# Patient Record
Sex: MALE | Race: WHITE
[De-identification: names, ages, dates, MRNs, and addresses within clinical notes are randomized per-mention and may not be internally consistent; named-entity substitution may affect disease eponyms.]

---

## 2022-11-21 ENCOUNTER — HOSPITAL ENCOUNTER (OUTPATIENT)
Dept: HOSPITAL 47 - CATHCVL | Age: 62
Discharge: HOME | End: 2022-11-21
Attending: INTERNAL MEDICINE
Payer: COMMERCIAL

## 2022-11-21 VITALS — HEART RATE: 80 BPM

## 2022-11-21 VITALS — DIASTOLIC BLOOD PRESSURE: 85 MMHG | SYSTOLIC BLOOD PRESSURE: 122 MMHG

## 2022-11-21 VITALS — RESPIRATION RATE: 16 BRPM

## 2022-11-21 DIAGNOSIS — E11.9: ICD-10-CM

## 2022-11-21 DIAGNOSIS — I50.9: ICD-10-CM

## 2022-11-21 DIAGNOSIS — E78.5: ICD-10-CM

## 2022-11-21 DIAGNOSIS — I11.0: ICD-10-CM

## 2022-11-21 DIAGNOSIS — I48.11: Primary | ICD-10-CM

## 2022-11-21 DIAGNOSIS — F17.210: ICD-10-CM

## 2022-11-21 LAB
ANION GAP SERPL CALC-SCNC: 9 MMOL/L
BUN SERPL-SCNC: 26 MG/DL (ref 9–20)
CALCIUM SPEC-MCNC: 9.7 MG/DL (ref 8.4–10.2)
CHLORIDE SERPL-SCNC: 106 MMOL/L (ref 98–107)
CO2 SERPL-SCNC: 24 MMOL/L (ref 22–30)
GLUCOSE BLD-MCNC: 168 MG/DL (ref 70–110)
GLUCOSE SERPL-MCNC: 176 MG/DL (ref 74–99)
POTASSIUM SERPL-SCNC: 4.2 MMOL/L (ref 3.5–5.1)
SODIUM SERPL-SCNC: 139 MMOL/L (ref 137–145)

## 2022-11-21 PROCEDURE — 93312 ECHO TRANSESOPHAGEAL: CPT

## 2022-11-21 PROCEDURE — 93325 DOPPLER ECHO COLOR FLOW MAPG: CPT

## 2022-11-21 PROCEDURE — 93320 DOPPLER ECHO COMPLETE: CPT

## 2022-11-21 PROCEDURE — 92960 CARDIOVERSION ELECTRIC EXT: CPT

## 2022-11-21 PROCEDURE — 80048 BASIC METABOLIC PNL TOTAL CA: CPT

## 2022-11-21 NOTE — P.TEE
Description of Procedure(s): 


Procedure performed: Transesophageal Echocardiogram with color flow doppler, 

pulsed wave doppler and continuous wave doppler, cardioversion





Moderate conscious sedation:  Moderate conscious sedation was supplied by 

anesthesia, see separate report





Complications: none





Indications: Symptomatic A. fib





PROCEDURE: After the risks, benefits and alternatives of the above mentioned 

procedure was explained in detail with the patient, informed consent was 

obtained.  Patient was brought to the lab in a fasting state.  Patient was given

IV Versed and Fentanyl for sedation.  The throat was sprayed with Hurricane to 

anesthetize the throat.  A lubricated Omni probe was then introduced into the 

esophagus and stomach and multiple views were obtained.  2D echo with color flow

doppler, pulsed wave doppler and continuous wave doppler was utilized.  Agitated

saline bubbles were injected to assess for any intra-atrial shunt.  The probe wa

s then removed.  Synchronize cardioversion 1 was performed with 200 J with 

resultant sinus rhythm.  Patient tolerated the procedure well.  Patient was 

transferred to the post procedure area in stable and satisfactory condition.





   


                FINDINGS: 


   1.  The aortic valve is tricuspid and functioning normally.


   2.  The mitral valve appears be normal with mild mitral regurgitation.


   3.  Tricuspid valve with trace tricuspid regurgitation.


   4.  The interatrial septum is intact.  No evidence of PFO.


   5.  Left atrial appendage is free of clot.  There is smoke noted in left 

atrial appendage however no thrombus.


   6.  Left ventricular ejection fraction is 15-20% with global hypokinesis

## 2023-06-20 ENCOUNTER — HOSPITAL ENCOUNTER (EMERGENCY)
Dept: HOSPITAL 47 - EC | Age: 63
LOS: 1 days | Discharge: OTHER INSTITUTION | End: 2023-06-21
Payer: COMMERCIAL

## 2023-06-20 DIAGNOSIS — E87.70: Primary | ICD-10-CM

## 2023-06-20 PROCEDURE — 84484 ASSAY OF TROPONIN QUANT: CPT

## 2023-06-20 PROCEDURE — 71046 X-RAY EXAM CHEST 2 VIEWS: CPT

## 2023-06-20 PROCEDURE — 86850 RBC ANTIBODY SCREEN: CPT

## 2023-06-20 PROCEDURE — 87635 SARS-COV-2 COVID-19 AMP PRB: CPT

## 2023-06-20 PROCEDURE — 85730 THROMBOPLASTIN TIME PARTIAL: CPT

## 2023-06-20 PROCEDURE — 85610 PROTHROMBIN TIME: CPT

## 2023-06-20 PROCEDURE — 85025 COMPLETE CBC W/AUTO DIFF WBC: CPT

## 2023-06-20 PROCEDURE — 85027 COMPLETE CBC AUTOMATED: CPT

## 2023-06-20 PROCEDURE — 36430 TRANSFUSION BLD/BLD COMPNT: CPT

## 2023-06-20 PROCEDURE — 99285 EMERGENCY DEPT VISIT HI MDM: CPT

## 2023-06-20 PROCEDURE — 86900 BLOOD TYPING SEROLOGIC ABO: CPT

## 2023-06-20 PROCEDURE — 86901 BLOOD TYPING SEROLOGIC RH(D): CPT

## 2023-06-20 PROCEDURE — 82272 OCCULT BLD FECES 1-3 TESTS: CPT

## 2023-06-20 PROCEDURE — 83735 ASSAY OF MAGNESIUM: CPT

## 2023-06-20 PROCEDURE — 86920 COMPATIBILITY TEST SPIN: CPT

## 2023-06-20 PROCEDURE — 80320 DRUG SCREEN QUANTALCOHOLS: CPT

## 2023-06-20 PROCEDURE — 83880 ASSAY OF NATRIURETIC PEPTIDE: CPT

## 2023-06-20 PROCEDURE — 83605 ASSAY OF LACTIC ACID: CPT

## 2023-06-20 PROCEDURE — 36415 COLL VENOUS BLD VENIPUNCTURE: CPT

## 2023-06-20 PROCEDURE — 80053 COMPREHEN METABOLIC PANEL: CPT

## 2023-06-21 LAB
ALBUMIN SERPL-MCNC: 4.4 G/DL (ref 3.5–5)
ALP SERPL-CCNC: 63 U/L (ref 38–126)
ALT SERPL-CCNC: 16 U/L (ref 4–49)
ANION GAP SERPL CALC-SCNC: 15 MMOL/L
APTT BLD: 24.3 SEC (ref 22–30)
AST SERPL-CCNC: 21 U/L (ref 17–59)
BASOPHILS # BLD AUTO: 0 K/UL (ref 0–0.2)
BASOPHILS NFR BLD AUTO: 0 %
BUN SERPL-SCNC: 88 MG/DL (ref 9–20)
CALCIUM SPEC-MCNC: 9.6 MG/DL (ref 8.4–10.2)
CHLORIDE SERPL-SCNC: 104 MMOL/L (ref 98–107)
CO2 SERPL-SCNC: 22 MMOL/L (ref 22–30)
EOSINOPHIL # BLD AUTO: 0.1 K/UL (ref 0–0.7)
EOSINOPHIL NFR BLD AUTO: 1 %
ERYTHROCYTE [DISTWIDTH] IN BLOOD BY AUTOMATED COUNT: 3.56 M/UL (ref 4.3–5.9)
ERYTHROCYTE [DISTWIDTH] IN BLOOD BY AUTOMATED COUNT: 3.97 M/UL (ref 4.3–5.9)
ERYTHROCYTE [DISTWIDTH] IN BLOOD: 14.4 % (ref 11.5–15.5)
ERYTHROCYTE [DISTWIDTH] IN BLOOD: 14.5 % (ref 11.5–15.5)
GLUCOSE SERPL-MCNC: 152 MG/DL (ref 74–99)
HCT VFR BLD AUTO: 35.7 % (ref 39–53)
HCT VFR BLD AUTO: 39.8 % (ref 39–53)
HGB BLD-MCNC: 11.4 GM/DL (ref 13–17.5)
HGB BLD-MCNC: 12.9 GM/DL (ref 13–17.5)
INR PPP: 1.2 (ref ?–1.2)
LYMPHOCYTES # SPEC AUTO: 1.4 K/UL (ref 1–4.8)
LYMPHOCYTES NFR SPEC AUTO: 12 %
MAGNESIUM SPEC-SCNC: 2 MG/DL (ref 1.6–2.3)
MCH RBC QN AUTO: 32.2 PG (ref 25–35)
MCH RBC QN AUTO: 32.4 PG (ref 25–35)
MCHC RBC AUTO-ENTMCNC: 32.1 G/DL (ref 31–37)
MCHC RBC AUTO-ENTMCNC: 32.4 G/DL (ref 31–37)
MCV RBC AUTO: 100.2 FL (ref 80–100)
MCV RBC AUTO: 100.2 FL (ref 80–100)
MONOCYTES # BLD AUTO: 0.8 K/UL (ref 0–1)
MONOCYTES NFR BLD AUTO: 6 %
NEUTROPHILS # BLD AUTO: 9.7 K/UL (ref 1.3–7.7)
NEUTROPHILS NFR BLD AUTO: 78 %
PLATELET # BLD AUTO: 251 K/UL (ref 150–450)
PLATELET # BLD AUTO: 284 K/UL (ref 150–450)
POTASSIUM SERPL-SCNC: 4 MMOL/L (ref 3.5–5.1)
PROT SERPL-MCNC: 7.8 G/DL (ref 6.3–8.2)
PT BLD: 12.4 SEC (ref 9–12)
SODIUM SERPL-SCNC: 141 MMOL/L (ref 137–145)
WBC # BLD AUTO: 12.4 K/UL (ref 3.8–10.6)
WBC # BLD AUTO: 13.8 K/UL (ref 3.8–10.6)

## 2023-06-21 NOTE — XR
EXAM:

  XR Chest, 2 Views

 

CLINICAL HISTORY:

  Chest pain, SOB

 

TECHNIQUE:

  Frontal and lateral views of the chest.

 

COMPARISON:

  No relevant prior studies available.

 

FINDINGS:

  Lungs:  Vascular congestion.

  Pleural space:  Unremarkable.  No pneumothorax. No pleural effusions.

  Heart:  Mild cardiomegaly.

  Mediastinum:  Unremarkable.

  Bones/joints:  No acute osseous abnormalities.

 

IMPRESSION:     

  Vascular congestion with mild enlargement of the cardiac silhouette.

## 2023-08-08 ENCOUNTER — HOSPITAL ENCOUNTER (OUTPATIENT)
Dept: HOSPITAL 47 - LABPAT | Age: 63
Discharge: HOME | End: 2023-08-08
Attending: INTERNAL MEDICINE
Payer: COMMERCIAL

## 2023-08-08 DIAGNOSIS — I49.9: ICD-10-CM

## 2023-08-08 DIAGNOSIS — Z01.818: Primary | ICD-10-CM

## 2023-08-08 LAB
ANION GAP SERPL CALC-SCNC: 11.7 MMOL/L (ref 4–12)
BUN SERPL-SCNC: 28.6 MG/DL (ref 9–27)
BUN/CREAT SERPL: 16.82 RATIO (ref 12–20)
CALCIUM SPEC-MCNC: 9.7 MG/DL (ref 8.7–10.3)
CHLORIDE SERPL-SCNC: 99 MMOL/L (ref 96–109)
CO2 SERPL-SCNC: 26.3 MMOL/L (ref 21.6–31.8)
GLUCOSE SERPL-MCNC: 161 MG/DL (ref 70–110)
POTASSIUM SERPL-SCNC: 5.1 MMOL/L (ref 3.5–5.5)
SODIUM SERPL-SCNC: 137 MMOL/L (ref 135–145)

## 2023-08-08 PROCEDURE — 80048 BASIC METABOLIC PNL TOTAL CA: CPT

## 2023-08-10 ENCOUNTER — HOSPITAL ENCOUNTER (OUTPATIENT)
Dept: HOSPITAL 47 - OR | Age: 63
Discharge: HOME | End: 2023-08-10
Attending: INTERNAL MEDICINE
Payer: COMMERCIAL

## 2023-08-10 VITALS — DIASTOLIC BLOOD PRESSURE: 81 MMHG | SYSTOLIC BLOOD PRESSURE: 127 MMHG | RESPIRATION RATE: 16 BRPM | HEART RATE: 53 BPM

## 2023-08-10 VITALS — TEMPERATURE: 97.5 F

## 2023-08-10 DIAGNOSIS — I48.11: Primary | ICD-10-CM

## 2023-08-10 DIAGNOSIS — I08.1: ICD-10-CM

## 2023-08-10 LAB
GLUCOSE BLD-MCNC: 112 MG/DL (ref 70–110)
GLUCOSE BLD-MCNC: 138 MG/DL (ref 70–110)

## 2023-08-10 PROCEDURE — 93320 DOPPLER ECHO COMPLETE: CPT

## 2023-08-10 PROCEDURE — 93312 ECHO TRANSESOPHAGEAL: CPT

## 2023-08-10 PROCEDURE — 93325 DOPPLER ECHO COLOR FLOW MAPG: CPT

## 2023-08-10 PROCEDURE — 92960 CARDIOVERSION ELECTRIC EXT: CPT

## 2023-08-10 PROCEDURE — 93005 ELECTROCARDIOGRAM TRACING: CPT

## 2023-08-10 NOTE — P.TEE
Description of Procedure(s): 


Procedure performed: Transesophageal Echocardiogram with color flow doppler, and

continuous wave doppler, synchronized cardioversion





Moderate conscious sedation:  Moderate conscious sedation was supplied by 

anesthesia, see separate report.





Complications: none





Indications: Afib





PROCEDURE: After the risks, benefits and alternatives of the above mentioned 

procedure was explained in detail with the patient, informed consent was 

obtained.  Patient was brought to the lab in a fasting state.  Patient was given

IV Versed and Fentanyl for sedation.  The throat was sprayed with Hurricane to 

anesthetize the throat.  A lubricated Omni probe was then introduced into the 

esophagus and stomach and multiple views were obtained.  2D echo with color flow

doppler, pulsed wave doppler and continuous wave doppler was utilized.  Agitated

saline bubbles were injected to assess for any intra-atrial shunt.  The probe 

was then removed.  There was no thrombus noted and therefore patient underwent 

synchronized cardioversion x 1 with 200J with resultant sinus rhythm.  Patient 

tolerated the procedure well.  Patient was transferred to the post procedure 

area in stable and satisfactory condition.





   


                FINDINGS: 


   1.  The aortic valve is tricuspid and function normally with trace AI.


   2.  The mitral valve appears be normal with mild regurgitation.


   3.  Tricuspid valve appears to be normal with moderate to severe tricuspid 

regurgitation.


   4.  There is a PFO.


   5.  Left atrial appendage is free of clot.


   6.  Left ventricular EF is 35% with global hypokinesis

## 2025-02-23 ENCOUNTER — HOSPITAL ENCOUNTER (INPATIENT)
Dept: HOSPITAL 47 - EC | Age: 65
LOS: 12 days | Discharge: HOME | DRG: 871 | End: 2025-03-07
Attending: HOSPITALIST | Admitting: HOSPITALIST
Payer: MEDICARE

## 2025-02-23 VITALS — BODY MASS INDEX: 26.2 KG/M2

## 2025-02-23 DIAGNOSIS — Z87.891: ICD-10-CM

## 2025-02-23 DIAGNOSIS — J44.1: ICD-10-CM

## 2025-02-23 DIAGNOSIS — N18.31: ICD-10-CM

## 2025-02-23 DIAGNOSIS — Q21.12: ICD-10-CM

## 2025-02-23 DIAGNOSIS — I07.1: ICD-10-CM

## 2025-02-23 DIAGNOSIS — I13.0: ICD-10-CM

## 2025-02-23 DIAGNOSIS — N17.0: ICD-10-CM

## 2025-02-23 DIAGNOSIS — I48.19: ICD-10-CM

## 2025-02-23 DIAGNOSIS — I42.9: ICD-10-CM

## 2025-02-23 DIAGNOSIS — A41.89: Primary | ICD-10-CM

## 2025-02-23 DIAGNOSIS — I50.23: ICD-10-CM

## 2025-02-23 DIAGNOSIS — J10.08: ICD-10-CM

## 2025-02-23 DIAGNOSIS — E11.22: ICD-10-CM

## 2025-02-23 DIAGNOSIS — J10.01: ICD-10-CM

## 2025-02-23 DIAGNOSIS — J44.0: ICD-10-CM

## 2025-02-23 DIAGNOSIS — I21.A1: ICD-10-CM

## 2025-02-23 DIAGNOSIS — Z79.01: ICD-10-CM

## 2025-02-23 DIAGNOSIS — E78.5: ICD-10-CM

## 2025-02-23 DIAGNOSIS — E22.2: ICD-10-CM

## 2025-02-23 DIAGNOSIS — E11.65: ICD-10-CM

## 2025-02-23 DIAGNOSIS — T50.2X5A: ICD-10-CM

## 2025-02-23 DIAGNOSIS — E87.20: ICD-10-CM

## 2025-02-23 DIAGNOSIS — Z79.899: ICD-10-CM

## 2025-02-23 DIAGNOSIS — I16.1: ICD-10-CM

## 2025-02-23 DIAGNOSIS — J80: ICD-10-CM

## 2025-02-23 DIAGNOSIS — F41.9: ICD-10-CM

## 2025-02-23 DIAGNOSIS — I45.9: ICD-10-CM

## 2025-02-23 DIAGNOSIS — J15.9: ICD-10-CM

## 2025-02-23 LAB
ALBUMIN SERPL-MCNC: 4 G/DL (ref 3.5–5)
ALP SERPL-CCNC: 59 U/L (ref 38–126)
ALT SERPL-CCNC: 31 U/L (ref 4–49)
ANION GAP SERPL CALC-SCNC: 14 MMOL/L
APTT BLD: 24.5 SEC (ref 22–30)
AST SERPL-CCNC: 58 U/L (ref 17–59)
BASOPHILS # BLD AUTO: 0.1 K/UL (ref 0–0.2)
BASOPHILS NFR BLD AUTO: 0 %
BUN SERPL-SCNC: 25 MG/DL (ref 9–20)
CALCIUM SPEC-MCNC: 8.9 MG/DL (ref 8.4–10.2)
CHLORIDE SERPL-SCNC: 101 MMOL/L (ref 98–107)
CO2 SERPL-SCNC: 21 MMOL/L (ref 22–30)
EOSINOPHIL # BLD AUTO: 0.1 K/UL (ref 0–0.7)
EOSINOPHIL NFR BLD AUTO: 0 %
ERYTHROCYTE [DISTWIDTH] IN BLOOD BY AUTOMATED COUNT: 5.14 M/UL (ref 4.3–5.9)
ERYTHROCYTE [DISTWIDTH] IN BLOOD: 13 % (ref 11.5–15.5)
GLUCOSE SERPL-MCNC: 228 MG/DL (ref 74–99)
HCT VFR BLD AUTO: 49.2 % (ref 39–53)
HGB BLD-MCNC: 15.9 GM/DL (ref 13–17.5)
INR PPP: 1.1 (ref ?–1.2)
LYMPHOCYTES # SPEC AUTO: 2.9 K/UL (ref 1–4.8)
LYMPHOCYTES NFR SPEC AUTO: 23 %
MAGNESIUM SPEC-SCNC: 1.6 MG/DL (ref 1.6–2.3)
MCH RBC QN AUTO: 30.8 PG (ref 25–35)
MCHC RBC AUTO-ENTMCNC: 32.3 G/DL (ref 31–37)
MCV RBC AUTO: 95.6 FL (ref 80–100)
MONOCYTES # BLD AUTO: 0.7 K/UL (ref 0–1)
MONOCYTES NFR BLD AUTO: 6 %
NEUTROPHILS # BLD AUTO: 8.6 K/UL (ref 1.3–7.7)
NEUTROPHILS NFR BLD AUTO: 68 %
NT-PROBNP SERPL-MCNC: (no result) PG/ML
PLATELET # BLD AUTO: 194 K/UL (ref 150–450)
POTASSIUM SERPL-SCNC: 4.6 MMOL/L (ref 3.5–5.1)
PROT SERPL-MCNC: 7.2 G/DL (ref 6.3–8.2)
PT BLD: 11.9 SEC (ref 10–12.5)
SODIUM SERPL-SCNC: 136 MMOL/L (ref 137–145)
WBC # BLD AUTO: 12.7 K/UL (ref 3.8–10.6)

## 2025-02-23 PROCEDURE — 83036 HEMOGLOBIN GLYCOSYLATED A1C: CPT

## 2025-02-23 PROCEDURE — 96375 TX/PRO/DX INJ NEW DRUG ADDON: CPT

## 2025-02-23 PROCEDURE — 84484 ASSAY OF TROPONIN QUANT: CPT

## 2025-02-23 PROCEDURE — 81001 URINALYSIS AUTO W/SCOPE: CPT

## 2025-02-23 PROCEDURE — 93306 TTE W/DOPPLER COMPLETE: CPT

## 2025-02-23 PROCEDURE — 96366 THER/PROPH/DIAG IV INF ADDON: CPT

## 2025-02-23 PROCEDURE — 85025 COMPLETE CBC W/AUTO DIFF WBC: CPT

## 2025-02-23 PROCEDURE — 87449 NOS EACH ORGANISM AG IA: CPT

## 2025-02-23 PROCEDURE — 99291 CRITICAL CARE FIRST HOUR: CPT

## 2025-02-23 PROCEDURE — 87040 BLOOD CULTURE FOR BACTERIA: CPT

## 2025-02-23 PROCEDURE — 71045 X-RAY EXAM CHEST 1 VIEW: CPT

## 2025-02-23 PROCEDURE — 80053 COMPREHEN METABOLIC PANEL: CPT

## 2025-02-23 PROCEDURE — 94660 CPAP INITIATION&MGMT: CPT

## 2025-02-23 PROCEDURE — 82805 BLOOD GASES W/O2 SATURATION: CPT

## 2025-02-23 PROCEDURE — 85730 THROMBOPLASTIN TIME PARTIAL: CPT

## 2025-02-23 PROCEDURE — 83880 ASSAY OF NATRIURETIC PEPTIDE: CPT

## 2025-02-23 PROCEDURE — 94640 AIRWAY INHALATION TREATMENT: CPT

## 2025-02-23 PROCEDURE — 85027 COMPLETE CBC AUTOMATED: CPT

## 2025-02-23 PROCEDURE — 83735 ASSAY OF MAGNESIUM: CPT

## 2025-02-23 PROCEDURE — 87636 SARSCOV2 & INF A&B AMP PRB: CPT

## 2025-02-23 PROCEDURE — 84145 PROCALCITONIN (PCT): CPT

## 2025-02-23 PROCEDURE — 96367 TX/PROPH/DG ADDL SEQ IV INF: CPT

## 2025-02-23 PROCEDURE — 76770 US EXAM ABDO BACK WALL COMP: CPT

## 2025-02-23 PROCEDURE — 96365 THER/PROPH/DIAG IV INF INIT: CPT

## 2025-02-23 PROCEDURE — 36600 WITHDRAWAL OF ARTERIAL BLOOD: CPT

## 2025-02-23 PROCEDURE — 94760 N-INVAS EAR/PLS OXIMETRY 1: CPT

## 2025-02-23 PROCEDURE — 96361 HYDRATE IV INFUSION ADD-ON: CPT

## 2025-02-23 PROCEDURE — 82272 OCCULT BLD FECES 1-3 TESTS: CPT

## 2025-02-23 PROCEDURE — 36415 COLL VENOUS BLD VENIPUNCTURE: CPT

## 2025-02-23 PROCEDURE — 80048 BASIC METABOLIC PNL TOTAL CA: CPT

## 2025-02-23 PROCEDURE — 93005 ELECTROCARDIOGRAM TRACING: CPT

## 2025-02-23 PROCEDURE — 85610 PROTHROMBIN TIME: CPT

## 2025-02-23 PROCEDURE — 83605 ASSAY OF LACTIC ACID: CPT

## 2025-02-23 RX ADMIN — ENALAPRILAT STA: 1.25 INJECTION INTRAVENOUS at 22:19

## 2025-02-23 RX ADMIN — ENALAPRILAT STA MG: 1.25 INJECTION INTRAVENOUS at 22:19

## 2025-02-23 NOTE — XR
EXAM:

  XR Chest, 1 View

 

CLINICAL HISTORY:

  ITS.REASON XR Reason: sob

 

TECHNIQUE:

  Frontal view of the chest.

 

COMPARISON:

  Chest x-ray of 6/21/2023.

 

FINDINGS:

  Lungs:  There is consolidative change at the left mid and lower lung 

zones as well as to a lesser extent at the left suprahilar region most 

suggestive of multifocal pneumonia.  Possible minimal patchy airspace 

disease at the right upper lobe.

  Pleural space:  Unremarkable.  No pneumothorax.

  Heart:  Cardiomegaly.

  Mediastinum:  Unremarkable.  Normal mediastinal contour.

  Bones/joints:  Unremarkable.  No acute fracture.

  Vasculature:  Atherosclerotic disease.

  Other findings:  Hypoaeration.

 

IMPRESSION:     

1.  Cardiomegaly.

2.  Areas of airspace disease most notably at the left mid lower lung 

zones most suggestive of multifocal pneumonia.  Clinical correlation and 

short-term imaging in 4-6 weeks is advised to document resolution.

3.  If there is concern for other etiologies, CT imaging of the chest 

should be performed.

## 2025-02-23 NOTE — ED
SOB HPI





- General


Chief Complaint: Shortness of Breath


Stated Complaint: SOB


Time Seen by Provider: 02/23/25 21:56


Source: patient, EMS, RN notes reviewed, old records reviewed


Mode of arrival: EMS


Limitations: no limitations





- History of Present Illness


Initial Comments: 





This is a 64 male to the ER for evaluation of severe respiratory distress 

shortness of breath a few days of cough congestion.  Patient presents in 

extremis with severe hypoxia per EMS, patient presents on BiPAP, CPAP secondary 

to respiratory distress respiratory failure, patient has history of smoking 

history of heart disease and severely elevated blood pressure on arrival to the 

ER


MD Complaint: shortness of breath, anxiety


-: hour(s)


Severity: severe


Severity scale (1-10): 10


Quality: aching, throbbing, stabbing


Consistency: constant


Improves With: nothing


Worsens With: exertion


Known History Of: COPD, asthma, congestive heart failure


Context: anxiety, recent illness


Associated Symptoms: pain with inspiration, fever


Treatments Prior to Arrival: none





- Related Data


                                Home Medications











 Medication  Instructions  Recorded  Confirmed


 


Apixaban [Eliquis] 5 mg PO BID 11/17/22 02/24/25


 


Furosemide [Lasix] 40 mg PO DAILY 11/17/22 02/24/25


 


Amiodarone [Cordarone] 200 mg PO DAILY 08/07/23 02/24/25


 


Metoprolol Succinate (ER) [Toprol 25 mg PO DAILY 02/24/25 02/24/25





Xl]   


 


lisinopriL [Zestril] 20 mg PO BID 02/24/25 02/24/25











                                    Allergies











Allergy/AdvReac Type Severity Reaction Status Date / Time


 


No Known Allergies Allergy   Verified 02/24/25 07:12














Review of Systems


ROS Statement: 


Those systems with pertinent positive or pertinent negative responses have been 

documented in the HPI.





ROS Other: All systems not noted in ROS Statement are negative.





Past Medical History


Past Medical History: Atrial Fibrillation, Diabetes Mellitus, Hyperlipidemia, 

Hypertension, Pneumonia, Supraventricular Tachycardia (SVT)


Additional Past Medical History / Comment(s): a fib, causes shortness of breath,

 can feel irregular heartbeat


History of Any Multi-Drug Resistant Organisms: None Reported


Past Surgical History: Cholecystectomy, Heart Catheterization


Additional Past Surgical History / Comment(s): Colonoscopy


Past Anesthesia/Blood Transfusion Reactions: No Reported Reaction


Past Psychological History: No Psychological Hx Reported


Smoking Status: Current every day smoker


Past Alcohol Use History: Occasional


Past Drug Use History: Marijuana





- Past Family History


  ** Father


Family Medical History: No Reported History





  ** Mother


Family Medical History: Coronary Artery Disease (CAD)





General Exam


Limitations: no limitations


General appearance: alert, in no apparent distress, anxious


Head exam: Present: atraumatic, normocephalic, normal inspection


Eye exam: Present: normal appearance, PERRL, EOMI.  Absent: scleral icterus, 

conjunctival injection, periorbital swelling


ENT exam: Present: normal exam, mucous membranes moist


Neck exam: Present: normal inspection.  Absent: tenderness, meningismus, 

lymphadenopathy


Respiratory exam: Present: respiratory distress, wheezes, rhonchi, decreased 

breath sounds, prolonged expiratory.  Absent: rales, stridor


Cardiovascular Exam: Present: regular rate, normal rhythm, normal heart sounds. 

 Absent: systolic murmur, diastolic murmur, rubs, gallop, clicks


GI/Abdominal exam: Present: soft, normal bowel sounds.  Absent: distended, 

tenderness, guarding, rebound, rigid


Extremities exam: Present: normal inspection, full ROM, normal capillary refill.

  Absent: tenderness, pedal edema, joint swelling, calf tenderness


Back exam: Present: normal inspection


Neurological exam: Present: alert, oriented X3, CN II-XII intact


Psychiatric exam: Present: normal affect, normal mood


Skin exam: Present: warm, dry, intact, normal color.  Absent: rash





Course


                                   Vital Signs











  02/23/25 02/23/25 02/23/25





  21:56 22:06 22:18


 


Temperature   


 


Pulse Rate 93 91 


 


Respiratory 33 H 40 H 





Rate   


 


Blood Pressure 201/113 177/97 


 


O2 Sat by Pulse 98 96 





Oximetry   


 


Fraction of   100





Inspired Oxygen   





(FIO2)   














  02/24/25 02/24/25 02/24/25





  00:49 00:59 02:07


 


Temperature   


 


Pulse Rate 63 65 


 


Respiratory   





Rate   


 


Blood Pressure   


 


O2 Sat by Pulse   





Oximetry   


 


Fraction of   50





Inspired Oxygen   





(FIO2)   














  02/24/25 02/24/25 02/24/25





  02:44 04:10 05:00


 


Temperature   


 


Pulse Rate 58 L  75


 


Respiratory 21  28 H





Rate   


 


Blood Pressure 122/67  161/88


 


O2 Sat by Pulse 99  96





Oximetry   


 


Fraction of  50 





Inspired Oxygen   





(FIO2)   














  02/24/25 02/24/25 02/24/25





  07:46 08:05 08:56


 


Temperature   102.3 F H


 


Pulse Rate 71 72 74


 


Respiratory   18





Rate   


 


Blood Pressure   105/79


 


O2 Sat by Pulse 94 L  89 L





Oximetry   


 


Fraction of   





Inspired Oxygen   





(FIO2)   














  02/24/25 02/24/25 02/24/25





  09:52 11:28 12:13


 


Temperature 98.4 F  


 


Pulse Rate  59 L 66


 


Respiratory  18 





Rate   


 


Blood Pressure  105/79 


 


O2 Sat by Pulse  60 L 96





Oximetry   


 


Fraction of   





Inspired Oxygen   





(FIO2)   














  02/24/25 02/24/25 02/24/25





  12:41 13:15 15:11


 


Temperature 99.1 F  98.8 F


 


Pulse Rate 76 77 79


 


Respiratory 22 30 H 32 H





Rate   


 


Blood Pressure 164/96  162/97


 


O2 Sat by Pulse 91 L 94 L 97





Oximetry   


 


Fraction of  50 





Inspired Oxygen   





(FIO2)   














  02/24/25





  15:38


 


Temperature 


 


Pulse Rate 


 


Respiratory 





Rate 


 


Blood Pressure 


 


O2 Sat by Pulse 92 L





Oximetry 


 


Fraction of 





Inspired Oxygen 





(FIO2) 














- Reevaluation(s)


Reevaluation #1: 





02/23/25 23:45


Medical records reviewed





History of atrial fibrillation history of GI bleed on anticoagulation no noted 

history of heart failure


Reevaluation #2: 





02/23/25 23:50


Patient placed on BiPAP immediately on arrival to the emergency department in s

evere distress 





patient given Vasotec for elevated blood pressure here in the ER





Significant improved blood pressure management





Patient continued to improve on BiPAP with breathing treatments and requesting 

to be discharged home informed that will not be a lopes plan from today and he 

accepts


Reevaluation #3: 





02/24/25 00:29


Patient informed of results and questions answered


Reevaluation #4: 





Was pt. sent in by a medical professional or institution (, PA, NP, urgent 

care, hospital, or nursing home...) When possible be specific


@  -no


Did you speak to anyone other than the patient for history (EMS, parent, family,

 police, friend...)? What history was obtained from this source 


@  -no


Did you review nursing and triage notes (agree or disagree)?  Why? 


@  -agree


Are old charts reviewed (outside hosp., previous admission, EMS record, old EKG,

 old radiological studies, urgent care reports/EKG's, nursing home records)? 

Report findings 


@  -yes


Differential Diagnosis (chest pain, altered mental status, abdominal pain women,

 abdominal pain men, vaginal bleeding, weakness, fever, dyspnea, syncope, 

headache, dizziness, GI bleed, back pain, seizure, CVA, palpatations, mental 

health, musculoskeletal)? 


@  -prior


EKG interpreted by me (3pts min.).


@  -yes


X-rays interpreted by me (1pt min.).


@  -yes with pneumonia on x-ray


CT interpreted by me (1pt min.).


@  -no


U/S interpreted by me (1pt. min.).


@  -no


What testing was considered but not performed or refused? (CT, X-rays, U/S, 

labs)? Why?


@  -none


What meds were considered but not given or refused? Why?


@  -none


Did you discuss the management of the patient with other professionals 

(professionals i.e. , PA, NP, lab, RT, psych nurse, , , 

teacher, , )? Give summary


@  -no


Was smoking cessation discussed for >3mins.?


@  -no


Was critical care preformed (if so, how long)?


@  -yes65


Were there social determinants of health that impacted care today? How? 

(Homelessness, low income, unemployed, alcoholism, drug addiction, transport

ation, low edu. Level, literacy, decrease access to med. care, assisted, rehab)?


@  -none


Was there de-escalation of care discussed even if they declined (Discuss DNR or 

withdrawal of care, Hospice)? DNR status


@  -no


What co-morbidities impacted this encounter? (DM, HTN, Smoking, COPD, CAD, 

Cancer, CVA, ARF, Chemo, Hep., AIDS, mental health diagnosis, sleep apnea, 

morbid obesity)?


@  -none


Was patient admitted / discharged? Hospital course, mention meds given and 

route, prescriptions, significant lab abnormalities, going to OR and other 

pertinent info.


@  - 64 male to the ER for evaluation of severe dyspnea shortness of breath, 

patient is in severe respiratory distress respiratory failure on BiPAP hypoxic 

respiratory failure on BiPAP secondary to pneumonia influenza and underlying 

history of COPD


Admitted


Undiagnosed new problem with uncertain prognosis?


@  -no


Drug Therapy requiring intensive monitoring for toxicity (Heparin, Nitro, 

Insulin, Cardizem)?


@  -no


Were any procedures done?


@  -no


Diagnosis/symptom?


@  -COPD hypoxic BiPAP respiratory failure influenza and COPD


Acute, or Chronic, or Acute on Chronic?


@  -Acute


Uncomplicated (without systemic symptoms) or Complicated (systemic symptoms)?


@  -Complicated


Side effects of treatment?


@  -no


Exacerbation, Progression, or Severe Exacerbation?


@  -exacerbation


Poses a threat to life or bodily function? How? (Chest pain, USA, MI, pneumonia,

 PE, COPD, DKA, ARF, appy, cholecystitis, CVA, Diverticulitis, Homicidal, 

Suicidal, threat to staff... and all critical care pts)


@  -yes with respiratory failure


Reevaluation #5: 





Differential Dyspnea:


Coronary syndrome, arrhythmia, tamponade, asthma, COPD, pulmonary embolism, 

pneumonia, pneumothorax, pulmonary effusion, anaphylaxis, diabetic ketoacidosis,

 flailed chest, pulmonary contusion, diaphragmatic rupture, anemia, 

neuromuscular, this is not meant to be an all-inclusive list. 








- Consultations


Consultation #1: 





Spoke with sound who agrees to admit the patient





Medical Decision Making





- Medical Decision Making





64 male to the ER for evaluation of severe dyspnea shortness of breath, patient 

is in severe respiratory distress respiratory failure on BiPAP hypoxic 

respiratory failure on BiPAP secondary to pneumonia influenza and underlying 

history of COPD





- Lab Data


Result diagrams: 


                                 03/02/25 07:14





                                 03/02/25 07:14


                                   Lab Results











  02/23/25 02/23/25 02/23/25 Range/Units





  02:30 22:14 22:14 


 


WBC   12.7 H   (3.8-10.6)  k/uL


 


RBC   5.14   (4.30-5.90)  m/uL


 


Hgb   15.9   (13.0-17.5)  gm/dL


 


Hct   49.2   (39.0-53.0)  %


 


MCV   95.6   (80.0-100.0)  fL


 


MCH   30.8   (25.0-35.0)  pg


 


MCHC   32.3   (31.0-37.0)  g/dL


 


RDW   13.0   (11.5-15.5)  %


 


Plt Count   194   (150-450)  k/uL


 


MPV   9.2   


 


Neutrophils %   68   %


 


Lymphocytes %   23   %


 


Monocytes %   6   %


 


Eosinophils %   0   %


 


Basophils %   0   %


 


Neutrophils #   8.6 H   (1.3-7.7)  k/uL


 


Lymphocytes #   2.9   (1.0-4.8)  k/uL


 


Monocytes #   0.7   (0-1.0)  k/uL


 


Eosinophils #   0.1   (0-0.7)  k/uL


 


Basophils #   0.1   (0-0.2)  k/uL


 


PT    11.9  (10.0-12.5)  sec


 


INR    1.1  (<1.2)  


 


APTT    24.5  (22.0-30.0)  sec


 


Sodium     (137-145)  mmol/L


 


Potassium     (3.5-5.1)  mmol/L


 


Chloride     ()  mmol/L


 


Carbon Dioxide     (22-30)  mmol/L


 


Anion Gap     mmol/L


 


BUN     (9-20)  mg/dL


 


Creatinine     (0.66-1.25)  mg/dL


 


Est GFR (CKD-EPI)AfAm     (>60 ml/min/1.73 sqM)  


 


Est GFR (CKD-EPI)NonAf     (>60 ml/min/1.73 sqM)  


 


Glucose     (74-99)  mg/dL


 


Lactic Ac Sepsis Rflx     


 


Plasma Lactic Acid Jeronimo     (0.7-2.0)  mmol/L


 


Calcium     (8.4-10.2)  mg/dL


 


Magnesium     (1.6-2.3)  mg/dL


 


Total Bilirubin     (0.2-1.3)  mg/dL


 


AST     (17-59)  U/L


 


ALT     (4-49)  U/L


 


Alkaline Phosphatase     ()  U/L


 


Troponin I     (0.000-0.034)  ng/mL


 


NT-Pro-B Natriuret Pep     pg/mL


 


Total Protein     (6.3-8.2)  g/dL


 


Albumin     (3.5-5.0)  g/dL


 


Procalcitonin  2.38 H    (0.02-0.50)  ng/mL


 


Influenza Type A (PCR)     (Not Detectd)  


 


Influenza Type B (PCR)     (Not Detectd)  


 


RSV (PCR)     (Not Detectd)  


 


SARS-CoV-2 (PCR)     (Not Detectd)  














  02/23/25 02/23/25 02/23/25 Range/Units





  22:14 22:14 22:14 


 


WBC     (3.8-10.6)  k/uL


 


RBC     (4.30-5.90)  m/uL


 


Hgb     (13.0-17.5)  gm/dL


 


Hct     (39.0-53.0)  %


 


MCV     (80.0-100.0)  fL


 


MCH     (25.0-35.0)  pg


 


MCHC     (31.0-37.0)  g/dL


 


RDW     (11.5-15.5)  %


 


Plt Count     (150-450)  k/uL


 


MPV     


 


Neutrophils %     %


 


Lymphocytes %     %


 


Monocytes %     %


 


Eosinophils %     %


 


Basophils %     %


 


Neutrophils #     (1.3-7.7)  k/uL


 


Lymphocytes #     (1.0-4.8)  k/uL


 


Monocytes #     (0-1.0)  k/uL


 


Eosinophils #     (0-0.7)  k/uL


 


Basophils #     (0-0.2)  k/uL


 


PT     (10.0-12.5)  sec


 


INR     (<1.2)  


 


APTT     (22.0-30.0)  sec


 


Sodium  136 L    (137-145)  mmol/L


 


Potassium  4.6    (3.5-5.1)  mmol/L


 


Chloride  101    ()  mmol/L


 


Carbon Dioxide  21 L    (22-30)  mmol/L


 


Anion Gap  14    mmol/L


 


BUN  25 H    (9-20)  mg/dL


 


Creatinine  1.59 H    (0.66-1.25)  mg/dL


 


Est GFR (CKD-EPI)AfAm  52    (>60 ml/min/1.73 sqM)  


 


Est GFR (CKD-EPI)NonAf  45    (>60 ml/min/1.73 sqM)  


 


Glucose  228 H    (74-99)  mg/dL


 


Lactic Ac Sepsis Rflx     


 


Plasma Lactic Acid Jeronimo   3.2 H*   (0.7-2.0)  mmol/L


 


Calcium  8.9    (8.4-10.2)  mg/dL


 


Magnesium  1.6    (1.6-2.3)  mg/dL


 


Total Bilirubin  0.6    (0.2-1.3)  mg/dL


 


AST  58    (17-59)  U/L


 


ALT  31    (4-49)  U/L


 


Alkaline Phosphatase  59    ()  U/L


 


Troponin I    0.102 H*  (0.000-0.034)  ng/mL


 


NT-Pro-B Natriuret Pep  69370    pg/mL


 


Total Protein  7.2    (6.3-8.2)  g/dL


 


Albumin  4.0    (3.5-5.0)  g/dL


 


Procalcitonin     (0.02-0.50)  ng/mL


 


Influenza Type A (PCR)     (Not Detectd)  


 


Influenza Type B (PCR)     (Not Detectd)  


 


RSV (PCR)     (Not Detectd)  


 


SARS-CoV-2 (PCR)     (Not Detectd)  














  02/23/25 02/23/25 Range/Units





  22:18 23:00 


 


WBC    (3.8-10.6)  k/uL


 


RBC    (4.30-5.90)  m/uL


 


Hgb    (13.0-17.5)  gm/dL


 


Hct    (39.0-53.0)  %


 


MCV    (80.0-100.0)  fL


 


MCH    (25.0-35.0)  pg


 


MCHC    (31.0-37.0)  g/dL


 


RDW    (11.5-15.5)  %


 


Plt Count    (150-450)  k/uL


 


MPV    


 


Neutrophils %    %


 


Lymphocytes %    %


 


Monocytes %    %


 


Eosinophils %    %


 


Basophils %    %


 


Neutrophils #    (1.3-7.7)  k/uL


 


Lymphocytes #    (1.0-4.8)  k/uL


 


Monocytes #    (0-1.0)  k/uL


 


Eosinophils #    (0-0.7)  k/uL


 


Basophils #    (0-0.2)  k/uL


 


PT    (10.0-12.5)  sec


 


INR    (<1.2)  


 


APTT    (22.0-30.0)  sec


 


Sodium    (137-145)  mmol/L


 


Potassium    (3.5-5.1)  mmol/L


 


Chloride    ()  mmol/L


 


Carbon Dioxide    (22-30)  mmol/L


 


Anion Gap    mmol/L


 


BUN    (9-20)  mg/dL


 


Creatinine    (0.66-1.25)  mg/dL


 


Est GFR (CKD-EPI)AfAm    (>60 ml/min/1.73 sqM)  


 


Est GFR (CKD-EPI)NonAf    (>60 ml/min/1.73 sqM)  


 


Glucose    (74-99)  mg/dL


 


Lactic Ac Sepsis Rflx   Y  


 


Plasma Lactic Acid Jeronimo    (0.7-2.0)  mmol/L


 


Calcium    (8.4-10.2)  mg/dL


 


Magnesium    (1.6-2.3)  mg/dL


 


Total Bilirubin    (0.2-1.3)  mg/dL


 


AST    (17-59)  U/L


 


ALT    (4-49)  U/L


 


Alkaline Phosphatase    ()  U/L


 


Troponin I    (0.000-0.034)  ng/mL


 


NT-Pro-B Natriuret Pep    pg/mL


 


Total Protein    (6.3-8.2)  g/dL


 


Albumin    (3.5-5.0)  g/dL


 


Procalcitonin    (0.02-0.50)  ng/mL


 


Influenza Type A (PCR)  Detected A   (Not Detectd)  


 


Influenza Type B (PCR)  Not Detected   (Not Detectd)  


 


RSV (PCR)  Not Detected   (Not Detectd)  


 


SARS-CoV-2 (PCR)  Not Detected   (Not Detectd)  














- EKG Data


-: EKG Interpreted by Me (EKG is sinus 93   QTc 439)





- Radiology Data


Radiology results: report reviewed (CXR is multifocal pneumonia), image reviewed





Critical Care Time


Critical Care Time: Yes


Total Critical Care Time: 65





Disposition


Clinical Impression: 


 Acute exacerbation of chronic obstructive pulmonary disease, Community acquired

 pneumonia, Congestive heart failure, Hypertensive emergency, Hypoxia, Acute 

respiratory failure





Disposition: ADMITTED AS IP TO THIS HOSP


Condition: Serious


Is patient prescribed a controlled substance at d/c from ED?: No


Time of Disposition: 00:30

## 2025-02-24 LAB
ANION GAP SERPL CALC-SCNC: 11 MMOL/L
BUN SERPL-SCNC: 29 MG/DL (ref 9–20)
CALCIUM SPEC-MCNC: 8.7 MG/DL (ref 8.4–10.2)
CHLORIDE SERPL-SCNC: 97 MMOL/L (ref 98–107)
CO2 SERPL-SCNC: 27 MMOL/L (ref 22–30)
ERYTHROCYTE [DISTWIDTH] IN BLOOD BY AUTOMATED COUNT: 4.56 M/UL (ref 4.3–5.9)
ERYTHROCYTE [DISTWIDTH] IN BLOOD: 13 % (ref 11.5–15.5)
GLUCOSE SERPL-MCNC: 174 MG/DL (ref 74–99)
HCT VFR BLD AUTO: 43.4 % (ref 39–53)
HGB BLD-MCNC: 14.3 GM/DL (ref 13–17.5)
MCH RBC QN AUTO: 31.5 PG (ref 25–35)
MCHC RBC AUTO-ENTMCNC: 33.1 G/DL (ref 31–37)
MCV RBC AUTO: 95.1 FL (ref 80–100)
PLATELET # BLD AUTO: 156 K/UL (ref 150–450)
POTASSIUM SERPL-SCNC: 4.3 MMOL/L (ref 3.5–5.1)
SODIUM SERPL-SCNC: 135 MMOL/L (ref 137–145)
WBC # BLD AUTO: 10.5 K/UL (ref 3.8–10.6)

## 2025-02-24 RX ADMIN — IPRATROPIUM BROMIDE AND ALBUTEROL SULFATE SCH ML: .5; 3 SOLUTION RESPIRATORY (INHALATION) at 07:46

## 2025-02-24 RX ADMIN — ATORVASTATIN CALCIUM SCH MG: 40 TABLET, FILM COATED ORAL at 21:33

## 2025-02-24 RX ADMIN — OSELTAMIVIR PHOSPHATE STA MG: 75 CAPSULE ORAL at 00:46

## 2025-02-24 RX ADMIN — ACETAMINOPHEN PRN MG: 325 TABLET, FILM COATED ORAL at 08:52

## 2025-02-24 RX ADMIN — AZITHROMYCIN MONOHYDRATE STA MLS/HR: 500 INJECTION, POWDER, LYOPHILIZED, FOR SOLUTION INTRAVENOUS at 00:51

## 2025-02-24 RX ADMIN — HEPARIN SODIUM SCH MLS/HR: 10000 INJECTION, SOLUTION INTRAVENOUS at 04:19

## 2025-02-24 RX ADMIN — HEPARIN SODIUM ONE UNIT: 1000 INJECTION, SOLUTION INTRAVENOUS; SUBCUTANEOUS at 04:18

## 2025-02-24 RX ADMIN — MORPHINE SULFATE STA MG: 2 INJECTION, SOLUTION INTRAMUSCULAR; INTRAVENOUS at 00:25

## 2025-02-24 RX ADMIN — METOPROLOL SUCCINATE SCH MG: 25 TABLET, EXTENDED RELEASE ORAL at 08:52

## 2025-02-24 RX ADMIN — AMIODARONE HYDROCHLORIDE SCH MG: 200 TABLET ORAL at 08:52

## 2025-02-24 RX ADMIN — CEFAZOLIN SCH MLS/HR: 330 INJECTION, POWDER, FOR SOLUTION INTRAMUSCULAR; INTRAVENOUS at 00:48

## 2025-02-24 RX ADMIN — FUROSEMIDE SCH MG: 10 INJECTION, SOLUTION INTRAMUSCULAR; INTRAVENOUS at 08:54

## 2025-02-24 RX ADMIN — IPRATROPIUM BROMIDE AND ALBUTEROL SULFATE STA ML: .5; 3 SOLUTION RESPIRATORY (INHALATION) at 00:49

## 2025-02-24 RX ADMIN — ASPIRIN 325 MG ORAL TABLET STA MG: 325 PILL ORAL at 04:23

## 2025-02-24 RX ADMIN — OSELTAMIVIR PHOSPHATE SCH MG: 30 CAPSULE ORAL at 08:05

## 2025-02-24 NOTE — P.CNPUL
History of Present Illness


Consult date: 02/24/25


Requesting physician: Ailyn Arce


Reason for consult: dyspnea, hypoxemia


Chief complaint: Severe shortness of breath, cough, congestion


History of present illness: 





This is a 64-year-old male patient with chronic and ongoing tobacco dependence, 

atrial fibrillation, diabetes mellitus, hypertension, hyperlipidemia who has a 1

week history of increasing shortness of breath, cough congestion fever and 

chills.  He came into the emergency room last evening with severe shortness of 

breath requiring BiPAP support which was 14/5 and 50% FiO2.  Chest x-ray 

revealed cardiomegaly and airspace disease most notably in the left mid lower 

lung zone suggestive of multifocal pneumonia and possible fluid volume overload.

 White count 10.5.  Hemoglobin 14.3.  Platelets 156.  Sodium 135.  Potassium 

4.3.  Bicarb 27.  BUN 29.  Creatinine 1.86.  Glucose 174.  Troponins 0.327, 

0.550, 0.5-2.  He is initiated on a heparin drip.  proBNP was 13,000.  

Procalcitonin 2.38.  He did test positive for influenza A.  Initiated on 

Tamiflu.  Initiated on ceftriaxone.  He is seen today in consultation in the 

emergency department.  He is currently sitting up on the stretcher.  Awake and 

alert.  He is on 6 L high flow nasal cannula.  He is breathing a bit easier 

today compared to yesterday.  Still with a loose nonproductive cough.  He did 

have a Tmax of 102.3.  Current temperature 99.1.  He is hemodynamically stable.





Review of Systems





REVIEW OF SYSTEMS:


CONSTITUTIONAL: Denies any recent significant weight loss or weight gain.


EYES: Denies change in vision.


EARS, NOSE, MOUTH, THROAT: Denies headaches, denies sore throat.


CARDIOVASCULAR: Denies chest pain, palpitations or syncopal episodes.


RESPIRATORY: Positive for shortness of breath, cough, congestion no hemoptysis.


GASTROINTESTINAL: Denies change in appetite, denies abdominal pain


GENITOURINARY: Denies hematuria, denies infections.


MUSKULOSKELETAL: Denies pain, denies swelling.


INTEGUMENTARY: Denies rash, denies eczema.


NEUROLOGICAL: Denies recent memory loss, no recent seizure activity. 


PSYCHIATRIC: Denies anxiety, denies depression.


HEMATOLOGIC/LYMPHATIC: Denies anemia, denies enlarged lymph nodes.








Past Medical History


Past Medical History: Atrial Fibrillation, Diabetes Mellitus, Hyperlipidemia, 

Hypertension, Pneumonia, Supraventricular Tachycardia (SVT)


Additional Past Medical History / Comment(s): a fib, causes shortness of breath,

can feel irregular heartbeat


History of Any Multi-Drug Resistant Organisms: None Reported


Past Surgical History: Cholecystectomy, Heart Catheterization


Additional Past Surgical History / Comment(s): Colonoscopy


Past Anesthesia/Blood Transfusion Reactions: No Reported Reaction


Past Psychological History: No Psychological Hx Reported


Smoking Status: Current every day smoker


Past Alcohol Use History: Occasional


Past Drug Use History: Marijuana





- Past Family History


  ** Father


Family Medical History: No Reported History





  ** Mother


Family Medical History: Coronary Artery Disease (CAD)





Medications and Allergies


                                Home Medications











 Medication  Instructions  Recorded  Confirmed  Type


 


Apixaban [Eliquis] 5 mg PO BID 11/17/22 02/24/25 History


 


Furosemide [Lasix] 40 mg PO DAILY 11/17/22 02/24/25 History


 


Amiodarone [Cordarone] 200 mg PO DAILY 08/07/23 02/24/25 History


 


Metoprolol Succinate (ER) [Toprol 25 mg PO DAILY 02/24/25 02/24/25 History





Xl]    


 


lisinopriL [Zestril] 20 mg PO BID 02/24/25 02/24/25 History








                                    Allergies











Allergy/AdvReac Type Severity Reaction Status Date / Time


 


No Known Allergies Allergy   Verified 02/24/25 07:12














Physical Exam


Vitals: 


                                   Vital Signs











  Temp Pulse Resp BP Pulse Ox FiO2


 


 02/24/25 13:15   77  30 H   94 L  50


 


 02/24/25 12:41  99.1 F  76  22  164/96  91 L 


 


 02/24/25 12:13   66    96 


 


 02/24/25 11:28   59 L  18  105/79  60 L 


 


 02/24/25 09:52  98.4 F     


 


 02/24/25 08:56  102.3 F H  74  18  105/79  89 L 


 


 02/24/25 08:05   72    


 


 02/24/25 07:46   71    94 L 


 


 02/24/25 05:00   75  28 H  161/88  96 


 


 02/24/25 04:10       50


 


 02/24/25 02:44   58 L  21  122/67  99 


 


 02/24/25 02:07       50


 


 02/24/25 00:59   65    


 


 02/24/25 00:49   63    


 


 02/23/25 22:18       100


 


 02/23/25 22:06   91  40 H  177/97  96 


 


 02/23/25 21:56   93  33 H  201/113  98 








                                Intake and Output











 02/23/25 02/24/25 02/24/25





 22:59 06:59 14:59


 


Other:   


 


  Weight 95.254 kg  














GENERAL EXAM: Alert, pleasant 64-year-old male, on 6 L high flow nasal cannula, 

fairly comfortable in no apparent distress.


HEAD: Normocephalic.


EYES: Normal reaction of pupils, equal size.


NOSE: Clear with pink turbinates.


THROAT: No erythema or exudates.


NECK: No masses, no JVD.


CHEST: No chest wall deformity.


LUNGS: Equal air entry with crackles in the bilateral bases, few scattered 

rhonchi, diminished.


CVS: S1 and S2 normal with no audible murmur, regular rhythm.


ABDOMEN: No hepatosplenomegaly, normal bowel sounds, no guarding or rigidity.


SPINE: No scoliosis or deformity


SKIN: No rashes


CENTRAL NERVOUS SYSTEM: No focal deficits, tone is normal in all 4 extremities.


EXTREMITIES: There is no peripheral edema.  No clubbing, no cyanosis.  Marleny

pheral pulses are intact.





Results





- Laboratory Findings


CBC and BMP: 


                                 02/24/25 02:30





                                 02/24/25 02:30


PT/INR, D-dimer











PT  11.9 sec (10.0-12.5)   02/23/25  22:14    


 


INR  1.1  (<1.2)   02/23/25  22:14    








Abnormal lab findings: 


                                  Abnormal Labs











  02/23/25 02/23/25 02/23/25





  02:30 22:14 22:14


 


WBC   12.7 H 


 


Neutrophils #   8.6 H 


 


APTT   


 


Sodium    136 L


 


Chloride   


 


Carbon Dioxide    21 L


 


BUN    25 H


 


Creatinine    1.59 H


 


Glucose    228 H


 


Plasma Lactic Acid Jeronimo   


 


Troponin I   


 


Procalcitonin  2.38 H  


 


Influenza Type A (PCR)   














  02/23/25 02/23/25 02/23/25





  22:14 22:14 22:18


 


WBC   


 


Neutrophils #   


 


APTT   


 


Sodium   


 


Chloride   


 


Carbon Dioxide   


 


BUN   


 


Creatinine   


 


Glucose   


 


Plasma Lactic Acid Jeronimo  3.2 H*  


 


Troponin I   0.102 H* 


 


Procalcitonin   


 


Influenza Type A (PCR)    Detected A














  02/24/25 02/24/25 02/24/25





  02:30 02:30 09:43


 


WBC   


 


Neutrophils #   


 


APTT    69.9 H


 


Sodium   135 L 


 


Chloride   97 L 


 


Carbon Dioxide   


 


BUN   29 H 


 


Creatinine   1.86 H 


 


Glucose   174 H 


 


Plasma Lactic Acid Jeronimo   


 


Troponin I  0.327 H*  


 


Procalcitonin   


 


Influenza Type A (PCR)   














  02/24/25 02/24/25





  09:43 12:56


 


WBC  


 


Neutrophils #  


 


APTT  


 


Sodium  


 


Chloride  


 


Carbon Dioxide  


 


BUN  


 


Creatinine  


 


Glucose  


 


Plasma Lactic Acid Jeronimo  


 


Troponin I  0.550 H*  0.522 H*


 


Procalcitonin  


 


Influenza Type A (PCR)  














- Diagnostic Findings


Chest x-ray: image reviewed





Assessment and Plan


Assessment: 





Acute hypoxemic respiratory failure secondary to an acute exacerbation of 

chronic obstructive pulmonary disease complicated by influenza A





Acute influenza A infection





Acute exacerbation of chronic systolic congestive heart failure with an ejection

 fraction of 35%





Troponin leak possible non-ST segment elevation myocardial infarction secondary 

to above





History of atrial fibrillation with previous cardioversion maintained on 

amiodarone and Eliquis





Chronic tobacco dependence with suspected underlying COPD





Hypertension





Hyperlipidemia





Diabetes mellitus, type II





Plan:





The patient was seen and evaluated


Imaging, labs and medications reviewed


Add Lasix 20 mg IV every 8 hours


Continue DuoNeb inhalations, add Symbicort


Continue heparin drip


Cardiology consult


Continue ceftriaxone


Continue Tamiflu


Titrate the FiO2 as tolerated


We will continue to follow and make further recommendations based on his 

clinical status





I have personally seen and examined the patient, performed the documentation and

 the assessment and plan as written.  Number of minutes spent on the visit: 20





Dictation was produced using Dragon dictation software.  Please excuse any 

grammatical, word or spelling errors.

## 2025-02-24 NOTE — P.CRDCN
History of Present Illness


Consult date: 02/24/25


Reason for Consult (text): 





NSTEMI


History of present illness: 





This is a 64-year-old male patient of Dr. Ray with past medical history of 

chronic kidney disease stage III, COPD tobacco use, alcohol abuse, hypertension,

diabetes mellitus type 2, persistent atrial fibrillation, systolic heart 

failure, recent GI bleed, family history of premature coronary artery disease.  

We have been asked to evaluate the patient for NSTEMI.  Patient states he had a 

cough for 10 days that was dry with increasing shortness of breath but gradually

worsening.  He does not have home oxygen therapy.  He denies any chest pain or 

chest pressure.  Prior to this episode, patient was feeling well with no 

shortness of breath.  He is now not eating or drinking very much with decreased 

appetite.  At home, he states he was taking all of his prescribed medications.  

He is urinating okay.  He presented with a blood pressure of 200/113.  He does 

not check his blood pressure at home.  Temperature max 102.3. Blood pressure now

161/88, heart rate 75, pulse ox 96% on BiPAP.  He denies smoking and denies 

alcohol use.  Patient has been started on heparin drip.  Patient is seen today 

in the emergency center waiting for a bed on the cardiac stepdown unit.





-EKG: Sinus rhythm with IVCD


-Chest x-ray: Cardiomegaly.  Areas of airspace disease most notably at the left 

mid and lower lung suggestive of multifocal pneumonia.


-Laboratory studies: WBC initially 12.7 and repeat 10.5, hemoglobin 14.3.  

Sodium 135, potassium 4.3, BUN 29 creatinine 1.86.  Lactic acid 3.2 followed by 

1.9.  Troponin 0.102, 0.327.  Influenza A detected.


-Home cardiac medications: Amiodarone 200 mg daily, Eliquis 5 mg twice daily, 

Lasix 40 mg daily, lisinopril 20 mg twice daily, metoprolol succinate 25 mg 

daily.


-ROSANNA and cardioversion performed on 8/10/2023 revealed aortic valve is tri

cuspid, functioning normally with trace AI.  Mitral valve appears to be normal 

with mild regurgitation.  Tricuspid valve appears to be normal with moderate to 

severe tricuspid regurgitation.  There is a PFO.  Left atrial appendage is free 

of clot.  EF 35%.


-ROSANNA and cardioversion performed 11/21/2022.


Dobutamine stress echocardiogram performed in the office on 8/3/2021 revealed 

nondiagnostic EKG portion secondary to baseline EKG abnormalities.  Normal 

stress echo portion without inducible ischemia.  Normal left ventricular 

ejection fraction of 60%.





Review Of Systems:


At the time of my exam:


CONSTITUTIONAL: Denies fever or chills.


HEENT: Denies blurred vision, vision changes, or eye pain. Denies hemoptysis 


CARDIOVASCULAR: Denies chest pain. Denies orthopnea. Denies PND. Denies 

palpitations


RESPIRATORY: Denies shortness of breath. 


GASTROINTESTINAL: Denies abdominal pain. Denies nausea or vomiting. 


HEMATOLOGIC: Denies bleeding disorders.


GENITOURINARY:  Denies any blood in urine.


SKIN: Denies puritis. Denies rash.





Physical examination:


Gen: This is a 64-year-old male in no acute respiratory distress.


VS: reviewed


HEENT: Head is atraumatic, normocephalic. Pupils equal, round. Sclerae is 

anicteric. 


NECK: Supple. No JVD. 


LUNGS: Scattered rhonchi.  No intercostal retractions.


HEART: Irregular rate and rhythm. No murmur. 


ABDOMEN: Soft  No tenderness.


EXTREMITIES: No pedal edema.  No calf tenderness.


NEUROLOGICAL: Patient is awake, alert and oriented x3.


 


Assessment:


Shortness of breath with acute hypoxic respiratory failure secondary to 

influenza, pneumonia, and component of heart failure


NSTEMI possibly secondary to sepsis, influenza and pneumonia


Influenza A


Pneumonia


Acute on chronic systolic heart failure


Acute kidney injury


Lactic acidosis


Cardiomyopathy with previous EF of 15 to 20%, possibly tachycardia induced


Hypertension


Hyperlipidemia


Persistent atrial fibrillation status post previous cardioversion x 2, currently

in sinus rhythm


Diabetes mellitus type 2





Plan:


Resume patient's home cardiac medications


Continue patient on heparin drip for at least 24 hours and hold Eliquis


Patient has been started on aspirin


Start patient on a atorvastatin 40 mg at bedtime


Continue IV Lasix 20 mg every 8 hours


Monitor ENE, daily weights, electrolytes and renal function


Obtain 2-D echocardiogram and Doppler study to assess cardiac structure and 

function


Further recommendations to follow based upon clinical course


Thank you kindly for this consultation.





Nurse practitioner note has been reviewed, I agree with documented findings and 

plan of care.  Patient was seen and examined.





Past Medical History


Past Medical History: Atrial Fibrillation, Diabetes Mellitus, Hyperlipidemia, 

Hypertension, Pneumonia, Supraventricular Tachycardia (SVT)


Additional Past Medical History / Comment(s): a fib, causes shortness of breath,

can feel irregular heartbeat


History of Any Multi-Drug Resistant Organisms: None Reported


Past Surgical History: Cholecystectomy, Heart Catheterization


Additional Past Surgical History / Comment(s): Colonoscopy


Past Anesthesia/Blood Transfusion Reactions: No Reported Reaction


Past Psychological History: No Psychological Hx Reported


Smoking Status: Current every day smoker


Past Alcohol Use History: Occasional


Past Drug Use History: Marijuana





- Past Family History


  ** Father


Family Medical History: No Reported History





  ** Mother


Family Medical History: Coronary Artery Disease (CAD)





Medications and Allergies


                                Home Medications











 Medication  Instructions  Recorded  Confirmed  Type


 


Apixaban [Eliquis] 5 mg PO BID 11/17/22 02/24/25 History


 


Furosemide [Lasix] 40 mg PO DAILY 11/17/22 02/24/25 History


 


Amiodarone [Cordarone] 200 mg PO DAILY 08/07/23 02/24/25 History


 


Metoprolol Succinate (ER) [Toprol 25 mg PO DAILY 02/24/25 02/24/25 History





Xl]    


 


lisinopriL [Zestril] 20 mg PO BID 02/24/25 02/24/25 History








                                    Allergies











Allergy/AdvReac Type Severity Reaction Status Date / Time


 


No Known Allergies Allergy   Verified 02/24/25 07:12














Physical Exam


Vitals: 


                                   Vital Signs











  Pulse Resp BP Pulse Ox FiO2


 


 02/24/25 05:00  75  28 H  161/88  96 


 


 02/24/25 04:10      50


 


 02/24/25 02:44  58 L  21  122/67  99 


 


 02/24/25 02:07      50


 


 02/24/25 00:59  65    


 


 02/24/25 00:49  63    


 


 02/23/25 22:18      100


 


 02/23/25 22:06  91  40 H  177/97  96 


 


 02/23/25 21:56  93  33 H  201/113  98 








                                Intake and Output











 02/23/25 02/24/25 02/24/25





 22:59 06:59 14:59


 


Other:   


 


  Weight 95.254 kg  














Results





                                 02/24/25 02:30





                                 02/24/25 02:30


                                 Cardiac Enzymes











  02/23/25 02/23/25 02/24/25 Range/Units





  22:14 22:14 02:30 


 


AST  58    (17-59)  U/L


 


Troponin I   0.102 H*  0.327 H*  (0.000-0.034)  ng/mL








                                   Coagulation











  02/23/25 Range/Units





  22:14 


 


PT  11.9  (10.0-12.5)  sec


 


APTT  24.5  (22.0-30.0)  sec








                                       CBC











  02/23/25 02/24/25 Range/Units





  22:14 02:30 


 


WBC  12.7 H  10.5  (3.8-10.6)  k/uL


 


RBC  5.14  4.56  (4.30-5.90)  m/uL


 


Hgb  15.9  14.3  (13.0-17.5)  gm/dL


 


Hct  49.2  43.4  (39.0-53.0)  %


 


Plt Count  194  156  (150-450)  k/uL








                          Comprehensive Metabolic Panel











  02/23/25 02/24/25 Range/Units





  22:14 02:30 


 


Sodium  136 L  135 L  (137-145)  mmol/L


 


Potassium  4.6  4.3  (3.5-5.1)  mmol/L


 


Chloride  101  97 L  ()  mmol/L


 


Carbon Dioxide  21 L  27  (22-30)  mmol/L


 


BUN  25 H  29 H  (9-20)  mg/dL


 


Creatinine  1.59 H  1.86 H  (0.66-1.25)  mg/dL


 


Glucose  228 H  174 H  (74-99)  mg/dL


 


Calcium  8.9  8.7  (8.4-10.2)  mg/dL


 


AST  58   (17-59)  U/L


 


ALT  31   (4-49)  U/L


 


Alkaline Phosphatase  59   ()  U/L


 


Total Protein  7.2   (6.3-8.2)  g/dL


 


Albumin  4.0   (3.5-5.0)  g/dL








                               Current Medications











Generic Name Dose Route Start Last Admin





  Trade Name Freq  PRN Reason Stop Dose Admin


 


Albuterol/Ipratropium  3 ml  02/24/25 00:25 





  Ipratropium-Albuterol 3 Ml Neb  INHALATION  





  RT-Q4H PRN  





  shortness of breath  


 


Albuterol/Ipratropium  3 ml  02/24/25 08:00 





  Ipratropium-Albuterol 3 Ml Neb  INHALATION  





  RT-QID BRADY  


 


Aspirin  81 mg  02/25/25 09:00 





  Aspirin 81 Mg  PO  





  DAILY BRADY  


 


Benzonatate  100 mg  02/24/25 01:58 





  Benzonatate 100 Mg Cap  PO  





  TID PRN  





  Cough  


 


Heparin Sodium (Porcine)  0 unit  02/24/25 03:54 





  Heparin Sodium 1,000 Un/Ml (10ml Vl)  IV  





  PER PROTOCOL PRN  





  Low PTT  





  Protocol  


 


Ceftriaxone Sodium 2 gm/  50 mls @ 100 mls/hr  02/25/25 09:00 





  Sodium Chloride  IVPB  02/28/25 09:29 





  Q24HR BRADY  





  Protocol  


 


Heparin Sodium/Sodium Chloride  250 mls @ 10 mls/hr  02/24/25 04:00  02/24/25 

04:19





  25,000 unit/ Sodium Chloride  IV   10.5 units/kg/hr





  .Q24H BRADY   10 mls/hr





    Administration





  Protocol  





  10.498 UNITS/KG/HR  


 


Miscellaneous Information  1 each  02/24/25 00:25 





  Pneumonia Protocol Utilized 1 Each Misc  PO  





  ONCE PRN  





  Per Protocol  


 


Oseltamivir Phosphate  30 mg  02/24/25 09:00 





  Oseltamivir 30 Mg Cap  PO  02/28/25 21:01 





  Q12HR BRADY  





  Protocol  








                                Intake and Output











 02/23/25 02/24/25 02/24/25





 22:59 06:59 14:59


 


Other:   


 


  Weight 95.254 kg  








                                        





                                 02/24/25 02:30 





                                 02/24/25 02:30

## 2025-02-24 NOTE — P.HPIM
History of Present Illness


H&P Date: 02/24/25





Patient is a 64-year-old male with a PMH of A-fib on Eliquis, COPD not on home 

oxygen, type II DM, hypertension, hyperlipidemia, CKD stage IIIa who presents to

the emergency room with complaints of shortness of breath and cough.  Patient 

reports that over the past 1 week he has been experiencing gradually worsening 

persistent cough.  Reports that the cough is nonproductive.  He also reports 

exertional dyspnea.  Denies experiencing fever or chills.  Also denies chest 

discomfort, nausea, vomiting, abdominal pain, diarrhea.





Chest x-ray in the emergency room revealed cardiomegaly along with findings of 

multifocal pneumonia.  EKG revealed sinus rhythm at 93 bpm with intraventricular

conduction delay as reviewed by me.  Laboratory evaluation did reveal influenza 

type a positive, troponin 0.102, proBNP 12,900, lactic acid 3.3, glucose 228, 

BUN 25, creatinine 1.59, and WBC count 12.7 with neutrophilic predominance.  

Patient was reportedly severely hypoxic and in respiratory distress upon EMS 

arrival at the scene and was started on BiPAP.





ED documentation reviewed and case discussed with ED provider. 





Review of systems:


Pertinent positives and negatives as discussed in HPI, a complete review of 

systems was performed and all other systems are negative.





Physical examination:


Vital signs reviewed


General: non toxic, no distress, appears at stated age, obese


Derm: no unusual rashes/lesions, warm


Head: atraumatic, normocephalic, symmetric


Eyes: EOMI, no lid lag, anicteric sclera, pupils equal round reactive to light


ENT: Nose and ears atraumatic


Neck: No cervical lymphadenopathy, trachea midline, supple


Mouth: no lip lesion, mucus membranes moist


Cardiovascular: S1S2 reg, no murmur, positive dorsalis pedis pulse bilateral, no

edema


Lungs: Bilateral rhonchi with some scattered wheezing, no accessory muscle use


Abdominal: soft,  nontender to palpation, no guarding


Ext: muscle strength 4 out of 5 in all 4 extremities grossly, no gross muscle 

atrophy, no contractures, 


Neuro:  CN II-XI grossly intact, no gross focal neuro deficits


Psych: Alert, oriented, appropriate affect 





Assessment:





Sepsis secondary to influenza pneumonia, unable to rule out bacterial pneumonia


Acute hypoxic respiratory failure, secondary to above


Elevated troponin, likely type II MI


Lactic acidosis


Chronic conditions: A-fib, COPD, type II DM, hypertension, hyperlipidemia, CKD 

stage IIIa





Imaging:


Chest x-ray in the emergency room revealed cardiomegaly along with findings of 

multifocal pneumonia.  EKG revealed sinus rhythm at 93 bpm with intraventricular

conduction delay as reviewed by me.





Data Review:


Laboratory evaluation did reveal influenza type a positive, troponin 0.102, 

proBNP 12,900, lactic acid 3.3, glucose 228, BUN 25, creatinine 1.59, and WBC 

count 12.7 with neutrophilic predominance.  Patient was reportedly severely 

hypoxic and in respiratory distress upon EMS arrival at the scene and was 

started on BiPAP.





Plan:





Continue with Tamiflu and azithromycin with ceftriaxone for now


Check procalcitonin levels


Follow-up blood and sputum cultures


Continue BiPAP for now


Pulmonary consulted


Continue DuoNebs around-the-clock and as needed


Trend troponin, patient denying chest discomfort at this time


Cardiac monitoring


Consider cardiology consult if troponin uptrending


Obtain echocardiogram 


Resume home medications once reconciled





DVT prophylaxis: Eliquis





The patient is admitted with an anticipated greater than than 2 midnight stay 

for evaluation of pneumonia


CODE STATUS: Full Code


Discussed with: Patient


Anticipated discharge place: Home











Past Medical History


Past Medical History: Atrial Fibrillation, Diabetes Mellitus, Hyperlipidemia, 

Hypertension, Pneumonia, Supraventricular Tachycardia (SVT)


Additional Past Medical History / Comment(s): a fib, causes shortness of breath,

can feel irregular heartbeat


History of Any Multi-Drug Resistant Organisms: None Reported


Past Surgical History: Cholecystectomy, Heart Catheterization


Additional Past Surgical History / Comment(s): Colonoscopy


Past Anesthesia/Blood Transfusion Reactions: No Reported Reaction


Past Psychological History: No Psychological Hx Reported


Smoking Status: Current every day smoker


Past Alcohol Use History: Occasional


Past Drug Use History: Marijuana





- Past Family History


  ** Father


Family Medical History: No Reported History





  ** Mother


Family Medical History: Coronary Artery Disease (CAD)





Medications and Allergies


                                Home Medications











 Medication  Instructions  Recorded  Confirmed  Type


 


Apixaban [Eliquis] 5 mg PO BID 11/17/22 08/10/23 History


 


Furosemide [Lasix] 80 mg PO QAM 11/17/22 08/10/23 History


 


lisinopriL [Zestril] 20 mg PO BID 11/17/22 08/10/23 History


 


Amiodarone [Cordarone] 200 mg PO BID 08/07/23 08/10/23 History


 


Atorvastatin [Lipitor] 40 mg PO HS 08/07/23 08/10/23 History


 


Metoprolol Tartrate [Lopressor] 25 mg PO BID 08/07/23 08/10/23 History


 


Omeprazole 20 mg PO BID 08/07/23 08/10/23 History








                                    Allergies











Allergy/AdvReac Type Severity Reaction Status Date / Time


 


No Known Allergies Allergy   Verified 08/10/23 10:53














Physical Exam


Vitals: 


                                   Vital Signs











  Pulse Resp BP Pulse Ox FiO2


 


 02/24/25 00:49  63    


 


 02/23/25 22:18      100


 


 02/23/25 22:06  91  40 H  177/97  96 


 


 02/23/25 21:56  93  33 H  201/113  98 








                                Intake and Output











 02/23/25 02/23/25 02/24/25





 14:59 22:59 06:59


 


Other:   


 


  Weight  95.254 kg 














Results


CBC & Chem 7: 


                                 02/23/25 22:14





                                 02/23/25 22:14


Labs: 


                  Abnormal Lab Results - Last 24 Hours (Table)











  02/23/25 02/23/25 02/23/25 Range/Units





  22:14 22:14 22:14 


 


WBC  12.7 H    (3.8-10.6)  k/uL


 


Neutrophils #  8.6 H    (1.3-7.7)  k/uL


 


Sodium   136 L   (137-145)  mmol/L


 


Carbon Dioxide   21 L   (22-30)  mmol/L


 


BUN   25 H   (9-20)  mg/dL


 


Creatinine   1.59 H   (0.66-1.25)  mg/dL


 


Glucose   228 H   (74-99)  mg/dL


 


Plasma Lactic Acid Jeronimo    3.2 H*  (0.7-2.0)  mmol/L


 


Troponin I     (0.000-0.034)  ng/mL


 


Influenza Type A (PCR)     (Not Detectd)  














  02/23/25 02/23/25 Range/Units





  22:14 22:18 


 


WBC    (3.8-10.6)  k/uL


 


Neutrophils #    (1.3-7.7)  k/uL


 


Sodium    (137-145)  mmol/L


 


Carbon Dioxide    (22-30)  mmol/L


 


BUN    (9-20)  mg/dL


 


Creatinine    (0.66-1.25)  mg/dL


 


Glucose    (74-99)  mg/dL


 


Plasma Lactic Acid Jeronimo    (0.7-2.0)  mmol/L


 


Troponin I  0.102 H*   (0.000-0.034)  ng/mL


 


Influenza Type A (PCR)   Detected A  (Not Detectd)

## 2025-02-25 LAB
ANION GAP SERPL CALC-SCNC: 10 MMOL/L
APTT BLD: 103.7 SEC (ref 22–30)
BASOPHILS # BLD AUTO: 0 K/UL (ref 0–0.2)
BASOPHILS NFR BLD AUTO: 0 %
BUN SERPL-SCNC: 44 MG/DL (ref 9–20)
CALCIUM SPEC-MCNC: 8.5 MG/DL (ref 8.4–10.2)
CHLORIDE SERPL-SCNC: 96 MMOL/L (ref 98–107)
CO2 SERPL-SCNC: 27 MMOL/L (ref 22–30)
EOSINOPHIL # BLD AUTO: 0 K/UL (ref 0–0.7)
EOSINOPHIL NFR BLD AUTO: 1 %
ERYTHROCYTE [DISTWIDTH] IN BLOOD BY AUTOMATED COUNT: 4.76 M/UL (ref 4.3–5.9)
ERYTHROCYTE [DISTWIDTH] IN BLOOD: 12.7 % (ref 11.5–15.5)
GLUCOSE SERPL-MCNC: 97 MG/DL (ref 74–99)
HCT VFR BLD AUTO: 45.5 % (ref 39–53)
HGB BLD-MCNC: 14.1 GM/DL (ref 13–17.5)
HYALINE CASTS UR QL AUTO: 9 /LPF (ref 0–2)
INR PPP: 1.1 (ref ?–1.2)
LYMPHOCYTES # SPEC AUTO: 1 K/UL (ref 1–4.8)
LYMPHOCYTES NFR SPEC AUTO: 13 %
MCH RBC QN AUTO: 29.5 PG (ref 25–35)
MCHC RBC AUTO-ENTMCNC: 30.9 G/DL (ref 31–37)
MCV RBC AUTO: 95.7 FL (ref 80–100)
MONOCYTES # BLD AUTO: 0.5 K/UL (ref 0–1)
MONOCYTES NFR BLD AUTO: 6 %
NEUTROPHILS # BLD AUTO: 5.7 K/UL (ref 1.3–7.7)
NEUTROPHILS NFR BLD AUTO: 79 %
PH UR: 5.5 [PH] (ref 5–8)
PLATELET # BLD AUTO: 168 K/UL (ref 150–450)
POTASSIUM SERPL-SCNC: 4.2 MMOL/L (ref 3.5–5.1)
PROT UR QL: (no result)
PT BLD: 12.1 SEC (ref 10–12.5)
RBC UR QL: 1 /HPF (ref 0–5)
SODIUM SERPL-SCNC: 133 MMOL/L (ref 137–145)
SP GR UR: 1.01 (ref 1–1.03)
UROBILINOGEN UR QL STRIP: <2 MG/DL (ref ?–2)
WBC # BLD AUTO: 7.2 K/UL (ref 3.8–10.6)
WBC #/AREA URNS HPF: 1 /HPF (ref 0–5)

## 2025-02-25 PROCEDURE — 5A09457 ASSISTANCE WITH RESPIRATORY VENTILATION, 24-96 CONSECUTIVE HOURS, CONTINUOUS POSITIVE AIRWAY PRESSURE: ICD-10-PCS

## 2025-02-25 RX ADMIN — ASPIRIN 81 MG CHEWABLE TABLET SCH MG: 81 TABLET CHEWABLE at 07:58

## 2025-02-25 RX ADMIN — APIXABAN SCH MG: 5 TABLET, FILM COATED ORAL at 12:04

## 2025-02-25 RX ADMIN — FUROSEMIDE STA MG: 10 INJECTION, SOLUTION INTRAMUSCULAR; INTRAVENOUS at 18:07

## 2025-02-25 RX ADMIN — AZITHROMYCIN MONOHYDRATE SCH MLS/HR: 500 INJECTION, POWDER, LYOPHILIZED, FOR SOLUTION INTRAVENOUS at 12:04

## 2025-02-25 NOTE — P.PN
Subjective


Progress Note Date: 02/25/25





This is a 64-year-old male patient with chronic and ongoing tobacco dependence, 

atrial fibrillation, diabetes mellitus, hypertension, hyperlipidemia who has a 1

week history of increasing shortness of breath, cough congestion fever and 

chills.  He came into the emergency room last evening with severe shortness of 

breath requiring BiPAP support which was 14/5 and 50% FiO2.  Chest x-ray 

revealed cardiomegaly and airspace disease most notably in the left mid lower 

lung zone suggestive of multifocal pneumonia and possible fluid volume overload.

 White count 10.5.  Hemoglobin 14.3.  Platelets 156.  Sodium 135.  Potassium 

4.3.  Bicarb 27.  BUN 29.  Creatinine 1.86.  Glucose 174.  Troponins 0.327, 

0.550, 0.5-2.  He is initiated on a heparin drip.  proBNP was 13,000.  

Procalcitonin 2.38.  He did test positive for influenza A.  Initiated on 

Tamiflu.  Initiated on ceftriaxone.  He is seen today in consultation in the 

emergency department.  He is currently sitting up on the stretcher.  Awake and 

alert.  He is on 6 L high flow nasal cannula.  He is breathing a bit easier 

today compared to yesterday.  Still with a loose nonproductive cough.  He did 

have a Tmax of 102.3.  Current temperature 99.1.  He is hemodynamically stable.





The patient is seen today February 25, 2025 in follow-up on the regular medical 

floor.  He is awake and alert in no acute distress.  Doing slightly better today

compared to yesterday.  He is still requiring 10 L high flow nasal cannula.  

Chest x-ray continues to show evidence of fluid volume overload/CHF.  

Echocardiogram reveals mildly impaired left ventricular systolic function with 

ejection fraction of 45 to 50%.  Moderately reduced global LV function.  Blood 

culture is pending.  White count 7.2.  Hemoglobin 14.1.  Platelets 168.  Sodium 

133. Potassium 4.2.  Bicarb 27.  BUN 44.  Creatinine 2.12.  Stool for occult 

blood was negative.  Since.  Anticoagulated with Eliquis.  Antibiotics in the 

form of ceftriaxone and azithromycin.  He is continued on Tamiflu.  Lasix 

discontinued per cardiology.





Objective





- Vital Signs


Vital signs: 


                                   Vital Signs











Temp  99.1 F   02/25/25 12:00


 


Pulse  71   02/25/25 12:00


 


Resp  23   02/25/25 12:00


 


BP  119/55   02/25/25 12:00


 


Pulse Ox  90 L  02/25/25 12:00


 


FiO2  60   02/25/25 12:10








                                 Intake & Output











 02/24/25 02/25/25 02/25/25





 18:59 06:59 18:59


 


Intake Total 118 250 142


 


Balance 118 250 142


 


Weight 95.254 kg 87.4 kg 


 


Intake:   


 


  Intake, IV Titration  250 24





  Amount   


 


    Heparin Sod,Pork in 0.45%  250 24





    NaCl 25,000 unit In 0.45   





    % NaCl 1 250ml.bag @ 10.   





    498 UNITS/KG/HR 10 mls/hr   





    IV .Q24H BRADY Rx#:   





    [id number]   


 


  Oral 118  118


 


Other:   


 


  Voiding Method  Bedside Commode Bedside Commode


 


  # Voids  0 














- Exam





GENERAL EXAM: Alert, 64-year-old male, on 10 L high flow nasal cannula, fairly 

comfortable in no apparent distress.


HEAD: Normocephalic.


EYES: Normal reaction of pupils, equal size.


NOSE: Clear with pink turbinates.


THROAT: No erythema or exudates.


NECK: No masses, no JVD.


CHEST: No chest wall deformity.


LUNGS: Equal air entry with crackles in the bilateral bases, few scattered 

rhonchi, diminished.


CVS: S1 and S2 normal with no audible murmur, regular rhythm.


ABDOMEN: No hepatosplenomegaly, normal bowel sounds, no guarding or rigidity.


SPINE: No scoliosis or deformity


SKIN: No rashes


CENTRAL NERVOUS SYSTEM: No focal deficits, tone is normal in all 4 extremities.


EXTREMITIES: There is 1+ peripheral edema.  No clubbing, no cyanosis.  Pe

ripheral pulses are intact.





- Labs


CBC & Chem 7: 


                                 02/25/25 06:55





                                 02/25/25 06:55


Labs: 


                  Abnormal Lab Results - Last 24 Hours (Table)











  02/25/25 02/25/25 02/25/25 Range/Units





  06:55 06:55 06:55 


 


MCHC  30.9 L    (31.0-37.0)  g/dL


 


APTT   103.7 H*   (22.0-30.0)  sec


 


Sodium    133 L  (137-145)  mmol/L


 


Chloride    96 L  ()  mmol/L


 


BUN    44 H  (9-20)  mg/dL


 


Creatinine    2.12 H  (0.66-1.25)  mg/dL








                      Microbiology - Last 24 Hours (Table)











 02/24/25 00:46 Blood Culture - Preliminary





 Blood 














Assessment and Plan


Assessment: 





Acute hypoxemic respiratory failure secondary to an acute exacerbation of 

chronic obstructive pulmonary disease complicated by influenza A and possible 

underlying pneumonia.  Procalcitonin 2.38.





Acute influenza A infection





Acute exacerbation of chronic systolic congestive heart failure with an ejection

fraction now 45-50%





Troponin leak possible non-ST segment elevation myocardial infarction secondary 

to above





Acute kidney injury





History of atrial fibrillation with previous cardioversion maintained on amioda

danae and Eliquis





Chronic tobacco dependence with suspected underlying COPD





Hypertension





Hyperlipidemia





Diabetes mellitus, type II





Plan:





The patient was seen and evaluated


Imaging, labs and medications reviewed


Continue DuoNeb inhalations, Symbicort


Continue heparin drip


Cardiology discontinued Lasix and heparin drip


Consult nephrology for acute kidney injury


Continue ceftriaxone and azithromycin


Continue Tamiflu


Anticoagulated with Eliquis


Titrate the FiO2 as tolerated


We will continue to follow





I have personally seen and examined the patient, performed the documentation and

the assessment and plan as written.  Number of minutes spent on the visit: 10





Dictation was produced using Dragon dictation software.  Please excuse any 

grammatical, word or spelling errors.

## 2025-02-25 NOTE — P.PN
Subjective


Progress Note Date: 02/25/25





Patient is a 64-year-old male with a PMH of A-fib on Eliquis, COPD not on home 

oxygen, type II DM, hypertension, hyperlipidemia, CKD stage IIIa who presents to

the emergency room with complaints of shortness of breath and cough.  Patient 

reports that over the past 1 week he has been experiencing gradually worsening 

persistent cough.  Reports that the cough is nonproductive.  He also reports 

exertional dyspnea.  Denies experiencing fever or chills.  Also denies chest 

discomfort, nausea, vomiting, abdominal pain, diarrhea.





Chest x-ray in the emergency room revealed cardiomegaly along with findings of 

multifocal pneumonia.  EKG revealed sinus rhythm at 93 bpm with intraventricular

conduction delay as reviewed by me.  Laboratory evaluation did reveal influenza 

type a positive, troponin 0.102, proBNP 12,900, lactic acid 3.3, glucose 228, 

BUN 25, creatinine 1.59, and WBC count 12.7 with neutrophilic predominance.  

Patient was reportedly severely hypoxic and in respiratory distress upon EMS 

arrival at the scene and was started on BiPAP.








2/25/2025 patient seen and examined at bedside.  No acute events overnight.  No 

new complaints. WBC 7.2, hemoglobin 14.1, platelet count 1 68,000, PT 12.1, INR 

1.1, .7, sodium 133, potassium 4.2, chloride 96, bicarb 27, BUN 44, 

creatinine 2.12, glucose 97.  Troponin increased to 0.55 and has flattened.  

Chest x-ray done showed findings favor CHF exacerbation/fluid overload state.  

Areas of underlying acute infiltrate cannot be excluded.  No significant change 

from most recent x-ray.





Review of systems:


Pertinent positives and negatives as discussed in HPI, a complete review of 

systems was performed and all other systems are negative.





Pertinent imaging and labs reviewed.





Physical examination:


Vital signs reviewed


General: non toxic, no distress, appears at stated age, high flow nasal cannula 

noted


Derm: no unusual rashes/lesions, warm


Head: atraumatic, normocephalic, symmetric


Eyes: EOMI, anicteric sclera, pupils equal round reactive to light


ENT: Nose and ears atraumatic


Neck: No cervical lymphadenopathy, trachea midline, supple


Mouth: no lip lesion, mucus membranes moist


Cardiovascular: S1S2 reg, no murmur


Lungs: Bibasilar rhonchi, no rhonchi, no rales, no accessory muscle use


Abdominal: soft,  nontender to palpation, no guarding


Ext: muscle strength 5 out of 5 in all 4 extremities grossly, no gross muscle 

atrophy, no contractures,  positive dorsalis pedis pulse bilateral, no edema


Neuro:  CN II-XI grossly intact, no gross focal neuro deficits


Psych: Alert and oriented x3, appropriate affect and mood





Assessment/Plan:


#. Sepsis secondary to influenza pneumonia, unable to rule out bacterial 

pneumonia


#. Acute hypoxic respiratory failure, secondary to above


-Procalcitonin elevated at 2.38


-Supportive oxygen as need 


-Received one-time dose Zithromax 500 mg p.o. and Rocephin 2 g IVPB in the ED


-Continue with Rocephin 2 g IVPB and Zithromax 500 mg IVPB


-Continue Tamiflu 30 mg p.o. every 12 hours


-Blood culture pending


-Urine culture pending


-Monitor CBC





#. Heart failure with reduced ejection fraction EF of 45 to 50%, not on GDMT


#. Hyponatremia secondary to above


-Echocardiogram in 2/24/2025 showed ejection fracture 45-50% with moderately 

increased septal wall thickness, biatrial dilatation, moderate tricuspid valve 

regurgitation


-Lasix 20 mg IV every 8 discontinued due by cardiology


-Strict FARHAN's


-Daily weights


-Fluid restriction


-Monitor BMP for electrolytes and renal function


-Cardiology consulted


-Metoprolol 25 mg daily





#.  Acute kidney injury on CKD


-Likely due to diuresis


-Lasix 20 mg IV discontinued.  Will reevaluate patient volume status


-Monitor BMP


-Avoid nephrotoxic agents





#.  NSTEMI


-EKG revealed sinus rhythm at 93 bpm with intraventricular conduction delay on 

admission


-Patient has no chest pain as of now


-Troponins flattened at 0.5


-IV heparin for 48 hours.  Now discontinued


-Cardiology initiated atorvastatin 40 mg daily





#. Lactic acidosis, resovled








Chronic conditions: 


#.  A-fib


#.  COPD


#.  Type II DM


#.  Hypertension


#.  Hyperlipidemia


-Lisinopril held due to HERNAN


-Continue Eliquis 5 mg twice daily, amiodarone 200 mg p.o. daily, metoprolol 25 

mg daily





F: Restrict fluids due to liters today





E: Monitor electrolytes particularly sodium and potassium





N: Heart healthy diet





A: Can ambulate with assistance








DVT prophylaxis: Eliquis 5 mg p.o. daily











Paola Boyd MD


PGY-1/Intern





Dictation was produced using dragon dictation software. please excuse any 

grammatical, word or spelling errors.








no new complaints. Farhan daily weights. IV heparin. Echo pending.





Added azithromycin 500mg IVPB daily





I have seen and evaluated the patient today.  Discussed with the resident and 

agree with the residents finding and plan as documented in the resident's note. 

Changes highlighted in blue font. 





Renal US ordered, bladder scan PRN, UA ordered.








Objective





- Vital Signs


Vital signs: 


                                   Vital Signs











Temp  99.2 F   02/25/25 08:00


 


Pulse  80   02/25/25 08:11


 


Resp  18   02/25/25 08:00


 


BP  142/74   02/25/25 08:00


 


Pulse Ox  91 L  02/25/25 08:00


 


FiO2  50   02/25/25 03:38








                                 Intake & Output











 02/24/25 02/25/25 02/25/25





 18:59 06:59 18:59


 


Intake Total 118 250 142


 


Balance 118 250 142


 


Weight 95.254 kg 87.4 kg 


 


Intake:   


 


  Intake, IV Titration  250 24





  Amount   


 


    Heparin Sod,Pork in 0.45%  250 24





    NaCl 25,000 unit In 0.45   





    % NaCl 1 250ml.bag @ 10.   





    498 UNITS/KG/HR 10 mls/hr   





    IV .Q24H [facility]ADY Rx#:   





    [id number]   


 


  Oral 118  118


 


Other:   


 


  Voiding Method  Bedside Commode Bedside Commode


 


  # Voids  0 














- Labs


CBC & Chem 7: 


                                 02/25/25 06:55





                                 02/25/25 06:55


Labs: 


                  Abnormal Lab Results - Last 24 Hours (Table)











  02/23/25 02/24/25 02/24/25 Range/Units





  02:30 09:43 09:43 


 


MCHC     (31.0-37.0)  g/dL


 


APTT   69.9 H   (22.0-30.0)  sec


 


Sodium     (137-145)  mmol/L


 


Chloride     ()  mmol/L


 


BUN     (9-20)  mg/dL


 


Creatinine     (0.66-1.25)  mg/dL


 


Troponin I    0.550 H*  (0.000-0.034)  ng/mL


 


Procalcitonin  2.38 H    (0.02-0.50)  ng/mL














  02/24/25 02/25/25 02/25/25 Range/Units





  12:56 06:55 06:55 


 


MCHC   30.9 L   (31.0-37.0)  g/dL


 


APTT    103.7 H*  (22.0-30.0)  sec


 


Sodium     (137-145)  mmol/L


 


Chloride     ()  mmol/L


 


BUN     (9-20)  mg/dL


 


Creatinine     (0.66-1.25)  mg/dL


 


Troponin I  0.522 H*    (0.000-0.034)  ng/mL


 


Procalcitonin     (0.02-0.50)  ng/mL














  02/25/25 Range/Units





  06:55 


 


MCHC   (31.0-37.0)  g/dL


 


APTT   (22.0-30.0)  sec


 


Sodium  133 L  (137-145)  mmol/L


 


Chloride  96 L  ()  mmol/L


 


BUN  44 H  (9-20)  mg/dL


 


Creatinine  2.12 H  (0.66-1.25)  mg/dL


 


Troponin I   (0.000-0.034)  ng/mL


 


Procalcitonin   (0.02-0.50)  ng/mL

## 2025-02-25 NOTE — US
EXAMINATION TYPE: US renals and bladder

 

DATE OF EXAM: 2/25/2025

 

COMPARISON: NONE

 

CLINICAL INDICATION: Male, 64 years old with history of HERNAN oN CKD; CKD

 

TECHNIQUE: Grayscale imaging of the bilateral kidneys and urinary bladder:

 

FINDINGS:

 

EXAM MEASUREMENTS:

 

Right Kidney:  11.0 x 5.0 x 5.1 cm

Left Kidney: 11.4 x 5.0 x 5.7 cm

 

 

Right Kidney: No hydronephrosis or masses seen  

Left Kidney: No hydronephrosis or masses seen  

Bladder: wnl

**Bilateral Jets seen: No

 

 

There is no evidence for hydronephrosis at this point in time.  No nephrolithiasis is seen.  No taran
s are identified. Cortical medullary differentiation is preserved bilaterally.  The urinary bladder i
s anechoic. 

 

 

 

IMPRESSION:

No hydronephrosis or nephrolithiasis.

 

 

 

[facility], Workstation: [id number], 2/25/2025 4:29 PM

## 2025-02-25 NOTE — XR
EXAMINATION TYPE: XR chest 1V portable

 

DATE OF EXAM: 2/25/2025

 

CLINICAL HISTORY: Difficulty breathing progress study.  

 

TECHNIQUE: Single AP portable upright view of the chest is obtained.

 

COMPARISON: Chest x-ray from 2 days earlier

 

FINDINGS:  Persistent cardiomegaly with bilateral increased opacities and likely small bilateral pleu
ral effusions. Osseous structures are intact.

 

IMPRESSION: Findings favor CHF exacerbation/fluid overload state. Correlate clinically. Areas of unde
rlying acute infiltrate cannot be excluded. No significant change from most recent x-ray.

 

[facility], Workstation: [id number], 2/25/2025 6:58 AM

## 2025-02-25 NOTE — P.PN
Subjective


Progress Note Date: 02/25/25





Reason for Consult (text): 





NSTEMI


History of present illness: 





This is a 64-year-old male patient of Dr. Ray with past medical history of 

chronic kidney disease stage III, COPD tobacco use, alcohol abuse, hypertension,

diabetes mellitus type 2, persistent atrial fibrillation, systolic heart 

failure, recent GI bleed, family history of premature coronary artery disease.  

We have been asked to evaluate the patient for NSTEMI.  Patient states he had a 

cough for 10 days that was dry with increasing shortness of breath but gradually

worsening.  He does not have home oxygen therapy.  He denies any chest pain or 

chest pressure.  Prior to this episode, patient was feeling well with no 

shortness of breath.  He is now not eating or drinking very much with decreased 

appetite.  At home, he states he was taking all of his prescribed medications.  

He is urinating okay.  He presented with a blood pressure of 200/113.  He does 

not check his blood pressure at home.  Temperature max 102.3. Blood pressure now

161/88, heart rate 75, pulse ox 96% on BiPAP.  He denies smoking and denies 

alcohol use.  Patient has been started on heparin drip.  Patient is seen today 

in the emergency center waiting for a bed on the cardiac stepdown unit.





-EKG: Sinus rhythm with IVCD


-Chest x-ray: Cardiomegaly.  Areas of airspace disease most notably at the left 

mid and lower lung suggestive of multifocal pneumonia.


-Laboratory studies: WBC initially 12.7 and repeat 10.5, hemoglobin 14.3.  

Sodium 135, potassium 4.3, BUN 29 creatinine 1.86.  Lactic acid 3.2 followed by 

1.9.  Troponin 0.102, 0.327.  Influenza A detected.


-Home cardiac medications: Amiodarone 200 mg daily, Eliquis 5 mg twice daily, 

Lasix 40 mg daily, lisinopril 20 mg twice daily, metoprolol succinate 25 mg 

daily.


-ROSANNA and cardioversion performed on 8/10/2023 revealed aortic valve is 

tricuspid, functioning normally with trace AI.  Mitral valve appears to be 

normal with mild regurgitation.  Tricuspid valve appears to be normal with 

moderate to severe tricuspid regurgitation.  There is a PFO.  Left atrial 

appendage is free of clot.  EF 35%.


-ROSANNA and cardioversion performed 11/21/2022.


Dobutamine stress echocardiogram performed in the office on 8/3/2021 revealed 

nondiagnostic EKG portion secondary to baseline EKG abnormalities.  Normal 

stress echo portion without inducible ischemia.  Normal left ventricular 

ejection fraction of 60%.





2/25


Patient seen and examined on the cardiac stepdown unit.  Patient states he still

has a significant amount of coughing.  No lower extremity edema.  He states he 

is having some chills as well.  Patient states he is urinating a lot but not 

eating very much with decreased appetite.  He has been maintained on IV Lasix at

20 mg every 8 hours as well as heparin drip.  No complaints of chest pain.  

Blood pressure 119/55, heart rate 71, pulse ox 90% on BiPAP.  Repeat blood work 

reveals WBC 7.2, hemoglobin 14.1.  Sodium 133, BUN 44 and creatinine 2.12.


Echocardiogram reveals EF of 45 to 50%, moderately increased septal wall 

thickness, moderate biatrial dilatation, moderate tricuspid regurgitation.





Physical examination:


Gen: This is a 64-year-old male in no acute respiratory distress.


VS: reviewed


HEENT: Head is atraumatic, normocephalic. Pupils equal, round. Sclerae is 

anicteric. 


NECK: Supple. No JVD. 


LUNGS: Scattered rhonchi.  No intercostal retractions.


HEART: Irregular rate and rhythm. No murmur. 


ABDOMEN: Soft  No tenderness.


EXTREMITIES: No pedal edema.  No calf tenderness.


NEUROLOGICAL: Patient is awake, alert and oriented x3.


 


Assessment:


Shortness of breath with acute hypoxic respiratory failure secondary to 

influenza, pneumonia, and component of heart failure


NSTEMI possibly secondary to sepsis, influenza and pneumonia


Influenza A


Pneumonia


Mild acute on chronic systolic heart failure


Acute kidney injury


Lactic acidosis


Cardiomyopathy with previous EF of 15 to 20%, possibly tachycardia induced


Hypertension


Hyperlipidemia


Persistent atrial fibrillation status post previous cardioversion x 2, currently

in sinus rhythm


Diabetes mellitus type 2





Plan:


Continue patient's home cardiac medications


Discontinue heparin drip and resume Eliquis


Continue patient on aspirin and atorvastatin 40 mg 


Discontinue IV Lasix


Monitor ENE, daily weights, electrolytes and renal function


Further recommendations to follow based upon clinical course





Nurse practitioner note has been reviewed, I agree with documented findings and 

plan of care.  Patient was seen and examined.





Objective





- Vital Signs


Vital signs: 


                                   Vital Signs











Temp  99.2 F   02/25/25 08:00


 


Pulse  80   02/25/25 08:11


 


Resp  18   02/25/25 08:00


 


BP  142/74   02/25/25 08:00


 


Pulse Ox  91 L  02/25/25 08:00


 


FiO2  50   02/25/25 03:38








                                 Intake & Output











 02/24/25 02/25/25 02/25/25





 18:59 06:59 18:59


 


Intake Total 118 250 142


 


Balance 118 250 142


 


Weight 95.254 kg 87.4 kg 


 


Intake:   


 


  Intake, IV Titration  250 24





  Amount   


 


    Heparin Sod,Pork in 0.45%  250 24





    NaCl 25,000 unit In 0.45   





    % NaCl 1 250ml.bag @ 10.   





    498 UNITS/KG/HR 10 mls/hr   





    IV .Q24H [facility] Rx#:   





    [id number]   


 


  Oral 118  118


 


Other:   


 


  Voiding Method  Bedside Commode Bedside Commode


 


  # Voids  0 














- Labs


CBC & Chem 7: 


                                 02/25/25 06:55





                                 02/25/25 06:55


Labs: 


                  Abnormal Lab Results - Last 24 Hours (Table)











  02/24/25 02/25/25 02/25/25 Range/Units





  12:56 06:55 06:55 


 


MCHC   30.9 L   (31.0-37.0)  g/dL


 


APTT    103.7 H*  (22.0-30.0)  sec


 


Sodium     (137-145)  mmol/L


 


Chloride     ()  mmol/L


 


BUN     (9-20)  mg/dL


 


Creatinine     (0.66-1.25)  mg/dL


 


Troponin I  0.522 H*    (0.000-0.034)  ng/mL














  02/25/25 Range/Units





  06:55 


 


MCHC   (31.0-37.0)  g/dL


 


APTT   (22.0-30.0)  sec


 


Sodium  133 L  (137-145)  mmol/L


 


Chloride  96 L  ()  mmol/L


 


BUN  44 H  (9-20)  mg/dL


 


Creatinine  2.12 H  (0.66-1.25)  mg/dL


 


Troponin I   (0.000-0.034)  ng/mL

## 2025-02-25 NOTE — CA
Transthoracic Echo Report 

 Name: Ranjan Vieira 

 MRN:    [id number] 

 Age:    64     Gender:     M 

 

 :    1960 

 Exam Date:     2025 10:26 

 Exam Location: [facility] 

 Ht (in):     67     Wt (lb):     210 

 Ordering Physician:        Ailyn Arce MD 

 Attending/Referring Phys: 

 Technician         Anita Narayanan RDCS 

 Procedure CPT: 

 Indications:       chf 

 

 Cardiac Hx:        Flu+ 

 Technical Quality:      Fair 

 Contrast 1:                                Total Dose (mL): 

 Contrast 2:                                Total Dose (mL): 

 

 MEASUREMENTS  (Male / Female) Normal Values 

 2D ECHO 

 LV Diastolic Diameter PLAX        5.0 cm                4.2 - 5.9 / 3.9 - 5.3 cm 

 LV Systolic Diameter PLAX         4.2 cm                 

 IVS Diastolic Thickness           1.7 cm                0.6 - 1.0 / 0.6 - 0.9 cm 

 LVPW Diastolic Thickness          1.4 cm                0.6 - 1.0 / 0.6 - 0.9 cm 

 LV Relative Wall Thickness        0.6                    

 RV Internal Dim ED PLAX           2.9 cm                 

 LVOT Diameter                     1.8 cm                 

 LA Systolic Diameter LX           4.8 cm                3.0 - 4.0 / 2.7 - 3.8 cm 

 LV Diastolic Volume MOD BP        94.0 cm???              67 - 155 / 56 - 104 cm??? 

 LV Systolic Volume MOD BP         42.7 cm???               - 58 / 19 - 49 cm??? 

 LV Ejection Fraction MOD BP       54.6 %                >= 55  % 

 LV Cardiac Index MOD BP           2046.7 cm???/min???m???      

 LV Diastolic Volume MOD 4C        91.6 cm???               

 LV Systolic Volume MOD 4C         53.0 cm???               

 LV Ejection Fraction MOD 4C       42.2 %                 

 LV Cardiac Index MOD 4C           1543.0 cm???/min???m???      

 LV Diastolic Length 4C            8.0 cm                 

 LV Systolic Length 4C             6.7 cm                 

 LV Diastolic Volume MOD 2C        95.7 cm???               

 LV Systolic Volume MOD 2C         43.8 cm???               

 LV Ejection Fraction MOD 2C       54.2 %                 

 LV Cardiac Index MOD 2C           2070.5 cm???/min???m???      

 LV Diastolic Length 2C            8.0 cm                 

 LV Systolic Length 2C             6.6 cm                 

 LA Volume                         80.0 cm???              18 - 58 / 22 - 52 cm??? 

 LA Volume Index                   37.1 cm???/m???           16 - 28 cm???/m??? 

 

 M-MODE 

 Aortic Root Diameter MM           3.5 cm                 

 LA Systolic Diameter MM           4.0 cm                 

 LA Ao Ratio MM                    1.1                    

 AV Cusp Separation MM             1.5 cm                 

 

 DOPPLER 

 MV Area PHT                       2.9 cm???                

 Mitral E Point Velocity           71.5 cm/s              

 Mitral A Point Velocity           61.4 cm/s              

 Mitral E to A Ratio               1.2                    

 MV Deceleration Time              265.5 ms               

 TR Peak Velocity                  352.7 cm/s             

 TR Peak Gradient                  49.8 mmHg              

 Right Ventricular Systolic Press  54.8 mmHg              

 

 

 FINDINGS 

 Left Ventricle 

 Left ventricular ejection fraction is estimated at 45-50%.  Moderately  

 increased septal wall thickness. Moderately reduced global left ventricular  

 systolic function. Left ventricular cavity size normal. 

 

 Right Ventricle 

 Mild right ventricular dilatation. Moderate pulmonary hypertension. 

 

 Right Atrium 

 Moderate right atrial dilatation. 

 

 Left Atrium 

 Moderately increased left atrial diameter. Moderately increased left atrial  

 volume. Mildly increased left atrial area. 

 

 Mitral Valve 

 Structurally normal mitral valve. Moderate mitral annular calcification. Trace  

 to mild mitral regurgitation. 

 

 Aortic Valve 

 Trileaflet aortic valve. Diffuse thickening (sclerosis) of the aortic valve  

 cusps without reduced excursion. No aortic regurgitation. 

 

 Tricuspid Valve 

 Structurally normal tricuspid valve. Moderate tricuspid regurgitation. No  

 tricuspid stenosis. 

 

 Pulmonic Valve 

 Structurally normal pulmonic valve. Trace pulmonic regurgitation. No pulmonic  

 stenosis. 

 

 Pericardium 

 No pericardial or pleural effusion. 

 

 Aorta 

 Aorta at upper limits of normal. 

 

 CONCLUSIONS 

 Left ventricular ejection fraction is estimated at 45-50%. 

 Moderately increased septal wall thickness. 

 Moderate biatrial dilatation 

 Moderate tricuspid regurgitation. 

 Previewed by:  

 Dr Keith Manley 

 (Electronically Signed) 

 Final Date:      2025 11:47

## 2025-02-26 LAB
ANION GAP SERPL CALC-SCNC: 5 MMOL/L
BASOPHILS # BLD AUTO: 0 K/UL (ref 0–0.2)
BASOPHILS NFR BLD AUTO: 0 %
BUN SERPL-SCNC: 45 MG/DL (ref 9–20)
CALCIUM SPEC-MCNC: 7.8 MG/DL (ref 8.4–10.2)
CHLORIDE SERPL-SCNC: 94 MMOL/L (ref 98–107)
CO2 BLDA-SCNC: 26 MMOL/L (ref 19–24)
CO2 SERPL-SCNC: 29 MMOL/L (ref 22–30)
EOSINOPHIL # BLD AUTO: 0 K/UL (ref 0–0.7)
EOSINOPHIL NFR BLD AUTO: 0 %
ERYTHROCYTE [DISTWIDTH] IN BLOOD BY AUTOMATED COUNT: 4.23 M/UL (ref 4.3–5.9)
ERYTHROCYTE [DISTWIDTH] IN BLOOD: 13.1 % (ref 11.5–15.5)
GLUCOSE SERPL-MCNC: 113 MG/DL (ref 74–99)
HCO3 BLDA-SCNC: 24 MMOL/L (ref 21–25)
HCT VFR BLD AUTO: 39.6 % (ref 39–53)
HGB BLD-MCNC: 12.8 GM/DL (ref 13–17.5)
LYMPHOCYTES # SPEC AUTO: 0.7 K/UL (ref 1–4.8)
LYMPHOCYTES NFR SPEC AUTO: 8 %
MCH RBC QN AUTO: 30.2 PG (ref 25–35)
MCHC RBC AUTO-ENTMCNC: 32.3 G/DL (ref 31–37)
MCV RBC AUTO: 93.7 FL (ref 80–100)
MONOCYTES # BLD AUTO: 0.4 K/UL (ref 0–1)
MONOCYTES NFR BLD AUTO: 5 %
NEUTROPHILS # BLD AUTO: 7.5 K/UL (ref 1.3–7.7)
NEUTROPHILS NFR BLD AUTO: 86 %
PCO2 BLDA: 38 MMHG (ref 35–45)
PH BLDA: 7.42 [PH] (ref 7.35–7.45)
PLATELET # BLD AUTO: 139 K/UL (ref 150–450)
PO2 BLDA: 74 MMHG (ref 83–108)
POTASSIUM SERPL-SCNC: 3.6 MMOL/L (ref 3.5–5.1)
SODIUM SERPL-SCNC: 128 MMOL/L (ref 137–145)
WBC # BLD AUTO: 8.7 K/UL (ref 3.8–10.6)

## 2025-02-26 RX ADMIN — BENZONATATE PRN MG: 100 CAPSULE ORAL at 15:36

## 2025-02-26 RX ADMIN — FUROSEMIDE STA MG: 10 INJECTION, SOLUTION INTRAMUSCULAR; INTRAVENOUS at 08:18

## 2025-02-26 RX ADMIN — HYDROCORTISONE SODIUM SUCCINATE SCH MG: 100 INJECTION, POWDER, FOR SOLUTION INTRAMUSCULAR; INTRAVENOUS at 11:37

## 2025-02-26 NOTE — P.PN
Subjective


Progress Note Date: 02/26/25





Reason for Consult (text): 





NSTEMI


History of present illness: 





This is a 64-year-old male patient of Dr. Ray with past medical history of 

chronic kidney disease stage III, COPD tobacco use, alcohol abuse, hypertension,

diabetes mellitus type 2, persistent atrial fibrillation, systolic heart 

failure, recent GI bleed, family history of premature coronary artery disease.  

We have been asked to evaluate the patient for NSTEMI.  Patient states he had a 

cough for 10 days that was dry with increasing shortness of breath but gradually

worsening.  He does not have home oxygen therapy.  He denies any chest pain or 

chest pressure.  Prior to this episode, patient was feeling well with no 

shortness of breath.  He is now not eating or drinking very much with decreased 

appetite.  At home, he states he was taking all of his prescribed medications.  

He is urinating okay.  He presented with a blood pressure of 200/113.  He does 

not check his blood pressure at home.  Temperature max 102.3. Blood pressure now

161/88, heart rate 75, pulse ox 96% on BiPAP.  He denies smoking and denies 

alcohol use.  Patient has been started on heparin drip.  Patient is seen today 

in the emergency center waiting for a bed on the cardiac stepdown unit.





-EKG: Sinus rhythm with IVCD


-Chest x-ray: Cardiomegaly.  Areas of airspace disease most notably at the left 

mid and lower lung suggestive of multifocal pneumonia.


-Laboratory studies: WBC initially 12.7 and repeat 10.5, hemoglobin 14.3.  

Sodium 135, potassium 4.3, BUN 29 creatinine 1.86.  Lactic acid 3.2 followed by 

1.9.  Troponin 0.102, 0.327.  Influenza A detected.


-Home cardiac medications: Amiodarone 200 mg daily, Eliquis 5 mg twice daily, 

Lasix 40 mg daily, lisinopril 20 mg twice daily, metoprolol succinate 25 mg 

daily.


-ROSANNA and cardioversion performed on 8/10/2023 revealed aortic valve is 

tricuspid, functioning normally with trace AI.  Mitral valve appears to be 

normal with mild regurgitation.  Tricuspid valve appears to be normal with 

moderate to severe tricuspid regurgitation.  There is a PFO.  Left atrial 

appendage is free of clot.  EF 35%.


-ROSANNA and cardioversion performed 11/21/2022.


Dobutamine stress echocardiogram performed in the office on 8/3/2021 revealed 

nondiagnostic EKG portion secondary to baseline EKG abnormalities.  Normal 

stress echo portion without inducible ischemia.  Normal left ventricular 

ejection fraction of 60%.





2/25


Patient seen and examined on the cardiac stepdown unit.  Patient states he still

has a significant amount of coughing.  No lower extremity edema.  He states he 

is having some chills as well.  Patient states he is urinating a lot but not 

eating very much with decreased appetite.  He has been maintained on IV Lasix at

20 mg every 8 hours as well as heparin drip.  No complaints of chest pain.  

Blood pressure 119/55, heart rate 71, pulse ox 90% on BiPAP.  Repeat blood work 

reveals WBC 7.2, hemoglobin 14.1.  Sodium 133, BUN 44 and creatinine 2.12.


Echocardiogram reveals EF of 45 to 50%, moderately increased septal wall 

thickness, moderate biatrial dilatation, moderate tricuspid regurgitation.





2/26


Patient seen and examined.  He is currently on BiPAP with pulse ox of 95% on 

FiO2 of 80, blood pressure 117/60, respiratory rate 35, heart rate 70.  

Temperature max 101.5.  Repeat blood work reveals hemoglobin 12.8.  Sodium 128, 

potassium 3.6, BUN 45 creatinine 1.99.





Physical examination: 


Gen: This is a 64-year-old male in no acute respiratory distress.


VS: reviewed


HEENT: Head is atraumatic, normocephalic. Pupils equal, round. Sclerae is a

nicteric. 


NECK: Supple. No JVD. 


LUNGS: Scattered rhonchi.  Accessory muscle usage.


HEART: Irregular rate and rhythm. No murmur. 


ABDOMEN: Soft  No tenderness.


EXTREMITIES: No pedal edema.  No calf tenderness.


NEUROLOGICAL: Patient is awake, alert and oriented x3.


 


Assessment:


Shortness of breath with acute hypoxic respiratory failure secondary to 

influenza, pneumonia, and component of heart failure


NSTEMI possibly secondary to sepsis, influenza and pneumonia


Influenza A


Pneumonia


Mild acute on chronic systolic heart failure


Acute kidney injury


Lactic acidosis


Cardiomyopathy with previous EF of 15 to 20%, possibly tachycardia induced


Hypertension


Hyperlipidemia


Persistent atrial fibrillation status post previous cardioversion x 2, currently

in sinus rhythm


Diabetes mellitus type 2


Hyponatremia





Plan:


Continue patient's home cardiac medications: Eliquis 5 mg twice daily, aspirin 

81 mg daily, atorvastatin 40 mg at bedtime, metoprolol succinate 25 mg daily


Discontinue IV Lasix


Continue oxygen support


Continue Tamiflu


Further recommendations to follow based upon clinical course





Nurse practitioner note has been reviewed, I agree with documented findings and 

plan of care.  Patient was seen and examined.





Objective





- Vital Signs


Vital signs: 


                                   Vital Signs











Temp  99.7 F H  02/26/25 08:00


 


Pulse  62   02/26/25 12:00


 


Resp  35 H  02/26/25 12:00


 


BP  117/60   02/26/25 12:00


 


Pulse Ox  95   02/26/25 12:00


 


FiO2  80   02/26/25 12:00








                                 Intake & Output











 02/25/25 02/26/25 02/26/25





 18:59 06:59 18:59


 


Intake Total 142  


 


Output Total  750 


 


Balance 142 -750 


 


Weight  89 kg 


 


Intake:   


 


  Intake, IV Titration 24  





  Amount   


 


    Heparin Sod,Pork in 0.45% 24  





    NaCl 25,000 unit In 0.45   





    % NaCl 1 250ml.bag @ 10.   





    498 UNITS/KG/HR 10 mls/hr   





    IV .Q24H [facility] Rx#:   





    [id number]   


 


  Oral 118  


 


Output:   


 


  Urine  750 


 


Other:   


 


  Voiding Method Bedside Commode Bedside Commode Bedside Commode


 


  # Voids  2 














- Labs


CBC & Chem 7: 


                                 02/26/25 06:20





                                 02/26/25 06:20


Labs: 


                  Abnormal Lab Results - Last 24 Hours (Table)











  02/25/25 02/26/25 02/26/25 Range/Units





  22:21 06:20 06:20 


 


RBC   4.23 L   (4.30-5.90)  m/uL


 


Hgb   12.8 L   (13.0-17.5)  gm/dL


 


Plt Count   139 L   (150-450)  k/uL


 


Lymphocytes #   0.7 L   (1.0-4.8)  k/uL


 


ABG pO2     ()  mmHg


 


ABG Total CO2     (19-24)  mmol/L


 


Hemoglobin     (13.0-17.5)  gm/dL


 


Sodium    128 L  (137-145)  mmol/L


 


Chloride    94 L  ()  mmol/L


 


BUN    45 H  (9-20)  mg/dL


 


Creatinine    1.99 H  (0.66-1.25)  mg/dL


 


Glucose    113 H  (74-99)  mg/dL


 


Calcium    7.8 L  (8.4-10.2)  mg/dL


 


Urine Protein  1+ H    (Negative)  


 


Urine Blood  Moderate H    (Negative)  


 


Urine Bacteria  Rare H    (None)  /hpf


 


Hyaline Casts  9 H    (0-2)  /lpf


 


Urine Mucus  Rare H    (None)  /hpf














  02/26/25 Range/Units





  08:15 


 


RBC   (4.30-5.90)  m/uL


 


Hgb   (13.0-17.5)  gm/dL


 


Plt Count   (150-450)  k/uL


 


Lymphocytes #   (1.0-4.8)  k/uL


 


ABG pO2  74 L  ()  mmHg


 


ABG Total CO2  26 H  (19-24)  mmol/L


 


Hemoglobin  12.9 L  (13.0-17.5)  gm/dL


 


Sodium   (137-145)  mmol/L


 


Chloride   ()  mmol/L


 


BUN   (9-20)  mg/dL


 


Creatinine   (0.66-1.25)  mg/dL


 


Glucose   (74-99)  mg/dL


 


Calcium   (8.4-10.2)  mg/dL


 


Urine Protein   (Negative)  


 


Urine Blood   (Negative)  


 


Urine Bacteria   (None)  /hpf


 


Hyaline Casts   (0-2)  /lpf


 


Urine Mucus   (None)  /hpf








                      Microbiology - Last 24 Hours (Table)











 02/24/25 00:46 Blood Culture - Preliminary





 Blood

## 2025-02-26 NOTE — P.PN
Subjective


Progress Note Date: 02/26/25





Patient is a 64-year-old male with a PMH of A-fib on Eliquis, COPD not on home 

oxygen, type II DM, hypertension, hyperlipidemia, CKD stage IIIa who presents to

the emergency room with complaints of shortness of breath and cough.  Patient 

reports that over the past 1 week he has been experiencing gradually worsening 

persistent cough.  Reports that the cough is nonproductive.  He also reports 

exertional dyspnea.  Denies experiencing fever or chills.  Also denies chest 

discomfort, nausea, vomiting, abdominal pain, diarrhea.





Chest x-ray in the emergency room revealed cardiomegaly along with findings of 

multifocal pneumonia.  EKG revealed sinus rhythm at 93 bpm with intraventricular

conduction delay as reviewed by me.  Laboratory evaluation did reveal influenza 

type a positive, troponin 0.102, proBNP 12,900, lactic acid 3.3, glucose 228, 

BUN 25, creatinine 1.59, and WBC count 12.7 with neutrophilic predominance.  

Patient was reportedly severely hypoxic and in respiratory distress upon EMS 

arrival at the scene and was started on BiPAP.








2/25/2025 patient seen and examined at bedside.  No acute events overnight.  No 

new complaints. WBC 7.2, hemoglobin 14.1, platelet count 1 68,000, PT 12.1, INR 

1.1, .7, sodium 133, potassium 4.2, chloride 96, bicarb 27, BUN 44, 

creatinine 2.12, glucose 97.  Troponin increased to 0.55 and has flattened.  

Chest x-ray done showed findings favor CHF exacerbation/fluid overload state.  

Areas of underlying acute infiltrate cannot be excluded.  No significant change 

from most recent x-ray.





2/26/25 patient seen and examined at bedside. no new complaints. Currently 

comfortable on BiPAP.  Labs today showed WBC 8.7, hemoglobin 12.8, MCV 93.7, 

platelet count 1 39,000, sodium 128, potassium 3.6, chloride 94, BUN 44, 

creatinine 1.99, calcium 7.8, glucose 113.  ABG showed pH of 7.42, pCO2 38, pO2 

74.  Urinalysis showed +1 protein, moderate ketones, rare bacteria, 9 hyaline 

casts and rare mucus, negative nitrites, negative leukocyte esterase.  Renal u

ltrasound done on 2/25 showed negative for hydronephrosis, stones, bladder 

anechoic





Review of systems:


Pertinent positives and negatives as discussed in HPI, a complete review of 

systems was performed and all other systems are negative.





Pertinent imaging and labs reviewed.





Physical examination:


Vital signs reviewed


General: non toxic, no distress, appears at stated age, BiPAP


Derm: no unusual rashes/lesions, warm


Head: atraumatic, normocephalic, symmetric


Eyes: EOMI, anicteric sclera, pupils equal round reactive to light


ENT: Nose and ears atraumatic


Neck: No cervical lymphadenopathy, trachea midline, supple


Mouth: no lip lesion, mucus membranes moist


Cardiovascular: S1S2 reg, no murmur


Lungs: Bibasilar crackles, no rhonchi, no rales, no accessory muscle use


Abdominal: soft,  nontender to palpation, no guarding


Ext: muscle strength 5 out of 5 in all 4 extremities grossly, no gross muscle 

atrophy, no contractures,  positive dorsalis pedis pulse bilateral, no edema


Neuro:  CN II-XI grossly intact, no gross focal neuro deficits


Psych: Alert and oriented x3, appropriate affect and mood





Assessment/Plan: 64-year-old male with history of COPD, A-fib on Eliquis and CKD

stage III here for further management of cardiorenal syndrome and sepsis 

secondary to pneumonia now BiPAP dependent.





#. Cardiorenal syndrome


#. Heart failure with reduced ejection fraction EF of 45 to 50%, not on GDMT


#. Hyponatremia secondary to above


#. Acute kidney injury on CKD


-Echocardiogram in 2/24/2025 showed ejection fracture 45-50% with moderately 

increased septal wall thickness, biatrial dilatation, moderate tricuspid valve r

egurgitation


-Lasix 20 mg IV every 8 discontinued due by cardiology


-Lasix 40 mg IV given once today. Will reevaluate patient volume status


-Strict ENE's


-Daily weights


-Fluid restriction


-Monitor BMP for electrolytes and renal function


-Cardiology consulted


-Metoprolol 25 mg daily.  Initiate GDMT on discharge





#. Sepsis secondary to influenza pneumonia, unable to rule out bacterial p

neumonia


#. Acute hypoxic respiratory failure, secondary to above


-Procalcitonin elevated at 2.38


-Supportive oxygen as need. Currently on BiPAP 14/5 80%


-Received one-time dose Zithromax 500 mg p.o. and Rocephin 2 g IVPB in the ED


-Continue with Rocephin 2 g IVPB and Zithromax 500 mg IVPB


-Continue Tamiflu 30 mg p.o. every 12 hours


-Hydrocortisone 50mg IV every 6 hours


-Blood culture negative at this time


-Urine culture pending


-Legionella urine antigen


-Monitor CBC





#.  NSTEMI, type II


-EKG revealed sinus rhythm at 93 bpm with intraventricular conduction delay on 

admission


-Patient has no chest pain as of now


-Troponins flattened at 0.5


-IV heparin for 48 hours.  Now discontinued


-Cardiology initiated atorvastatin 40 mg daily





#. Lactic acidosis, resovled








Chronic conditions: 


#.  A-fib


#.  COPD


#.  Type II DM


#.  Hypertension


#.  Hyperlipidemia


-Lisinopril held due to HERNAN


-Continue Eliquis 5 mg twice daily, amiodarone 200 mg p.o. daily, metoprolol 25 

mg daily





F: Restrict fluids due to liters today





E: Monitor electrolytes particularly sodium and potassium





N: Heart healthy diet





A: Can ambulate with assistance








DVT prophylaxis: Eliquis 5 mg p.o. daily











Paola Boyd MD


PGY-1/Intern





Dictation was produced using dragon dictation software. please excuse any 

grammatical, word or spelling errors.








I have seen and evaluated the patient today.  Discussed with the resident and 

agree with the residents finding and plan as documented in the resident's note. 

Changes highlighted in blue font.








Objective





- Vital Signs


Vital signs: 


                                   Vital Signs











Temp  101.5 F H  02/26/25 04:35


 


Pulse  70   02/26/25 04:35


 


Resp  32 H  02/26/25 04:35


 


BP  133/67   02/26/25 04:35


 


Pulse Ox  95   02/26/25 04:35


 


FiO2  80   02/26/25 04:35








                                 Intake & Output











 02/25/25 02/25/25 02/26/25





 06:59 18:59 06:59


 


Intake Total 250 142 


 


Output Total   750


 


Balance 250 142 -750


 


Weight 87.4 kg  89 kg


 


Intake:   


 


  Intake, IV Titration 250 24 





  Amount   


 


    Heparin Sod,Pork in 0.45% 250 24 





    NaCl 25,000 unit In 0.45   





    % NaCl 1 250ml.bag @ 10.   





    498 UNITS/KG/HR 10 mls/hr   





    IV .Q24H [facility] Rx#:   





    [id number]   


 


  Oral  118 


 


Output:   


 


  Urine   750


 


Other:   


 


  Voiding Method Bedside Commode Bedside Commode Bedside Commode


 


  # Voids 0  2














- Labs


CBC & Chem 7: 


                                 02/26/25 06:20





                                 02/26/25 06:20


Labs: 


                  Abnormal Lab Results - Last 24 Hours (Table)











  02/25/25 02/25/25 02/25/25 Range/Units





  06:55 06:55 06:55 


 


MCHC  30.9 L    (31.0-37.0)  g/dL


 


APTT   103.7 H*   (22.0-30.0)  sec


 


Sodium    133 L  (137-145)  mmol/L


 


Chloride    96 L  ()  mmol/L


 


BUN    44 H  (9-20)  mg/dL


 


Creatinine    2.12 H  (0.66-1.25)  mg/dL


 


Urine Protein     (Negative)  


 


Urine Blood     (Negative)  


 


Urine Bacteria     (None)  /hpf


 


Hyaline Casts     (0-2)  /lpf


 


Urine Mucus     (None)  /hpf














  02/25/25 Range/Units





  22:21 


 


MCHC   (31.0-37.0)  g/dL


 


APTT   (22.0-30.0)  sec


 


Sodium   (137-145)  mmol/L


 


Chloride   ()  mmol/L


 


BUN   (9-20)  mg/dL


 


Creatinine   (0.66-1.25)  mg/dL


 


Urine Protein  1+ H  (Negative)  


 


Urine Blood  Moderate H  (Negative)  


 


Urine Bacteria  Rare H  (None)  /hpf


 


Hyaline Casts  9 H  (0-2)  /lpf


 


Urine Mucus  Rare H  (None)  /hpf








                      Microbiology - Last 24 Hours (Table)











 02/24/25 00:46 Blood Culture - Preliminary





 Blood

## 2025-02-26 NOTE — XR
EXAMINATION TYPE: XR chest 1V

 

DATE OF EXAM: 2/26/2025

 

CLINICAL HISTORY: Difficulty breathing progress study.  

 

TECHNIQUE: Single AP portable upright view of the chest is obtained.

 

COMPARISON: Chest x-ray from one day earlier

 

FINDINGS:  Persistent cardiomegaly with bilateral multifocal increased opacities and likely small abdifatah
ateral pleural effusions. Osseous structures are intact.

 

IMPRESSION: Persistent cardiomegaly with bilateral multifocal acute infiltrates and/or edema. Correla
te clinically.

 

[facility], Workstation: [id number], 2/26/2025 8:20 AM

## 2025-02-26 NOTE — P.PN
Subjective


Progress Note Date: 02/26/25





This is a 64-year-old male patient with chronic and ongoing tobacco dependence, 

atrial fibrillation, diabetes mellitus, hypertension, hyperlipidemia who has a 1

week history of increasing shortness of breath, cough congestion fever and 

chills.  He came into the emergency room last evening with severe shortness of 

breath requiring BiPAP support which was 14/5 and 50% FiO2.  Chest x-ray 

revealed cardiomegaly and airspace disease most notably in the left mid lower 

lung zone suggestive of multifocal pneumonia and possible fluid volume overload.

 White count 10.5.  Hemoglobin 14.3.  Platelets 156.  Sodium 135.  Potassium 

4.3.  Bicarb 27.  BUN 29.  Creatinine 1.86.  Glucose 174.  Troponins 0.327, 

0.550, 0.5-2.  He is initiated on a heparin drip.  proBNP was 13,000.  

Procalcitonin 2.38.  He did test positive for influenza A.  Initiated on 

Tamiflu.  Initiated on ceftriaxone.  He is seen today in consultation in the 

emergency department.  He is currently sitting up on the stretcher.  Awake and 

alert.  He is on 6 L high flow nasal cannula.  He is breathing a bit easier 

today compared to yesterday.  Still with a loose nonproductive cough.  He did 

have a Tmax of 102.3.  Current temperature 99.1.  He is hemodynamically stable.





The patient is seen today February 25, 2025 in follow-up on the regular medical 

floor.  He is awake and alert in no acute distress.  Doing slightly better today

compared to yesterday.  He is still requiring 10 L high flow nasal cannula.  

Chest x-ray continues to show evidence of fluid volume overload/CHF.  

Echocardiogram reveals mildly impaired left ventricular systolic function with 

ejection fraction of 45 to 50%.  Moderately reduced global LV function.  Blood 

culture is pending.  White count 7.2.  Hemoglobin 14.1.  Platelets 168.  Sodium 

133. Potassium 4.2.  Bicarb 27.  BUN 44.  Creatinine 2.12.  Stool for occult 

blood was negative.  Since.  Anticoagulated with Eliquis.  Antibiotics in the 

form of ceftriaxone and azithromycin.  He is continued on Tamiflu.  Lasix 

discontinued per cardiology.





The patient is seen today February 26, 2025 in follow-up on the regular medical 

floor.  He is currently on BiPAP 14/5 and 80% FiO2.  He was trialed on 12 L high

flow earlier this morning with O2 saturations in the 70s and 80s.  Arterial 

blood gases on the 80% FiO2 revealed a PaO2 of 74, pCO2 of 38 and a pH of 7.42. 

A chest x-ray revealed similar findings of persistent cardiomegaly with 

bilateral multifocal acute infiltrates and/or edema.  He received Lasix 40 mg 

IVP x 1.  Blood culture reveals no growth to date.  White count 8.7.  Hemoglobin

12.8.  Platelets 139.  Sodium 128.  Potassium 3.6.  Bicarb 29.  BUN 45.  

Creatinine 1.99.  He is continued on DuoNeb inhalations.  Continued on Tamiflu. 

Initiated on Solu-Cortef.  Remains on antibiotics in the form of ceftriaxone and

azithromycin.  Anticoagulated with Eliquis.  Currently on a 1500 mL fluid 

restriction.  Nephrology is following.  ACE inhibitor remains on hold.





Objective





- Vital Signs


Vital signs: 


                                   Vital Signs











Temp  99.7 F H  02/26/25 08:00


 


Pulse  68   02/26/25 15:20


 


Resp  35 H  02/26/25 12:00


 


BP  117/60   02/26/25 12:00


 


Pulse Ox  95   02/26/25 12:00


 


FiO2  70   02/26/25 15:20








                                 Intake & Output











 02/25/25 02/26/25 02/26/25





 18:59 06:59 18:59


 


Intake Total 142  


 


Output Total  750 100


 


Balance 142 -750 -100


 


Weight  89 kg 


 


Intake:   


 


  Intake, IV Titration 24  





  Amount   


 


    Heparin Sod,Pork in 0.45% 24  





    NaCl 25,000 unit In 0.45   





    % NaCl 1 250ml.bag @ 10.   





    498 UNITS/KG/HR 10 mls/hr   





    IV .Q24H [facility] Rx#:   





    [id number]   


 


  Oral 118  


 


Output:   


 


  Urine  750 


 


  Post Void Residual   100


 


Other:   


 


  Voiding Method Bedside Commode Bedside Commode Bedside Commode


 


  # Voids  2 














- Exam





GENERAL EXAM: Alert, 64-year-old male, on BiPAP 14/5 and 80% FiO2 a, fairly 

comfortable in no apparent distress.


HEAD: Normocephalic.


EYES: Normal reaction of pupils, equal size.


NOSE: Clear with pink turbinates.


THROAT: No erythema or exudates.


NECK: No masses, no JVD.


CHEST: No chest wall deformity.


LUNGS: Equal air entry with crackles in the bilateral bases, few scattered 

rhonchi, diminished.


CVS: S1 and S2 normal with no audible murmur, regular rhythm.


ABDOMEN: No hepatosplenomegaly, normal bowel sounds, no guarding or rigidity.


SPINE: No scoliosis or deformity


SKIN: No rashes


CENTRAL NERVOUS SYSTEM: No focal deficits, tone is normal in all 4 extremities.


EXTREMITIES: There is 1+ peripheral edema.  No clubbing, no cyanosis.  P

eripheral pulses are intact.





- Labs


CBC & Chem 7: 


                                 02/26/25 06:20





                                 02/26/25 06:20


Labs: 


                  Abnormal Lab Results - Last 24 Hours (Table)











  02/25/25 02/26/25 02/26/25 Range/Units





  22:21 06:20 06:20 


 


RBC   4.23 L   (4.30-5.90)  m/uL


 


Hgb   12.8 L   (13.0-17.5)  gm/dL


 


Plt Count   139 L   (150-450)  k/uL


 


Lymphocytes #   0.7 L   (1.0-4.8)  k/uL


 


ABG pO2     ()  mmHg


 


ABG Total CO2     (19-24)  mmol/L


 


Hemoglobin     (13.0-17.5)  gm/dL


 


Sodium    128 L  (137-145)  mmol/L


 


Chloride    94 L  ()  mmol/L


 


BUN    45 H  (9-20)  mg/dL


 


Creatinine    1.99 H  (0.66-1.25)  mg/dL


 


Glucose    113 H  (74-99)  mg/dL


 


Calcium    7.8 L  (8.4-10.2)  mg/dL


 


Urine Protein  1+ H    (Negative)  


 


Urine Blood  Moderate H    (Negative)  


 


Urine Bacteria  Rare H    (None)  /hpf


 


Hyaline Casts  9 H    (0-2)  /lpf


 


Urine Mucus  Rare H    (None)  /hpf














  02/26/25 Range/Units





  08:15 


 


RBC   (4.30-5.90)  m/uL


 


Hgb   (13.0-17.5)  gm/dL


 


Plt Count   (150-450)  k/uL


 


Lymphocytes #   (1.0-4.8)  k/uL


 


ABG pO2  74 L  ()  mmHg


 


ABG Total CO2  26 H  (19-24)  mmol/L


 


Hemoglobin  12.9 L  (13.0-17.5)  gm/dL


 


Sodium   (137-145)  mmol/L


 


Chloride   ()  mmol/L


 


BUN   (9-20)  mg/dL


 


Creatinine   (0.66-1.25)  mg/dL


 


Glucose   (74-99)  mg/dL


 


Calcium   (8.4-10.2)  mg/dL


 


Urine Protein   (Negative)  


 


Urine Blood   (Negative)  


 


Urine Bacteria   (None)  /hpf


 


Hyaline Casts   (0-2)  /lpf


 


Urine Mucus   (None)  /hpf








                      Microbiology - Last 24 Hours (Table)











 02/24/25 00:46 Blood Culture - Preliminary





 Blood 














Assessment and Plan


Assessment: 





Acute hypoxemic respiratory failure secondary to an acute exacerbation of chroni

c obstructive pulmonary disease complicated by influenza A and possible 

underlying pneumonia.  Procalcitonin 2.38.





Acute influenza A infection





Acute exacerbation of chronic systolic congestive heart failure with an ejection

fraction now 45-50%





Troponin leak possible non-ST segment elevation myocardial infarction secondary 

to above





Acute kidney injury





History of atrial fibrillation with previous cardioversion maintained on 

amiodarone and Eliquis





Chronic tobacco dependence with suspected underlying COPD





Hypertension





Hyperlipidemia





Diabetes mellitus, type II





Plan:





The patient was seen and evaluated


Chest x-ray, labs and medications reviewed


Increasing FiO2 requirements this a.m.


Currently on BiPAP 


Decreased the FiO2 to 70%


Transition to high flow nasal cannula if tolerated


Lasix 40 mg IVP x 1


Arterial blood gases reviewed


Continue to titrate down the FiO2 as tolerated


Continue DuoNeb inhalations, Symbicort


Initiated on Solu-Cortef


Continue ceftriaxone and azithromycin


Continue Tamiflu


Anticoagulated with Eliquis


We will continue to follow





I have personally seen and examined the patient, performed the documentation and

the assessment and plan as written.  Number of minutes spent on the visit: 10





Dictation was produced using Dragon dictation software.  Please excuse any 

grammatical, word or spelling errors.

## 2025-02-26 NOTE — P.NPCON
History of Present Illness





- Reason for Consult


Consult date: 02/26/25





- History of Present Illness


Reason for consult: HERNAN





64-year-old male with PMH of A-fib maintained on Eliquis, non oxygen dependent 

COPD, type 2 diabetes mellitus, hypertension, hyperlipidemia, CKD stage IIIa 

presented to the emergency department with complaints of worsening shortness of 

breath cough.  Reporting that over the past week or so he has had a worsening 

nonproductive cough, and worsening exertional dyspnea.  Endorses producing urine

without any dysuria or hematuria.  He denies fever, chills, chest discomfort, 

nausea, vomiting, abdominal pain, however notes some diarrhea.  He has no known 

family history of kidney disease.  He denies use of any NSAIDs at home.





Chest x-ray showed findings that are indicative of CHF exacerbation/fluid 

overload state


Renal ultrasound showed no evidence of hydronephrosis


Echocardiogram on 2/24/2025 showed EF 45-50%





Creatinine on arrival 1.59, this morning 1.99.


BUN on arrival 25, currently 45.








Past Medical History


Past Medical History: Atrial Fibrillation, Diabetes Mellitus, Hyperlipidemia, 

Hypertension, Pneumonia, Supraventricular Tachycardia (SVT)


Additional Past Medical History / Comment(s): a fib, causes shortness of breath,

can feel irregular heartbeat


History of Any Multi-Drug Resistant Organisms: None Reported


Past Surgical History: Cholecystectomy, Heart Catheterization


Additional Past Surgical History / Comment(s): Colonoscopy


Past Anesthesia/Blood Transfusion Reactions: No Reported Reaction


Past Psychological History: No Psychological Hx Reported


Smoking Status: Former smoker


Past Alcohol Use History: Occasional


Additional Past Alcohol Use History / Comment(s): Started smoking as a teen 

about a ppd.


Past Drug Use History: Marijuana





- Past Family History


  ** Father


Family Medical History: No Reported History





  ** Mother


Family Medical History: Coronary Artery Disease (CAD)





Medications and Allergies


                                Home Medications











 Medication  Instructions  Recorded  Confirmed  Type


 


Apixaban [Eliquis] 5 mg PO BID 11/17/22 02/24/25 History


 


Furosemide [Lasix] 40 mg PO DAILY 11/17/22 02/24/25 History


 


Amiodarone [Cordarone] 200 mg PO DAILY 08/07/23 02/24/25 History


 


Metoprolol Succinate (ER) [Toprol 25 mg PO DAILY 02/24/25 02/24/25 History





Xl]    


 


lisinopriL [Zestril] 20 mg PO BID 02/24/25 02/24/25 History








                                    Allergies











Allergy/AdvReac Type Severity Reaction Status Date / Time


 


No Known Allergies Allergy   Verified 02/24/25 07:12














Physical Exam


Vitals: 


                                   Vital Signs











  Temp Pulse Pulse Resp BP Pulse Ox FiO2


 


 02/26/25 11:19   70     


 


 02/26/25 11:12        80


 


 02/26/25 11:09   66     


 


 02/26/25 08:01   70     


 


 02/26/25 08:00  99.7 F H   63  30 H  110/54  94 L  80


 


 02/26/25 07:51        80


 


 02/26/25 07:47   68     


 


 02/26/25 04:35  101.5 F H   70  32 H  133/67  95  80


 


 02/26/25 03:29        80


 


 02/26/25 00:21        60


 


 02/25/25 22:56  100.5 F H   75  26 H  163/71  94 L  80


 


 02/25/25 20:48  100.2 F H   64  24  127/54  99  80


 


 02/25/25 20:40   78      80


 


 02/25/25 20:26   74     


 


 02/25/25 16:00  100.4 F H   73  19  116/69  93 L 


 


 02/25/25 15:43   78     


 


 02/25/25 15:30   68     


 


 02/25/25 12:10        60


 


 02/25/25 12:00  99.1 F   71  23  119/55  90 L 


 


 02/25/25 11:44   80     


 


 02/25/25 11:35   78     








                                Intake and Output











 02/25/25 02/26/25 02/26/25





 22:59 06:59 14:59


 


Output Total  750 


 


Balance  -750 


 


Output:   


 


  Urine  750 


 


Other:   


 


  Voiding Method Bedside Commode Bedside Commode Bedside Commode


 


  # Voids  2 


 


  Weight  89 kg 











Patient is awake, comfortable, no acute distress.


Heart: S1 and S2 heard


Lungs: Bilateral breath sounds are heard


Abdomen: Soft and nontender


Lower extremities: No edema


CNS: grossly intact








Results





- Lab Results


                             Most recent lab results











ABG pH  7.42  (7.35-7.45)   02/26/25  08:15    


 


ABG pCO2  38 mmHg (35-45)   02/26/25  08:15    


 


ABG pO2  74 mmHg ()  L  02/26/25  08:15    


 


ABG HCO3  24 mmol/L (21-25)   02/26/25  08:15    


 


ABG O2 Saturation  95.7 % (94-97)   02/26/25  08:15    


 


Calcium  7.8 mg/dL (8.4-10.2)  L  02/26/25  06:20    


 


Magnesium  1.6 mg/dL (1.6-2.3)   02/23/25  22:14    














                                 02/26/25 06:20





                                 02/26/25 06:20





Assessment and Plan


Assessment: 


#HERNAN secondary to ATN secondary to cardiorenal syndrome and infection. Cr peaked

 at 2.1 this admission - 1.99 today. 


#chronic kidney disease stage 3A, baseline creatinine 1.6-1.7. 


#Hyponatremia secondary to hypervolemia and acute respiratory infection causing 

SIADH.


#Sepsis secondary to influenza type a pneumonia, currently maintained on Tamiflu


#Acute hypoxic respiratory failure secondary to above


#HFrEF w/EF 45 to 50%


#Type II NSTEMI





Plan: 


-Received 40 mg IV Lasix yesterday (2/25/2025) and this morning; reassess in 

a.m., continue IV Lasix as needed following reassessment


-Renal ultrasound showed no evidence of hydronephrosis


-UA showed 1+ protein, moderate blood, no rbc's.


-Repeat chest x-ray in the a.m.


-Recheck labs in a.m.


-Maintain fluid restriction 1500 cc


-Bladder scan, if retaining greater than 300 cc insert Valentine catheter


-Avoid nephrotoxic agents


-Continue to hold lisinopril





Thank you for the consultation. I will continue to follow the patient with you 

during this hospital stay. 





I have seen and examined the patient with resident and agree with A&P as 

written.

## 2025-02-27 LAB
ANION GAP SERPL CALC-SCNC: 8 MMOL/L
BASOPHILS # BLD AUTO: 0 K/UL (ref 0–0.2)
BASOPHILS NFR BLD AUTO: 0 %
BUN SERPL-SCNC: 47 MG/DL (ref 9–20)
CALCIUM SPEC-MCNC: 8.5 MG/DL (ref 8.4–10.2)
CHLORIDE SERPL-SCNC: 97 MMOL/L (ref 98–107)
CO2 SERPL-SCNC: 28 MMOL/L (ref 22–30)
EOSINOPHIL # BLD AUTO: 0 K/UL (ref 0–0.7)
EOSINOPHIL NFR BLD AUTO: 0 %
ERYTHROCYTE [DISTWIDTH] IN BLOOD BY AUTOMATED COUNT: 4.38 M/UL (ref 4.3–5.9)
ERYTHROCYTE [DISTWIDTH] IN BLOOD: 12.7 % (ref 11.5–15.5)
GLUCOSE BLD-MCNC: 198 MG/DL (ref 70–110)
GLUCOSE BLD-MCNC: 219 MG/DL (ref 70–110)
GLUCOSE BLD-MCNC: 227 MG/DL (ref 70–110)
GLUCOSE SERPL-MCNC: 132 MG/DL (ref 74–99)
HCT VFR BLD AUTO: 41.4 % (ref 39–53)
HGB BLD-MCNC: 13.3 GM/DL (ref 13–17.5)
LYMPHOCYTES # SPEC AUTO: 0.7 K/UL (ref 1–4.8)
LYMPHOCYTES NFR SPEC AUTO: 9 %
MCH RBC QN AUTO: 30.3 PG (ref 25–35)
MCHC RBC AUTO-ENTMCNC: 32.1 G/DL (ref 31–37)
MCV RBC AUTO: 94.5 FL (ref 80–100)
MONOCYTES # BLD AUTO: 0.5 K/UL (ref 0–1)
MONOCYTES NFR BLD AUTO: 6 %
NEUTROPHILS # BLD AUTO: 6.3 K/UL (ref 1.3–7.7)
NEUTROPHILS NFR BLD AUTO: 83 %
PLATELET # BLD AUTO: 167 K/UL (ref 150–450)
POTASSIUM SERPL-SCNC: 3.9 MMOL/L (ref 3.5–5.1)
SODIUM SERPL-SCNC: 133 MMOL/L (ref 137–145)
WBC # BLD AUTO: 7.6 K/UL (ref 3.8–10.6)

## 2025-02-27 RX ADMIN — METHYLPREDNISOLONE SODIUM SUCCINATE SCH MG: 125 INJECTION, POWDER, FOR SOLUTION INTRAMUSCULAR; INTRAVENOUS at 17:24

## 2025-02-27 RX ADMIN — FLUTICASONE PROPIONATE PRN SPRAY: 50 SPRAY, METERED NASAL at 17:06

## 2025-02-27 RX ADMIN — FUROSEMIDE STA MG: 10 INJECTION, SOLUTION INTRAMUSCULAR; INTRAVENOUS at 10:36

## 2025-02-27 NOTE — P.PN
Subjective


Progress Note Date: 02/27/25





Reason for Consult (text): 





NSTEMI


History of present illness: 





This is a 64-year-old male patient of Dr. Ray with past medical history of 

chronic kidney disease stage III, COPD tobacco use, alcohol abuse, hypertension,

diabetes mellitus type 2, persistent atrial fibrillation, systolic heart 

failure, recent GI bleed, family history of premature coronary artery disease.  

We have been asked to evaluate the patient for NSTEMI.  Patient states he had a 

cough for 10 days that was dry with increasing shortness of breath but gradually

worsening.  He does not have home oxygen therapy.  He denies any chest pain or 

chest pressure.  Prior to this episode, patient was feeling well with no 

shortness of breath.  He is now not eating or drinking very much with decreased 

appetite.  At home, he states he was taking all of his prescribed medications.  

He is urinating okay.  He presented with a blood pressure of 200/113.  He does 

not check his blood pressure at home.  Temperature max 102.3. Blood pressure now

161/88, heart rate 75, pulse ox 96% on BiPAP.  He denies smoking and denies 

alcohol use.  Patient has been started on heparin drip.  Patient is seen today 

in the emergency center waiting for a bed on the cardiac stepdown unit.





-EKG: Sinus rhythm with IVCD


-Chest x-ray: Cardiomegaly.  Areas of airspace disease most notably at the left 

mid and lower lung suggestive of multifocal pneumonia.


-Laboratory studies: WBC initially 12.7 and repeat 10.5, hemoglobin 14.3.  

Sodium 135, potassium 4.3, BUN 29 creatinine 1.86.  Lactic acid 3.2 followed by 

1.9.  Troponin 0.102, 0.327.  Influenza A detected.


-Home cardiac medications: Amiodarone 200 mg daily, Eliquis 5 mg twice daily, 

Lasix 40 mg daily, lisinopril 20 mg twice daily, metoprolol succinate 25 mg 

daily.


-ROSANNA and cardioversion performed on 8/10/2023 revealed aortic valve is 

tricuspid, functioning normally with trace AI.  Mitral valve appears to be 

normal with mild regurgitation.  Tricuspid valve appears to be normal with 

moderate to severe tricuspid regurgitation.  There is a PFO.  Left atrial 

appendage is free of clot.  EF 35%.


-ROSANNA and cardioversion performed 11/21/2022.


Dobutamine stress echocardiogram performed in the office on 8/3/2021 revealed 

nondiagnostic EKG portion secondary to baseline EKG abnormalities.  Normal 

stress echo portion without inducible ischemia.  Normal left ventricular 

ejection fraction of 60%.





2/25


Patient seen and examined on the cardiac stepdown unit.  Patient states he still

has a significant amount of coughing.  No lower extremity edema.  He states he 

is having some chills as well.  Patient states he is urinating a lot but not 

eating very much with decreased appetite.  He has been maintained on IV Lasix at

20 mg every 8 hours as well as heparin drip.  No complaints of chest pain.  

Blood pressure 119/55, heart rate 71, pulse ox 90% on BiPAP.  Repeat blood work 

reveals WBC 7.2, hemoglobin 14.1.  Sodium 133, BUN 44 and creatinine 2.12.


Echocardiogram reveals EF of 45 to 50%, moderately increased septal wall 

thickness, moderate biatrial dilatation, moderate tricuspid regurgitation.





2/26


Patient seen and examined.  He is currently on BiPAP with pulse ox of 95% on 

FiO2 of 80, blood pressure 117/60, respiratory rate 35, heart rate 70.  

Temperature max 101.5.  Repeat blood work reveals hemoglobin 12.8.  Sodium 128, 

potassium 3.6, BUN 45 creatinine 1.99.





2/27


Patient seen and examined.  Patient has been going back and forth between BiPAP 

and high flow nasal cannula at 15 L.  Blood pressure 146/69, heart rate 50s and 

60s, respiratory rate 28.  Repeat lab work reveals hemoglobin 13.3, BUN 47 

creatinine 1.54, sodium 133, potassium 3.9.  Patient received a dose of IV Lasix

40 mg today.





Physical examination: 


Gen: This is a 64-year-old male in no acute respiratory distress.


VS: reviewed


HEENT: Head is atraumatic, normocephalic. Pupils equal, round. Sclerae is 

anicteric. 


NECK: Supple. No JVD. 


LUNGS: Scattered rhonchi.  Accessory muscle usage.


HEART: Irregular rate and rhythm. No murmur. 


ABDOMEN: Soft  No tenderness.


EXTREMITIES: No pedal edema.  No calf tenderness.


NEUROLOGICAL: Patient is awake, alert and oriented x3.


 


Assessment:


Acute hypoxic respiratory failure secondary to influenza, pneumonia, and 

component of heart failure


NSTEMI possibly secondary to sepsis, influenza and pneumonia


Influenza A


Pneumonia


Mild acute on chronic systolic heart failure


Acute kidney injury


Lactic acidosis


Cardiomyopathy with previous EF of 15 to 20%, possibly tachycardia induced


Hypertension


Hyperlipidemia


Persistent atrial fibrillation status post previous cardioversion x 2, currently

in sinus rhythm


Diabetes mellitus type 2


Hyponatremia





Plan:


Continue patient's home cardiac medications: Eliquis 5 mg twice daily, aspirin 

81 mg daily, atorvastatin 40 mg at bedtime, metoprolol succinate 25 mg daily


Pulmonary is ordering IV Lasix daily as indicated, 40 mg given today


Continue oxygen support


Continue Tamiflu, steroids and IV antibiotics


Further recommendations to follow based upon clinical course





Nurse practitioner note has been reviewed, I agree with documented findings and 

plan of care.  Patient was seen and examined.





Objective





- Vital Signs


Vital signs: 


                                   Vital Signs











Temp  97.6 F   02/27/25 11:45


 


Pulse  62   02/27/25 11:48


 


Resp  25 H  02/27/25 11:48


 


BP  146/69   02/27/25 11:45


 


Pulse Ox  100   02/27/25 11:45


 


FiO2  60   02/27/25 11:45








                                 Intake & Output











 02/26/25 02/27/25 02/27/25





 18:59 06:59 18:59


 


Intake Total   360


 


Output Total 100 525 


 


Balance -100 -525 360


 


Weight  87.3 kg 


 


Intake:   


 


  Oral   360


 


Output:   


 


  Urine  525 


 


  Post Void Residual 100  


 


Other:   


 


  Voiding Method Bedside Commode Bedside Commode 





  Urinal 


 


  # Voids   1


 


  # Bowel Movements   1














- Labs


CBC & Chem 7: 


                                 02/27/25 06:55





                                 02/27/25 06:55


Labs: 


                  Abnormal Lab Results - Last 24 Hours (Table)











  02/27/25 02/27/25 02/27/25 Range/Units





  06:55 06:55 11:13 


 


Lymphocytes #  0.7 L    (1.0-4.8)  k/uL


 


Sodium   133 L   (137-145)  mmol/L


 


Chloride   97 L   ()  mmol/L


 


BUN   47 H   (9-20)  mg/dL


 


Creatinine   1.54 H   (0.66-1.25)  mg/dL


 


Glucose   132 H   (74-99)  mg/dL


 


POC Glucose (mg/dL)    219 H  ()  mg/dL








                      Microbiology - Last 24 Hours (Table)











 02/24/25 00:46 Blood Culture - Preliminary





 Blood

## 2025-02-27 NOTE — P.PN
Subjective


Progress Note Date: 02/27/25





This is a 64-year-old male patient with chronic and ongoing tobacco dependence, 

atrial fibrillation, diabetes mellitus, hypertension, hyperlipidemia who has a 1

week history of increasing shortness of breath, cough congestion fever and 

chills.  He came into the emergency room last evening with severe shortness of 

breath requiring BiPAP support which was 14/5 and 50% FiO2.  Chest x-ray 

revealed cardiomegaly and airspace disease most notably in the left mid lower 

lung zone suggestive of multifocal pneumonia and possible fluid volume overload.

 White count 10.5.  Hemoglobin 14.3.  Platelets 156.  Sodium 135.  Potassium 

4.3.  Bicarb 27.  BUN 29.  Creatinine 1.86.  Glucose 174.  Troponins 0.327, 

0.550, 0.5-2.  He is initiated on a heparin drip.  proBNP was 13,000.  

Procalcitonin 2.38.  He did test positive for influenza A.  Initiated on 

Tamiflu.  Initiated on ceftriaxone.  He is seen today in consultation in the 

emergency department.  He is currently sitting up on the stretcher.  Awake and 

alert.  He is on 6 L high flow nasal cannula.  He is breathing a bit easier 

today compared to yesterday.  Still with a loose nonproductive cough.  He did 

have a Tmax of 102.3.  Current temperature 99.1.  He is hemodynamically stable.





The patient is seen today February 25, 2025 in follow-up on the regular medical 

floor.  He is awake and alert in no acute distress.  Doing slightly better today

compared to yesterday.  He is still requiring 10 L high flow nasal cannula.  

Chest x-ray continues to show evidence of fluid volume overload/CHF.  

Echocardiogram reveals mildly impaired left ventricular systolic function with 

ejection fraction of 45 to 50%.  Moderately reduced global LV function.  Blood 

culture is pending.  White count 7.2.  Hemoglobin 14.1.  Platelets 168.  Sodium 

133. Potassium 4.2.  Bicarb 27.  BUN 44.  Creatinine 2.12.  Stool for occult 

blood was negative.  Since.  Anticoagulated with Eliquis.  Antibiotics in the 

form of ceftriaxone and azithromycin.  He is continued on Tamiflu.  Lasix 

discontinued per cardiology.





The patient is seen today February 26, 2025 in follow-up on the regular medical 

floor.  He is currently on BiPAP 14/5 and 80% FiO2.  He was trialed on 12 L high

flow earlier this morning with O2 saturations in the 70s and 80s.  Arterial 

blood gases on the 80% FiO2 revealed a PaO2 of 74, pCO2 of 38 and a pH of 7.42. 

A chest x-ray revealed similar findings of persistent cardiomegaly with 

bilateral multifocal acute infiltrates and/or edema.  He received Lasix 40 mg 

IVP x 1.  Blood culture reveals no growth to date.  White count 8.7.  Hemoglobin

12.8.  Platelets 139.  Sodium 128.  Potassium 3.6.  Bicarb 29.  BUN 45.  

Creatinine 1.99.  He is continued on DuoNeb inhalations.  Continued on Tamiflu. 

Initiated on Solu-Cortef.  Remains on antibiotics in the form of ceftriaxone and

azithromycin.  Anticoagulated with Eliquis.  Currently on a 1500 mL fluid 

restriction.  Nephrology is following.  ACE inhibitor remains on hold.





The patient is seen today February 27, 2025 in follow-up on the regular medical 

floor.  He is currently sitting up in bed.  Still requiring BiPAP support 

currently 14/5 and 60% FiO2.  He remains on DuoNeb and elations, Solu-Cortef, 

Tamiflu.  Anticoagulated with Eliquis.  Antibiotics in the form of ceftriaxone. 

Blood culture reveals no growth.  White count 7.6.  Hemoglobin 13.3.  Platelets 

167.  Sodium 133.  Potassium 3.9.  Bicarb 28.  BUN 47.  Creatinine 1.54.  

Glucose 132.  Chest x-ray continues to show worsening bilateral diffuse acute 

infiltrates and/or edema.  Developing ARDS is not excluded.  He is given Lasix 

40 mg IVP today.  Currently in a negative balance.





Objective





- Vital Signs


Vital signs: 


                                   Vital Signs











Temp  97.6 F   02/27/25 11:45


 


Pulse  62   02/27/25 11:48


 


Resp  25 H  02/27/25 11:48


 


BP  146/69   02/27/25 11:45


 


Pulse Ox  100   02/27/25 11:45


 


FiO2  60   02/27/25 11:45








                                 Intake & Output











 02/26/25 02/27/25 02/27/25





 18:59 06:59 18:59


 


Intake Total   360


 


Output Total 100 525 


 


Balance -100 -525 360


 


Weight  87.3 kg 


 


Intake:   


 


  Oral   360


 


Output:   


 


  Urine  525 


 


  Post Void Residual 100  


 


Other:   


 


  Voiding Method Bedside Commode Bedside Commode 





  Urinal 


 


  # Voids   1


 


  # Bowel Movements   1














- Exam





GENERAL EXAM: Alert, 64-year-old male, sitting up in bed, on BiPAP 14/5 and 60% 

FiO2 a, fairly comfortable in no apparent distress.


HEAD: Normocephalic.


EYES: Normal reaction of pupils, equal size.


NOSE: Clear with pink turbinates.


THROAT: No erythema or exudates.


NECK: No masses, no JVD.


CHEST: No chest wall deformity.


LUNGS: Equal air entry with crackles in the bilateral bases, few scattered 

rhonchi, diminished.


CVS: S1 and S2 normal with no audible murmur, regular rhythm.


ABDOMEN: No hepatosplenomegaly, normal bowel sounds, no guarding or rigidity.


SPINE: No scoliosis or deformity


SKIN: No rashes


CENTRAL NERVOUS SYSTEM: No focal deficits, tone is normal in all 4 extremities.


EXTREMITIES: There is 1+ peripheral edema.  No clubbing, no cyanosis.  Periph

eral pulses are intact.





- Labs


CBC & Chem 7: 


                                 02/27/25 06:55





                                 02/27/25 06:55


Labs: 


                  Abnormal Lab Results - Last 24 Hours (Table)











  02/27/25 02/27/25 02/27/25 Range/Units





  06:55 06:55 11:13 


 


Lymphocytes #  0.7 L    (1.0-4.8)  k/uL


 


Sodium   133 L   (137-145)  mmol/L


 


Chloride   97 L   ()  mmol/L


 


BUN   47 H   (9-20)  mg/dL


 


Creatinine   1.54 H   (0.66-1.25)  mg/dL


 


Glucose   132 H   (74-99)  mg/dL


 


POC Glucose (mg/dL)    219 H  ()  mg/dL








                      Microbiology - Last 24 Hours (Table)











 02/24/25 00:46 Blood Culture - Preliminary





 Blood 














Assessment and Plan


Assessment: 





Acute hypoxemic respiratory failure secondary to an acute exacerbation of 

chronic obstructive pulmonary disease complicated by influenza A and underlying 

pneumonia.  Procalcitonin 2.38.





Acute influenza A infection





Acute exacerbation of chronic systolic congestive heart failure with an ejection

fraction now 45-50%





Troponin leak possible non-ST segment elevation myocardial infarction secondary 

to above





Acute kidney injury





History of atrial fibrillation with previous cardioversion maintained on 

amiodarone and Eliquis





Chronic tobacco dependence with suspected underlying COPD





Hypertension





Hyperlipidemia





Diabetes mellitus, type II





Plan:





The patient was seen and evaluated


Chest x-ray, labs and medications reviewed


Currently on BiPAP 14/5 and 60% FiO2


Transition to high flow nasal cannula if tolerated


Continue to titrate down the FiO2 as tolerated


Lasix 40 mg IVP x 1 again today


Continue DuoNeb inhalations, Symbicort


Continue Solu-Medrol


Continue ceftriaxone, completed azithromycin


Continue Tamiflu


Anticoagulated with Eliquis


Condition is guarded


We will continue to follow





I have personally seen and examined the patient, performed the documentation and

the assessment and plan as written.  Number of minutes spent on the visit: 10





Dictation was produced using Dragon dictation software.  Please excuse any 

grammatical, word or spelling errors.

## 2025-02-27 NOTE — XR
EXAMINATION TYPE: XR chest 1V portable

 

DATE OF EXAM: 2/27/2025

 

CLINICAL HISTORY: Difficulty breathing and CHF progress study.  

 

TECHNIQUE: Single AP portable upright view of the chest is obtained.

 

COMPARISON: Chest x-ray from one day earlier and 2 days earlier.

 

FINDINGS:  Persistent cardiomegaly with continued worsening bilateral diffuse increased opacities. Os
seous structures are intact.

 

IMPRESSION: Continued worsening bilateral diffuse acute infiltrates and/or edema. Correlate clinicall
y. Developing ARDS is not excluded.

 

[facility], Workstation: [id number], 2/27/2025 6:59 AM

## 2025-02-27 NOTE — P.PN
Subjective


Progress Note Date: 02/27/25





Patient is a 64-year-old male with a PMH of A-fib on Eliquis, COPD not on home 

oxygen, type II DM, hypertension, hyperlipidemia, CKD stage IIIa who presents to

the emergency room with complaints of shortness of breath and cough.  Patient 

reports that over the past 1 week he has been experiencing gradually worsening 

persistent cough.  Reports that the cough is nonproductive.  He also reports 

exertional dyspnea.  Denies experiencing fever or chills.  Also denies chest 

discomfort, nausea, vomiting, abdominal pain, diarrhea.





Chest x-ray in the emergency room revealed cardiomegaly along with findings of 

multifocal pneumonia.  EKG revealed sinus rhythm at 93 bpm with intraventricular

conduction delay as reviewed by me.  Laboratory evaluation did reveal influenza 

type a positive, troponin 0.102, proBNP 12,900, lactic acid 3.3, glucose 228, 

BUN 25, creatinine 1.59, and WBC count 12.7 with neutrophilic predominance.  

Patient was reportedly severely hypoxic and in respiratory distress upon EMS 

arrival at the scene and was started on BiPAP.








2/25/2025 patient seen and examined at bedside.  No acute events overnight.  No 

new complaints. WBC 7.2, hemoglobin 14.1, platelet count 1 68,000, PT 12.1, INR 

1.1, .7, sodium 133, potassium 4.2, chloride 96, bicarb 27, BUN 44, 

creatinine 2.12, glucose 97.  Troponin increased to 0.55 and has flattened.  

Chest x-ray done showed findings favor CHF exacerbation/fluid overload state.  

Areas of underlying acute infiltrate cannot be excluded.  No significant change 

from most recent x-ray.





2/26/25 patient seen and examined at bedside. no new complaints. Currently 

comfortable on BiPAP.  Labs today showed WBC 8.7, hemoglobin 12.8, MCV 93.7, 

platelet count 1 39,000, sodium 128, potassium 3.6, chloride 94, BUN 44, 

creatinine 1.99, calcium 7.8, glucose 113.  ABG showed pH of 7.42, pCO2 38, pO2 

74.  Urinalysis showed +1 protein, moderate ketones, rare bacteria, 9 hyaline 

casts and rare mucus, negative nitrites, negative leukocyte esterase.  Renal u

ltrasound done on 2/25 showed negative for hydronephrosis, stones, bladder 

anechoic





227/25 patient seen and examined at bedside.  Given Ativan IV for anxiety 

overnight.  No acute events. Still required BiPAP overnight.  WBC 7.6, 

hemoglobin 13.9, platelet count 1 67,000, sodium 133, chloride 97, BUN 47, 

creatinine 1.54, glucose 132, calcium 8.5.  Legionella Ag negative.  Chest x-ray

independently interpreted today showed continued worsening of bilateral diffuse 

acute infiltrates and/or edema, developing ARDS not included.





Review of systems:


Pertinent positives and negatives as discussed in HPI, a complete review of 

systems was performed and all other systems are negative.





Pertinent imaging and labs reviewed.





Physical examination:


Vital signs reviewed


General: non toxic, no distress, appears at stated age, BiPAP


Derm: no unusual rashes/lesions, warm


Head: atraumatic, normocephalic, symmetric


Eyes: EOMI, anicteric sclera, pupils equal round reactive to light


ENT: Nose and ears atraumatic


Neck: No cervical lymphadenopathy, trachea midline, supple


Mouth: no lip lesion, mucus membranes moist


Cardiovascular: S1S2 reg, no murmur


Lungs: Bibasilar crackles, no rhonchi, no rales, no accessory muscle use


Abdominal: soft,  nontender to palpation, no guarding


Ext: muscle strength 5 out of 5 in all 4 extremities grossly, no gross muscle 

atrophy, no contractures,  positive dorsalis pedis pulse bilateral, no edema


Neuro:  CN II-XI grossly intact, no gross focal neuro deficits


Psych: Alert and oriented x3, appropriate affect and mood





Assessment/Plan: 64-year-old male with history of COPD, A-fib on Eliquis and CKD

stage III here for further management of cardiorenal syndrome and sepsis 

secondary to pneumonia now BiPAP dependent.





#. Cardiorenal syndrome


#. Heart failure with reduced ejection fraction EF of 45 to 50%, not on GDMT


#. Hyponatremia secondary to above


#. Acute kidney injury on CKD, improving


-Echocardiogram in 2/24/2025 showed ejection fracture 45-50% with moderately i

ncreased septal wall thickness, biatrial dilatation, moderate tricuspid valve 

regurgitation


-Lasix 20 mg IV every 8 discontinued due by cardiology


-Lasix 40 mg IV given once today by pulmonology


-Patient euvolemic today.  Continue to monitor volume status.


-Strict ENE's


-Daily weights


-Fluid restriction


-Monitor BMP for electrolytes and renal function


-Cardiology consulted


-Metoprolol 25 mg daily.  Initiate GDMT on discharge





#. Sepsis secondary to influenza pneumonia, unable to rule out bacterial 

pneumonia


#. Acute hypoxic respiratory failure, secondary to above


#. Severe ARDS


-P/F ratio 92


-Procalcitonin elevated at 2.38


-Legionella urine antigen neagtive


-Supportive oxygen as needed


-Received one-time dose Zithromax 500 mg p.o. and Rocephin 2 g IVPB in the ED


-Continue with Rocephin 2 g IVPB and Zithromax 500 mg IVPB


-Continue Tamiflu 30 mg p.o. every 12 hours


-Hydrocortisone 50mg IV every 6 hours


-Blood culture negative at this time


-Monitor CBC





#.  NSTEMI, type II


-EKG revealed sinus rhythm at 93 bpm with intraventricular conduction delay on 

admission


-Patient has no chest pain as of now


-Troponins flattened at 0.5


-IV heparin for 48 hours.  Now discontinued


-Cardiology initiated atorvastatin 40 mg daily





#. Lactic acidosis, resovled








Chronic conditions: 


#.  A-fib


#.  COPD


#.  Type II DM


#.  Hypertension


#.  Hyperlipidemia


-Lisinopril held due to HERNAN


-Continue Eliquis 5 mg twice daily, amiodarone 200 mg p.o. daily, metoprolol 25 

mg daily


-Hemoglobin A1c


-Not on home diabetes medications 


-Glucose Accu-Cheks ACHS 


-Initiate Insulin sliding scale ACHS


-Monitor for hypoglycemia





F: Restrict fluids due to 2 liters





E: Monitor electrolytes particularly sodium and potassium





N: Heart healthy diet





A: Can ambulate with assistance








DVT prophylaxis: Eliquis 5 mg p.o. daily











Paola Boyd MD


PGY-1/Intern





Dictation was produced using dragon dictation software. please excuse any 

grammatical, word or spelling errors.








I have seen and evaluated the patient today.  Discussed with the resident and 

agree with the residents finding and plan as documented in the resident's note. 

Changes highlighted in blue font.





Objective





- Vital Signs


Vital signs: 


                                   Vital Signs











Temp  98.7 F   02/27/25 00:00


 


Pulse  56 L  02/27/25 03:58


 


Resp  20   02/27/25 06:29


 


BP  123/57   02/27/25 03:58


 


Pulse Ox  96   02/27/25 06:29


 


FiO2  60   02/27/25 04:01








                                 Intake & Output











 02/26/25 02/26/25 02/27/25





 06:59 18:59 06:59


 


Output Total 750 100 525


 


Balance -750 -100 -525


 


Weight 89 kg  


 


Output:   


 


  Urine 750  525


 


  Post Void Residual  100 


 


Other:   


 


  Voiding Method Bedside Commode Bedside Commode Bedside Commode





   Urinal


 


  # Voids 2  














- Labs


CBC & Chem 7: 


                                 02/27/25 06:55





                                 02/27/25 06:55


Labs: 


                  Abnormal Lab Results - Last 24 Hours (Table)











  02/26/25 02/26/25 02/26/25 Range/Units





  06:20 06:20 08:15 


 


RBC  4.23 L    (4.30-5.90)  m/uL


 


Hgb  12.8 L    (13.0-17.5)  gm/dL


 


Plt Count  139 L    (150-450)  k/uL


 


Lymphocytes #  0.7 L    (1.0-4.8)  k/uL


 


ABG pO2    74 L  ()  mmHg


 


ABG Total CO2    26 H  (19-24)  mmol/L


 


Hemoglobin    12.9 L  (13.0-17.5)  gm/dL


 


Sodium   128 L   (137-145)  mmol/L


 


Chloride   94 L   ()  mmol/L


 


BUN   45 H   (9-20)  mg/dL


 


Creatinine   1.99 H   (0.66-1.25)  mg/dL


 


Glucose   113 H   (74-99)  mg/dL


 


Calcium   7.8 L   (8.4-10.2)  mg/dL








                      Microbiology - Last 24 Hours (Table)











 02/24/25 00:46 Blood Culture - Preliminary





 Blood

## 2025-02-27 NOTE — P.PN
Subjective


Progress Note Date: 02/27/25


Patient seen for follow-up for HERNAN and hyponatremia.


Patient has been transition to high flow nasal cannula however showed signs of 

desaturation into the low 80% and now on BiPAP.


Bladder scan was completed yesterday which showed <300 cc residual, he endorses 

some urine production








Objective





- Vital Signs


Vital signs: 


                                   Vital Signs











Temp  97.8 F   02/27/25 08:00


 


Pulse  58 L  02/27/25 08:12


 


Resp  18   02/27/25 08:12


 


BP  124/61   02/27/25 08:00


 


Pulse Ox  98   02/27/25 08:05


 


FiO2  60   02/27/25 08:05








                                 Intake & Output











 02/26/25 02/27/25 02/27/25





 18:59 06:59 18:59


 


Intake Total   180


 


Output Total 100 525 


 


Balance -100 -525 180


 


Weight  87.3 kg 


 


Intake:   


 


  Oral   180


 


Output:   


 


  Urine  525 


 


  Post Void Residual 100  


 


Other:   


 


  Voiding Method Bedside Commode Bedside Commode 





  Urinal 


 


  # Voids   1


 


  # Bowel Movements   1














- Exam


Patient is awake, comfortable, no acute distress.


Heart: S1 and S2 heard


Lungs: Bilateral breath sounds are heard


Abdomen: Soft and nontender


Lower extremities: No edema


CNS: grossly intact








- Labs


CBC & Chem 7: 


                                 02/27/25 06:55





                                 02/27/25 06:55


Labs: 


                  Abnormal Lab Results - Last 24 Hours (Table)











  02/27/25 02/27/25 Range/Units





  06:55 06:55 


 


Lymphocytes #  0.7 L   (1.0-4.8)  k/uL


 


Sodium   133 L  (137-145)  mmol/L


 


Chloride   97 L  ()  mmol/L


 


BUN   47 H  (9-20)  mg/dL


 


Creatinine   1.54 H  (0.66-1.25)  mg/dL


 


Glucose   132 H  (74-99)  mg/dL








                      Microbiology - Last 24 Hours (Table)











 02/24/25 00:46 Blood Culture - Preliminary





 Blood 














Assessment and Plan


Assessment: 


#ATN secondary to Cardiorenal syndrome; permissive HERNAN on stage IIIa chronic 

kidney disease, baseline creatinine 1.6-1.7 currently 1.54, improved


#Hyponatremia secondary to hypervolemia and acute respiratory infection, 

improving 133 today


#Sepsis secondary to influenza type a pneumonia, currently maintained on Tamiflu


#Acute hypoxic respiratory failure secondary to above


#HFrEF w/EF 45 to 50%


#Type II NSTEMI





Plan: 


-Received 40 mg IV Lasix this morning; reassess in a.m., continue IV Lasix as 

needed following reassessment


-Creatinine improved to 1.54 this morning, BUN 47, sodium 133


-Chest x-ray showed continued worsening bilateral diffuse acute infiltrates 

and/or edema


-Recheck labs in a.m.


-Maintain fluid restriction 1500 cc


-Bladder scan showed <300 cc, Valentine was not inserted


-Avoid nephrotoxic agents


-Continue to hold lisinopril





I have seen and examined the patient with resident and agree with A&P as 

written.

## 2025-02-28 LAB
ANION GAP SERPL CALC-SCNC: 9 MMOL/L
BASOPHILS # BLD AUTO: 0 K/UL (ref 0–0.2)
BASOPHILS NFR BLD AUTO: 0 %
BUN SERPL-SCNC: 54 MG/DL (ref 9–20)
CALCIUM SPEC-MCNC: 8.9 MG/DL (ref 8.4–10.2)
CHLORIDE SERPL-SCNC: 98 MMOL/L (ref 98–107)
CO2 SERPL-SCNC: 26 MMOL/L (ref 22–30)
EOSINOPHIL # BLD AUTO: 0 K/UL (ref 0–0.7)
EOSINOPHIL NFR BLD AUTO: 0 %
ERYTHROCYTE [DISTWIDTH] IN BLOOD BY AUTOMATED COUNT: 4.5 M/UL (ref 4.3–5.9)
ERYTHROCYTE [DISTWIDTH] IN BLOOD: 12.5 % (ref 11.5–15.5)
GLUCOSE BLD-MCNC: 219 MG/DL (ref 70–110)
GLUCOSE BLD-MCNC: 237 MG/DL (ref 70–110)
GLUCOSE BLD-MCNC: 316 MG/DL (ref 70–110)
GLUCOSE BLD-MCNC: 380 MG/DL (ref 70–110)
GLUCOSE SERPL-MCNC: 178 MG/DL (ref 74–99)
HCT VFR BLD AUTO: 42.6 % (ref 39–53)
HGB BLD-MCNC: 13.6 GM/DL (ref 13–17.5)
LYMPHOCYTES # SPEC AUTO: 0.5 K/UL (ref 1–4.8)
LYMPHOCYTES NFR SPEC AUTO: 7 %
MCH RBC QN AUTO: 30.2 PG (ref 25–35)
MCHC RBC AUTO-ENTMCNC: 31.9 G/DL (ref 31–37)
MCV RBC AUTO: 94.7 FL (ref 80–100)
MONOCYTES # BLD AUTO: 0.5 K/UL (ref 0–1)
MONOCYTES NFR BLD AUTO: 7 %
NEUTROPHILS # BLD AUTO: 5.6 K/UL (ref 1.3–7.7)
NEUTROPHILS NFR BLD AUTO: 84 %
PLATELET # BLD AUTO: 185 K/UL (ref 150–450)
POTASSIUM SERPL-SCNC: 4 MMOL/L (ref 3.5–5.1)
SODIUM SERPL-SCNC: 133 MMOL/L (ref 137–145)
WBC # BLD AUTO: 6.7 K/UL (ref 3.8–10.6)

## 2025-02-28 RX ADMIN — BUDESONIDE SCH MG: 1 SUSPENSION RESPIRATORY (INHALATION) at 21:00

## 2025-02-28 RX ADMIN — FUROSEMIDE STA MG: 10 INJECTION, SOLUTION INTRAMUSCULAR; INTRAVENOUS at 09:15

## 2025-02-28 RX ADMIN — FORMOTEROL FUMARATE DIHYDRATE SCH MCG: 20 SOLUTION RESPIRATORY (INHALATION) at 21:00

## 2025-02-28 RX ADMIN — FUROSEMIDE SCH MG: 10 INJECTION, SOLUTION INTRAMUSCULAR; INTRAVENOUS at 17:29

## 2025-02-28 NOTE — P.PN
Subjective


Progress Note Date: 02/28/25





Reason for Consult (text): 


NSTEMI


History of present illness: 


This is a 64-year-old male patient of Dr. Ray with past medical history of 

chronic kidney disease stage III, COPD tobacco use, alcohol abuse, hypertension,

diabetes mellitus type 2, persistent atrial fibrillation, systolic heart 

failure, recent GI bleed, family history of premature coronary artery disease.  

We have been asked to evaluate the patient for NSTEMI.  Patient states he had a 

cough for 10 days that was dry with increasing shortness of breath but gradually

worsening.  He does not have home oxygen therapy.  He denies any chest pain or 

chest pressure.  Prior to this episode, patient was feeling well with no 

shortness of breath.  He is now not eating or drinking very much with decreased 

appetite.  At home, he states he was taking all of his prescribed medications.  

He is urinating okay.  He presented with a blood pressure of 200/113.  He does 

not check his blood pressure at home.  Temperature max 102.3. Blood pressure now

161/88, heart rate 75, pulse ox 96% on BiPAP.  He denies smoking and denies 

alcohol use.  Patient has been started on heparin drip.  Patient is seen today 

in the emergency center waiting for a bed on the cardiac stepdown unit.





-EKG: Sinus rhythm with IVCD


-Chest x-ray: Cardiomegaly.  Areas of airspace disease most notably at the left 

mid and lower lung suggestive of multifocal pneumonia.


-Laboratory studies: WBC initially 12.7 and repeat 10.5, hemoglobin 14.3.  

Sodium 135, potassium 4.3, BUN 29 creatinine 1.86.  Lactic acid 3.2 followed by 

1.9.  Troponin 0.102, 0.327.  Influenza A detected.


-Home cardiac medications: Amiodarone 200 mg daily, Eliquis 5 mg twice daily, 

Lasix 40 mg daily, lisinopril 20 mg twice daily, metoprolol succinate 25 mg 

daily.


-ROSANNA and cardioversion performed on 8/10/2023 revealed aortic valve is 

tricuspid, functioning normally with trace AI.  Mitral valve appears to be 

normal with mild regurgitation.  Tricuspid valve appears to be normal with 

moderate to severe tricuspid regurgitation.  There is a PFO.  Left atrial 

appendage is free of clot.  EF 35%.


-ROSANNA and cardioversion performed 11/21/2022.


Dobutamine stress echocardiogram performed in the office on 8/3/2021 revealed 

nondiagnostic EKG portion secondary to baseline EKG abnormalities.  Normal 

stress echo portion without inducible ischemia.  Normal left ventricular 

ejection fraction of 60%.





2/25


Patient seen and examined on the cardiac stepdown unit.  Patient states he still

has a significant amount of coughing.  No lower extremity edema.  He states he 

is having some chills as well.  Patient states he is urinating a lot but not 

eating very much with decreased appetite.  He has been maintained on IV Lasix at

20 mg every 8 hours as well as heparin drip.  No complaints of chest pain.  

Blood pressure 119/55, heart rate 71, pulse ox 90% on BiPAP.  Repeat blood work 

reveals WBC 7.2, hemoglobin 14.1.  Sodium 133, BUN 44 and creatinine 2.12.


Echocardiogram reveals EF of 45 to 50%, moderately increased septal wall 

thickness, moderate biatrial dilatation, moderate tricuspid regurgitation.





2/26


Patient seen and examined.  He is currently on BiPAP with pulse ox of 95% on 

FiO2 of 80, blood pressure 117/60, respiratory rate 35, heart rate 70.  

Temperature max 101.5.  Repeat blood work reveals hemoglobin 12.8.  Sodium 128, 

potassium 3.6, BUN 45 creatinine 1.99.





2/27


Patient seen and examined.  Patient has been going back and forth between BiPAP 

and high flow nasal cannula at 15 L.  Blood pressure 146/69, heart rate 50s and 

60s, respiratory rate 28.  Repeat lab work reveals hemoglobin 13.3, BUN 47 

creatinine 1.54, sodium 133, potassium 3.9.  Patient received a dose of IV Lasix

40 mg today.





2/28


Patient seen and examined.  Patient continues to have cough with cough and 

sputum production.  He denies any lower extremity edema.  Kidney function is 

improving.  Blood pressure 164/85, heart rate 64, pulse ox 96% on BiPAP at 60% 

FiO2.  Repeat blood work reveals hemoglobin of 13.2, BUN 54 creatinine 1.44.





Physical examination: 


Gen: This is a 64-year-old male in no acute respiratory distress.


VS: reviewed


HEENT: Head is atraumatic, normocephalic. Pupils equal, round. Sclerae is 

anicteric. 


NECK: Supple. No JVD. 


LUNGS: Scattered rhonchi.  Accessory muscle usage.


HEART: Irregular rate and rhythm. No murmur. 


ABDOMEN: Soft  No tenderness.


EXTREMITIES: No pedal edema.  No calf tenderness.


NEUROLOGICAL: Patient is awake, alert and oriented x3.


 


Assessment:


Acute hypoxic respiratory failure secondary to influenza, pneumonia, and 

component of heart failure


NSTEMI possibly secondary to sepsis, influenza and pneumonia


Influenza A


Pneumonia


Mild acute on chronic systolic heart failure


Acute kidney injury


Lactic acidosis


Cardiomyopathy with previous EF of 15 to 20%, possibly tachycardia induced


Hypertension


Hyperlipidemia


Persistent atrial fibrillation status post previous cardioversion x 2, currently

in sinus rhythm


Diabetes mellitus type 2


Hyponatremia





Plan:


Continue patient's home cardiac medications: Eliquis 5 mg twice daily, aspirin 

81 mg daily, atorvastatin 40 mg at bedtime, metoprolol succinate 25 mg daily


Pulmonary is ordering IV Lasix daily as indicated, 40 mg given today


Continue oxygen support


Continue Tamiflu, steroids and IV antibiotics


No further cardiac workup at this time


Cardiology will sign off this case and follow on an as-needed basis.  Please 

reconsult for any new concerns.  Patient may follow-up in the office in one to 2

weeks.





Nurse practitioner note has been reviewed, I agree with documented findings and 

plan of care.  Patient was seen and examined.





Objective





- Vital Signs


Vital signs: 


                                   Vital Signs











Temp  98.8 F   02/28/25 08:00


 


Pulse  68   02/28/25 11:51


 


Resp  30 H  02/28/25 11:51


 


BP  160/76   02/28/25 08:00


 


Pulse Ox  79 L  02/28/25 08:00


 


FiO2  60   02/28/25 11:41








                                 Intake & Output











 02/27/25 02/28/25 02/28/25





 18:59 06:59 18:59


 


Intake Total 540  118


 


Output Total  550 


 


Balance 540 -550 118


 


Weight  64 kg 


 


Intake:   


 


  Oral 540  118


 


Output:   


 


  Urine  550 


 


Other:   


 


  Voiding Method  Bedside Commode 





  Urinal 


 


  # Voids 1  


 


  # Bowel Movements 1  














- Labs


CBC & Chem 7: 


                                 02/28/25 07:39





                                 02/28/25 07:39


Labs: 


                  Abnormal Lab Results - Last 24 Hours (Table)











  02/27/25 02/27/25 02/28/25 Range/Units





  16:11 20:12 06:04 


 


Lymphocytes #     (1.0-4.8)  k/uL


 


Sodium     (137-145)  mmol/L


 


BUN     (9-20)  mg/dL


 


Creatinine     (0.66-1.25)  mg/dL


 


Glucose     (74-99)  mg/dL


 


POC Glucose (mg/dL)  198 H  227 H  219 H  ()  mg/dL


 


Hemoglobin A1c     (<=6.0)  %














  02/28/25 02/28/25 02/28/25 Range/Units





  07:39 07:39 07:39 


 


Lymphocytes #    0.5 L  (1.0-4.8)  k/uL


 


Sodium   133 L   (137-145)  mmol/L


 


BUN   54 H   (9-20)  mg/dL


 


Creatinine   1.44 H   (0.66-1.25)  mg/dL


 


Glucose   178 H   (74-99)  mg/dL


 


POC Glucose (mg/dL)     ()  mg/dL


 


Hemoglobin A1c  6.2 H    (<=6.0)  %














  02/28/25 Range/Units





  11:47 


 


Lymphocytes #   (1.0-4.8)  k/uL


 


Sodium   (137-145)  mmol/L


 


BUN   (9-20)  mg/dL


 


Creatinine   (0.66-1.25)  mg/dL


 


Glucose   (74-99)  mg/dL


 


POC Glucose (mg/dL)  237 H  ()  mg/dL


 


Hemoglobin A1c   (<=6.0)  %








                      Microbiology - Last 24 Hours (Table)











 02/24/25 00:46 Blood Culture - Preliminary





 Blood

## 2025-02-28 NOTE — P.PN
Subjective


Progress Note Date: 02/28/25





Patient is a 64-year-old male with a PMH of A-fib on Eliquis, COPD not on home 

oxygen, type II DM, hypertension, hyperlipidemia, CKD stage IIIa who presents to

the emergency room with complaints of shortness of breath and cough.  Patient 

reports that over the past 1 week he has been experiencing gradually worsening 

persistent cough.  Reports that the cough is nonproductive.  He also reports 

exertional dyspnea.  Denies experiencing fever or chills.  Also denies chest 

discomfort, nausea, vomiting, abdominal pain, diarrhea.





Chest x-ray in the emergency room revealed cardiomegaly along with findings of 

multifocal pneumonia.  EKG revealed sinus rhythm at 93 bpm with intraventricular

conduction delay as reviewed by me.  Laboratory evaluation did reveal influenza 

type a positive, troponin 0.102, proBNP 12,900, lactic acid 3.3, glucose 228, 

BUN 25, creatinine 1.59, and WBC count 12.7 with neutrophilic predominance.  

Patient was reportedly severely hypoxic and in respiratory distress upon EMS 

arrival at the scene and was started on BiPAP.








2/25/2025 patient seen and examined at bedside.  No acute events overnight.  No 

new complaints. WBC 7.2, hemoglobin 14.1, platelet count 1 68,000, PT 12.1, INR 

1.1, .7, sodium 133, potassium 4.2, chloride 96, bicarb 27, BUN 44, 

creatinine 2.12, glucose 97.  Troponin increased to 0.55 and has flattened.  

Chest x-ray done showed findings favor CHF exacerbation/fluid overload state.  

Areas of underlying acute infiltrate cannot be excluded.  No significant change 

from most recent x-ray.





2/26/25 patient seen and examined at bedside. no new complaints. Currently 

comfortable on BiPAP.  Labs today showed WBC 8.7, hemoglobin 12.8, MCV 93.7, 

platelet count 1 39,000, sodium 128, potassium 3.6, chloride 94, BUN 44, 

creatinine 1.99, calcium 7.8, glucose 113.  ABG showed pH of 7.42, pCO2 38, pO2 

74.  Urinalysis showed +1 protein, moderate ketones, rare bacteria, 9 hyaline 

casts and rare mucus, negative nitrites, negative leukocyte esterase.  Renal u

ltrasound done on 2/25 showed negative for hydronephrosis, stones, bladder 

anechoic





227/25 patient seen and examined at bedside.  Given Ativan IV for anxiety 

overnight.  No acute events. Still required BiPAP overnight.  WBC 7.6, 

hemoglobin 13.9, platelet count 1 67,000, sodium 133, chloride 97, BUN 47, 

creatinine 1.54, glucose 132, calcium 8.5.  Legionella Ag negative.  Chest x-ray

today showed continued worsening of bilateral diffuse acute infiltrates and/or 

edema, developing ARDS not included.





2/28/25 patient seen and examined at bedside. Still needed BiPAP overnight. No 

new complaints. No acute events overnight.  Labs today show WBC 6.7, hemoglobin 

13.6, platelet count 1 85,000, sodium 133, potassium 4, chloride 98, BUN 54, 

creatinine 1.44, glucose 178, A1c 6.2, calcium 8.9.  Chest x-ray independently 

interpreted showed continued bilateral diffuse acute infiltrates and/or edema.  

No significant change from prior x-ray.





Review of systems:


Pertinent positives and negatives as discussed in HPI, a complete review of 

systems was performed and all other systems are negative.





Pertinent imaging and labs reviewed.





Physical examination:


Vital signs reviewed


General: non toxic, no distress, appears at stated age, BiPAP


Derm: no unusual rashes/lesions, warm


Head: atraumatic, normocephalic, symmetric


Eyes: EOMI, anicteric sclera, pupils equal round reactive to light


ENT: Nose and ears atraumatic


Neck: No cervical lymphadenopathy, trachea midline, supple


Mouth: no lip lesion, mucus membranes moist


Cardiovascular: S1S2 reg, no murmur


Lungs: Bibasilar crackles, expiratory wheezing in middle lung fields no rhonchi,

no rales, no accessory muscle use


Abdominal: soft,  nontender to palpation, no guarding


Ext: muscle strength 5 out of 5 in all 4 extremities grossly, no gross muscle 

atrophy, no contractures,  positive dorsalis pedis pulse bilateral, no edema


Neuro:  CN II-XI grossly intact, no gross focal neuro deficits


Psych: Alert and oriented x3, appropriate affect and mood





Assessment/Plan: 64-year-old male with history of COPD, A-fib on Eliquis and CKD

stage III here for further management of cardiorenal syndrome and sepsis 

secondary to pneumonia now BiPAP dependent.





#. Cardiorenal syndrome


#. Heart failure with reduced ejection fraction EF of 45 to 50%, not on GDMT


#. Hyponatremia secondary to above


#. Acute kidney injury on CKD, improving


-Echocardiogram in 2/24/2025 showed ejection fracture 45-50% with moderately 

increased septal wall thickness, biatrial dilatation, moderate tricuspid valve 

regurgitation


-Lasix 20 mg IV every 8 discontinued due by cardiology


-Lasix 40 mg IV given once today by pulmonology


-Patient euvolemic today.  Continue to monitor volume status.


-Strict ENE's


-Daily weights


-Fluid restriction


-Monitor BMP for electrolytes and renal function


-Cardiology consulted


-Metoprolol 25 mg daily.  Initiate GDMT on discharge





#. Sepsis secondary to influenza pneumonia, unable to rule out bacterial 

pneumonia


#. Acute hypoxic respiratory failure, secondary to above


#. Severe ARDS


-P/F ratio 92


-Chest x-ray independently interpreted showed continued bilateral diffuse acute 

infiltrates and/or edema.  No significant change from prior x-ray.


-Procalcitonin elevated at 2.38


-Legionella urine antigen neagtive


-Supportive oxygen as needed


-Day 4 with Rocephin 2 g IVPB


-Continue Tamiflu 30 mg p.o. every 12 hours


-Hydrocortisone 50mg IV every 6 hours discontinued by pulmonology


-Solumedrol 60mg IV every 6 hours per pulmonology


-Blood culture negative at this time


-Monitor CBC





#.  NSTEMI, type II


-EKG revealed sinus rhythm at 93 bpm with intraventricular conduction delay on 

admission


-Patient has no chest pain as of now


-Troponins flattened at 0.5


-IV heparin for 48 hours.  Now discontinued


-Cardiology initiated atorvastatin 40 mg daily





#. Lactic acidosis, resovled








Chronic conditions: 


#.  A-fib


#.  COPD


#.  Type II DM


#.  Hypertension


#.  Hyperlipidemia


-Lisinopril held due to HERNAN


-Continue Eliquis 5 mg twice daily, amiodarone 200 mg p.o. daily, metoprolol 25 

mg daily


-Hemoglobin A1c 6.2


-Levemir 10 units nightly


-Not on home diabetes medications 


-Glucose Accu-Cheks ACHS 


-Initiate Insulin sliding scale ACHS


-Monitor for hypoglycemia





F: Restrict fluids due to 2 liters





E: Monitor electrolytes particularly sodium and potassium





N: Heart healthy diet





A: Can ambulate with assistance








DVT prophylaxis: Eliquis 5 mg p.o. daily











Paola Boyd MD


PGY-1/Intern





Dictation was produced using dragon dictation software. please excuse any 

grammatical, word or spelling errors.











I have seen and evaluated the patient today.  Discussed with the resident and 

agree with the residents finding and plan as documented in the resident's note. 

Changes highlighted in blue font.





Objective





- Vital Signs


Vital signs: 


                                   Vital Signs











Temp  98.1 F   02/28/25 04:00


 


Pulse  54 L  02/28/25 04:00


 


Resp  18   02/28/25 04:00


 


BP  159/75   02/28/25 04:00


 


Pulse Ox  92 L  02/28/25 04:00


 


FiO2  60   02/28/25 04:40








                                 Intake & Output











 02/27/25 02/27/25 02/28/25





 06:59 18:59 06:59


 


Intake Total  540 


 


Output Total 525  550


 


Balance -525 540 -550


 


Weight 87.3 kg  64 kg


 


Intake:   


 


  Oral  540 


 


Output:   


 


  Urine 525  550


 


Other:   


 


  Voiding Method Bedside Commode  Bedside Commode





 Urinal  Urinal


 


  # Voids  1 


 


  # Bowel Movements  1 














- Labs


CBC & Chem 7: 


                                 02/28/25 07:39





                                 02/28/25 07:39


Labs: 


                  Abnormal Lab Results - Last 24 Hours (Table)











  02/27/25 02/27/25 02/27/25 Range/Units





  06:55 06:55 11:13 


 


Lymphocytes #  0.7 L    (1.0-4.8)  k/uL


 


Sodium   133 L   (137-145)  mmol/L


 


Chloride   97 L   ()  mmol/L


 


BUN   47 H   (9-20)  mg/dL


 


Creatinine   1.54 H   (0.66-1.25)  mg/dL


 


Glucose   132 H   (74-99)  mg/dL


 


POC Glucose (mg/dL)    219 H  ()  mg/dL














  02/27/25 02/27/25 02/28/25 Range/Units





  16:11 20:12 06:04 


 


Lymphocytes #     (1.0-4.8)  k/uL


 


Sodium     (137-145)  mmol/L


 


Chloride     ()  mmol/L


 


BUN     (9-20)  mg/dL


 


Creatinine     (0.66-1.25)  mg/dL


 


Glucose     (74-99)  mg/dL


 


POC Glucose (mg/dL)  198 H  227 H  219 H  ()  mg/dL








                      Microbiology - Last 24 Hours (Table)











 02/24/25 00:46 Blood Culture - Preliminary





 Blood

## 2025-02-28 NOTE — P.PN
Subjective


Progress Note Date: 02/28/25


Patient seen for follow-up for HERNAN and hyponatremia.


He was taken off of BiPAP to eat his meal, at that time he dozed off and 

saturations dropped into the high 70s/low 80% and was placed back on BiPAP this 

morning.


Continues to endorse urine production.  Received another dose of Lasix this 

morning.








Objective





- Vital Signs


Vital signs: 


                                   Vital Signs











Temp  98.1 F   02/28/25 04:00


 


Pulse  62   02/28/25 07:57


 


Resp  22   02/28/25 07:57


 


BP  159/75   02/28/25 04:00


 


Pulse Ox  88 L  02/28/25 07:57


 


FiO2  60   02/28/25 07:57








                                 Intake & Output











 02/27/25 02/28/25 02/28/25





 18:59 06:59 18:59


 


Intake Total 540  118


 


Output Total  550 


 


Balance 540 -550 118


 


Weight  64 kg 


 


Intake:   


 


  Oral 540  118


 


Output:   


 


  Urine  550 


 


Other:   


 


  Voiding Method  Bedside Commode 





  Urinal 


 


  # Voids 1  


 


  # Bowel Movements 1  














- Exam


Patient is awake, comfortable, no acute distress.


Heart: S1 and S2 heard


Lungs: Bilateral breath sounds are heard


Abdomen: Soft and nontender


Lower extremities: No edema


CNS: grossly intact








- Labs


CBC & Chem 7: 


                                 02/28/25 07:39





                                 02/28/25 07:39


Labs: 


                  Abnormal Lab Results - Last 24 Hours (Table)











  02/27/25 02/27/25 02/27/25 Range/Units





  11:13 16:11 20:12 


 


Lymphocytes #     (1.0-4.8)  k/uL


 


Sodium     (137-145)  mmol/L


 


BUN     (9-20)  mg/dL


 


Creatinine     (0.66-1.25)  mg/dL


 


Glucose     (74-99)  mg/dL


 


POC Glucose (mg/dL)  219 H  198 H  227 H  ()  mg/dL














  02/28/25 02/28/25 02/28/25 Range/Units





  06:04 07:39 07:39 


 


Lymphocytes #    0.5 L  (1.0-4.8)  k/uL


 


Sodium   133 L   (137-145)  mmol/L


 


BUN   54 H   (9-20)  mg/dL


 


Creatinine   1.44 H   (0.66-1.25)  mg/dL


 


Glucose   178 H   (74-99)  mg/dL


 


POC Glucose (mg/dL)  219 H    ()  mg/dL








                      Microbiology - Last 24 Hours (Table)











 02/24/25 00:46 Blood Culture - Preliminary





 Blood 














Assessment and Plan


Assessment: 


#HERNAN secondary to ATN secondary to CRS; Renal function improving. 


#CKD stage 3a, baseline creatinine 1.6-1.7. 


#Hyponatremia secondary to hypervolemia and acute respiratory infection, 

improving 133 today


#Sepsis secondary to influenza type a pneumonia, currently maintained on Tamiflu


#Acute hypoxic respiratory failure secondary to above


#HFrEF w/EF 45 to 50%


#Type II NSTEMI





Plan: 


-Received 40 mg IV Lasix this morning; reassess in a.m., continue IV Lasix as 

needed following reassessment


-Creatinine improved to 1.44 this morning, BUN 44, sodium 133


-Chest x-ray showed continued worsening bilateral diffuse acute infiltrates 

and/or edema


-Repeat chest x-ray 3/2/2025 to reassess


-Recheck labs in a.m.


-Maintain fluid restriction 1500 cc


-Bladder scan showed <300 cc, Valentine was not inserted


-Avoid nephrotoxic agents


-Continue to hold lisinopril





I have seen and examined the patient with resident and agree with A&P as 

written.

## 2025-02-28 NOTE — XR
EXAMINATION TYPE: XR chest 1V portable

 

DATE OF EXAM: 2/28/2025

 

CLINICAL HISTORY: Pneumonia progress study.  

 

TECHNIQUE: Single AP portable upright view of the chest is obtained.

 

COMPARISON: Chest x-ray from one day earlier and older studies

 

FINDINGS:  Persistent cardiomegaly with confluent bilateral increased opacities. Osseous structures a
re intact.

 

IMPRESSION: Continued bilateral diffuse acute infiltrates and/or edema. Developing ARDS is not exclud
ed. No significant change from one day earlier.

 

[facility], Workstation: [id number], 2/28/2025 7:31 AM

## 2025-02-28 NOTE — P.PN
Subjective


Progress Note Date: 02/28/25





This is a 64-year-old male patient with chronic and ongoing tobacco dependence, 

atrial fibrillation, diabetes mellitus, hypertension, hyperlipidemia who has a 1

week history of increasing shortness of breath, cough congestion fever and 

chills.  He came into the emergency room last evening with severe shortness of 

breath requiring BiPAP support which was 14/5 and 50% FiO2.  Chest x-ray 

revealed cardiomegaly and airspace disease most notably in the left mid lower 

lung zone suggestive of multifocal pneumonia and possible fluid volume overload.

 White count 10.5.  Hemoglobin 14.3.  Platelets 156.  Sodium 135.  Potassium 

4.3.  Bicarb 27.  BUN 29.  Creatinine 1.86.  Glucose 174.  Troponins 0.327, 

0.550, 0.5-2.  He is initiated on a heparin drip.  proBNP was 13,000.  

Procalcitonin 2.38.  He did test positive for influenza A.  Initiated on 

Tamiflu.  Initiated on ceftriaxone.  He is seen today in consultation in the 

emergency department.  He is currently sitting up on the stretcher.  Awake and 

alert.  He is on 6 L high flow nasal cannula.  He is breathing a bit easier 

today compared to yesterday.  Still with a loose nonproductive cough.  He did 

have a Tmax of 102.3.  Current temperature 99.1.  He is hemodynamically stable.





The patient is seen today February 25, 2025 in follow-up on the regular medical 

floor.  He is awake and alert in no acute distress.  Doing slightly better today

compared to yesterday.  He is still requiring 10 L high flow nasal cannula.  

Chest x-ray continues to show evidence of fluid volume overload/CHF.  

Echocardiogram reveals mildly impaired left ventricular systolic function with 

ejection fraction of 45 to 50%.  Moderately reduced global LV function.  Blood 

culture is pending.  White count 7.2.  Hemoglobin 14.1.  Platelets 168.  Sodium 

133. Potassium 4.2.  Bicarb 27.  BUN 44.  Creatinine 2.12.  Stool for occult 

blood was negative.  Since.  Anticoagulated with Eliquis.  Antibiotics in the 

form of ceftriaxone and azithromycin.  He is continued on Tamiflu.  Lasix 

discontinued per cardiology.





The patient is seen today February 26, 2025 in follow-up on the regular medical 

floor.  He is currently on BiPAP 14/5 and 80% FiO2.  He was trialed on 12 L high

flow earlier this morning with O2 saturations in the 70s and 80s.  Arterial 

blood gases on the 80% FiO2 revealed a PaO2 of 74, pCO2 of 38 and a pH of 7.42. 

A chest x-ray revealed similar findings of persistent cardiomegaly with 

bilateral multifocal acute infiltrates and/or edema.  He received Lasix 40 mg 

IVP x 1.  Blood culture reveals no growth to date.  White count 8.7.  Hemoglobin

12.8.  Platelets 139.  Sodium 128.  Potassium 3.6.  Bicarb 29.  BUN 45.  

Creatinine 1.99.  He is continued on DuoNeb inhalations.  Continued on Tamiflu. 

Initiated on Solu-Cortef.  Remains on antibiotics in the form of ceftriaxone and

azithromycin.  Anticoagulated with Eliquis.  Currently on a 1500 mL fluid 

restriction.  Nephrology is following.  ACE inhibitor remains on hold.





The patient is seen today February 27, 2025 in follow-up on the regular medical 

floor.  He is currently sitting up in bed.  Still requiring BiPAP support 

currently 14/5 and 60% FiO2.  He remains on DuoNeb and elations, Solu-Cortef, 

Tamiflu.  Anticoagulated with Eliquis.  Antibiotics in the form of ceftriaxone. 

Blood culture reveals no growth.  White count 7.6.  Hemoglobin 13.3.  Platelets 

167.  Sodium 133.  Potassium 3.9.  Bicarb 28.  BUN 47.  Creatinine 1.54.  

Glucose 132.  Chest x-ray continues to show worsening bilateral diffuse acute 

infiltrates and/or edema.  Developing ARDS is not excluded.  He is given Lasix 

40 mg IVP today.  Currently in a negative balance.





The patient is seen today February 28, 2025 in follow-up on the regular medical 

floor.  Resting in bed.  Currently afebrile.  Hemodynamically stable.  He contin

ues to require BiPAP support 10/5 and 50% FiO2.  He has been tolerating some 

time off on 15 L high flow nasal cannula with O2 saturations in the upper 80s.  

Blood culture revealed no growth.  White count 6.7.  Hemoglobin 13.6.  Platelets

185.  Sodium 133.  Potassium 4.0.  Bicarb 26.  BUN 54.  Creatinine 1.44.  

Glucose 178.  He is continued on DuoNeb inhalations, Pulmicort and Perforomist 

inhalations, Solu-Medrol.  Anticoagulated with Eliquis.  Continued on Tamiflu.  

Completed a course of ceftriaxone.  Received Lasix 40 mg IVP x 1 again today.  

Remains in a negative balance.  X-ray continues to show bilateral diffuse acute 

infiltrates and/or edema.  ARDS is not excluded.  No significant change.





Objective





- Vital Signs


Vital signs: 


                                   Vital Signs











Temp  98.0 F   02/28/25 12:00


 


Pulse  68   02/28/25 15:39


 


Resp  22   02/28/25 15:39


 


BP  164/85   02/28/25 12:00


 


Pulse Ox  96   02/28/25 12:00


 


FiO2  50   02/28/25 15:28








                                 Intake & Output











 02/27/25 02/28/25 02/28/25





 18:59 06:59 18:59


 


Intake Total 540  236


 


Output Total  550 


 


Balance 540 -550 236


 


Weight  64 kg 


 


Intake:   


 


  Oral 540  236


 


Output:   


 


  Urine  550 


 


Other:   


 


  Voiding Method  Bedside Commode Bedside Commode





  Urinal Urinal


 


  # Voids 1  


 


  # Bowel Movements 1  














- Exam





GENERAL EXAM: Alert, pleasant 64-year-old male, sitting up in bed, on BiPAP 14/5

and 50% FiO2, fairly comfortable in no apparent distress.


HEAD: Normocephalic.


EYES: Normal reaction of pupils, equal size.


NOSE: Clear with pink turbinates.


THROAT: No erythema or exudates.


NECK: No masses, no JVD.


CHEST: No chest wall deformity.


LUNGS: Equal air entry with crackles in the bilateral bases, few scattered 

rhonchi, diminished.


CVS: S1 and S2 normal with no audible murmur, regular rhythm.


ABDOMEN: No hepatosplenomegaly, normal bowel sounds, no guarding or rigidity.


SPINE: No scoliosis or deformity


SKIN: No rashes


CENTRAL NERVOUS SYSTEM: No focal deficits, tone is normal in all 4 extremities.


EXTREMITIES: There is 1+ peripheral edema.  No clubbing, no cyanosis.  

Peripheral pulses are intact.





- Labs


CBC & Chem 7: 


                                 02/28/25 07:39





                                 02/28/25 07:39


Labs: 


                  Abnormal Lab Results - Last 24 Hours (Table)











  02/27/25 02/27/25 02/28/25 Range/Units





  16:11 20:12 06:04 


 


Lymphocytes #     (1.0-4.8)  k/uL


 


Sodium     (137-145)  mmol/L


 


BUN     (9-20)  mg/dL


 


Creatinine     (0.66-1.25)  mg/dL


 


Glucose     (74-99)  mg/dL


 


POC Glucose (mg/dL)  198 H  227 H  219 H  ()  mg/dL


 


Hemoglobin A1c     (<=6.0)  %














  02/28/25 02/28/25 02/28/25 Range/Units





  07:39 07:39 07:39 


 


Lymphocytes #    0.5 L  (1.0-4.8)  k/uL


 


Sodium   133 L   (137-145)  mmol/L


 


BUN   54 H   (9-20)  mg/dL


 


Creatinine   1.44 H   (0.66-1.25)  mg/dL


 


Glucose   178 H   (74-99)  mg/dL


 


POC Glucose (mg/dL)     ()  mg/dL


 


Hemoglobin A1c  6.2 H    (<=6.0)  %














  02/28/25 Range/Units





  11:47 


 


Lymphocytes #   (1.0-4.8)  k/uL


 


Sodium   (137-145)  mmol/L


 


BUN   (9-20)  mg/dL


 


Creatinine   (0.66-1.25)  mg/dL


 


Glucose   (74-99)  mg/dL


 


POC Glucose (mg/dL)  237 H  ()  mg/dL


 


Hemoglobin A1c   (<=6.0)  %








                      Microbiology - Last 24 Hours (Table)











 02/24/25 00:46 Blood Culture - Preliminary





 Blood 














Assessment and Plan


Assessment: 





Acute hypoxemic respiratory failure secondary to an acute exacerbation of 

chronic obstructive pulmonary disease complicated by influenza A and underlying 

pneumonia.  Procalcitonin 2.38.





Acute influenza A infection





Acute exacerbation of chronic systolic congestive heart failure with an ejection

fraction now 45-50%





Troponin leak possible non-ST segment elevation myocardial infarction secondary 

to above





Acute kidney injury





History of atrial fibrillation with previous cardioversion maintained on 

amiodarone and Eliquis





Chronic tobacco dependence with suspected underlying COPD





Hypertension





Hyperlipidemia





Diabetes mellitus, type II





Plan:





The patient was seen and evaluated


Chest x-ray, labs and medications reviewed


Currently on BiPAP 14/5 and 50% FiO2


Transition to high flow nasal cannula if tolerated


Continue to titrate down the FiO2 as tolerated


Lasix 40 mg IVP x 1 again today


Renew ceftriaxone


Continue DuoNeb inhalations, Symbicort


Add Pulmicort and Perforomist inhalations


Continue Solu-Medrol


Continue Tamiflu


Anticoagulated with Eliquis


Condition remains guarded


We will continue to follow





I have personally seen and examined the patient, performed the documentation and

the assessment and plan as written.  Number of minutes spent on the visit: 10





Dictation was produced using Dragon dictation software.  Please excuse any gramm

atical, word or spelling errors.

## 2025-03-01 LAB
ANION GAP SERPL CALC-SCNC: 8 MMOL/L
BASOPHILS # BLD AUTO: 0 K/UL (ref 0–0.2)
BASOPHILS NFR BLD AUTO: 0 %
BUN SERPL-SCNC: 58 MG/DL (ref 9–20)
CALCIUM SPEC-MCNC: 9 MG/DL (ref 8.4–10.2)
CHLORIDE SERPL-SCNC: 97 MMOL/L (ref 98–107)
CO2 SERPL-SCNC: 27 MMOL/L (ref 22–30)
EOSINOPHIL # BLD AUTO: 0 K/UL (ref 0–0.7)
EOSINOPHIL NFR BLD AUTO: 0 %
ERYTHROCYTE [DISTWIDTH] IN BLOOD BY AUTOMATED COUNT: 4.21 M/UL (ref 4.3–5.9)
ERYTHROCYTE [DISTWIDTH] IN BLOOD: 12.9 % (ref 11.5–15.5)
GLUCOSE BLD-MCNC: 242 MG/DL (ref 70–110)
GLUCOSE BLD-MCNC: 260 MG/DL (ref 70–110)
GLUCOSE BLD-MCNC: 400 MG/DL (ref 70–110)
GLUCOSE BLD-MCNC: 444 MG/DL (ref 70–110)
GLUCOSE SERPL-MCNC: 236 MG/DL (ref 74–99)
HCT VFR BLD AUTO: 39.3 % (ref 39–53)
HGB BLD-MCNC: 12.8 GM/DL (ref 13–17.5)
LYMPHOCYTES # SPEC AUTO: 0.5 K/UL (ref 1–4.8)
LYMPHOCYTES NFR SPEC AUTO: 4 %
MAGNESIUM SPEC-SCNC: 2 MG/DL (ref 1.6–2.3)
MCH RBC QN AUTO: 30.5 PG (ref 25–35)
MCHC RBC AUTO-ENTMCNC: 32.6 G/DL (ref 31–37)
MCV RBC AUTO: 93.3 FL (ref 80–100)
MONOCYTES # BLD AUTO: 0.7 K/UL (ref 0–1)
MONOCYTES NFR BLD AUTO: 6 %
NEUTROPHILS # BLD AUTO: 11 K/UL (ref 1.3–7.7)
NEUTROPHILS NFR BLD AUTO: 88 %
PLATELET # BLD AUTO: 219 K/UL (ref 150–450)
POTASSIUM SERPL-SCNC: 3.9 MMOL/L (ref 3.5–5.1)
SODIUM SERPL-SCNC: 132 MMOL/L (ref 137–145)
WBC # BLD AUTO: 12.5 K/UL (ref 3.8–10.6)

## 2025-03-01 RX ADMIN — FUROSEMIDE STA MG: 10 INJECTION, SOLUTION INTRAMUSCULAR; INTRAVENOUS at 11:59

## 2025-03-01 NOTE — P.PN
Subjective


Progress Note Date: 03/01/25





Patient is a 64-year-old male with a PMH of A-fib on Eliquis, COPD not on home 

oxygen, type II DM, hypertension, hyperlipidemia, CKD stage IIIa who presents to

the emergency room with complaints of shortness of breath and cough.  Patient 

reports that over the past 1 week he has been experiencing gradually worsening 

persistent cough.  Reports that the cough is nonproductive.  He also reports 

exertional dyspnea.  Denies experiencing fever or chills.  Also denies chest 

discomfort, nausea, vomiting, abdominal pain, diarrhea.





Chest x-ray in the emergency room revealed cardiomegaly along with findings of 

multifocal pneumonia.  EKG revealed sinus rhythm at 93 bpm with intraventricular

conduction delay as reviewed by me.  Laboratory evaluation did reveal influenza 

type a positive, troponin 0.102, proBNP 12,900, lactic acid 3.3, glucose 228, 

BUN 25, creatinine 1.59, and WBC count 12.7 with neutrophilic predominance.  

Patient was reportedly severely hypoxic and in respiratory distress upon EMS 

arrival at the scene and was started on BiPAP.








2/25/2025 patient seen and examined at bedside.  No acute events overnight.  No 

new complaints. WBC 7.2, hemoglobin 14.1, platelet count 1 68,000, PT 12.1, INR 

1.1, .7, sodium 133, potassium 4.2, chloride 96, bicarb 27, BUN 44, 

creatinine 2.12, glucose 97.  Troponin increased to 0.55 and has flattened.  

Chest x-ray done showed findings favor CHF exacerbation/fluid overload state.  

Areas of underlying acute infiltrate cannot be excluded.  No significant change 

from most recent x-ray.





2/26/25 patient seen and examined at bedside. no new complaints. Currently 

comfortable on BiPAP.  Labs today showed WBC 8.7, hemoglobin 12.8, MCV 93.7, 

platelet count 1 39,000, sodium 128, potassium 3.6, chloride 94, BUN 44, 

creatinine 1.99, calcium 7.8, glucose 113.  ABG showed pH of 7.42, pCO2 38, pO2 

74.  Urinalysis showed +1 protein, moderate ketones, rare bacteria, 9 hyaline 

casts and rare mucus, negative nitrites, negative leukocyte esterase.  Renal u

ltrasound done on 2/25 showed negative for hydronephrosis, stones, bladder 

anechoic





227/25 patient seen and examined at bedside.  Given Ativan IV for anxiety 

overnight.  No acute events. Still required BiPAP overnight.  WBC 7.6, 

hemoglobin 13.9, platelet count 1 67,000, sodium 133, chloride 97, BUN 47, 

creatinine 1.54, glucose 132, calcium 8.5.  Legionella Ag negative.  Chest x-ray

today showed continued worsening of bilateral diffuse acute infiltrates and/or 

edema, developing ARDS not included.





2/28/25 patient seen and examined at bedside. Still needed BiPAP overnight. No 

new complaints. No acute events overnight.  Labs today show WBC 6.7, hemoglobin 

13.6, platelet count 1 85,000, sodium 133, potassium 4, chloride 98, BUN 54, 

creatinine 1.44, glucose 178, A1c 6.2, calcium 8.9.  Chest x-ray independently 

interpreted showed continued bilateral diffuse acute infiltrates and/or edema.  

No significant change from prior x-ray.





3/1/2025 patient seen and examined at bedside.  No acute events overnight.  No 

new complaints.  Still requiring BiPAP overnight.  Labs today showed WBC 12.5, 

hemoglobin 12.8, MCV 93.3, platelet count 219,000, sodium 132, potassium 3.9, 

chloride 97, bicarb 27, BUN 58, creatinine 1.4, glucose 236, calcium 9, 

magnesium 2. Chest x-ray independently interpreted showed similar diffuse patchy

airspace opacities throughout the lungs likely representing acute infiltrates 

and/or edema.





Review of systems:


Pertinent positives and negatives as discussed in HPI, a complete review of 

systems was performed and all other systems are negative.





Pertinent imaging and labs reviewed.





Physical examination:


Vital signs reviewed


General: non toxic, no distress, appears at stated age, HFNC


Derm: no unusual rashes/lesions, warm


Head: atraumatic, normocephalic, symmetric


Eyes: EOMI, anicteric sclera, pupils equal round reactive to light


ENT: Nose and ears atraumatic


Neck: No cervical lymphadenopathy, trachea midline, supple


Mouth: no lip lesion, mucus membranes moist


Cardiovascular: S1S2 reg, no murmur


Lungs: decreased breath sounds in all lung fields, expiratory wheezing in middle

lung fields no rhonchi, no rales, no accessory muscle use


Abdominal: soft,  nontender to palpation, no guarding


Ext: muscle strength 5 out of 5 in all 4 extremities grossly, no gross muscle 

atrophy, no contractures,  positive dorsalis pedis pulse bilateral, no edema


Neuro:  CN II-XI grossly intact, no gross focal neuro deficits


Psych: Alert and oriented x3, appropriate affect and mood





Assessment/Plan: 64-year-old male with history of COPD, A-fib on Eliquis and CKD

stage III here for further management of cardiorenal syndrome and sepsis 

secondary to pneumonia now BiPAP dependent.





#. Cardiorenal syndrome


#. Heart failure with reduced ejection fraction EF of 45 to 50%, not on GDMT


#. Hyponatremia secondary to above


#. Acute kidney injury on CKD, improving


-Echocardiogram in 2/24/2025 showed ejection fracture 45-50% with moderately 

increased septal wall thickness, biatrial dilatation, moderate tricuspid valve 

regurgitation


-Lasix 40 mg IV given once today by nephrology


-Patient euvolemic today.  Continue to monitor volume status.


-Strict ENE's


-Daily weights


-Fluid restriction


-Monitor BMP for electrolytes and renal function


-Cardiology consulted


-Metoprolol 25 mg daily.  Initiate GDMT on discharge





#. Sepsis secondary to influenza pneumonia, unable to rule out bacterial pneumon

ia


#. Acute hypoxic respiratory failure, secondary to above


#. Severe ARDS


-P/F ratio 92


-Chest x-ray independently interpreted showed similar diffuse patchy airspace 

opacities throughout the lungs likely representing acute infiltrates and/or 

edema.


-Procalcitonin elevated at 2.38


-Legionella urine antigen neagtive


-Supportive oxygen as needed


-Continue with Rocephin 2 g IVPB per pulmonology


-Continue Tamiflu 30 mg p.o. every 12 hours


-Hydrocortisone 50mg IV every 6 hours discontinued by pulmonology


-Solumedrol 60mg IV every 6 hours per pulmonology


-Blood culture negative at this time


-Monitor CBC





#.  NSTEMI, type II


-EKG revealed sinus rhythm at 93 bpm with intraventricular conduction delay on 

admission


-Patient has no chest pain as of now


-Troponins flattened at 0.5


-IV heparin for 48 hours.  Now discontinued


-Cardiology initiated atorvastatin 40 mg daily





#. Lactic acidosis, resovled








Chronic conditions: 


#.  A-fib


#.  COPD


#.  Type II DM


#.  Hypertension


#.  Hyperlipidemia


-Lisinopril held due to HERNAN


-Continue Eliquis 5 mg twice daily, amiodarone 200 mg p.o. daily, metoprolol 25 

mg daily


-Hemoglobin A1c 6.2


-Levemir 20 units nightly


-Initiate lispro 5 units SQ ACHS for meal coverage


-Not on home diabetes medications 


-Glucose Accu-Cheks ACHS 


-Continue insulin sliding scale ACHS


-Monitor for hypoglycemia





F: Restrict fluids due to 2 liters





E: Monitor electrolytes particularly sodium and potassium





N: Heart healthy diet





A: Can ambulate with assistance








DVT prophylaxis: Eliquis 5 mg p.o. daily











Paola Boyd MD


PGY-1/Intern





Dictation was produced using dragon dictation software. please excuse any 

grammatical, word or spelling errors.








I have seen and evaluated the patient today.  Discussed with the resident and 

agree with the residents finding and plan as documented in the resident's note. 

Changes highlighted in blue font.





Objective





- Vital Signs


Vital signs: 


                                   Vital Signs











Temp  98.0 F   03/01/25 04:00


 


Pulse  62   03/01/25 04:00


 


Resp  18   03/01/25 04:00


 


BP  171/89   03/01/25 04:00


 


Pulse Ox  96   03/01/25 04:00


 


FiO2  50   03/01/25 05:08








                                 Intake & Output











 02/28/25 03/01/25 03/01/25





 18:59 06:59 18:59


 


Intake Total 354  


 


Output Total  700 


 


Balance 354 -700 


 


Intake:   


 


  Oral 354  


 


Output:   


 


  Urine  700 


 


Other:   


 


  Voiding Method Bedside Commode Bedside Commode 





 Urinal Urinal 














- Labs


CBC & Chem 7: 


                                 03/01/25 06:12





                                 03/01/25 06:12


Labs: 


                  Abnormal Lab Results - Last 24 Hours (Table)











  02/28/25 02/28/25 02/28/25 Range/Units





  07:39 07:39 07:39 


 


WBC     (3.8-10.6)  k/uL


 


RBC     (4.30-5.90)  m/uL


 


Hgb     (13.0-17.5)  gm/dL


 


Neutrophils #     (1.3-7.7)  k/uL


 


Lymphocytes #    0.5 L  (1.0-4.8)  k/uL


 


Sodium   133 L   (137-145)  mmol/L


 


BUN   54 H   (9-20)  mg/dL


 


Creatinine   1.44 H   (0.66-1.25)  mg/dL


 


Glucose   178 H   (74-99)  mg/dL


 


POC Glucose (mg/dL)     ()  mg/dL


 


Hemoglobin A1c  6.2 H    (<=6.0)  %














  02/28/25 02/28/25 02/28/25 Range/Units





  11:47 16:24 20:30 


 


WBC     (3.8-10.6)  k/uL


 


RBC     (4.30-5.90)  m/uL


 


Hgb     (13.0-17.5)  gm/dL


 


Neutrophils #     (1.3-7.7)  k/uL


 


Lymphocytes #     (1.0-4.8)  k/uL


 


Sodium     (137-145)  mmol/L


 


BUN     (9-20)  mg/dL


 


Creatinine     (0.66-1.25)  mg/dL


 


Glucose     (74-99)  mg/dL


 


POC Glucose (mg/dL)  237 H  380 H  316 H  ()  mg/dL


 


Hemoglobin A1c     (<=6.0)  %














  03/01/25 03/01/25 Range/Units





  06:12 06:26 


 


WBC  12.5 H   (3.8-10.6)  k/uL


 


RBC  4.21 L   (4.30-5.90)  m/uL


 


Hgb  12.8 L   (13.0-17.5)  gm/dL


 


Neutrophils #  11.0 H   (1.3-7.7)  k/uL


 


Lymphocytes #  0.5 L   (1.0-4.8)  k/uL


 


Sodium    (137-145)  mmol/L


 


BUN    (9-20)  mg/dL


 


Creatinine    (0.66-1.25)  mg/dL


 


Glucose    (74-99)  mg/dL


 


POC Glucose (mg/dL)   242 H  ()  mg/dL


 


Hemoglobin A1c    (<=6.0)  %

## 2025-03-01 NOTE — P.PN
Subjective


Patient seen for follow-up for HERNAN and hyponatremia.


Renal function and sodium level stable.  On nasal cannula.





Vital signs are stable.


General: No acute distress.


HEENT: Head exam is unremarkable. 


LUNGS: No audible rhonchi or wheezes.


HEART: Rate and Rhythm are regular.


ABDOMEN: Nontender.


EXTREMITITES: No edema.








Objective





- Vital Signs


Vital signs: 


                                   Vital Signs











Temp  98.0 F   03/01/25 04:00


 


Pulse  62   03/01/25 08:05


 


Resp  22   03/01/25 08:00


 


BP  165/69   03/01/25 08:00


 


Pulse Ox  86 L  03/01/25 08:00


 


FiO2  50   03/01/25 05:08








                                 Intake & Output











 02/28/25 03/01/25 03/01/25





 18:59 06:59 18:59


 


Intake Total 354  


 


Output Total  700 


 


Balance 354 -700 


 


Intake:   


 


  Oral 354  


 


Output:   


 


  Urine  700 


 


Other:   


 


  Voiding Method Bedside Commode Bedside Commode 





 Urinal Urinal 














- Labs


CBC & Chem 7: 


                                 03/01/25 06:12





                                 03/01/25 06:12


Labs: 


                  Abnormal Lab Results - Last 24 Hours (Table)











  02/28/25 02/28/25 02/28/25 Range/Units





  07:39 11:47 16:24 


 


WBC     (3.8-10.6)  k/uL


 


RBC     (4.30-5.90)  m/uL


 


Hgb     (13.0-17.5)  gm/dL


 


Neutrophils #     (1.3-7.7)  k/uL


 


Lymphocytes #     (1.0-4.8)  k/uL


 


Sodium     (137-145)  mmol/L


 


Chloride     ()  mmol/L


 


BUN     (9-20)  mg/dL


 


Creatinine     (0.66-1.25)  mg/dL


 


Glucose     (74-99)  mg/dL


 


POC Glucose (mg/dL)   237 H  380 H  ()  mg/dL


 


Hemoglobin A1c  6.2 H    (<=6.0)  %














  02/28/25 03/01/25 03/01/25 Range/Units





  20:30 06:12 06:12 


 


WBC   12.5 H   (3.8-10.6)  k/uL


 


RBC   4.21 L   (4.30-5.90)  m/uL


 


Hgb   12.8 L   (13.0-17.5)  gm/dL


 


Neutrophils #   11.0 H   (1.3-7.7)  k/uL


 


Lymphocytes #   0.5 L   (1.0-4.8)  k/uL


 


Sodium    132 L  (137-145)  mmol/L


 


Chloride    97 L  ()  mmol/L


 


BUN    58 H  (9-20)  mg/dL


 


Creatinine    1.40 H  (0.66-1.25)  mg/dL


 


Glucose    236 H  (74-99)  mg/dL


 


POC Glucose (mg/dL)  316 H    ()  mg/dL


 


Hemoglobin A1c     (<=6.0)  %














  03/01/25 Range/Units





  06:26 


 


WBC   (3.8-10.6)  k/uL


 


RBC   (4.30-5.90)  m/uL


 


Hgb   (13.0-17.5)  gm/dL


 


Neutrophils #   (1.3-7.7)  k/uL


 


Lymphocytes #   (1.0-4.8)  k/uL


 


Sodium   (137-145)  mmol/L


 


Chloride   ()  mmol/L


 


BUN   (9-20)  mg/dL


 


Creatinine   (0.66-1.25)  mg/dL


 


Glucose   (74-99)  mg/dL


 


POC Glucose (mg/dL)  242 H  ()  mg/dL


 


Hemoglobin A1c   (<=6.0)  %














Assessment and Plan


Assessment: 


#HERNAN secondary to ATN secondary to CRS; Renal function improving. 


#CKD stage 3a, baseline creatinine 1.6-1.7. 


#Hyponatremia secondary to hypervolemia and acute respiratory infection, stable.


#Sepsis secondary to influenza type a pneumonia, currently maintained on Tamiflu


#Acute hypoxic respiratory failure secondary to above


#HFrEF w/EF 45 to 50%


#Type II NSTEMI





Plan: 


-Repeat IV Lasix 40 mg once today.


-Recheck labs in a.m.


-Maintain fluid restriction 1500 cc


-Bladder scan showed <300 cc, Valentine was not inserted


-Avoid nephrotoxic agents


-Continue to hold lisinopril

## 2025-03-01 NOTE — P.PN
Subjective


Progress Note Date: 03/01/25





This is a 64-year-old male patient with chronic and ongoing tobacco dependence, 

atrial fibrillation, diabetes mellitus, hypertension, hyperlipidemia who has a 1

week history of increasing shortness of breath, cough congestion fever and 

chills.  He came into the emergency room last evening with severe shortness of 

breath requiring BiPAP support which was 14/5 and 50% FiO2.  Chest x-ray 

revealed cardiomegaly and airspace disease most notably in the left mid lower 

lung zone suggestive of multifocal pneumonia and possible fluid volume overload.

 White count 10.5.  Hemoglobin 14.3.  Platelets 156.  Sodium 135.  Potassium 

4.3.  Bicarb 27.  BUN 29.  Creatinine 1.86.  Glucose 174.  Troponins 0.327, 

0.550, 0.5-2.  He is initiated on a heparin drip.  proBNP was 13,000.  

Procalcitonin 2.38.  He did test positive for influenza A.  Initiated on 

Tamiflu.  Initiated on ceftriaxone.  He is seen today in consultation in the 

emergency department.  He is currently sitting up on the stretcher.  Awake and 

alert.  He is on 6 L high flow nasal cannula.  He is breathing a bit easier 

today compared to yesterday.  Still with a loose nonproductive cough.  He did 

have a Tmax of 102.3.  Current temperature 99.1.  He is hemodynamically stable.





The patient is seen today February 25, 2025 in follow-up on the regular medical 

floor.  He is awake and alert in no acute distress.  Doing slightly better today

compared to yesterday.  He is still requiring 10 L high flow nasal cannula.  

Chest x-ray continues to show evidence of fluid volume overload/CHF.  

Echocardiogram reveals mildly impaired left ventricular systolic function with 

ejection fraction of 45 to 50%.  Moderately reduced global LV function.  Blood 

culture is pending.  White count 7.2.  Hemoglobin 14.1.  Platelets 168.  Sodium 

133. Potassium 4.2.  Bicarb 27.  BUN 44.  Creatinine 2.12.  Stool for occult 

blood was negative.  Since.  Anticoagulated with Eliquis.  Antibiotics in the 

form of ceftriaxone and azithromycin.  He is continued on Tamiflu.  Lasix 

discontinued per cardiology.





The patient is seen today February 26, 2025 in follow-up on the regular medical 

floor.  He is currently on BiPAP 14/5 and 80% FiO2.  He was trialed on 12 L high

flow earlier this morning with O2 saturations in the 70s and 80s.  Arterial 

blood gases on the 80% FiO2 revealed a PaO2 of 74, pCO2 of 38 and a pH of 7.42. 

A chest x-ray revealed similar findings of persistent cardiomegaly with 

bilateral multifocal acute infiltrates and/or edema.  He received Lasix 40 mg 

IVP x 1.  Blood culture reveals no growth to date.  White count 8.7.  Hemoglobin

12.8.  Platelets 139.  Sodium 128.  Potassium 3.6.  Bicarb 29.  BUN 45.  

Creatinine 1.99.  He is continued on DuoNeb inhalations.  Continued on Tamiflu. 

Initiated on Solu-Cortef.  Remains on antibiotics in the form of ceftriaxone and

azithromycin.  Anticoagulated with Eliquis.  Currently on a 1500 mL fluid 

restriction.  Nephrology is following.  ACE inhibitor remains on hold.





The patient is seen today February 27, 2025 in follow-up on the regular medical 

floor.  He is currently sitting up in bed.  Still requiring BiPAP support 

currently 14/5 and 60% FiO2.  He remains on DuoNeb and elations, Solu-Cortef, 

Tamiflu.  Anticoagulated with Eliquis.  Antibiotics in the form of ceftriaxone. 

Blood culture reveals no growth.  White count 7.6.  Hemoglobin 13.3.  Platelets 

167.  Sodium 133.  Potassium 3.9.  Bicarb 28.  BUN 47.  Creatinine 1.54.  

Glucose 132.  Chest x-ray continues to show worsening bilateral diffuse acute 

infiltrates and/or edema.  Developing ARDS is not excluded.  He is given Lasix 

40 mg IVP today.  Currently in a negative balance.





The patient is seen today February 28, 2025 in follow-up on the regular medical 

floor.  Resting in bed.  Currently afebrile.  Hemodynamically stable.  He contin

ues to require BiPAP support 10/5 and 50% FiO2.  He has been tolerating some 

time off on 15 L high flow nasal cannula with O2 saturations in the upper 80s.  

Blood culture revealed no growth.  White count 6.7.  Hemoglobin 13.6.  Platelets

185.  Sodium 133.  Potassium 4.0.  Bicarb 26.  BUN 54.  Creatinine 1.44.  

Glucose 178.  He is continued on DuoNeb inhalations, Pulmicort and Perforomist 

inhalations, Solu-Medrol.  Anticoagulated with Eliquis.  Continued on Tamiflu.  

Completed a course of ceftriaxone.  Received Lasix 40 mg IVP x 1 again today.  

Remains in a negative balance.  X-ray continues to show bilateral diffuse acute 

infiltrates and/or edema.  ARDS is not excluded.  No significant change.





The patient is seen today March 1, 2025 in follow-up on the selective care unit.

 He is awake and alert in no acute distress.  He is sitting up in bed.  He is 

currently on 15 L high flow nasal cannula.  He has been off the BiPAP since 

earlier this morning which was set at 14/5 and 50% FiO2.  He has been slow to 

progress.  He did receive Lasix 40 mg IVP x 1 again today.  Blood culture 

revealed no growth.  White count 12.5.  Hemoglobin 12.8.  Platelets 219.  Sodium

132.  Potassium 3.9.  Bicarb 27.  BUN 58.  Creatinine 1.40.  Glucose 236.  He 

remains on DuoNeb inhalations, Pulmicort and performance and elations, IV Solu-

Medrol.  He is anticoagulated with Eliquis.  Remains on antibiotics in the form 

of ceftriaxone.  Chest x-ray continues to show similar diffuse patchy airspace 

opacities throughout the lungs likely acute infiltrates and/or edema.  

Developing ARDS is not excluded.





Objective





- Vital Signs


Vital signs: 


                                   Vital Signs











Temp  97.7 F   03/01/25 12:00


 


Pulse  77   03/01/25 14:00


 


Resp  26 H  03/01/25 14:00


 


BP  175/78   03/01/25 12:00


 


Pulse Ox  91 L  03/01/25 12:00


 


FiO2  50   03/01/25 07:45








                                 Intake & Output











 02/28/25 03/01/25 03/01/25





 18:59 06:59 18:59


 


Intake Total 354  580


 


Output Total  700 1150


 


Balance 354 700 -570


 


Intake:   


 


  Intake, IV Titration   100





  Amount   


 


    cefTRIAXone 2 gm In   100





    Sodium Chloride 0.9% 50   





    ml @ 100 mls/hr IVPB   





    Q24HR [facility] Rx#:[id number]   


 


  Oral 354  480


 


Output:   


 


  Urine  700 1150


 


Other:   


 


  Voiding Method Bedside Commode Bedside Commode Bedside Commode





 Urinal Urinal Urinal














- Exam





GENERAL EXAM: Alert, 64-year-old male, sitting up in bed, on 15 L high flow 

nasal cannula, fairly comfortable in no apparent distress.


HEAD: Normocephalic.


EYES: Normal reaction of pupils, equal size.


NOSE: Clear with pink turbinates.


THROAT: No erythema or exudates.


NECK: No masses, no JVD.


CHEST: No chest wall deformity.


LUNGS: Equal air entry with crackles in the bilateral bases, few scattered 

rhonchi, diminished.


CVS: S1 and S2 normal with no audible murmur, regular rhythm.


ABDOMEN: No hepatosplenomegaly, normal bowel sounds, no guarding or rigidity.


SPINE: No scoliosis or deformity


SKIN: No rashes


CENTRAL NERVOUS SYSTEM: No focal deficits, tone is normal in all 4 extremities.


EXTREMITIES: There is 1+ peripheral edema.  No clubbing, no cyanosis.  

Peripheral pulses are intact.





- Labs


CBC & Chem 7: 


                                 03/01/25 06:12





                                 03/01/25 06:12


Labs: 


                  Abnormal Lab Results - Last 24 Hours (Table)











  02/28/25 02/28/25 03/01/25 Range/Units





  16:24 20:30 06:12 


 


WBC    12.5 H  (3.8-10.6)  k/uL


 


RBC    4.21 L  (4.30-5.90)  m/uL


 


Hgb    12.8 L  (13.0-17.5)  gm/dL


 


Neutrophils #    11.0 H  (1.3-7.7)  k/uL


 


Lymphocytes #    0.5 L  (1.0-4.8)  k/uL


 


Sodium     (137-145)  mmol/L


 


Chloride     ()  mmol/L


 


BUN     (9-20)  mg/dL


 


Creatinine     (0.66-1.25)  mg/dL


 


Glucose     (74-99)  mg/dL


 


POC Glucose (mg/dL)  380 H  316 H   ()  mg/dL














  03/01/25 03/01/25 03/01/25 Range/Units





  06:12 06:26 11:37 


 


WBC     (3.8-10.6)  k/uL


 


RBC     (4.30-5.90)  m/uL


 


Hgb     (13.0-17.5)  gm/dL


 


Neutrophils #     (1.3-7.7)  k/uL


 


Lymphocytes #     (1.0-4.8)  k/uL


 


Sodium  132 L    (137-145)  mmol/L


 


Chloride  97 L    ()  mmol/L


 


BUN  58 H    (9-20)  mg/dL


 


Creatinine  1.40 H    (0.66-1.25)  mg/dL


 


Glucose  236 H    (74-99)  mg/dL


 


POC Glucose (mg/dL)   242 H  444 H  ()  mg/dL








                      Microbiology - Last 24 Hours (Table)











 02/24/25 00:46 Blood Culture - Final





 Blood 














Assessment and Plan


Assessment: 





Acute hypoxemic respiratory failure secondary to an acute exacerbation of 

chronic obstructive pulmonary disease complicated by influenza A and underlying 

pneumonia.  Procalcitonin 2.38.





Acute influenza A infection





Acute exacerbation of chronic systolic congestive heart failure with an ejection

fraction now 45-50%





Troponin leak possible non-ST segment elevation myocardial infarction secondary 

to above





Acute kidney injury





History of atrial fibrillation with previous cardioversion maintained on 

amiodarone and Eliquis





Chronic tobacco dependence with suspected underlying COPD





Hypertension





Hyperlipidemia





Diabetes mellitus, type II





Plan:





The patient was seen and evaluated


Chest x-ray, labs and medications reviewed


Currently on 15 L high flow nasal cannula


Continue BiPAP support as needed


Continue to titrate down the FiO2 as tolerated


Lasix 40 mg IVP x 1 again today


Continue ceftriaxone


Continue DuoNeb inhalations, Symbicort


Continue Pulmicort and Perforomist inhalations


Continue Solu-Medrol


Continue Tamiflu


Anticoagulated with Eliquis


Condition remains guarded


We will continue to follow





I have personally seen and examined the patient, performed the documentation and

the assessment and plan as written.  Number of minutes spent on the visit: 10





Dictation was produced using Dragon dictation software.  Please excuse any 

grammatical, word or spelling errors.

## 2025-03-01 NOTE — XR
EXAMINATION TYPE: XR chest 1V portable

 

DATE OF EXAM: 3/1/2025 8:06 AM

 

COMPARISON: Multiple radiographs, with the most recent on 2/28/2025

 

TECHNIQUE: XR chest 1V portable Portable AP radiograph of the chest.

 

CLINICAL INDICATION:Male, 64 years old with history of chf/pneumonia; 

 

FINDINGS: 

Lungs/Pleura: No pleural effusion or pneumothorax. Similar diffuse patchy airspace opacities througho
ut both lungs. 

Pulmonary vascularity: Unremarkable.

Heart/mediastinum: Cardiomediastinal silhouette is enlarged and stable.  Atherosclerotic calcificatio
ns are seen in the aorta.

Musculoskeletal: No acute osseous pathology.

 

IMPRESSION: 

Similar diffuse patchy airspace opacities throughout the lungs likely presenting acute infiltrates an
d/or edema. Developing ARDS is not excluded.

 

 

[facility], Workstation: [id number], 3/1/2025 8:10 AM

## 2025-03-02 LAB
ANION GAP SERPL CALC-SCNC: 9 MMOL/L
BASOPHILS # BLD AUTO: 0 K/UL (ref 0–0.2)
BASOPHILS NFR BLD AUTO: 0 %
BUN SERPL-SCNC: 53 MG/DL (ref 9–20)
CALCIUM SPEC-MCNC: 8.6 MG/DL (ref 8.4–10.2)
CHLORIDE SERPL-SCNC: 97 MMOL/L (ref 98–107)
CO2 SERPL-SCNC: 27 MMOL/L (ref 22–30)
EOSINOPHIL # BLD AUTO: 0 K/UL (ref 0–0.7)
EOSINOPHIL NFR BLD AUTO: 0 %
ERYTHROCYTE [DISTWIDTH] IN BLOOD BY AUTOMATED COUNT: 4.22 M/UL (ref 4.3–5.9)
ERYTHROCYTE [DISTWIDTH] IN BLOOD: 13 % (ref 11.5–15.5)
GLUCOSE BLD-MCNC: 231 MG/DL (ref 70–110)
GLUCOSE BLD-MCNC: 246 MG/DL (ref 70–110)
GLUCOSE BLD-MCNC: 304 MG/DL (ref 70–110)
GLUCOSE BLD-MCNC: 315 MG/DL (ref 70–110)
GLUCOSE SERPL-MCNC: 238 MG/DL (ref 74–99)
HCT VFR BLD AUTO: 39.9 % (ref 39–53)
HGB BLD-MCNC: 12.7 GM/DL (ref 13–17.5)
LYMPHOCYTES # SPEC AUTO: 0.3 K/UL (ref 1–4.8)
LYMPHOCYTES NFR SPEC AUTO: 2 %
MCH RBC QN AUTO: 30.2 PG (ref 25–35)
MCHC RBC AUTO-ENTMCNC: 31.9 G/DL (ref 31–37)
MCV RBC AUTO: 94.6 FL (ref 80–100)
MONOCYTES # BLD AUTO: 0.7 K/UL (ref 0–1)
MONOCYTES NFR BLD AUTO: 5 %
NEUTROPHILS # BLD AUTO: 13.3 K/UL (ref 1.3–7.7)
NEUTROPHILS NFR BLD AUTO: 92 %
PLATELET # BLD AUTO: 242 K/UL (ref 150–450)
POTASSIUM SERPL-SCNC: 3.9 MMOL/L (ref 3.5–5.1)
SODIUM SERPL-SCNC: 133 MMOL/L (ref 137–145)
WBC # BLD AUTO: 14.4 K/UL (ref 3.8–10.6)

## 2025-03-02 RX ADMIN — FUROSEMIDE SCH MG: 10 INJECTION, SOLUTION INTRAMUSCULAR; INTRAVENOUS at 11:54

## 2025-03-02 NOTE — XR
EXAMINATION TYPE: XR chest 1V portable

 

DATE OF EXAM: 3/2/2025 4:32 AM

 

COMPARISON: Multiple radiographs, with the most recent on 3/1/2025

 

TECHNIQUE: XR chest 1V portable Portable AP radiograph of the chest.

 

CLINICAL INDICATION:Male, 64 years old with history of SOB; 

 

FINDINGS: 

Lungs/Pleura: No sizable pleural effusion or pneumothorax. Similar diffuse patchy airspace opacities 
throughout both lungs. 

Heart/mediastinum: Cardiomediastinal silhouette is enlarged and stable.  Atherosclerotic calcificatio
ns are seen in the aorta.

Musculoskeletal: No acute osseous pathology.

 

IMPRESSION: 

Similar diffuse patchy airspace opacities throughout the lungs likely presenting acute infiltrates ve
rsus edema versus ARDS.

 

 

[facility], Workstation: [id number], 3/2/2025 7:13 AM

## 2025-03-02 NOTE — P.PN
Subjective


Progress Note Date: 03/02/25





This is a 64-year-old male patient with chronic and ongoing tobacco dependence, 

atrial fibrillation, diabetes mellitus, hypertension, hyperlipidemia who has a 1

week history of increasing shortness of breath, cough congestion fever and 

chills.  He came into the emergency room last evening with severe shortness of 

breath requiring BiPAP support which was 14/5 and 50% FiO2.  Chest x-ray 

revealed cardiomegaly and airspace disease most notably in the left mid lower 

lung zone suggestive of multifocal pneumonia and possible fluid volume overload.

 White count 10.5.  Hemoglobin 14.3.  Platelets 156.  Sodium 135.  Potassium 

4.3.  Bicarb 27.  BUN 29.  Creatinine 1.86.  Glucose 174.  Troponins 0.327, 

0.550, 0.5-2.  He is initiated on a heparin drip.  proBNP was 13,000.  

Procalcitonin 2.38.  He did test positive for influenza A.  Initiated on 

Tamiflu.  Initiated on ceftriaxone.  He is seen today in consultation in the 

emergency department.  He is currently sitting up on the stretcher.  Awake and 

alert.  He is on 6 L high flow nasal cannula.  He is breathing a bit easier 

today compared to yesterday.  Still with a loose nonproductive cough.  He did 

have a Tmax of 102.3.  Current temperature 99.1.  He is hemodynamically stable.





The patient is seen today February 25, 2025 in follow-up on the regular medical 

floor.  He is awake and alert in no acute distress.  Doing slightly better today

compared to yesterday.  He is still requiring 10 L high flow nasal cannula.  

Chest x-ray continues to show evidence of fluid volume overload/CHF.  

Echocardiogram reveals mildly impaired left ventricular systolic function with 

ejection fraction of 45 to 50%.  Moderately reduced global LV function.  Blood 

culture is pending.  White count 7.2.  Hemoglobin 14.1.  Platelets 168.  Sodium 

133. Potassium 4.2.  Bicarb 27.  BUN 44.  Creatinine 2.12.  Stool for occult 

blood was negative.  Since.  Anticoagulated with Eliquis.  Antibiotics in the 

form of ceftriaxone and azithromycin.  He is continued on Tamiflu.  Lasix 

discontinued per cardiology.





The patient is seen today February 26, 2025 in follow-up on the regular medical 

floor.  He is currently on BiPAP 14/5 and 80% FiO2.  He was trialed on 12 L high

flow earlier this morning with O2 saturations in the 70s and 80s.  Arterial 

blood gases on the 80% FiO2 revealed a PaO2 of 74, pCO2 of 38 and a pH of 7.42. 

A chest x-ray revealed similar findings of persistent cardiomegaly with 

bilateral multifocal acute infiltrates and/or edema.  He received Lasix 40 mg 

IVP x 1.  Blood culture reveals no growth to date.  White count 8.7.  Hemoglobin

12.8.  Platelets 139.  Sodium 128.  Potassium 3.6.  Bicarb 29.  BUN 45.  

Creatinine 1.99.  He is continued on DuoNeb inhalations.  Continued on Tamiflu. 

Initiated on Solu-Cortef.  Remains on antibiotics in the form of ceftriaxone and

azithromycin.  Anticoagulated with Eliquis.  Currently on a 1500 mL fluid 

restriction.  Nephrology is following.  ACE inhibitor remains on hold.





The patient is seen today February 27, 2025 in follow-up on the regular medical 

floor.  He is currently sitting up in bed.  Still requiring BiPAP support 

currently 14/5 and 60% FiO2.  He remains on DuoNeb and elations, Solu-Cortef, 

Tamiflu.  Anticoagulated with Eliquis.  Antibiotics in the form of ceftriaxone. 

Blood culture reveals no growth.  White count 7.6.  Hemoglobin 13.3.  Platelets 

167.  Sodium 133.  Potassium 3.9.  Bicarb 28.  BUN 47.  Creatinine 1.54.  

Glucose 132.  Chest x-ray continues to show worsening bilateral diffuse acute 

infiltrates and/or edema.  Developing ARDS is not excluded.  He is given Lasix 

40 mg IVP today.  Currently in a negative balance.





The patient is seen today February 28, 2025 in follow-up on the regular medical 

floor.  Resting in bed.  Currently afebrile.  Hemodynamically stable.  He contin

ues to require BiPAP support 10/5 and 50% FiO2.  He has been tolerating some 

time off on 15 L high flow nasal cannula with O2 saturations in the upper 80s.  

Blood culture revealed no growth.  White count 6.7.  Hemoglobin 13.6.  Platelets

185.  Sodium 133.  Potassium 4.0.  Bicarb 26.  BUN 54.  Creatinine 1.44.  

Glucose 178.  He is continued on DuoNeb inhalations, Pulmicort and Perforomist 

inhalations, Solu-Medrol.  Anticoagulated with Eliquis.  Continued on Tamiflu.  

Completed a course of ceftriaxone.  Received Lasix 40 mg IVP x 1 again today.  

Remains in a negative balance.  X-ray continues to show bilateral diffuse acute 

infiltrates and/or edema.  ARDS is not excluded.  No significant change.





The patient is seen today March 1, 2025 in follow-up on the selective care unit.

 He is awake and alert in no acute distress.  He is sitting up in bed.  He is 

currently on 15 L high flow nasal cannula.  He has been off the BiPAP since 

earlier this morning which was set at 14/5 and 50% FiO2.  He has been slow to 

progress.  He did receive Lasix 40 mg IVP x 1 again today.  Blood culture 

revealed no growth.  White count 12.5.  Hemoglobin 12.8.  Platelets 219.  Sodium

132.  Potassium 3.9.  Bicarb 27.  BUN 58.  Creatinine 1.40.  Glucose 236.  He 

remains on DuoNeb inhalations, Pulmicort and performance and elations, IV Solu-

Medrol.  He is anticoagulated with Eliquis.  Remains on antibiotics in the form 

of ceftriaxone.  Chest x-ray continues to show similar diffuse patchy airspace 

opacities throughout the lungs likely acute infiltrates and/or edema.  

Developing ARDS is not excluded.





The patient is seen today March 2, 2025 in follow-up on the selective care unit.

 He is awake and alert in no acute distress.  Sitting up in bed.  Maintaining O2

saturations in the 90s on 12 L high flow nasal cannula.  He did wear BiPAP last 

night 14/5 and 50% FiO2.  He has been slow to progress but is feeling better 

today compared to yesterday.  Chest x-ray shows similar diffuse patchy airspace 

opacities representing acute infiltrates versus edema versus ARDS.  Remains on 

IV diuretics.  Currently in a negative balance. White count 14.4.  Hemoglobin 

12.7.  Platelets 242.  Sodium 133.  Potassium 3.9.  Bicarb 27.  BUN 53.  

Creatinine 1.31.  Glucose 238.  He remains on DuoNeb inhalations, Pulmicort and 

Perforomist inhalations, IV Solu-Medrol.  He is anticoagulated with Eliquis.  

Remains on ceftriaxone. 





Objective





- Vital Signs


Vital signs: 


                                   Vital Signs











Temp  97.4 F L  03/02/25 11:59


 


Pulse  70   03/02/25 12:26


 


Resp  16   03/02/25 11:59


 


BP  166/75   03/02/25 11:59


 


Pulse Ox  97   03/02/25 11:59


 


FiO2  50   03/02/25 07:50








                                 Intake & Output











 03/01/25 03/02/25 03/02/25





 18:59 06:59 18:59


 


Intake Total 1120  300


 


Output Total 1150 500 


 


Balance -30 -500 300


 


Weight  76 kg 


 


Intake:   


 


  Intake, IV Titration 100  





  Amount   


 


    cefTRIAXone 2 gm In 100  





    Sodium Chloride 0.9% 50   





    ml @ 100 mls/hr IVPB   





    Q24HR [facility] Rx#:[id number]   


 


  Oral 1020  300


 


Output:   


 


  Urine 1150 500 


 


Other:   


 


  Voiding Method Bedside Commode Bedside Commode Bedside Commode





 Urinal Urinal Urinal














- Exam





GENERAL EXAM: Alert, 64-year-old male, on 12 L high flow nasal cannula, fairly 

comfortable in no apparent distress.


HEAD: Normocephalic.


EYES: Normal reaction of pupils, equal size.


NOSE: Clear with pink turbinates.


THROAT: No erythema or exudates.


NECK: No masses, no JVD.


CHEST: No chest wall deformity.


LUNGS: Equal air entry with crackles in the bilateral bases, few scattered 

rhonchi, diminished.


CVS: S1 and S2 normal with no audible murmur, regular rhythm.


ABDOMEN: No hepatosplenomegaly, normal bowel sounds, no guarding or rigidity.


SPINE: No scoliosis or deformity


SKIN: No rashes


CENTRAL NERVOUS SYSTEM: No focal deficits, tone is normal in all 4 extremities.


EXTREMITIES: There is 1+ peripheral edema.  No clubbing, no cyanosis.  

Peripheral pulses are intact.





- Labs


CBC & Chem 7: 


                                 03/02/25 07:14





                                 03/02/25 07:14


Labs: 


                  Abnormal Lab Results - Last 24 Hours (Table)











  03/01/25 03/01/25 03/02/25 Range/Units





  16:18 19:55 05:54 


 


WBC     (3.8-10.6)  k/uL


 


RBC     (4.30-5.90)  m/uL


 


Hgb     (13.0-17.5)  gm/dL


 


Neutrophils #     (1.3-7.7)  k/uL


 


Lymphocytes #     (1.0-4.8)  k/uL


 


Sodium     (137-145)  mmol/L


 


Chloride     ()  mmol/L


 


BUN     (9-20)  mg/dL


 


Creatinine     (0.66-1.25)  mg/dL


 


Glucose     (74-99)  mg/dL


 


POC Glucose (mg/dL)  400 H  260 H  246 H  ()  mg/dL














  03/02/25 03/02/25 03/02/25 Range/Units





  07:14 07:14 11:36 


 


WBC  14.4 H    (3.8-10.6)  k/uL


 


RBC  4.22 L    (4.30-5.90)  m/uL


 


Hgb  12.7 L    (13.0-17.5)  gm/dL


 


Neutrophils #  13.3 H    (1.3-7.7)  k/uL


 


Lymphocytes #  0.3 L    (1.0-4.8)  k/uL


 


Sodium   133 L   (137-145)  mmol/L


 


Chloride   97 L   ()  mmol/L


 


BUN   53 H   (9-20)  mg/dL


 


Creatinine   1.31 H   (0.66-1.25)  mg/dL


 


Glucose   238 H   (74-99)  mg/dL


 


POC Glucose (mg/dL)    315 H  ()  mg/dL








                      Microbiology - Last 24 Hours (Table)











 02/24/25 00:46 Blood Culture - Final





 Blood 














Assessment and Plan


Assessment: 





Acute hypoxemic respiratory failure secondary to an acute exacerbation of 

chronic obstructive pulmonary disease complicated by influenza A and underlying 

pneumonia.  Procalcitonin 2.38.





Acute influenza A infection





Acute exacerbation of chronic systolic congestive heart failure with an ejection

fraction now 45-50%





Troponin leak possible non-ST segment elevation myocardial infarction secondary 

to above





Acute kidney injury





History of atrial fibrillation with previous cardioversion maintained on amioda

danae and Eliquis





Chronic tobacco dependence with suspected underlying COPD





Hypertension





Hyperlipidemia





Diabetes mellitus, type II





Plan:





The patient was seen and evaluated


Chest x-ray, labs and medications reviewed


Currently on 12 L high flow nasal cannula


Continue BiPAP support as needed


Continue to titrate down the FiO2 as tolerated


Lasix 40 mg IVP daily


Continue ceftriaxone


Continue bronchodilators


Continue Solu-Medrol


Continue Tamiflu


Anticoagulated with Eliquis


Condition remains guarded


Increase his activity as tolerated





I have personally seen and examined the patient, performed the documentation and

the assessment and plan as written.  Number of minutes spent on the visit: 10





Dictation was produced using Dragon dictation software.  Please excuse any 

grammatical, word or spelling errors.

## 2025-03-02 NOTE — P.PN
Subjective


Progress Note Date: 03/02/25








Subjective: 


Patient seen and examined at bedside.  No acute events overnight.  Respiratory 

function similar to yesterday





Pertinent positives and negatives as discussed above, a complete review of 

systems was performed and all other systems are negative.








Vitals Signs Reviewed. 





General: Nontoxic, no distress, appears at stated age, chronically ill-appearing


Derm: Warm, dry


Head: Atraumatic, normocephalic, symmetric


Eyes: EOMI, no lid lag, anicteric sclera


Mouth: No lip lesion, mucus membranes moist


Cardiovascular: S1S2 reg, no murmur


Lungs: Bilateral Rales, no accessory muscle use, supplemental oxygen


Abdominal: Soft, nontender to palpation, no guarding, no appreciable 

organomegaly


Ext: No gross muscle atrophy, no edema, no contractures


Neuro: CN II-XI grossly intact, no focal neuro deficits


Psych: Alert, oriented, appropriate affect





Data Reviewed Today: 


Pertinent Labs: WBC 14.4, sodium 133, creatinine 1.31, blood sugars range 

between 2 38-3 15


Imaging: [Chest x-ray independently interpreted from this morning, shows 

persistent bilateral interstitial opacities





Assessment and Plan:


Active:


Acute hypoxic respiratory failure


Sepsis secondary influenza pneumonia


Severe ARDS


Acute COPD exacerbation


NSTEMI type II


Heart failure with reduced ejection fraction 45 to 50%,in mild exacerbation


HERANN on CKD, likely cardiorenal


Mild hyponatremia


-Continue Solu-Medrol 60 IV every 6 hours, DuoNebs 4 times daily, every 4 hours 

as needed, Pulmicort twice daily


-Continue ceftriaxone 2 g IV every 24 hours, status post azithromycin


-Status post Tamiflu


-Nephrology note reviewed, on scheduled Lasix 40 IV daily, monitor I's and O's, 

monitor electrolytes


-Wean oxygen, BiPAP if needed


-Pulmonology and cardiology following


-Continue telemetry monitoring


-Aspirin 81 mg, atorvastatin 40 mg nightly





Type 2 diabetes


Uncontrolled hyperglycemia


Increase Lantus to 30 units nightly, lispro to 10 units ACHS, continue sliding 

scale insulin, monitor for hypoglycemia





Resolved:


Lactic acidosis





Chronic:


Paroxysmal A-fib


Hypertension-holding lisinopril


Dyslipidemia





DVT ppx: Eliquis





Code status: Full code





Anticipated discharge place: Pending clinical course


Anticipated discharge time: Pending clinical course





Objective





- Vital Signs


Vital signs: 


                                   Vital Signs











Temp  97.4 F L  03/02/25 11:59


 


Pulse  70   03/02/25 12:26


 


Resp  16   03/02/25 11:59


 


BP  166/75   03/02/25 11:59


 


Pulse Ox  97   03/02/25 11:59


 


FiO2  50   03/02/25 07:50








                                 Intake & Output











 03/01/25 03/02/25 03/02/25





 18:59 06:59 18:59


 


Intake Total 1120  300


 


Output Total 1150 500 


 


Balance -30 -500 300


 


Weight  76 kg 


 


Intake:   


 


  Intake, IV Titration 100  





  Amount   


 


    cefTRIAXone 2 gm In 100  





    Sodium Chloride 0.9% 50   





    ml @ 100 mls/hr IVPB   





    Q24HR [facility] Rx#:[id number]   


 


  Oral 1020  300


 


Output:   


 


  Urine 1150 500 


 


Other:   


 


  Voiding Method Bedside Commode Bedside Commode Bedside Commode





 Urinal Urinal Urinal














- Labs


CBC & Chem 7: 


                                 03/02/25 07:14





                                 03/02/25 07:14


Labs: 


                  Abnormal Lab Results - Last 24 Hours (Table)











  03/01/25 03/01/25 03/02/25 Range/Units





  16:18 19:55 05:54 


 


WBC     (3.8-10.6)  k/uL


 


RBC     (4.30-5.90)  m/uL


 


Hgb     (13.0-17.5)  gm/dL


 


Neutrophils #     (1.3-7.7)  k/uL


 


Lymphocytes #     (1.0-4.8)  k/uL


 


Sodium     (137-145)  mmol/L


 


Chloride     ()  mmol/L


 


BUN     (9-20)  mg/dL


 


Creatinine     (0.66-1.25)  mg/dL


 


Glucose     (74-99)  mg/dL


 


POC Glucose (mg/dL)  400 H  260 H  246 H  ()  mg/dL














  03/02/25 03/02/25 03/02/25 Range/Units





  07:14 07:14 11:36 


 


WBC  14.4 H    (3.8-10.6)  k/uL


 


RBC  4.22 L    (4.30-5.90)  m/uL


 


Hgb  12.7 L    (13.0-17.5)  gm/dL


 


Neutrophils #  13.3 H    (1.3-7.7)  k/uL


 


Lymphocytes #  0.3 L    (1.0-4.8)  k/uL


 


Sodium   133 L   (137-145)  mmol/L


 


Chloride   97 L   ()  mmol/L


 


BUN   53 H   (9-20)  mg/dL


 


Creatinine   1.31 H   (0.66-1.25)  mg/dL


 


Glucose   238 H   (74-99)  mg/dL


 


POC Glucose (mg/dL)    315 H  ()  mg/dL








                      Microbiology - Last 24 Hours (Table)











 02/24/25 00:46 Blood Culture - Final





 Blood

## 2025-03-02 NOTE — P.PN
Subjective


Patient seen for follow-up for HERNAN and hyponatremia.


Renal function and sodium level stable.  On nasal cannula.


No active complaints.  Oral intake fair.





Vital signs are stable.


General: No acute distress.


HEENT: Head exam is unremarkable. 


LUNGS: No audible rhonchi or wheezes.


HEART: Rate and Rhythm are regular.


ABDOMEN: Nontender.


EXTREMITITES: No edema.








Objective





- Vital Signs


Vital signs: 


                                   Vital Signs











Temp  97.4 F L  03/02/25 08:00


 


Pulse  70   03/02/25 08:12


 


Resp  22   03/02/25 08:00


 


BP  166/72   03/02/25 08:00


 


Pulse Ox  98   03/02/25 08:00


 


FiO2  50   03/02/25 07:50








                                 Intake & Output











 03/01/25 03/02/25 03/02/25





 18:59 06:59 18:59


 


Intake Total 1120  300


 


Output Total 1150 500 


 


Balance -30 -500 300


 


Weight  76 kg 


 


Intake:   


 


  Intake, IV Titration 100  





  Amount   


 


    cefTRIAXone 2 gm In 100  





    Sodium Chloride 0.9% 50   





    ml @ 100 mls/hr IVPB   





    Q24HR [facility] Rx#:[id number]   


 


  Oral 1020  300


 


Output:   


 


  Urine 1150 500 


 


Other:   


 


  Voiding Method Bedside Commode Bedside Commode 





 Urinal Urinal 














- Labs


CBC & Chem 7: 


                                 03/02/25 07:14





                                 03/02/25 07:14


Labs: 


                  Abnormal Lab Results - Last 24 Hours (Table)











  03/01/25 03/01/25 03/01/25 Range/Units





  11:37 16:18 19:55 


 


WBC     (3.8-10.6)  k/uL


 


RBC     (4.30-5.90)  m/uL


 


Hgb     (13.0-17.5)  gm/dL


 


Neutrophils #     (1.3-7.7)  k/uL


 


Lymphocytes #     (1.0-4.8)  k/uL


 


Sodium     (137-145)  mmol/L


 


Chloride     ()  mmol/L


 


BUN     (9-20)  mg/dL


 


Creatinine     (0.66-1.25)  mg/dL


 


Glucose     (74-99)  mg/dL


 


POC Glucose (mg/dL)  444 H  400 H  260 H  ()  mg/dL














  03/02/25 03/02/25 03/02/25 Range/Units





  05:54 07:14 07:14 


 


WBC   14.4 H   (3.8-10.6)  k/uL


 


RBC   4.22 L   (4.30-5.90)  m/uL


 


Hgb   12.7 L   (13.0-17.5)  gm/dL


 


Neutrophils #   13.3 H   (1.3-7.7)  k/uL


 


Lymphocytes #   0.3 L   (1.0-4.8)  k/uL


 


Sodium    133 L  (137-145)  mmol/L


 


Chloride    97 L  ()  mmol/L


 


BUN    53 H  (9-20)  mg/dL


 


Creatinine    1.31 H  (0.66-1.25)  mg/dL


 


Glucose    238 H  (74-99)  mg/dL


 


POC Glucose (mg/dL)  246 H    ()  mg/dL








                      Microbiology - Last 24 Hours (Table)











 02/24/25 00:46 Blood Culture - Final





 Blood 














Assessment and Plan


Assessment: 


#HERNAN secondary to ATN secondary to CRS; Renal function improving. 


#CKD stage 3a, baseline creatinine 1.6-1.7. 


#Hyponatremia secondary to hypervolemia and acute respiratory infection, stable.


#Sepsis secondary to influenza type a pneumonia, s/p Tamiflu


#Acute hypoxic respiratory failure secondary to above


#HFrEF w/EF 45 to 50%


#Type II NSTEMI





Plan: 


-Continue with diuresis, 40 mg IV Lasix once daily.


-Recheck labs in a.m.


-Maintain fluid restriction 1500 cc


-Bladder scan showed <300 cc, Valentine was not inserted


-Avoid nephrotoxic agents

## 2025-03-03 LAB
ANION GAP SERPL CALC-SCNC: 5 MMOL/L
BASOPHILS # BLD AUTO: 0 K/UL (ref 0–0.2)
BASOPHILS NFR BLD AUTO: 0 %
BUN SERPL-SCNC: 58 MG/DL (ref 9–20)
CALCIUM SPEC-MCNC: 8.8 MG/DL (ref 8.4–10.2)
CHLORIDE SERPL-SCNC: 98 MMOL/L (ref 98–107)
CO2 SERPL-SCNC: 30 MMOL/L (ref 22–30)
EOSINOPHIL # BLD AUTO: 0 K/UL (ref 0–0.7)
EOSINOPHIL NFR BLD AUTO: 0 %
ERYTHROCYTE [DISTWIDTH] IN BLOOD BY AUTOMATED COUNT: 4.23 M/UL (ref 4.3–5.9)
ERYTHROCYTE [DISTWIDTH] IN BLOOD: 12.7 % (ref 11.5–15.5)
GLUCOSE BLD-MCNC: 207 MG/DL (ref 70–110)
GLUCOSE BLD-MCNC: 235 MG/DL (ref 70–110)
GLUCOSE BLD-MCNC: 250 MG/DL (ref 70–110)
GLUCOSE BLD-MCNC: 280 MG/DL (ref 70–110)
GLUCOSE SERPL-MCNC: 186 MG/DL (ref 74–99)
HCT VFR BLD AUTO: 40 % (ref 39–53)
HGB BLD-MCNC: 12.7 GM/DL (ref 13–17.5)
LYMPHOCYTES # SPEC AUTO: 0.4 K/UL (ref 1–4.8)
LYMPHOCYTES NFR SPEC AUTO: 2 %
MAGNESIUM SPEC-SCNC: 2 MG/DL (ref 1.6–2.3)
MCH RBC QN AUTO: 30 PG (ref 25–35)
MCHC RBC AUTO-ENTMCNC: 31.7 G/DL (ref 31–37)
MCV RBC AUTO: 94.5 FL (ref 80–100)
MONOCYTES # BLD AUTO: 0.9 K/UL (ref 0–1)
MONOCYTES NFR BLD AUTO: 5 %
NEUTROPHILS # BLD AUTO: 15.2 K/UL (ref 1.3–7.7)
NEUTROPHILS NFR BLD AUTO: 91 %
PLATELET # BLD AUTO: 310 K/UL (ref 150–450)
POTASSIUM SERPL-SCNC: 3.8 MMOL/L (ref 3.5–5.1)
SODIUM SERPL-SCNC: 133 MMOL/L (ref 137–145)
WBC # BLD AUTO: 16.7 K/UL (ref 3.8–10.6)

## 2025-03-03 NOTE — P.PN
Subjective


Progress Note Date: 03/03/25





Patient is a 64-year-old male with a PMH of A-fib on Eliquis, COPD not on home 

oxygen, type II DM, hypertension, hyperlipidemia, CKD stage IIIa who presents to

the emergency room with complaints of shortness of breath and cough.  Patient 

reports that over the past 1 week he has been experiencing gradually worsening 

persistent cough.  Reports that the cough is nonproductive.  He also reports 

exertional dyspnea.  Denies experiencing fever or chills.  Also denies chest 

discomfort, nausea, vomiting, abdominal pain, diarrhea.





Chest x-ray in the emergency room revealed cardiomegaly along with findings of 

multifocal pneumonia.  EKG revealed sinus rhythm at 93 bpm with intraventricular

conduction delay as reviewed by me.  Laboratory evaluation did reveal influenza 

type a positive, troponin 0.102, proBNP 12,900, lactic acid 3.3, glucose 228, 

BUN 25, creatinine 1.59, and WBC count 12.7 with neutrophilic predominance.  

Patient was reportedly severely hypoxic and in respiratory distress upon EMS 

arrival at the scene and was started on BiPAP.








2/25/2025 patient seen and examined at bedside.  No acute events overnight.  No 

new complaints. WBC 7.2, hemoglobin 14.1, platelet count 1 68,000, PT 12.1, INR 

1.1, .7, sodium 133, potassium 4.2, chloride 96, bicarb 27, BUN 44, 

creatinine 2.12, glucose 97.  Troponin increased to 0.55 and has flattened.  

Chest x-ray done showed findings favor CHF exacerbation/fluid overload state.  

Areas of underlying acute infiltrate cannot be excluded.  No significant change 

from most recent x-ray.





2/26/25 patient seen and examined at bedside. no new complaints. Currently 

comfortable on BiPAP.  Labs today showed WBC 8.7, hemoglobin 12.8, MCV 93.7, 

platelet count 1 39,000, sodium 128, potassium 3.6, chloride 94, BUN 44, 

creatinine 1.99, calcium 7.8, glucose 113.  ABG showed pH of 7.42, pCO2 38, pO2 

74.  Urinalysis showed +1 protein, moderate ketones, rare bacteria, 9 hyaline 

casts and rare mucus, negative nitrites, negative leukocyte esterase.  Renal u

ltrasound done on 2/25 showed negative for hydronephrosis, stones, bladder 

anechoic





227/25 patient seen and examined at bedside.  Given Ativan IV for anxiety 

overnight.  No acute events. Still required BiPAP overnight.  WBC 7.6, 

hemoglobin 13.9, platelet count 1 67,000, sodium 133, chloride 97, BUN 47, 

creatinine 1.54, glucose 132, calcium 8.5.  Legionella Ag negative.  Chest x-ray

today showed continued worsening of bilateral diffuse acute infiltrates and/or 

edema, developing ARDS not included.





2/28/25 patient seen and examined at bedside. Still needed BiPAP overnight. No 

new complaints. No acute events overnight.  Labs today show WBC 6.7, hemoglobin 

13.6, platelet count 1 85,000, sodium 133, potassium 4, chloride 98, BUN 54, 

creatinine 1.44, glucose 178, A1c 6.2, calcium 8.9.  Chest x-ray independently 

interpreted showed continued bilateral diffuse acute infiltrates and/or edema.  

No significant change from prior x-ray.





3/1/2025 patient seen and examined at bedside.  No acute events overnight.  No 

new complaints.  Still requiring BiPAP overnight.  Labs today showed WBC 12.5, 

hemoglobin 12.8, MCV 93.3, platelet count 219,000, sodium 132, potassium 3.9, 

chloride 97, bicarb 27, BUN 58, creatinine 1.4, glucose 236, calcium 9, 

magnesium 2. Chest x-ray independently interpreted showed similar diffuse patchy

airspace opacities throughout the lungs likely representing acute infiltrates 

and/or edema.





3/2/2025 Patient seen and examined at bedside.  No acute events overnight.  Res

piratory function similar to yesterday





3/3/2025 patient seen and examined at bedside.  No acute events overnight.  

Still requiring BiPAP at night on FiO2 of 50%.  Labs today showed WBC 16.7, 

hemoglobin 12.7, MCV 94.5, platelet count 210,000, sodium 133, potassium 3.8, 

chloride 98, bicarb 38, BUN 58, creatinine 1.3, glucose 196, calcium 8.8, 

magnesium 2.  Chest x-ray today independently interpreted showed no changes in 

diffuse patchy airspace opacities throughout the lungs.





Review of systems:


Pertinent positives and negatives as discussed in HPI, a complete review of 

systems was performed and all other systems are negative.





Pertinent imaging and labs reviewed.





Physical examination:


Vital signs reviewed


General: non toxic, no distress, appears at stated age, ill-appearing, HFNC


Derm: no unusual rashes/lesions, warm


Head: atraumatic, normocephalic, symmetric


Eyes: EOMI, anicteric sclera, pupils equal round reactive to light


ENT: Nose and ears atraumatic


Neck: No cervical lymphadenopathy, trachea midline, supple


Mouth: no lip lesion, mucus membranes moist


Cardiovascular: S1S2 reg, no murmur


Lungs: decreased breath sounds in all lung fields, bibasilar coarse rales, no 

rhonchi, no rales, no accessory muscle use


Abdominal: soft,  nontender to palpation, no guarding


Ext: muscle strength 5 out of 5 in all 4 extremities grossly, no gross muscle 

atrophy, no contractures,  positive dorsalis pedis pulse bilateral, no edema


Neuro:  CN II-XI grossly intact, no gross focal neuro deficits


Psych: Alert and oriented x3, appropriate affect and mood





Assessment/Plan: 64-year-old male with history of COPD, A-fib on Eliquis and CKD

stage III here for further management of cardiorenal syndrome and sepsis 

secondary to pneumonia now BiPAP dependent.





#. Cardiorenal syndrome


#. Heart failure with reduced ejection fraction EF of 45 to 50% (2/2025), not on

GDMT


#. Hyponatremia secondary to above


#. Acute kidney injury on CKD, improving


-Lasix 40 mg IV daily y by nephrology


-Patient euvolemic today.  Continue to monitor volume status.


-Strict ENE's


-Daily weights


-Fluid restriction


-Monitor BMP for electrolytes and renal function


-Cardiology consulted


-Metoprolol 25 mg daily.  Initiate GDMT on discharge





#. Sepsis secondary to influenza pneumonia, unable to rule out bacterial 

pneumonia


#. Acute hypoxic respiratory failure, secondary to above


#. Severe ARDS


-P/F ratio 92


-Chest x-ray today independently interpreted showed no changes in diffuse patchy

airspace opacities throughout the lungs.


-Procalcitonin normal at 0.33


-Legionella urine antigen neagtive


-Supportive oxygen as needed


-Discontinued Rocephin 2 g IVPB, completed course of antibiotics


-Finished Tamiflu p.o. on 3/2


-Hydrocortisone 50mg IV every 6 hours discontinued by pulmonology


-Solumedrol 60mg IV every 6 hours per pulmonology


-Blood culture negative


-Monitor CBC





#.  NSTEMI, type II


-EKG revealed sinus rhythm at 93 bpm with intraventricular conduction delay on 

admission


-Patient has no chest pain


-Troponins flattened at 0.5


-IV heparin for 48 hours.  Now discontinued


-Cardiology initiated atorvastatin 40 mg daily





#. Lactic acidosis, resovled








Chronic conditions: 


#.  A-fib


#.  COPD


#.  Type II DM


#.  Hypertension


#.  Hyperlipidemia


-Lisinopril held due to HERNAN


-Continue Eliquis 5 mg twice daily, amiodarone 200 mg p.o. daily, metoprolol 25 

mg daily


-Hemoglobin A1c 6.2


-Glargine 42 units units nightly


-Initiate lispro 14 units SQ ACHS for meal coverage


-Not on home diabetes medications 


-Glucose Accu-Cheks ACHS 


-Continue insulin sliding scale ACHS


-Monitor for hypoglycemia





F: Restrict fluids due to 1.5 liters





E: Monitor electrolytes particularly sodium and potassium





N: Heart healthy diet





A: Can ambulate with assistance








DVT prophylaxis: Eliquis 5 mg p.o. daily











Paola Boyd MD


PGY-1/Intern





Dictation was produced using dragon dictation software. please excuse any 

grammatical, word or spelling errors.








I have seen and evaluated the patient today.  Discussed with the resident and 

agree with the residents finding and plan as documented in the resident's note. 

Changes highlighted in blue font.





Objective





- Vital Signs


Vital signs: 


                                   Vital Signs











Temp  97.9 F   03/03/25 04:00


 


Pulse  59 L  03/03/25 04:00


 


Resp  21   03/03/25 04:00


 


BP  147/66   03/03/25 04:00


 


Pulse Ox  98   03/03/25 08:30


 


FiO2  50   03/03/25 04:00








                                 Intake & Output











 03/02/25 03/03/25 03/03/25





 18:59 06:59 18:59


 


Intake Total 540 20 


 


Output Total 300 750 


 


Balance 240 -730 


 


Weight  76 kg 


 


Intake:   


 


  IV  20 


 


    Invasive Line 3  20 


 


  Oral 540  


 


Output:   


 


  Urine 300 750 


 


Other:   


 


  Voiding Method Bedside Commode Bedside Commode 





 Urinal Urinal 


 


  # Voids 1  














- Labs


CBC & Chem 7: 


                                 03/03/25 07:00





                                 03/03/25 07:00


Labs: 


                  Abnormal Lab Results - Last 24 Hours (Table)











  03/02/25 03/02/25 03/02/25 Range/Units





  07:14 07:14 11:36 


 


WBC  14.4 H    (3.8-10.6)  k/uL


 


RBC  4.22 L    (4.30-5.90)  m/uL


 


Hgb  12.7 L    (13.0-17.5)  gm/dL


 


Neutrophils #  13.3 H    (1.3-7.7)  k/uL


 


Lymphocytes #  0.3 L    (1.0-4.8)  k/uL


 


Sodium   133 L   (137-145)  mmol/L


 


Chloride   97 L   ()  mmol/L


 


BUN   53 H   (9-20)  mg/dL


 


Creatinine   1.31 H   (0.66-1.25)  mg/dL


 


Glucose   238 H   (74-99)  mg/dL


 


POC Glucose (mg/dL)    315 H  ()  mg/dL














  03/02/25 03/02/25 03/03/25 Range/Units





  16:45 20:01 05:56 


 


WBC     (3.8-10.6)  k/uL


 


RBC     (4.30-5.90)  m/uL


 


Hgb     (13.0-17.5)  gm/dL


 


Neutrophils #     (1.3-7.7)  k/uL


 


Lymphocytes #     (1.0-4.8)  k/uL


 


Sodium     (137-145)  mmol/L


 


Chloride     ()  mmol/L


 


BUN     (9-20)  mg/dL


 


Creatinine     (0.66-1.25)  mg/dL


 


Glucose     (74-99)  mg/dL


 


POC Glucose (mg/dL)  304 H  231 H  207 H  ()  mg/dL














  03/03/25 03/03/25 Range/Units





  07:00 07:00 


 


WBC   16.7 H  (3.8-10.6)  k/uL


 


RBC   4.23 L  (4.30-5.90)  m/uL


 


Hgb   12.7 L  (13.0-17.5)  gm/dL


 


Neutrophils #   15.2 H  (1.3-7.7)  k/uL


 


Lymphocytes #   0.4 L  (1.0-4.8)  k/uL


 


Sodium  133 L   (137-145)  mmol/L


 


Chloride    ()  mmol/L


 


BUN  58 H   (9-20)  mg/dL


 


Creatinine  1.30 H   (0.66-1.25)  mg/dL


 


Glucose  186 H   (74-99)  mg/dL


 


POC Glucose (mg/dL)    ()  mg/dL

## 2025-03-03 NOTE — XR
EXAMINATION TYPE: XR chest 1V portable

 

DATE OF EXAM: 3/3/2025 7:23 AM

 

COMPARISON: 3/2/2025 

 

CLINICAL INDICATION: Male, 64 years old with history of ARDS, 

 

TECHNIQUE:  XR chest 1V portable views of the chest are obtained.

 

FINDINGS:

Demonstrated are scattered senescent parenchymal change.  

 

Diffuse patchy airspace disease throughout both lung fields is unchanged. Correlate for pneumonia bradley
yareli acute pulmonary edema versus ARDS.

 

The heart is stable.

 

Hilar and mediastinal structures are within normal limits.  

 

Degenerative changes are seen of the dorsal spine. 

 

IMPRESSION: 

1.  Stable chest.

 

[facility], Workstation: [id number], 3/3/2025 7:32 AM

## 2025-03-03 NOTE — P.PN
Subjective


Progress Note Date: 03/03/25





This is a 64-year-old male patient with chronic and ongoing tobacco dependence, 

atrial fibrillation, diabetes mellitus, hypertension, hyperlipidemia who has a 1

week history of increasing shortness of breath, cough congestion fever and 

chills.  He came into the emergency room last evening with severe shortness of 

breath requiring BiPAP support which was 14/5 and 50% FiO2.  Chest x-ray 

revealed cardiomegaly and airspace disease most notably in the left mid lower 

lung zone suggestive of multifocal pneumonia and possible fluid volume overload.

 White count 10.5.  Hemoglobin 14.3.  Platelets 156.  Sodium 135.  Potassium 

4.3.  Bicarb 27.  BUN 29.  Creatinine 1.86.  Glucose 174.  Troponins 0.327, 

0.550, 0.5-2.  He is initiated on a heparin drip.  proBNP was 13,000.  

Procalcitonin 2.38.  He did test positive for influenza A.  Initiated on 

Tamiflu.  Initiated on ceftriaxone.  He is seen today in consultation in the 

emergency department.  He is currently sitting up on the stretcher.  Awake and 

alert.  He is on 6 L high flow nasal cannula.  He is breathing a bit easier 

today compared to yesterday.  Still with a loose nonproductive cough.  He did 

have a Tmax of 102.3.  Current temperature 99.1.  He is hemodynamically stable.





The patient is seen today February 25, 2025 in follow-up on the regular medical 

floor.  He is awake and alert in no acute distress.  Doing slightly better today

compared to yesterday.  He is still requiring 10 L high flow nasal cannula.  

Chest x-ray continues to show evidence of fluid volume overload/CHF.  

Echocardiogram reveals mildly impaired left ventricular systolic function with 

ejection fraction of 45 to 50%.  Moderately reduced global LV function.  Blood 

culture is pending.  White count 7.2.  Hemoglobin 14.1.  Platelets 168.  Sodium 

133. Potassium 4.2.  Bicarb 27.  BUN 44.  Creatinine 2.12.  Stool for occult 

blood was negative.  Since.  Anticoagulated with Eliquis.  Antibiotics in the 

form of ceftriaxone and azithromycin.  He is continued on Tamiflu.  Lasix 

discontinued per cardiology.





The patient is seen today February 26, 2025 in follow-up on the regular medical 

floor.  He is currently on BiPAP 14/5 and 80% FiO2.  He was trialed on 12 L high

flow earlier this morning with O2 saturations in the 70s and 80s.  Arterial 

blood gases on the 80% FiO2 revealed a PaO2 of 74, pCO2 of 38 and a pH of 7.42. 

A chest x-ray revealed similar findings of persistent cardiomegaly with 

bilateral multifocal acute infiltrates and/or edema.  He received Lasix 40 mg 

IVP x 1.  Blood culture reveals no growth to date.  White count 8.7.  Hemoglobin

12.8.  Platelets 139.  Sodium 128.  Potassium 3.6.  Bicarb 29.  BUN 45.  

Creatinine 1.99.  He is continued on DuoNeb inhalations.  Continued on Tamiflu. 

Initiated on Solu-Cortef.  Remains on antibiotics in the form of ceftriaxone and

azithromycin.  Anticoagulated with Eliquis.  Currently on a 1500 mL fluid 

restriction.  Nephrology is following.  ACE inhibitor remains on hold.





The patient is seen today February 27, 2025 in follow-up on the regular medical 

floor.  He is currently sitting up in bed.  Still requiring BiPAP support 

currently 14/5 and 60% FiO2.  He remains on DuoNeb and elations, Solu-Cortef, 

Tamiflu.  Anticoagulated with Eliquis.  Antibiotics in the form of ceftriaxone. 

Blood culture reveals no growth.  White count 7.6.  Hemoglobin 13.3.  Platelets 

167.  Sodium 133.  Potassium 3.9.  Bicarb 28.  BUN 47.  Creatinine 1.54.  

Glucose 132.  Chest x-ray continues to show worsening bilateral diffuse acute 

infiltrates and/or edema.  Developing ARDS is not excluded.  He is given Lasix 

40 mg IVP today.  Currently in a negative balance.





The patient is seen today February 28, 2025 in follow-up on the regular medical 

floor.  Resting in bed.  Currently afebrile.  Hemodynamically stable.  He contin

ues to require BiPAP support 10/5 and 50% FiO2.  He has been tolerating some 

time off on 15 L high flow nasal cannula with O2 saturations in the upper 80s.  

Blood culture revealed no growth.  White count 6.7.  Hemoglobin 13.6.  Platelets

185.  Sodium 133.  Potassium 4.0.  Bicarb 26.  BUN 54.  Creatinine 1.44.  

Glucose 178.  He is continued on DuoNeb inhalations, Pulmicort and Perforomist 

inhalations, Solu-Medrol.  Anticoagulated with Eliquis.  Continued on Tamiflu.  

Completed a course of ceftriaxone.  Received Lasix 40 mg IVP x 1 again today.  

Remains in a negative balance.  X-ray continues to show bilateral diffuse acute 

infiltrates and/or edema.  ARDS is not excluded.  No significant change.





The patient is seen today March 1, 2025 in follow-up on the selective care unit.

 He is awake and alert in no acute distress.  He is sitting up in bed.  He is 

currently on 15 L high flow nasal cannula.  He has been off the BiPAP since 

earlier this morning which was set at 14/5 and 50% FiO2.  He has been slow to 

progress.  He did receive Lasix 40 mg IVP x 1 again today.  Blood culture 

revealed no growth.  White count 12.5.  Hemoglobin 12.8.  Platelets 219.  Sodium

132.  Potassium 3.9.  Bicarb 27.  BUN 58.  Creatinine 1.40.  Glucose 236.  He 

remains on DuoNeb inhalations, Pulmicort and performance and elations, IV Solu-

Medrol.  He is anticoagulated with Eliquis.  Remains on antibiotics in the form 

of ceftriaxone.  Chest x-ray continues to show similar diffuse patchy airspace 

opacities throughout the lungs likely acute infiltrates and/or edema.  

Developing ARDS is not excluded.





The patient is seen today March 2, 2025 in follow-up on the selective care unit.

 He is awake and alert in no acute distress.  Sitting up in bed.  Maintaining O2

saturations in the 90s on 12 L high flow nasal cannula.  He did wear BiPAP last 

night 14/5 and 50% FiO2.  He has been slow to progress but is feeling better 

today compared to yesterday.  Chest x-ray shows similar diffuse patchy airspace 

opacities representing acute infiltrates versus edema versus ARDS.  Remains on 

IV diuretics.  Currently in a negative balance. White count 14.4.  Hemoglobin 

12.7.  Platelets 242.  Sodium 133.  Potassium 3.9.  Bicarb 27.  BUN 53.  

Creatinine 1.31.  Glucose 238.  He remains on DuoNeb inhalations, Pulmicort and 

Perforomist inhalations, IV Solu-Medrol.  He is anticoagulated with Eliquis.  

Remains on ceftriaxone. 





The patient is seen today March 3, 2025 in follow-up on the selective care unit.

 He is awake.  Sitting up in bed.  Maintaining good O2 saturations in the 90s 

now on 10 L high flow nasal cannula.  Did wear the BiPAP from midnight to about 

4 AM.  Settings are 14/5 and 50% FiO2.  Procalcitonin 0.33.  He remains on 

DuoNeb inhalations, Pulmicort and Perforomist inhalations, Solu-Medrol.  Remains

on IV diuretics.  Anticoagulated with Eliquis.  Currently in a -500 mL balance.





Objective





- Vital Signs


Vital signs: 


                                   Vital Signs











Temp  97.9 F   03/03/25 04:00


 


Pulse  64   03/03/25 11:56


 


Resp  21   03/03/25 04:00


 


BP  147/66   03/03/25 04:00


 


Pulse Ox  98   03/03/25 08:30


 


FiO2  50   03/03/25 04:00








                                 Intake & Output











 03/02/25 03/03/25 03/03/25





 18:59 06:59 18:59


 


Intake Total 540 20 240


 


Output Total 300 750 500


 


Balance 240 -730 -260


 


Weight  76 kg 


 


Intake:   


 


  IV  20 


 


    Invasive Line 3  20 


 


  Oral 540  240


 


Output:   


 


  Urine 300 750 500


 


Other:   


 


  Voiding Method Bedside Commode Bedside Commode 





 Urinal Urinal 


 


  # Voids 1  1














- Exam





GENERAL EXAM: Alert, 64-year-old male, on 10 L high flow nasal cannula, fairly 

comfortable in no apparent distress.


HEAD: Normocephalic.


EYES: Normal reaction of pupils, equal size.


NOSE: Clear with pink turbinates.


THROAT: No erythema or exudates.


NECK: No masses, no JVD.


CHEST: No chest wall deformity.


LUNGS: Equal air entry with crackles in the bilateral bases, diminished.


CVS: S1 and S2 normal with no audible murmur, regular rhythm.


ABDOMEN: No hepatosplenomegaly, normal bowel sounds, no guarding or rigidity.


SPINE: No scoliosis or deformity


SKIN: No rashes


CENTRAL NERVOUS SYSTEM: No focal deficits, tone is normal in all 4 extremities.


EXTREMITIES: There is 1+ peripheral edema.  No clubbing, no cyanosis.  

Peripheral pulses are intact.





- Labs


CBC & Chem 7: 


                                 03/03/25 07:00





                                 03/03/25 07:00


Labs: 


                  Abnormal Lab Results - Last 24 Hours (Table)











  03/02/25 03/02/25 03/03/25 Range/Units





  16:45 20:01 05:56 


 


WBC     (3.8-10.6)  k/uL


 


RBC     (4.30-5.90)  m/uL


 


Hgb     (13.0-17.5)  gm/dL


 


Neutrophils #     (1.3-7.7)  k/uL


 


Lymphocytes #     (1.0-4.8)  k/uL


 


Sodium     (137-145)  mmol/L


 


BUN     (9-20)  mg/dL


 


Creatinine     (0.66-1.25)  mg/dL


 


Glucose     (74-99)  mg/dL


 


POC Glucose (mg/dL)  304 H  231 H  207 H  ()  mg/dL














  03/03/25 03/03/25 03/03/25 Range/Units





  07:00 07:00 11:34 


 


WBC   16.7 H   (3.8-10.6)  k/uL


 


RBC   4.23 L   (4.30-5.90)  m/uL


 


Hgb   12.7 L   (13.0-17.5)  gm/dL


 


Neutrophils #   15.2 H   (1.3-7.7)  k/uL


 


Lymphocytes #   0.4 L   (1.0-4.8)  k/uL


 


Sodium  133 L    (137-145)  mmol/L


 


BUN  58 H    (9-20)  mg/dL


 


Creatinine  1.30 H    (0.66-1.25)  mg/dL


 


Glucose  186 H    (74-99)  mg/dL


 


POC Glucose (mg/dL)    280 H  ()  mg/dL














Assessment and Plan


Assessment: 





Acute hypoxemic respiratory failure secondary to an acute exacerbation of 

chronic obstructive pulmonary disease complicated by influenza A and underlying 

pneumonia.  Procalcitonin 2.38.





Acute influenza A infection





Acute exacerbation of chronic systolic congestive heart failure with an ejection

fraction now 45-50%





Troponin leak possible non-ST segment elevation myocardial infarction secondary 

to above





Acute kidney injury





History of atrial fibrillation with previous cardioversion maintained on 

amiodarone and Eliquis





Chronic tobacco dependence with suspected underlying COPD





Hypertension





Hyperlipidemia





Diabetes mellitus, type II





Plan:





The patient was seen and evaluated


Labs and medications reviewed


Currently on 10 L high flow nasal cannula


Continue BiPAP support as needed


Continue to titrate down the FiO2 as tolerated


Lasix 40 mg IVP daily


Completed ceftriaxone


Continue bronchodilators, Solu-Medrol


Continue Tamiflu


Anticoagulated with Eliquis


Increase his activity as tolerated


We will continue to follow





I have personally seen and examined the patient, performed the documentation and

the assessment and plan as written.  Number of minutes spent on the visit: 10





Dictation was produced using Dragon dictation software.  Please excuse any 

grammatical, word or spelling errors.

## 2025-03-03 NOTE — P.PN
Subjective


Progress Note Date: 03/03/25


Patient seen for follow-up for HERNAN and hyponatremia.


Renal function and sodium level remained stable.  Remains on high flow nasal 

cannula.  Currently 9 L.


No active complaints.  Oral intake fair.








Objective





- Vital Signs


Vital signs: 


                                   Vital Signs











Temp  97.9 F   03/03/25 04:00


 


Pulse  70   03/03/25 08:59


 


Resp  21   03/03/25 04:00


 


BP  147/66   03/03/25 04:00


 


Pulse Ox  98   03/03/25 08:30


 


FiO2  50   03/03/25 04:00








                                 Intake & Output











 03/02/25 03/03/25 03/03/25





 18:59 06:59 18:59


 


Intake Total 540 20 240


 


Output Total 300 750 


 


Balance 240 -730 240


 


Weight  76 kg 


 


Intake:   


 


  IV  20 


 


    Invasive Line 3  20 


 


  Oral 540  240


 


Output:   


 


  Urine 300 750 


 


Other:   


 


  Voiding Method Bedside Commode Bedside Commode 





 Urinal Urinal 


 


  # Voids 1  














- Exam


Patient is awake, comfortable, no acute distress.


Heart: S1 and S2 heard


Lungs: Bilateral breath sounds are heard


Abdomen: Soft and nontender


Lower extremities: No edema


CNS: grossly intact








- Labs


CBC & Chem 7: 


                                 03/05/25 08:01





                                 03/05/25 08:01


Labs: 


                  Abnormal Lab Results - Last 24 Hours (Table)











  03/02/25 03/02/25 03/02/25 Range/Units





  11:36 16:45 20:01 


 


WBC     (3.8-10.6)  k/uL


 


RBC     (4.30-5.90)  m/uL


 


Hgb     (13.0-17.5)  gm/dL


 


Neutrophils #     (1.3-7.7)  k/uL


 


Lymphocytes #     (1.0-4.8)  k/uL


 


Sodium     (137-145)  mmol/L


 


BUN     (9-20)  mg/dL


 


Creatinine     (0.66-1.25)  mg/dL


 


Glucose     (74-99)  mg/dL


 


POC Glucose (mg/dL)  315 H  304 H  231 H  ()  mg/dL














  03/03/25 03/03/25 03/03/25 Range/Units





  05:56 07:00 07:00 


 


WBC    16.7 H  (3.8-10.6)  k/uL


 


RBC    4.23 L  (4.30-5.90)  m/uL


 


Hgb    12.7 L  (13.0-17.5)  gm/dL


 


Neutrophils #    15.2 H  (1.3-7.7)  k/uL


 


Lymphocytes #    0.4 L  (1.0-4.8)  k/uL


 


Sodium   133 L   (137-145)  mmol/L


 


BUN   58 H   (9-20)  mg/dL


 


Creatinine   1.30 H   (0.66-1.25)  mg/dL


 


Glucose   186 H   (74-99)  mg/dL


 


POC Glucose (mg/dL)  207 H    ()  mg/dL














Assessment and Plan


Assessment: 


# HERNAN secondary to ATN secondary to cardiorenal syndrome; renal function 

improving - currently stable. 


#Stage IIIa chronic kidney disease, baseline creatinine 1.6-1.7


#Hyponatremia secondary to hypervolemia and acute respiratory infection, stable


#Sepsis secondary to influenza type a pneumonia, s/p Tamiflu


#Acute hypoxic respiratory failure secondary to above


#HFrEF w/EF 45 to 50%


#Type II NSTEMI





Plan: 


-Continue with diuresis, 40 mg IV Lasix once daily


-Recheck labs in a.m.


-Maintain fluid restriction 1500 cc


-Bladder scan showed <300 cc, Valentine was not inserted


-Avoid nephrotoxic agents





Patient is seen and examined. Agree with resident's findings assessment and 

plan.

## 2025-03-04 LAB
ANION GAP SERPL CALC-SCNC: 8 MMOL/L
BASOPHILS # BLD AUTO: 0 K/UL (ref 0–0.2)
BASOPHILS NFR BLD AUTO: 0 %
BUN SERPL-SCNC: 60 MG/DL (ref 9–20)
CALCIUM SPEC-MCNC: 8.9 MG/DL (ref 8.4–10.2)
CHLORIDE SERPL-SCNC: 97 MMOL/L (ref 98–107)
CO2 SERPL-SCNC: 32 MMOL/L (ref 22–30)
EOSINOPHIL # BLD AUTO: 0 K/UL (ref 0–0.7)
EOSINOPHIL NFR BLD AUTO: 0 %
ERYTHROCYTE [DISTWIDTH] IN BLOOD BY AUTOMATED COUNT: 4.27 M/UL (ref 4.3–5.9)
ERYTHROCYTE [DISTWIDTH] IN BLOOD: 12.7 % (ref 11.5–15.5)
GLUCOSE BLD-MCNC: 103 MG/DL (ref 70–110)
GLUCOSE BLD-MCNC: 170 MG/DL (ref 70–110)
GLUCOSE BLD-MCNC: 202 MG/DL (ref 70–110)
GLUCOSE BLD-MCNC: 233 MG/DL (ref 70–110)
GLUCOSE SERPL-MCNC: 106 MG/DL (ref 74–99)
HCT VFR BLD AUTO: 40.8 % (ref 39–53)
HGB BLD-MCNC: 13 GM/DL (ref 13–17.5)
LYMPHOCYTES # SPEC AUTO: 0.4 K/UL (ref 1–4.8)
LYMPHOCYTES NFR SPEC AUTO: 2 %
MCH RBC QN AUTO: 30.5 PG (ref 25–35)
MCHC RBC AUTO-ENTMCNC: 31.9 G/DL (ref 31–37)
MCV RBC AUTO: 95.6 FL (ref 80–100)
MONOCYTES # BLD AUTO: 1 K/UL (ref 0–1)
MONOCYTES NFR BLD AUTO: 6 %
NEUTROPHILS # BLD AUTO: 16.5 K/UL (ref 1.3–7.7)
NEUTROPHILS NFR BLD AUTO: 91 %
PLATELET # BLD AUTO: 387 K/UL (ref 150–450)
POTASSIUM SERPL-SCNC: 4.3 MMOL/L (ref 3.5–5.1)
SODIUM SERPL-SCNC: 137 MMOL/L (ref 137–145)
WBC # BLD AUTO: 18.1 K/UL (ref 3.8–10.6)

## 2025-03-04 NOTE — P.PN
Subjective


Progress Note Date: 03/04/25





Patient is a 64-year-old male with a PMH of A-fib on Eliquis, COPD not on home 

oxygen, type II DM, hypertension, hyperlipidemia, CKD stage IIIa who presents to

the emergency room with complaints of shortness of breath and cough.  Patient 

reports that over the past 1 week he has been experiencing gradually worsening 

persistent cough.  Reports that the cough is nonproductive.  He also reports 

exertional dyspnea.  Denies experiencing fever or chills.  Also denies chest 

discomfort, nausea, vomiting, abdominal pain, diarrhea.





Chest x-ray in the emergency room revealed cardiomegaly along with findings of 

multifocal pneumonia.  EKG revealed sinus rhythm at 93 bpm with intraventricular

conduction delay as reviewed by me.  Laboratory evaluation did reveal influenza 

type a positive, troponin 0.102, proBNP 12,900, lactic acid 3.3, glucose 228, 

BUN 25, creatinine 1.59, and WBC count 12.7 with neutrophilic predominance.  

Patient was reportedly severely hypoxic and in respiratory distress upon EMS 

arrival at the scene and was started on BiPAP.








2/25/2025 patient seen and examined at bedside.  No acute events overnight.  No 

new complaints. WBC 7.2, hemoglobin 14.1, platelet count 1 68,000, PT 12.1, INR 

1.1, .7, sodium 133, potassium 4.2, chloride 96, bicarb 27, BUN 44, 

creatinine 2.12, glucose 97.  Troponin increased to 0.55 and has flattened.  

Chest x-ray done showed findings favor CHF exacerbation/fluid overload state.  

Areas of underlying acute infiltrate cannot be excluded.  No significant change 

from most recent x-ray.





2/26/25 patient seen and examined at bedside. no new complaints. Currently 

comfortable on BiPAP.  Labs today showed WBC 8.7, hemoglobin 12.8, MCV 93.7, 

platelet count 1 39,000, sodium 128, potassium 3.6, chloride 94, BUN 44, 

creatinine 1.99, calcium 7.8, glucose 113.  ABG showed pH of 7.42, pCO2 38, pO2 

74.  Urinalysis showed +1 protein, moderate ketones, rare bacteria, 9 hyaline 

casts and rare mucus, negative nitrites, negative leukocyte esterase.  Renal u

ltrasound done on 2/25 showed negative for hydronephrosis, stones, bladder 

anechoic





227/25 patient seen and examined at bedside.  Given Ativan IV for anxiety 

overnight.  No acute events. Still required BiPAP overnight.  WBC 7.6, 

hemoglobin 13.9, platelet count 1 67,000, sodium 133, chloride 97, BUN 47, 

creatinine 1.54, glucose 132, calcium 8.5.  Legionella Ag negative.  Chest x-ray

today showed continued worsening of bilateral diffuse acute infiltrates and/or 

edema, developing ARDS not included.





2/28/25 patient seen and examined at bedside. Still needed BiPAP overnight. No 

new complaints. No acute events overnight.  Labs today show WBC 6.7, hemoglobin 

13.6, platelet count 1 85,000, sodium 133, potassium 4, chloride 98, BUN 54, 

creatinine 1.44, glucose 178, A1c 6.2, calcium 8.9.  Chest x-ray independently 

interpreted showed continued bilateral diffuse acute infiltrates and/or edema.  

No significant change from prior x-ray.





3/1/2025 patient seen and examined at bedside.  No acute events overnight.  No 

new complaints.  Still requiring BiPAP overnight.  Labs today showed WBC 12.5, 

hemoglobin 12.8, MCV 93.3, platelet count 219,000, sodium 132, potassium 3.9, 

chloride 97, bicarb 27, BUN 58, creatinine 1.4, glucose 236, calcium 9, 

magnesium 2. Chest x-ray independently interpreted showed similar diffuse patchy

airspace opacities throughout the lungs likely representing acute infiltrates 

and/or edema.





3/2/2025 Patient seen and examined at bedside.  No acute events overnight.  Res

piratory function similar to yesterday





3/3/2025 patient seen and examined at bedside.  No acute events overnight.  

Still requiring BiPAP at night on FiO2 of 50%.  Labs today showed WBC 16.7, 

hemoglobin 12.7, MCV 94.5, platelet count 210,000, sodium 133, potassium 3.8, 

chloride 98, bicarb 38, BUN 58, creatinine 1.3, glucose 196, calcium 8.8, 

magnesium 2.  Chest x-ray today independently interpreted showed no changes in 

diffuse patchy airspace opacities throughout the lungs.





3/4/2025 patient seen and examined at bedside.  No acute events overnight.  Was 

able to tolerate oxygenation with high flow nasal cannula overnight.  Labs today

showed WBC 18.1, hemoglobin 13, platelet count 387,000, sodium 137, potassium 

4.3, chloride 97, bicarb 32, BUN 60, creatinine 1.31, glucose 106, calcium 8.9. 

Chest x-ray today reported improved aeration of the lungs with persistent 

multifocal airspace opacities. 





Review of systems:


Pertinent positives and negatives as discussed in HPI, a complete review of 

systems was performed and all other systems are negative.





Pertinent imaging and labs reviewed.





Physical examination:


Vital signs reviewed


General: non toxic, no distress, appears at stated age, ill-appearing, HFNC


Derm: no unusual rashes/lesions, warm


Head: atraumatic, normocephalic, symmetric


Eyes: EOMI, anicteric sclera, pupils equal round reactive to light


ENT: Nose and ears atraumatic


Neck: No cervical lymphadenopathy, trachea midline, supple


Mouth: no lip lesion, mucus membranes moist


Cardiovascular: S1S2 reg, no murmur


Lungs: decreased breath sounds in middle lung fields, bibasilar coarse rales, no

rhonchi, no rales, no accessory muscle use


Abdominal: soft,  nontender to palpation, no guarding


Ext: muscle strength 5 out of 5 in all 4 extremities grossly, no gross muscle 

atrophy, no contractures,  positive dorsalis pedis pulse bilateral, no edema


Neuro:  CN II-XI grossly intact, no gross focal neuro deficits


Psych: Alert and oriented x3, appropriate affect and mood





Assessment/Plan: 64-year-old male with history of COPD, A-fib on Eliquis and CKD

stage III here for further management of cardiorenal syndrome and sepsis seconda

ry to pneumonia now BiPAP dependent.





#. Cardiorenal syndrome


#. Heart failure with reduced ejection fraction EF of 45 to 50% (2/2025), not on

GDMT


#. Hyponatremia secondary to above


#. Acute kidney injury on CKD, improving


-Discontinue Lasix 40 mg IV. Reevaluate need tomorrow, discussed with nephrology

as well


-Patient euvolemic today.  Continue to monitor volume status.


-Strict ENE's


-Daily weights


-Fluid restriction


-Monitor BMP for electrolytes and renal function


-Cardiology consulted


-Metoprolol 25 mg daily.  Initiate GDMT on discharge





#. Sepsis secondary to influenza pneumonia, unable to rule out bacterial 

pneumonia


#. Acute hypoxic respiratory failure, secondary to above


#. Severe ARDS


-P/F ratio 92


-Chest x-ray today reported improved aeration of the lungs with persistent 

multifocal airspace opacities


-Procalcitonin normal at 0.33


-Legionella urine antigen neagtive


-Supportive oxygen as needed


-Discontinued Rocephin 2 g IVPB


-Finished Tamiflu p.o. on 3/2


-Hydrocortisone 50mg IV every 6 hours discontinued by pulmonology


-Solumedrol 60mg IV every 6 hours per pulmonology


-Blood culture negative


-Monitor CBC





#.  NSTEMI, type II


-EKG revealed sinus rhythm at 93 bpm with intraventricular conduction delay on 

admission


-Patient has no chest pain


-Troponins flattened at 0.5


-IV heparin for 48 hours.  Now discontinued


-Cardiology initiated atorvastatin 40 mg daily





#. Lactic acidosis, resovled








Chronic conditions: 


#.  A-fib


#.  COPD


#.  Type II DM


#.  Hypertension


#.  Hyperlipidemia


-Lisinopril held due to HERNAN


-Continue Eliquis 5 mg twice daily, amiodarone 200 mg p.o. daily, metoprolol 25 

mg daily


-Hemoglobin A1c 6.2


-Glargine 42 units units nightly


-Initiate lispro 14 units SQ ACHS for meal coverage


-Not on home diabetes medications 


-Glucose Accu-Cheks ACHS 


-Continue insulin sliding scale ACHS


-Monitor for hypoglycemia





F: Restrict fluids due to 1.5 liters





E: Monitor electrolytes particularly sodium and potassium





N: Heart healthy diet





A: Can ambulate with assistance








DVT prophylaxis: Eliquis 5 mg p.o. daily











Paola Boyd MD


PGY-1/Intern





Dictation was produced using dragon dictation software. please excuse any 

grammatical, word or spelling errors.











I have seen and evaluated the patient today.  Discussed with the resident and 

agree with the residents finding and plan as documented in the resident's note. 

Changes highlighted in blue font.





Objective





- Vital Signs


Vital signs: 


                                   Vital Signs











Temp  97.7 F   03/04/25 04:00


 


Pulse  58 L  03/04/25 04:00


 


Resp  22   03/04/25 04:00


 


BP  131/67   03/04/25 04:00


 


Pulse Ox  98   03/04/25 04:00


 


FiO2  50   03/04/25 00:32








                                 Intake & Output











 03/03/25 03/04/25 03/04/25





 18:59 06:59 18:59


 


Intake Total 720 257 


 


Output Total 500 875 


 


Balance 220 -618 


 


Weight 76 kg 76.5 kg 


 


Intake:   


 


  IV  20 


 


    Invasive Line 4  20 


 


  Oral 720 237 


 


Output:   


 


  Urine 500 875 


 


Other:   


 


  Voiding Method Bedside Commode Bedside Commode 





 Urinal Urinal 


 


  # Voids 1  














- Labs


CBC & Chem 7: 


                                 03/04/25 08:01





                                 03/04/25 08:01


Labs: 


                  Abnormal Lab Results - Last 24 Hours (Table)











  03/03/25 03/03/25 03/03/25 Range/Units





  07:00 07:00 11:34 


 


WBC   16.7 H   (3.8-10.6)  k/uL


 


RBC   4.23 L   (4.30-5.90)  m/uL


 


Hgb   12.7 L   (13.0-17.5)  gm/dL


 


Neutrophils #   15.2 H   (1.3-7.7)  k/uL


 


Lymphocytes #   0.4 L   (1.0-4.8)  k/uL


 


Sodium  133 L    (137-145)  mmol/L


 


BUN  58 H    (9-20)  mg/dL


 


Creatinine  1.30 H    (0.66-1.25)  mg/dL


 


Glucose  186 H    (74-99)  mg/dL


 


POC Glucose (mg/dL)    280 H  ()  mg/dL














  03/03/25 03/03/25 Range/Units





  16:23 20:00 


 


WBC    (3.8-10.6)  k/uL


 


RBC    (4.30-5.90)  m/uL


 


Hgb    (13.0-17.5)  gm/dL


 


Neutrophils #    (1.3-7.7)  k/uL


 


Lymphocytes #    (1.0-4.8)  k/uL


 


Sodium    (137-145)  mmol/L


 


BUN    (9-20)  mg/dL


 


Creatinine    (0.66-1.25)  mg/dL


 


Glucose    (74-99)  mg/dL


 


POC Glucose (mg/dL)  235 H  250 H  ()  mg/dL

## 2025-03-04 NOTE — P.PN
Subjective


Progress Note Date: 03/04/25


Principal diagnosis: 





Pneumonia, respiratory failure, CHF.





This is a 64-year-old male patient with chronic and ongoing tobacco dependence, 

atrial fibrillation, diabetes mellitus, hypertension, hyperlipidemia who has a 1

week history of increasing shortness of breath, cough congestion fever and 

chills.  He came into the emergency room last evening with severe shortness of 

breath requiring BiPAP support which was 14/5 and 50% FiO2.  Chest x-ray 

revealed cardiomegaly and airspace disease most notably in the left mid lower 

lung zone suggestive of multifocal pneumonia and possible fluid volume overload.

 White count 10.5.  Hemoglobin 14.3.  Platelets 156.  Sodium 135.  Potassium 

4.3.  Bicarb 27.  BUN 29.  Creatinine 1.86.  Glucose 174.  Troponins 0.327, 

0.550, 0.5-2.  He is initiated on a heparin drip.  proBNP was 13,000.  

Procalcitonin 2.38.  He did test positive for influenza A.  Initiated on 

Tamiflu.  Initiated on ceftriaxone.  He is seen today in consultation in the 

emergency department.  He is currently sitting up on the stretcher.  Awake and 

alert.  He is on 6 L high flow nasal cannula.  He is breathing a bit easier 

today compared to yesterday.  Still with a loose nonproductive cough.  He did 

have a Tmax of 102.3.  Current temperature 99.1.  He is hemodynamically stable.





The patient is seen today February 25, 2025 in follow-up on the regular medical 

floor.  He is awake and alert in no acute distress.  Doing slightly better today

compared to yesterday.  He is still requiring 10 L high flow nasal cannula.  

Chest x-ray continues to show evidence of fluid volume overload/CHF.  

Echocardiogram reveals mildly impaired left ventricular systolic function with 

ejection fraction of 45 to 50%.  Moderately reduced global LV function.  Blood 

culture is pending.  White count 7.2.  Hemoglobin 14.1.  Platelets 168.  Sodium 

133. Potassium 4.2.  Bicarb 27.  BUN 44.  Creatinine 2.12.  Stool for occult 

blood was negative.  Since.  Anticoagulated with Eliquis.  Antibiotics in the 

form of ceftriaxone and azithromycin.  He is continued on Tamiflu.  Lasix 

discontinued per cardiology.





The patient is seen today February 26, 2025 in follow-up on the regular medical 

floor.  He is currently on BiPAP 14/5 and 80% FiO2.  He was trialed on 12 L high

flow earlier this morning with O2 saturations in the 70s and 80s.  Arterial 

blood gases on the 80% FiO2 revealed a PaO2 of 74, pCO2 of 38 and a pH of 7.42. 

A chest x-ray revealed similar findings of persistent cardiomegaly with 

bilateral multifocal acute infiltrates and/or edema.  He received Lasix 40 mg 

IVP x 1.  Blood culture reveals no growth to date.  White count 8.7.  Hemoglobin

12.8.  Platelets 139.  Sodium 128.  Potassium 3.6.  Bicarb 29.  BUN 45.  

Creatinine 1.99.  He is continued on DuoNeb inhalations.  Continued on Tamiflu. 

Initiated on Solu-Cortef.  Remains on antibiotics in the form of ceftriaxone and

azithromycin.  Anticoagulated with Eliquis.  Currently on a 1500 mL fluid 

restriction.  Nephrology is following.  ACE inhibitor remains on hold.





The patient is seen today February 27, 2025 in follow-up on the regular medical 

floor.  He is currently sitting up in bed.  Still requiring BiPAP support 

currently 14/5 and 60% FiO2.  He remains on DuoNeb and elations, Solu-Cortef, 

Tamiflu.  Anticoagulated with Eliquis.  Antibiotics in the form of ceftriaxone. 

Blood culture reveals no growth.  White count 7.6.  Hemoglobin 13.3.  Platelets 

167.  Sodium 133.  Potassium 3.9.  Bicarb 28.  BUN 47.  Creatinine 1.54.  

Glucose 132.  Chest x-ray continues to show worsening bilateral diffuse acute 

infiltrates and/or edema.  Developing ARDS is not excluded.  He is given Lasix 

40 mg IVP today.  Currently in a negative balance.





The patient is seen today February 28, 2025 in follow-up on the regular medical 

floor.  Resting in bed.  Currently afebrile.  Hemodynamically stable.  He 

continues to require BiPAP support 10/5 and 50% FiO2.  He has been tolerating 

some time off on 15 L high flow nasal cannula with O2 saturations in the upper 

80s.  Blood culture revealed no growth.  White count 6.7.  Hemoglobin 13.6.  

Platelets 185.  Sodium 133.  Potassium 4.0.  Bicarb 26.  BUN 54.  Creatinine 

1.44.  Glucose 178.  He is continued on DuoNeb inhalations, Pulmicort and 

Perforomist inhalations, Solu-Medrol.  Anticoagulated with Eliquis.  Continued 

on Tamiflu.  Completed a course of ceftriaxone.  Received Lasix 40 mg IVP x 1 

again today.  Remains in a negative balance.  X-ray continues to show bilateral 

diffuse acute infiltrates and/or edema.  ARDS is not excluded.  No significant 

change.





The patient is seen today March 1, 2025 in follow-up on the selective care unit.

 He is awake and alert in no acute distress.  He is sitting up in bed.  He is 

currently on 15 L high flow nasal cannula.  He has been off the BiPAP since 

earlier this morning which was set at 14/5 and 50% FiO2.  He has been slow to 

progress.  He did receive Lasix 40 mg IVP x 1 again today.  Blood culture 

revealed no growth.  White count 12.5.  Hemoglobin 12.8.  Platelets 219.  Sodium

132.  Potassium 3.9.  Bicarb 27.  BUN 58.  Creatinine 1.40.  Glucose 236.  He 

remains on DuoNeb inhalations, Pulmicort and performance and elations, IV Solu-

Medrol.  He is anticoagulated with Eliquis.  Remains on antibiotics in the form 

of ceftriaxone.  Chest x-ray continues to show similar diffuse patchy airspace 

opacities throughout the lungs likely acute infiltrates and/or edema.  

Developing ARDS is not excluded.





The patient is seen today March 2, 2025 in follow-up on the selective care unit.

 He is awake and alert in no acute distress.  Sitting up in bed.  Maintaining O2

saturations in the 90s on 12 L high flow nasal cannula.  He did wear BiPAP last 

night 14/5 and 50% FiO2.  He has been slow to progress but is feeling better tod

ay compared to yesterday.  Chest x-ray shows similar diffuse patchy airspace 

opacities representing acute infiltrates versus edema versus ARDS.  Remains on 

IV diuretics.  Currently in a negative balance. White count 14.4.  Hemoglobin 

12.7.  Platelets 242.  Sodium 133.  Potassium 3.9.  Bicarb 27.  BUN 53.  

Creatinine 1.31.  Glucose 238.  He remains on DuoNeb inhalations, Pulmicort and 

Perforomist inhalations, IV Solu-Medrol.  He is anticoagulated with Eliquis.  

Remains on ceftriaxone. 





The patient is seen today March 3, 2025 in follow-up on the selective care unit.

 He is awake.  Sitting up in bed.  Maintaining good O2 saturations in the 90s 

now on 10 L high flow nasal cannula.  Did wear the BiPAP from midnight to about 

4 AM.  Settings are 14/5 and 50% FiO2.  Procalcitonin 0.33.  He remains on 

DuoNeb inhalations, Pulmicort and Perforomist inhalations, Solu-Medrol.  Remains

on IV diuretics.  Anticoagulated with Eliquis.  Currently in a -500 mL balance.





Progress note dated March 4, 2025.





64-year-old male admitted with a diagnosis of pneumonia, CHF, and acute 

respiratory failure.  He is currently on 6 L nasal cannula.  He does have BiPAP 

in the room, with settings of 14/5, 50%.  He is not receiving any IV fluids.  He

is awake and alert.  No distress.  Current labs include a white count 18.1, 

hemoglobin 13, hematocrit 40.8, and platelet count of 387,000.  Sodium 137, 

potassium 4.3, chlorides 97, CO2 32, BUN 60, and creatinine 1.31.  Glucose is 

170.  Calcium 8.9.  Chest x-ray shows improved aeration of the lungs 

bilaterally.





Objective





- Vital Signs


Vital signs: 


                                   Vital Signs











Temp  97.7 F   03/04/25 07:47


 


Pulse  64   03/04/25 12:02


 


Resp  19   03/04/25 11:10


 


BP  128/61   03/04/25 11:10


 


Pulse Ox  98   03/04/25 11:52


 


FiO2  50   03/04/25 00:32








                                 Intake & Output











 03/03/25 03/04/25 03/04/25





 18:59 06:59 18:59


 


Intake Total 720 257 190


 


Output Total 500 875 300


 


Balance 220 -618 -110


 


Weight 76 kg 76.5 kg 


 


Intake:   


 


  IV  20 10


 


    Invasive Line 4  20 10


 


  Oral 720 237 180


 


Output:   


 


  Urine 500 875 300


 


Other:   


 


  Voiding Method Bedside Commode Bedside Commode Bedside Commode





 Urinal Urinal Urinal


 


  # Voids 1  














- Exam





No acute distress, oriented 3.  Currently on nasal O2 at 6 L.





HEENT examination is grossly unremarkable.  Mucous membranes are moist.  No oral

lesions.





Neck supple.  Full range of motion.  No adenopathy thyromegaly or neck vein 

distention.





Cardiovascular examination reveals regular rhythm rate.  S1-S2 normal.  No S3 or

S4.  No discernible murmur noted.





Lungs reveal bibasilar crackles.  No wheezes.  No rhonchi.  Breath sounds are 

equal bilaterally.





Abdomen soft bowel sounds are heard.  No masses or tenderness.





Extremities are intact.  No cyanosis or clubbing.  There is 1+ edema.





Skin is without rash or lesion.





Neurologic examination is brief but nonfocal.





- Labs


CBC & Chem 7: 


                                 03/04/25 08:01





                                 03/04/25 08:01


Labs: 


                  Abnormal Lab Results - Last 24 Hours (Table)











  03/03/25 03/03/25 03/04/25 Range/Units





  16:23 20:00 08:01 


 


WBC    18.1 H  (3.8-10.6)  k/uL


 


RBC    4.27 L  (4.30-5.90)  m/uL


 


Neutrophils #    16.5 H  (1.3-7.7)  k/uL


 


Lymphocytes #    0.4 L  (1.0-4.8)  k/uL


 


Chloride     ()  mmol/L


 


Carbon Dioxide     (22-30)  mmol/L


 


BUN     (9-20)  mg/dL


 


Creatinine     (0.66-1.25)  mg/dL


 


Glucose     (74-99)  mg/dL


 


POC Glucose (mg/dL)  235 H  250 H   ()  mg/dL














  03/04/25 03/04/25 Range/Units





  08:01 12:01 


 


WBC    (3.8-10.6)  k/uL


 


RBC    (4.30-5.90)  m/uL


 


Neutrophils #    (1.3-7.7)  k/uL


 


Lymphocytes #    (1.0-4.8)  k/uL


 


Chloride  97 L   ()  mmol/L


 


Carbon Dioxide  32 H   (22-30)  mmol/L


 


BUN  60 H   (9-20)  mg/dL


 


Creatinine  1.31 H   (0.66-1.25)  mg/dL


 


Glucose  106 H   (74-99)  mg/dL


 


POC Glucose (mg/dL)   170 H  ()  mg/dL














Assessment and Plan


Assessment: 





Acute hypoxemic respiratory failure secondary to an acute exacerbation of 

chronic obstructive pulmonary disease complicated by influenza A and underlying 

pneumonia.  





Acute influenza A infection.





Acute exacerbation of chronic systolic congestive heart failure with an ejection

fraction now 45-50%.





Troponin leak possible non-ST segment elevation myocardial infarction.





Acute kidney injury.





History of atrial fibrillation with previous cardioversion.





Chronic tobacco dependence with suspected underlying COPD.





Hypertension.





Hyperlipidemia.





Diabetes mellitus, type II.





Plan: 





Plan dated March 4, 2025.





The patient is seen today in room 378.  He is currently on 6 L.  Chest x-ray is 

clearly improved.  We will continue to follow make recommendations where 

appropriate.  All labs, x-rays, medications are reviewed.  The patient is not 

receiving any IV fluids.  Yesterday, he was on 10 L high flow.  BiPAP is in the 

room, with settings of 14/5 and 50%.  We will continue to follow make 

recommendations where appropriate.  The patients overall prognosis remains 

guarded.  Dictation was produced using Dragon dictation software.  Please excuse

any grammatical, word or spelling errors.








Time with Patient: Less than 30

## 2025-03-04 NOTE — P.PN
Subjective


Progress Note Date: 03/04/25


Patient seen for follow-up for HERNAN and hyponatremia.


Renal function and sodium level remained stable.  Remains on high flow nasal 

cannula.  Currently 6 L.


No active complaints.  Oral intake fair.








Objective





- Vital Signs


Vital signs: 


                                   Vital Signs











Temp  97.7 F   03/04/25 07:47


 


Pulse  68   03/04/25 08:49


 


Resp  18   03/04/25 07:54


 


BP  135/74   03/04/25 07:47


 


Pulse Ox  98   03/04/25 08:29


 


FiO2  50   03/04/25 00:32








                                 Intake & Output











 03/03/25 03/04/25 03/04/25





 18:59 06:59 18:59


 


Intake Total 720 257 190


 


Output Total 500 875 


 


Balance 220 -618 190


 


Weight 76 kg 76.5 kg 


 


Intake:   


 


  IV  20 10


 


    Invasive Line 4  20 10


 


  Oral 720 237 180


 


Output:   


 


  Urine 500 875 


 


Other:   


 


  Voiding Method Bedside Commode Bedside Commode Bedside Commode





 Urinal Urinal Urinal


 


  # Voids 1  














- Exam


Patient is awake, comfortable, no acute distress.


Heart: S1 and S2 heard


Lungs: Bilateral breath sounds are heard


Abdomen: Soft and nontender


Lower extremities: No edema


CNS: grossly intact








- Labs


CBC & Chem 7: 


                                 03/05/25 08:01





                                 03/05/25 08:01


Labs: 


                  Abnormal Lab Results - Last 24 Hours (Table)











  03/03/25 03/03/25 03/03/25 Range/Units





  11:34 16:23 20:00 


 


WBC     (3.8-10.6)  k/uL


 


RBC     (4.30-5.90)  m/uL


 


Neutrophils #     (1.3-7.7)  k/uL


 


Lymphocytes #     (1.0-4.8)  k/uL


 


Chloride     ()  mmol/L


 


Carbon Dioxide     (22-30)  mmol/L


 


BUN     (9-20)  mg/dL


 


Creatinine     (0.66-1.25)  mg/dL


 


Glucose     (74-99)  mg/dL


 


POC Glucose (mg/dL)  280 H  235 H  250 H  ()  mg/dL














  03/04/25 03/04/25 Range/Units





  08:01 08:01 


 


WBC  18.1 H   (3.8-10.6)  k/uL


 


RBC  4.27 L   (4.30-5.90)  m/uL


 


Neutrophils #  16.5 H   (1.3-7.7)  k/uL


 


Lymphocytes #  0.4 L   (1.0-4.8)  k/uL


 


Chloride   97 L  ()  mmol/L


 


Carbon Dioxide   32 H  (22-30)  mmol/L


 


BUN   60 H  (9-20)  mg/dL


 


Creatinine   1.31 H  (0.66-1.25)  mg/dL


 


Glucose   106 H  (74-99)  mg/dL


 


POC Glucose (mg/dL)    ()  mg/dL














Assessment and Plan


Assessment: 


#HERNAN secondary to ATN secondary to cardiorenal syndrome; renal function 

improving - currently stable. 


#Stage IIIa chronic kidney disease, baseline creatinine 1.6-1.7


#Hyponatremia secondary to hypervolemia and acute respiratory infection - 

resolved


#Sepsis secondary to influenza type a pneumonia, s/p Tamiflu


#Acute hypoxic respiratory failure secondary to above


#HFrEF w/EF 45 to 50%


#Type II NSTEMI





Plan: 


-Discontinue daily 40 mg IV Lasix.  Patient appears to be euvolemic at this 

time.  With some evidence of contraction alkalosis.


-Recheck labs in a.m.


-Maintain fluid restriction 1500 cc


-Avoid nephrotoxic agents.


Can likely resume home dose of diuretics in 1-2 days.


Patient is seen and examined. Agree with resient's findings ,assessment and 

plan.

## 2025-03-04 NOTE — XR
EXAMINATION TYPE: XR chest 1V portable

 

DATE OF EXAM: 3/4/2025 7:06 AM

 

COMPARISON: Chest radiographs from 3/3/2025

 

CLINICAL INDICATION: Male, 64 years old with history of ARDS;

 

TECHNIQUE: XR chest 1V portable Frontal view of the chest.

 

FINDINGS: 

Lungs/Pleura: Improved aeration of lungs on today's exam with persistent airspace opacities scattered
 throughout the lungs. No evidence of pneumothorax or large pleural effusion. 

Pulmonary vascularity: Unremarkable.

Heart/mediastinum: Cardiomediastinal silhouette is unremarkable.  Atherosclerotic calcifications are 
seen in the aorta.

Musculoskeletal: No acute osseous pathology.

 

IMPRESSION: 

Improved aeration of the lungs with persistent multifocal airspace opacities.

 

[facility], Workstation: [id number], 3/4/2025 7:18 AM

## 2025-03-05 LAB
ANION GAP SERPL CALC-SCNC: 5 MMOL/L
BASOPHILS # BLD AUTO: 0 K/UL (ref 0–0.2)
BASOPHILS NFR BLD AUTO: 0 %
BUN SERPL-SCNC: 63 MG/DL (ref 9–20)
CALCIUM SPEC-MCNC: 8.8 MG/DL (ref 8.4–10.2)
CHLORIDE SERPL-SCNC: 99 MMOL/L (ref 98–107)
CO2 SERPL-SCNC: 29 MMOL/L (ref 22–30)
EOSINOPHIL # BLD AUTO: 0 K/UL (ref 0–0.7)
EOSINOPHIL NFR BLD AUTO: 0 %
ERYTHROCYTE [DISTWIDTH] IN BLOOD BY AUTOMATED COUNT: 4.12 M/UL (ref 4.3–5.9)
ERYTHROCYTE [DISTWIDTH] IN BLOOD: 12.6 % (ref 11.5–15.5)
GLUCOSE BLD-MCNC: 157 MG/DL (ref 70–110)
GLUCOSE BLD-MCNC: 160 MG/DL (ref 70–110)
GLUCOSE BLD-MCNC: 273 MG/DL (ref 70–110)
GLUCOSE BLD-MCNC: 316 MG/DL (ref 70–110)
GLUCOSE SERPL-MCNC: 130 MG/DL (ref 74–99)
HCT VFR BLD AUTO: 39 % (ref 39–53)
HGB BLD-MCNC: 12.4 GM/DL (ref 13–17.5)
LYMPHOCYTES # SPEC AUTO: 0.3 K/UL (ref 1–4.8)
LYMPHOCYTES NFR SPEC AUTO: 2 %
MCH RBC QN AUTO: 30.2 PG (ref 25–35)
MCHC RBC AUTO-ENTMCNC: 31.9 G/DL (ref 31–37)
MCV RBC AUTO: 94.7 FL (ref 80–100)
MONOCYTES # BLD AUTO: 0.8 K/UL (ref 0–1)
MONOCYTES NFR BLD AUTO: 4 %
NEUTROPHILS # BLD AUTO: 18.4 K/UL (ref 1.3–7.7)
NEUTROPHILS NFR BLD AUTO: 94 %
PLATELET # BLD AUTO: 399 K/UL (ref 150–450)
POTASSIUM SERPL-SCNC: 4.3 MMOL/L (ref 3.5–5.1)
SODIUM SERPL-SCNC: 133 MMOL/L (ref 137–145)
WBC # BLD AUTO: 19.6 K/UL (ref 3.8–10.6)

## 2025-03-05 RX ADMIN — BUDESONIDE AND FORMOTEROL FUMARATE DIHYDRATE SCH PUFF: 160; 4.5 AEROSOL RESPIRATORY (INHALATION) at 20:48

## 2025-03-05 NOTE — P.PN
Subjective


Progress Note Date: 03/05/25





This is a 64-year-old male patient with chronic and ongoing tobacco dependence, 

atrial fibrillation, diabetes mellitus, hypertension, hyperlipidemia who has a 1

week history of increasing shortness of breath, cough congestion fever and 

chills.  He came into the emergency room last evening with severe shortness of 

breath requiring BiPAP support which was 14/5 and 50% FiO2.  Chest x-ray 

revealed cardiomegaly and airspace disease most notably in the left mid lower 

lung zone suggestive of multifocal pneumonia and possible fluid volume overload.

 White count 10.5.  Hemoglobin 14.3.  Platelets 156.  Sodium 135.  Potassium 

4.3.  Bicarb 27.  BUN 29.  Creatinine 1.86.  Glucose 174.  Troponins 0.327, 

0.550, 0.5-2.  He is initiated on a heparin drip.  proBNP was 13,000.  

Procalcitonin 2.38.  He did test positive for influenza A.  Initiated on 

Tamiflu.  Initiated on ceftriaxone.  He is seen today in consultation in the 

emergency department.  He is currently sitting up on the stretcher.  Awake and 

alert.  He is on 6 L high flow nasal cannula.  He is breathing a bit easier 

today compared to yesterday.  Still with a loose nonproductive cough.  He did 

have a Tmax of 102.3.  Current temperature 99.1.  He is hemodynamically stable.





The patient is seen today February 25, 2025 in follow-up on the regular medical 

floor.  He is awake and alert in no acute distress.  Doing slightly better today

compared to yesterday.  He is still requiring 10 L high flow nasal cannula.  

Chest x-ray continues to show evidence of fluid volume overload/CHF.  

Echocardiogram reveals mildly impaired left ventricular systolic function with 

ejection fraction of 45 to 50%.  Moderately reduced global LV function.  Blood 

culture is pending.  White count 7.2.  Hemoglobin 14.1.  Platelets 168.  Sodium 

133. Potassium 4.2.  Bicarb 27.  BUN 44.  Creatinine 2.12.  Stool for occult 

blood was negative.  Since.  Anticoagulated with Eliquis.  Antibiotics in the 

form of ceftriaxone and azithromycin.  He is continued on Tamiflu.  Lasix 

discontinued per cardiology.





The patient is seen today February 26, 2025 in follow-up on the regular medical 

floor.  He is currently on BiPAP 14/5 and 80% FiO2.  He was trialed on 12 L high

flow earlier this morning with O2 saturations in the 70s and 80s.  Arterial 

blood gases on the 80% FiO2 revealed a PaO2 of 74, pCO2 of 38 and a pH of 7.42. 

A chest x-ray revealed similar findings of persistent cardiomegaly with 

bilateral multifocal acute infiltrates and/or edema.  He received Lasix 40 mg 

IVP x 1.  Blood culture reveals no growth to date.  White count 8.7.  Hemoglobin

12.8.  Platelets 139.  Sodium 128.  Potassium 3.6.  Bicarb 29.  BUN 45.  

Creatinine 1.99.  He is continued on DuoNeb inhalations.  Continued on Tamiflu. 

Initiated on Solu-Cortef.  Remains on antibiotics in the form of ceftriaxone and

azithromycin.  Anticoagulated with Eliquis.  Currently on a 1500 mL fluid 

restriction.  Nephrology is following.  ACE inhibitor remains on hold.





The patient is seen today February 27, 2025 in follow-up on the regular medical 

floor.  He is currently sitting up in bed.  Still requiring BiPAP support 

currently 14/5 and 60% FiO2.  He remains on DuoNeb and elations, Solu-Cortef, 

Tamiflu.  Anticoagulated with Eliquis.  Antibiotics in the form of ceftriaxone. 

Blood culture reveals no growth.  White count 7.6.  Hemoglobin 13.3.  Platelets 

167.  Sodium 133.  Potassium 3.9.  Bicarb 28.  BUN 47.  Creatinine 1.54.  

Glucose 132.  Chest x-ray continues to show worsening bilateral diffuse acute 

infiltrates and/or edema.  Developing ARDS is not excluded.  He is given Lasix 

40 mg IVP today.  Currently in a negative balance.





The patient is seen today February 28, 2025 in follow-up on the regular medical 

floor.  Resting in bed.  Currently afebrile.  Hemodynamically stable.  He contin

ues to require BiPAP support 10/5 and 50% FiO2.  He has been tolerating some 

time off on 15 L high flow nasal cannula with O2 saturations in the upper 80s.  

Blood culture revealed no growth.  White count 6.7.  Hemoglobin 13.6.  Platelets

185.  Sodium 133.  Potassium 4.0.  Bicarb 26.  BUN 54.  Creatinine 1.44.  

Glucose 178.  He is continued on DuoNeb inhalations, Pulmicort and Perforomist 

inhalations, Solu-Medrol.  Anticoagulated with Eliquis.  Continued on Tamiflu.  

Completed a course of ceftriaxone.  Received Lasix 40 mg IVP x 1 again today.  

Remains in a negative balance.  X-ray continues to show bilateral diffuse acute 

infiltrates and/or edema.  ARDS is not excluded.  No significant change.





The patient is seen today March 1, 2025 in follow-up on the selective care unit.

 He is awake and alert in no acute distress.  He is sitting up in bed.  He is 

currently on 15 L high flow nasal cannula.  He has been off the BiPAP since 

earlier this morning which was set at 14/5 and 50% FiO2.  He has been slow to 

progress.  He did receive Lasix 40 mg IVP x 1 again today.  Blood culture 

revealed no growth.  White count 12.5.  Hemoglobin 12.8.  Platelets 219.  Sodium

132.  Potassium 3.9.  Bicarb 27.  BUN 58.  Creatinine 1.40.  Glucose 236.  He 

remains on DuoNeb inhalations, Pulmicort and performance and elations, IV Solu-

Medrol.  He is anticoagulated with Eliquis.  Remains on antibiotics in the form 

of ceftriaxone.  Chest x-ray continues to show similar diffuse patchy airspace 

opacities throughout the lungs likely acute infiltrates and/or edema.  

Developing ARDS is not excluded.





The patient is seen today March 2, 2025 in follow-up on the selective care unit.

 He is awake and alert in no acute distress.  Sitting up in bed.  Maintaining O2

saturations in the 90s on 12 L high flow nasal cannula.  He did wear BiPAP last 

night 14/5 and 50% FiO2.  He has been slow to progress but is feeling better 

today compared to yesterday.  Chest x-ray shows similar diffuse patchy airspace 

opacities representing acute infiltrates versus edema versus ARDS.  Remains on 

IV diuretics.  Currently in a negative balance. White count 14.4.  Hemoglobin 

12.7.  Platelets 242.  Sodium 133.  Potassium 3.9.  Bicarb 27.  BUN 53.  

Creatinine 1.31.  Glucose 238.  He remains on DuoNeb inhalations, Pulmicort and 

Perforomist inhalations, IV Solu-Medrol.  He is anticoagulated with Eliquis.  

Remains on ceftriaxone. 





The patient is seen today March 3, 2025 in follow-up on the selective care unit.

 He is awake.  Sitting up in bed.  Maintaining good O2 saturations in the 90s 

now on 10 L high flow nasal cannula.  Did wear the BiPAP from midnight to about 

4 AM.  Settings are 14/5 and 50% FiO2.  Procalcitonin 0.33.  He remains on 

DuoNeb inhalations, Pulmicort and Perforomist inhalations, Solu-Medrol.  Remains

on IV diuretics.  Anticoagulated with Eliquis.  Currently in a -500 mL balance.





The patient is seen today March 4, 2025 in follow-up on the selective care unit.

 He is currently sitting up in bed.  Awake and alert in no acute distress.  

Maintaining O2 saturations in the 90s on 2 L/min per nasal cannula.  He is 84% 

on room air.  Chest x-ray reveals similar multifocal airspace opacities 

compatible with pulmonary edema.  Blood culture revealed no growth.  White count

19.6.  Hemoglobin 12.4.  Platelets 399.  Sodium 133.  Potassium 4.3.  Bicarb 29.

 BUN 63.  Creatinine 1.22.  Glucose 130.  Remains on DuoNeb inhalations, 

Pulmicort and performance inhalations, prednisone taper.  Anticoagulated with 

Eliquis. 





Objective





- Vital Signs


Vital signs: 


                                   Vital Signs











Temp  97.7 F   03/05/25 04:00


 


Pulse  68   03/05/25 11:37


 


Resp  16   03/05/25 11:54


 


BP  120/70   03/05/25 11:54


 


Pulse Ox  90 L  03/05/25 12:23


 


FiO2  50   03/04/25 00:32








                                 Intake & Output











 03/04/25 03/05/25 03/05/25





 18:59 06:59 18:59


 


Intake Total 380 20 10


 


Output Total 300 900 


 


Balance 80 -880 10


 


Weight  77.5 kg 


 


Intake:   


 


  IV 20 20 10


 


    Invasive Line 4 20 20 10


 


  Oral 360  


 


Output:   


 


  Urine 300 900 


 


Other:   


 


  Voiding Method Bedside Commode Bedside Commode 





 Urinal Urinal 


 


  # Voids 1  


 


  # Bowel Movements 1  














- Exam





GENERAL EXAM: Alert, 64-year-old male, on 2 L nasal cannula, fairly comfortable 

in no apparent distress.


HEAD: Normocephalic.


EYES: Normal reaction of pupils, equal size.


NOSE: Clear with pink turbinates.


THROAT: No erythema or exudates.


NECK: No masses, no JVD.


CHEST: No chest wall deformity.


LUNGS: Equal air entry with crackles in the bilateral bases, diminished.


CVS: S1 and S2 normal with no audible murmur, regular rhythm.


ABDOMEN: No hepatosplenomegaly, normal bowel sounds, no guarding or rigidity.


SPINE: No scoliosis or deformity


SKIN: No rashes


CENTRAL NERVOUS SYSTEM: No focal deficits, tone is normal in all 4 extremities.


EXTREMITIES: There is 1+ peripheral edema.  No clubbing, no cyanosis.  

Peripheral pulses are intact.





- Labs


CBC & Chem 7: 


                                 03/05/25 08:01





                                 03/05/25 08:01


Labs: 


                  Abnormal Lab Results - Last 24 Hours (Table)











  03/04/25 03/04/25 03/05/25 Range/Units





  16:53 19:57 06:09 


 


WBC     (3.8-10.6)  k/uL


 


RBC     (4.30-5.90)  m/uL


 


Hgb     (13.0-17.5)  gm/dL


 


Neutrophils #     (1.3-7.7)  k/uL


 


Lymphocytes #     (1.0-4.8)  k/uL


 


Sodium     (137-145)  mmol/L


 


BUN     (9-20)  mg/dL


 


Glucose     (74-99)  mg/dL


 


POC Glucose (mg/dL)  233 H  202 H  160 H  ()  mg/dL














  03/05/25 03/05/25 03/05/25 Range/Units





  08:01 08:01 11:54 


 


WBC   19.6 H   (3.8-10.6)  k/uL


 


RBC   4.12 L   (4.30-5.90)  m/uL


 


Hgb   12.4 L   (13.0-17.5)  gm/dL


 


Neutrophils #   18.4 H   (1.3-7.7)  k/uL


 


Lymphocytes #   0.3 L   (1.0-4.8)  k/uL


 


Sodium  133 L    (137-145)  mmol/L


 


BUN  63 H    (9-20)  mg/dL


 


Glucose  130 H    (74-99)  mg/dL


 


POC Glucose (mg/dL)    157 H  ()  mg/dL














Assessment and Plan


Assessment: 





Acute hypoxemic respiratory failure secondary to an acute exacerbation of 

chronic obstructive pulmonary disease complicated by influenza A and underlying 

pneumonia.  Procalcitonin 2.38.





Acute influenza A infection





Acute exacerbation of chronic systolic congestive heart failure with an ejection

fraction now 45-50%





Troponin leak possible non-ST segment elevation myocardial infarction secondary 

to above





Acute kidney injury





History of atrial fibrillation with previous cardioversion maintained on am

iodarone and Eliquis





Chronic tobacco dependence with suspected underlying COPD





Hypertension





Hyperlipidemia





Diabetes mellitus, type II





Plan:





The patient was seen and evaluated


Labs and medications reviewed


Cleared for discharge


Currently on 2 L nasal cannula


May require home oxygen


Continue Symbicort, albuterol HFA, prednisone


Follow-up in our office in 1 week





I have personally seen and examined the patient, performed the documentation and

the assessment and plan as written.  Number of minutes spent on the visit: 10





Dictation was produced using Dragon dictation software.  Please excuse any 

grammatical, word or spelling errors.

## 2025-03-05 NOTE — P.PN
Subjective


Progress Note Date: 03/05/25


Patient seen for follow-up for HERNAN and hyponatremia.


Renal function and sodium level remained stable.  Was breathing comfortably on 

room air.


No active complaints.  Oral intake fair.








Objective





- Vital Signs


Vital signs: 


                                   Vital Signs











Temp  97.7 F   03/05/25 04:00


 


Pulse  71   03/05/25 09:05


 


Resp  16   03/05/25 09:05


 


BP  130/66   03/05/25 09:05


 


Pulse Ox  92 L  03/05/25 09:05


 


FiO2  50   03/04/25 00:32








                                 Intake & Output











 03/04/25 03/05/25 03/05/25





 18:59 06:59 18:59


 


Intake Total 380 20 


 


Output Total 300 900 


 


Balance 80 -880 


 


Weight  77.5 kg 


 


Intake:   


 


  IV 20 20 


 


    Invasive Line 4 20 20 


 


  Oral 360  


 


Output:   


 


  Urine 300 900 


 


Other:   


 


  Voiding Method Bedside Commode Bedside Commode 





 Urinal Urinal 


 


  # Voids 1  


 


  # Bowel Movements 1  














- Exam


Patient is awake, comfortable, no acute distress.


Heart: S1 and S2 heard


Lungs: Bilateral breath sounds are heard


Abdomen: Soft and nontender


Lower extremities: No edema


CNS: grossly intact








- Labs


CBC & Chem 7: 


                                 03/06/25 07:25





                                 03/06/25 07:25


Labs: 


                  Abnormal Lab Results - Last 24 Hours (Table)











  03/04/25 03/04/25 03/04/25 Range/Units





  12:01 16:53 19:57 


 


WBC     (3.8-10.6)  k/uL


 


RBC     (4.30-5.90)  m/uL


 


Hgb     (13.0-17.5)  gm/dL


 


Neutrophils #     (1.3-7.7)  k/uL


 


Lymphocytes #     (1.0-4.8)  k/uL


 


Sodium     (137-145)  mmol/L


 


BUN     (9-20)  mg/dL


 


Glucose     (74-99)  mg/dL


 


POC Glucose (mg/dL)  170 H  233 H  202 H  ()  mg/dL














  03/05/25 03/05/25 03/05/25 Range/Units





  06:09 08:01 08:01 


 


WBC    19.6 H  (3.8-10.6)  k/uL


 


RBC    4.12 L  (4.30-5.90)  m/uL


 


Hgb    12.4 L  (13.0-17.5)  gm/dL


 


Neutrophils #    18.4 H  (1.3-7.7)  k/uL


 


Lymphocytes #    0.3 L  (1.0-4.8)  k/uL


 


Sodium   133 L   (137-145)  mmol/L


 


BUN   63 H   (9-20)  mg/dL


 


Glucose   130 H   (74-99)  mg/dL


 


POC Glucose (mg/dL)  160 H    ()  mg/dL














Assessment and Plan


Assessment: 


#HERNAN secondary to ATN secondary to cardiorenal syndrome; renal function 

improving - currently stable, creatinine today 1.22.


#Stage IIIa chronic kidney disease, baseline creatinine 1.6-1.7


#Hyponatremia secondary to hypervolemia and acute respiratory infection


#Sepsis secondary to influenza type a pneumonia, s/p Tamiflu


#Acute hypoxic respiratory failure secondary to above


#HFrEF w/EF 45 to 50%


#Type II NSTEMI





Plan: 


-Patient appears to be euvolemic at this time. 


-Recheck labs in a.m.


-Maintain fluid restriction 2000 cc


-Avoid nephrotoxic agents


-Stable for discharge from nephrology standpoint





Agree with resident's findings, assessment and plan.

## 2025-03-05 NOTE — XR
EXAMINATION TYPE: XR chest 1V portable

 

DATE OF EXAM: 3/5/2025 6:34 AM

 

COMPARISON: Chest radiographs from 3/4/2025

 

CLINICAL INDICATION: Male, 64 years old with history of ARDS; [facility]

 

TECHNIQUE: XR chest 1V portable Frontal view of the chest.

 

FINDINGS: 

Lungs/Pleura: Multifocal airspace opacities. No evidence of pneumothorax or pleural effusion. 

Pulmonary vascularity: Unremarkable.

Heart/mediastinum: Cardiomediastinal silhouette is unremarkable.  Atherosclerotic calcifications are 
seen in the aorta.

Musculoskeletal: No acute osseous pathology.

 

 

IMPRESSION: 

Multifocal airspace opacities compatible with pulmonary edema in provided history.

 

[facility], Workstation: [id number], 3/5/2025 7:06 AM

## 2025-03-05 NOTE — P.PN
Subjective


Progress Note Date: 03/05/25





Patient is a 64-year-old male with a PMH of A-fib on Eliquis, COPD not on home 

oxygen, type II DM, hypertension, hyperlipidemia, CKD stage IIIa who presents to

the emergency room with complaints of shortness of breath and cough.  Patient 

reports that over the past 1 week he has been experiencing gradually worsening 

persistent cough.  Reports that the cough is nonproductive.  He also reports 

exertional dyspnea.  Denies experiencing fever or chills.  Also denies chest 

discomfort, nausea, vomiting, abdominal pain, diarrhea.





Chest x-ray in the emergency room revealed cardiomegaly along with findings of 

multifocal pneumonia.  EKG revealed sinus rhythm at 93 bpm with intraventricular

conduction delay as reviewed by me.  Laboratory evaluation did reveal influenza 

type a positive, troponin 0.102, proBNP 12,900, lactic acid 3.3, glucose 228, 

BUN 25, creatinine 1.59, and WBC count 12.7 with neutrophilic predominance.  

Patient was reportedly severely hypoxic and in respiratory distress upon EMS 

arrival at the scene and was started on BiPAP.








2/25/2025 patient seen and examined at bedside.  No acute events overnight.  No 

new complaints. WBC 7.2, hemoglobin 14.1, platelet count 1 68,000, PT 12.1, INR 

1.1, .7, sodium 133, potassium 4.2, chloride 96, bicarb 27, BUN 44, 

creatinine 2.12, glucose 97.  Troponin increased to 0.55 and has flattened.  

Chest x-ray done showed findings favor CHF exacerbation/fluid overload state.  

Areas of underlying acute infiltrate cannot be excluded.  No significant change 

from most recent x-ray.





2/26/25 patient seen and examined at bedside. no new complaints. Currently 

comfortable on BiPAP.  Labs today showed WBC 8.7, hemoglobin 12.8, MCV 93.7, 

platelet count 1 39,000, sodium 128, potassium 3.6, chloride 94, BUN 44, 

creatinine 1.99, calcium 7.8, glucose 113.  ABG showed pH of 7.42, pCO2 38, pO2 

74.  Urinalysis showed +1 protein, moderate ketones, rare bacteria, 9 hyaline 

casts and rare mucus, negative nitrites, negative leukocyte esterase.  Renal u

ltrasound done on 2/25 showed negative for hydronephrosis, stones, bladder 

anechoic





227/25 patient seen and examined at bedside.  Given Ativan IV for anxiety 

overnight.  No acute events. Still required BiPAP overnight.  WBC 7.6, 

hemoglobin 13.9, platelet count 1 67,000, sodium 133, chloride 97, BUN 47, 

creatinine 1.54, glucose 132, calcium 8.5.  Legionella Ag negative.  Chest x-ray

today showed continued worsening of bilateral diffuse acute infiltrates and/or 

edema, developing ARDS not included.





2/28/25 patient seen and examined at bedside. Still needed BiPAP overnight. No 

new complaints. No acute events overnight.  Labs today show WBC 6.7, hemoglobin 

13.6, platelet count 1 85,000, sodium 133, potassium 4, chloride 98, BUN 54, 

creatinine 1.44, glucose 178, A1c 6.2, calcium 8.9.  Chest x-ray independently 

interpreted showed continued bilateral diffuse acute infiltrates and/or edema.  

No significant change from prior x-ray.





3/1/2025 patient seen and examined at bedside.  No acute events overnight.  No 

new complaints.  Still requiring BiPAP overnight.  Labs today showed WBC 12.5, 

hemoglobin 12.8, MCV 93.3, platelet count 219,000, sodium 132, potassium 3.9, 

chloride 97, bicarb 27, BUN 58, creatinine 1.4, glucose 236, calcium 9, 

magnesium 2. Chest x-ray independently interpreted showed similar diffuse patchy

airspace opacities throughout the lungs likely representing acute infiltrates 

and/or edema.





3/2/2025 Patient seen and examined at bedside.  No acute events overnight.  Res

piratory function similar to yesterday





3/3/2025 patient seen and examined at bedside.  No acute events overnight.  

Still requiring BiPAP at night on FiO2 of 50%.  Labs today showed WBC 16.7, 

hemoglobin 12.7, MCV 94.5, platelet count 210,000, sodium 133, potassium 3.8, 

chloride 98, bicarb 38, BUN 58, creatinine 1.3, glucose 196, calcium 8.8, 

magnesium 2.  Chest x-ray today independently interpreted showed no changes in 

diffuse patchy airspace opacities throughout the lungs.





3/4/2025 patient seen and examined at bedside.  No acute events overnight.  Was 

able to tolerate oxygenation with high flow nasal cannula overnight.  Labs today

showed WBC 18.1, hemoglobin 13, platelet count 387,000, sodium 137, potassium 

4.3, chloride 97, bicarb 32, BUN 60, creatinine 1.31, glucose 106, calcium 8.9. 

Chest x-ray today reported improved aeration of the lungs with persistent 

multifocal airspace opacities. 





20/5/2025 patient seen and examined at bedside.  Patient required 2 L nasal 

cannula overnight.  ENE negative balance.  Labs today showed WBC 19.6, 

hemoglobin 12.4, MCV 94.7, platelet count 3 99,000, sodium 133, potassium 4.3, 

chloride 99, bicarb 29, BUN 63, creatinine 1.2, glucose 130, calcium 8.8.  Chest

x-ray showed multifocal airspace opacities compatible with pulmonary edema and 

provide history.





Review of systems:


Pertinent positives and negatives as discussed in HPI, a complete review of 

systems was performed and all other systems are negative.





Pertinent imaging and labs reviewed.





Physical examination:


Vital signs reviewed


General: non toxic, no distress, appears at stated age, ill-appearing, HFNC


Derm: no unusual rashes/lesions, warm


Head: atraumatic, normocephalic, symmetric


Eyes: EOMI, anicteric sclera, pupils equal round reactive to light


ENT: Nose and ears atraumatic


Neck: No cervical lymphadenopathy, trachea midline, supple


Mouth: no lip lesion, mucus membranes moist


Cardiovascular: S1S2 reg, no murmur


Lungs: decreased breath sounds in middle lung fields, bibasilar coarse rales, no

accessory muscle use


Abdominal: soft,  nontender to palpation, no guarding


Ext: muscle strength 5 out of 5 in all 4 extremities grossly, no gross muscle 

atrophy, no contractures,  positive dorsalis pedis pulse bilateral, no edema


Neuro:  CN II-XI grossly intact, no gross focal neuro deficits


Psych: Alert and oriented x3, appropriate affect and mood





Assessment/Plan: 64-year-old male with history of COPD, A-fib on Eliquis and CKD

stage III here for further management of cardiorenal syndrome and sepsis 

secondary to pneumonia now BiPAP dependent.





#. Cardiorenal syndrome


#. Heart failure with reduced ejection fraction EF of 45 to 50% (2/2025), not on

GDMT


#. Hyponatremia secondary to above


#. Acute kidney injury on CKD, improving


-Discontinue Lasix 40 mg IV. Reevaluate need tomorrow, discussed with nephrology

as well


-Patient euvolemic today.  Continue to monitor volume status.


-Strict ENE's


-Daily weights


-Fluid restriction


-Monitor BMP for electrolytes and renal function


-Cardiology consulted


-Metoprolol 25 mg daily.  Initiate GDMT on discharge





#. Sepsis secondary to influenza pneumonia, unable to rule out bacterial 

pneumonia


#. Acute hypoxic respiratory failure, secondary to above


#. Severe ARDS


-P/F ratio 92


-Chest x-ray showed multifocal airspace opacities compatible with pulmonary 

edema and provide history.


-Procalcitonin normal at 0.33


-Legionella urine antigen neagtive


-Supportive oxygen as needed


-Discontinued Rocephin 2 g IVPB 


-Finished Tamiflu p.o. on 3/2


-Discontinued Solumedrol 60mg IV


-Initiate prednisone 40 mg p.o. daily


-Blood culture negative


-Monitor CBC


-PT/OT consulted





#.  NSTEMI, type II


-EKG revealed sinus rhythm at 93 bpm with intraventricular conduction delay on 

admission


-Patient has no chest pain


-Troponins flattened at 0.5


-IV heparin for 48 hours.  Now discontinued


-Cardiology initiated atorvastatin 40 mg daily





#. Lactic acidosis, resovled








Chronic conditions: 


#.  A-fib


#.  COPD


#.  Type II DM


#.  Hypertension


#.  Hyperlipidemia


-Restart Lisinopril 20mg daily


-Continue Eliquis 5 mg twice daily, amiodarone 200 mg p.o. daily, metoprolol 25 

mg daily


-Hemoglobin A1c 6.2


-Glargine 42 units units nightly


-Initiate lispro 16 units SQ ACHS for meal coverage


-Not on home diabetes medications 


-Glucose Accu-Cheks ACHS 


-Continue insulin sliding scale ACHS


-Monitor for hypoglycemia





F: Restrict fluids due to 1.5 liters





E: Monitor electrolytes particularly sodium and potassium





N: Heart healthy diet





A: Can ambulate with assistance








DVT prophylaxis: Eliquis 5 mg p.o. daily











Paola Boyd MD


PGY-1/Intern





Dictation was produced using dragon dictation software. please excuse any 

grammatical, word or spelling errors.





I have seen and evaluated the patient today.  Discussed with the resident and 

agree with the residents finding and plan as documented in the resident's note. 

Changes highlighted in blue font.





Objective





- Vital Signs


Vital signs: 


                                   Vital Signs











Temp  97.7 F   03/05/25 04:00


 


Pulse  64   03/05/25 04:00


 


Resp  20   03/05/25 04:00


 


BP  136/64   03/05/25 04:00


 


Pulse Ox  97   03/05/25 04:00


 


FiO2  50   03/04/25 00:32








                                 Intake & Output











 03/04/25 03/05/25 03/05/25





 18:59 06:59 18:59


 


Intake Total 380 20 


 


Output Total 300 900 


 


Balance 80 -880 


 


Weight  77.5 kg 


 


Intake:   


 


  IV 20 20 


 


    Invasive Line 4 20 20 


 


  Oral 360  


 


Output:   


 


  Urine 300 900 


 


Other:   


 


  Voiding Method Bedside Commode Bedside Commode 





 Urinal Urinal 


 


  # Voids 1  


 


  # Bowel Movements 1  














- Labs


CBC & Chem 7: 


                                 03/05/25 08:01





                                 03/05/25 08:01


Labs: 


                  Abnormal Lab Results - Last 24 Hours (Table)











  03/04/25 03/04/25 03/04/25 Range/Units





  08:01 08:01 12:01 


 


WBC  18.1 H    (3.8-10.6)  k/uL


 


RBC  4.27 L    (4.30-5.90)  m/uL


 


Neutrophils #  16.5 H    (1.3-7.7)  k/uL


 


Lymphocytes #  0.4 L    (1.0-4.8)  k/uL


 


Chloride   97 L   ()  mmol/L


 


Carbon Dioxide   32 H   (22-30)  mmol/L


 


BUN   60 H   (9-20)  mg/dL


 


Creatinine   1.31 H   (0.66-1.25)  mg/dL


 


Glucose   106 H   (74-99)  mg/dL


 


POC Glucose (mg/dL)    170 H  ()  mg/dL














  03/04/25 03/04/25 03/05/25 Range/Units





  16:53 19:57 06:09 


 


WBC     (3.8-10.6)  k/uL


 


RBC     (4.30-5.90)  m/uL


 


Neutrophils #     (1.3-7.7)  k/uL


 


Lymphocytes #     (1.0-4.8)  k/uL


 


Chloride     ()  mmol/L


 


Carbon Dioxide     (22-30)  mmol/L


 


BUN     (9-20)  mg/dL


 


Creatinine     (0.66-1.25)  mg/dL


 


Glucose     (74-99)  mg/dL


 


POC Glucose (mg/dL)  233 H  202 H  160 H  ()  mg/dL

## 2025-03-06 VITALS — RESPIRATION RATE: 20 BRPM

## 2025-03-06 LAB
ANION GAP SERPL CALC-SCNC: 6 MMOL/L
BASOPHILS # BLD AUTO: 0 K/UL (ref 0–0.2)
BASOPHILS NFR BLD AUTO: 0 %
BUN SERPL-SCNC: 68 MG/DL (ref 9–20)
CALCIUM SPEC-MCNC: 8.5 MG/DL (ref 8.4–10.2)
CHLORIDE SERPL-SCNC: 98 MMOL/L (ref 98–107)
CO2 SERPL-SCNC: 29 MMOL/L (ref 22–30)
EOSINOPHIL # BLD AUTO: 0 K/UL (ref 0–0.7)
EOSINOPHIL NFR BLD AUTO: 0 %
ERYTHROCYTE [DISTWIDTH] IN BLOOD BY AUTOMATED COUNT: 3.94 M/UL (ref 4.3–5.9)
ERYTHROCYTE [DISTWIDTH] IN BLOOD: 12.6 % (ref 11.5–15.5)
GLUCOSE BLD-MCNC: 126 MG/DL (ref 70–110)
GLUCOSE BLD-MCNC: 171 MG/DL (ref 70–110)
GLUCOSE BLD-MCNC: 212 MG/DL (ref 70–110)
GLUCOSE BLD-MCNC: 240 MG/DL (ref 70–110)
GLUCOSE BLD-MCNC: 242 MG/DL (ref 70–110)
GLUCOSE BLD-MCNC: 57 MG/DL (ref 70–110)
GLUCOSE BLD-MCNC: 64 MG/DL (ref 70–110)
GLUCOSE BLD-MCNC: 93 MG/DL (ref 70–110)
GLUCOSE SERPL-MCNC: 157 MG/DL (ref 74–99)
HCT VFR BLD AUTO: 37.6 % (ref 39–53)
HGB BLD-MCNC: 11.7 GM/DL (ref 13–17.5)
LYMPHOCYTES # SPEC AUTO: 0.6 K/UL (ref 1–4.8)
LYMPHOCYTES NFR SPEC AUTO: 3 %
MCH RBC QN AUTO: 29.8 PG (ref 25–35)
MCHC RBC AUTO-ENTMCNC: 31.2 G/DL (ref 31–37)
MCV RBC AUTO: 95.6 FL (ref 80–100)
MONOCYTES # BLD AUTO: 1.4 K/UL (ref 0–1)
MONOCYTES NFR BLD AUTO: 7 %
NEUTROPHILS # BLD AUTO: 19.1 K/UL (ref 1.3–7.7)
NEUTROPHILS NFR BLD AUTO: 90 %
PLATELET # BLD AUTO: 382 K/UL (ref 150–450)
POTASSIUM SERPL-SCNC: 4 MMOL/L (ref 3.5–5.1)
SODIUM SERPL-SCNC: 133 MMOL/L (ref 137–145)
WBC # BLD AUTO: 21.4 K/UL (ref 3.8–10.6)

## 2025-03-06 RX ADMIN — LORAZEPAM PRN MG: 2 INJECTION INTRAMUSCULAR; INTRAVENOUS at 23:09

## 2025-03-06 NOTE — XR
EXAMINATION TYPE: XR chest 1V portable

 

DATE OF EXAM: 3/6/2025 7:04 AM

 

COMPARISON: Chest radiograph from one day prior.

 

CLINICAL INDICATION: Male, 64 years old with history of ARDS; [facility]

 

TECHNIQUE: XR chest 1V portable Frontal view of the chest.

 

FINDINGS: 

Lungs/Pleura: There is no evidence of pleural effusion, focal consolidation, or pneumothorax.  

Pulmonary vascularity: Similar pulmonary vascular congestion hazy opacities throughout the lungs.

Heart/mediastinum: Cardiomediastinal silhouette is unremarkable.

Musculoskeletal: No acute osseous pathology.

 

Other findings: None

 

IMPRESSION: 

Multifocal airspace opacities compatible with ARDS.

 

[facility], Workstation: [id number], 3/6/2025 7:21 AM

## 2025-03-06 NOTE — P.PN
Subjective


Progress Note Date: 03/06/25





Patient is a 64-year-old male with a PMH of A-fib on Eliquis, COPD not on home 

oxygen, type II DM, hypertension, hyperlipidemia, CKD stage IIIa who presents to

the emergency room with complaints of shortness of breath and cough.  Patient 

reports that over the past 1 week he has been experiencing gradually worsening 

persistent cough.  Reports that the cough is nonproductive.  He also reports 

exertional dyspnea.  Denies experiencing fever or chills.  Also denies chest 

discomfort, nausea, vomiting, abdominal pain, diarrhea.





Chest x-ray in the emergency room revealed cardiomegaly along with findings of 

multifocal pneumonia.  EKG revealed sinus rhythm at 93 bpm with intraventricular

conduction delay as reviewed by me.  Laboratory evaluation did reveal influenza 

type a positive, troponin 0.102, proBNP 12,900, lactic acid 3.3, glucose 228, 

BUN 25, creatinine 1.59, and WBC count 12.7 with neutrophilic predominance.  

Patient was reportedly severely hypoxic and in respiratory distress upon EMS 

arrival at the scene and was started on BiPAP.








2/25/2025 patient seen and examined at bedside.  No acute events overnight.  No 

new complaints. WBC 7.2, hemoglobin 14.1, platelet count 1 68,000, PT 12.1, INR 

1.1, .7, sodium 133, potassium 4.2, chloride 96, bicarb 27, BUN 44, 

creatinine 2.12, glucose 97.  Troponin increased to 0.55 and has flattened.  

Chest x-ray done showed findings favor CHF exacerbation/fluid overload state.  

Areas of underlying acute infiltrate cannot be excluded.  No significant change 

from most recent x-ray.





2/26/25 patient seen and examined at bedside. no new complaints. Currently 

comfortable on BiPAP.  Labs today showed WBC 8.7, hemoglobin 12.8, MCV 93.7, 

platelet count 1 39,000, sodium 128, potassium 3.6, chloride 94, BUN 44, 

creatinine 1.99, calcium 7.8, glucose 113.  ABG showed pH of 7.42, pCO2 38, pO2 

74.  Urinalysis showed +1 protein, moderate ketones, rare bacteria, 9 hyaline 

casts and rare mucus, negative nitrites, negative leukocyte esterase.  Renal u

ltrasound done on 2/25 showed negative for hydronephrosis, stones, bladder 

anechoic





227/25 patient seen and examined at bedside.  Given Ativan IV for anxiety 

overnight.  No acute events. Still required BiPAP overnight.  WBC 7.6, 

hemoglobin 13.9, platelet count 1 67,000, sodium 133, chloride 97, BUN 47, 

creatinine 1.54, glucose 132, calcium 8.5.  Legionella Ag negative.  Chest x-ray

today showed continued worsening of bilateral diffuse acute infiltrates and/or 

edema, developing ARDS not included.





2/28/25 patient seen and examined at bedside. Still needed BiPAP overnight. No 

new complaints. No acute events overnight.  Labs today show WBC 6.7, hemoglobin 

13.6, platelet count 1 85,000, sodium 133, potassium 4, chloride 98, BUN 54, 

creatinine 1.44, glucose 178, A1c 6.2, calcium 8.9.  Chest x-ray independently 

interpreted showed continued bilateral diffuse acute infiltrates and/or edema.  

No significant change from prior x-ray.





3/1/2025 patient seen and examined at bedside.  No acute events overnight.  No 

new complaints.  Still requiring BiPAP overnight.  Labs today showed WBC 12.5, 

hemoglobin 12.8, MCV 93.3, platelet count 219,000, sodium 132, potassium 3.9, 

chloride 97, bicarb 27, BUN 58, creatinine 1.4, glucose 236, calcium 9, 

magnesium 2. Chest x-ray independently interpreted showed similar diffuse patchy

airspace opacities throughout the lungs likely representing acute infiltrates 

and/or edema.





3/2/2025 Patient seen and examined at bedside.  No acute events overnight.  Res

piratory function similar to yesterday





3/3/2025 patient seen and examined at bedside.  No acute events overnight.  

Still requiring BiPAP at night on FiO2 of 50%.  Labs today showed WBC 16.7, 

hemoglobin 12.7, MCV 94.5, platelet count 210,000, sodium 133, potassium 3.8, 

chloride 98, bicarb 38, BUN 58, creatinine 1.3, glucose 196, calcium 8.8, 

magnesium 2.  Chest x-ray today independently interpreted showed no changes in 

diffuse patchy airspace opacities throughout the lungs.





3/4/2025 patient seen and examined at bedside.  No acute events overnight.  Was 

able to tolerate oxygenation with high flow nasal cannula overnight.  Labs today

showed WBC 18.1, hemoglobin 13, platelet count 387,000, sodium 137, potassium 

4.3, chloride 97, bicarb 32, BUN 60, creatinine 1.31, glucose 106, calcium 8.9. 

Chest x-ray today reported improved aeration of the lungs with persistent 

multifocal airspace opacities. 





3/5/2025 patient seen and examined at bedside.  Patient required 2 L nasal 

cannula overnight.  ENE negative balance.  Labs today showed WBC 19.6, 

hemoglobin 12.4, MCV 94.7, platelet count 3 99,000, sodium 133, potassium 4.3, 

chloride 99, bicarb 29, BUN 63, creatinine 1.2, glucose 130, calcium 8.8.  Chest

x-ray showed multifocal airspace opacities compatible with pulmonary edema and 

provide history.





3/6/2025 patient seen and examined at bedside.  No acute events overnight.  

Required 1 to 2 L of oxygen on nasal cannula.  Labs today showed WBC 21.4, 

hemoglobin 11.7, platelet count 382,000, sodium 133, potassium 4, chloride 98, 

bicarb 29, BUN 68, creatinine 1.41, glucose 157, calcium 8.5.  Chest x-ray 

independently interpreted showed improved bilateral opacities





Review of systems:


Pertinent positives and negatives as discussed in HPI, a complete review of 

systems was performed and all other systems are negative.





Pertinent imaging and labs reviewed.





Physical examination:


Vital signs reviewed


General: non toxic, no distress, appears at stated age, ill-appearing, HFNC


Derm: no unusual rashes/lesions, warm


Head: atraumatic, normocephalic, symmetric


Eyes: EOMI, anicteric sclera, pupils equal round reactive to light


ENT: Nose and ears atraumatic


Neck: No cervical lymphadenopathy, trachea midline, supple


Mouth: no lip lesion, mucus membranes moist


Cardiovascular: S1S2 reg, no murmur


Lungs: bibasilar coarse rales, no accessory muscle use


Abdominal: soft,  nontender to palpation, no guarding


Ext: muscle strength 5 out of 5 in all 4 extremities grossly, no gross muscle 

atrophy, no contractures,  positive dorsalis pedis pulse bilateral, no edema


Neuro:  CN II-XI grossly intact, no gross focal neuro deficits


Psych: Alert and oriented x3, appropriate affect and mood





Assessment/Plan: 64-year-old male with history of COPD, A-fib on Eliquis and CKD

stage III here for further management of cardiorenal syndrome and sepsis secon

bradly to pneumonia.  Oxygenation requirements improved





#. Sepsis secondary to influenza pneumonia, unable to rule out bacterial 

pneumonia


#. Acute hypoxic respiratory failure, secondary to above


#. Severe ARDS


-P/F ratio 92


-Chest x-ray independently interpreted showed improved bilateral opacities


-Procalcitonin normal at 0.33


-Legionella urine antigen neagtive


-Supportive oxygen as needed


-Discontinued Rocephin 2 g IVPB 


-Finished Tamiflu p.o. on 3/2


-Continue prednisone 40 mg p.o. daily


-Blood culture negative


-Monitor CBC


-PT/OT consulted


-WBC still up trending, consider discharging tomorrow if stable or downtrending





#. Cardiorenal syndrome


#. Heart failure with reduced ejection fraction EF of 45 to 50% (2/2025), not on

GDMT


#. Hyponatremia secondary to above


#. Acute kidney injury on CKD, improving


-Discontinued Lasix 40 mg IV.


-Patient euvolemic today.  Continue to monitor volume status.


-Strict ENE's


-Daily weights


-Fluid restriction


-Monitor BMP for electrolytes and renal function


-Cardiology consulted


-Metoprolol 25 mg daily.  Initiate GDMT on discharge





#.  NSTEMI, type II


-EKG revealed sinus rhythm at 93 bpm with intraventricular conduction delay on 

admission


-Patient has no chest pain


-Troponins flattened at 0.5


-IV heparin for 48 hours.  Now discontinued


-Cardiology initiated atorvastatin 40 mg daily





#. Lactic acidosis, resovled








Chronic conditions: 


#.  A-fib


#.  COPD


#.  Type II DM


#.  Hypertension


#.  Hyperlipidemia


-Continue Lisinopril 20mg daily


-Continue Eliquis 5 mg twice daily, amiodarone 200 mg p.o. daily, metoprolol 25 

mg daily


-Hemoglobin A1c 6.2


-Glargine 20 units units twice daily


-Initiate lispro 16 units 3 times daily for meal coverage


-Not on home diabetes medications 


-Glucose Accu-Cheks ACHS 


-Continue insulin sliding scale ACHS


-Monitor for hypoglycemia





F: Restrict fluids due to 2 liters





E: Monitor electrolytes particularly sodium and potassium





N: Heart healthy diet





A: Can ambulate with assistance








DVT prophylaxis: Eliquis 5 mg p.o. daily











Paola Boyd MD


PGY-1/Intern





Dictation was produced using dragon dictation software. please excuse any 

grammatical, word or spelling errors.





I have seen and evaluated the patient today.  Discussed with the resident and 

agree with the residents finding and plan as documented in the resident's note. 

Changes highlighted in blue font.  t





Objective





- Vital Signs


Vital signs: 


                                   Vital Signs











Temp  98.5 F   03/06/25 04:04


 


Pulse  61   03/06/25 04:04


 


Resp  16   03/06/25 04:04


 


BP  128/68   03/06/25 04:04


 


Pulse Ox  88 L  03/06/25 04:04


 


FiO2  50   03/04/25 00:32








                                 Intake & Output











 03/05/25 03/06/25 03/06/25





 18:59 06:59 18:59


 


Intake Total  1194 


 


Output Total  800 


 


Balance  394 


 


Weight  77 kg 


 


Intake:   


 


  Oral  1194 


 


Output:   


 


  Urine  800 


 


Other:   


 


  Voiding Method  Bedside Commode 





  Urinal 














- Labs


CBC & Chem 7: 


                                 03/06/25 07:25





                                 03/06/25 07:25


Labs: 


                  Abnormal Lab Results - Last 24 Hours (Table)











  03/05/25 03/05/25 03/05/25 Range/Units





  08:01 08:01 11:54 


 


WBC   19.6 H   (3.8-10.6)  k/uL


 


RBC   4.12 L   (4.30-5.90)  m/uL


 


Hgb   12.4 L   (13.0-17.5)  gm/dL


 


Neutrophils #   18.4 H   (1.3-7.7)  k/uL


 


Lymphocytes #   0.3 L   (1.0-4.8)  k/uL


 


Sodium  133 L    (137-145)  mmol/L


 


BUN  63 H    (9-20)  mg/dL


 


Glucose  130 H    (74-99)  mg/dL


 


POC Glucose (mg/dL)    157 H  ()  mg/dL














  03/05/25 03/05/25 03/06/25 Range/Units





  17:05 20:18 00:02 


 


WBC     (3.8-10.6)  k/uL


 


RBC     (4.30-5.90)  m/uL


 


Hgb     (13.0-17.5)  gm/dL


 


Neutrophils #     (1.3-7.7)  k/uL


 


Lymphocytes #     (1.0-4.8)  k/uL


 


Sodium     (137-145)  mmol/L


 


BUN     (9-20)  mg/dL


 


Glucose     (74-99)  mg/dL


 


POC Glucose (mg/dL)  273 H  316 H  171 H  ()  mg/dL














  03/06/25 03/06/25 Range/Units





  05:46 06:01 


 


WBC    (3.8-10.6)  k/uL


 


RBC    (4.30-5.90)  m/uL


 


Hgb    (13.0-17.5)  gm/dL


 


Neutrophils #    (1.3-7.7)  k/uL


 


Lymphocytes #    (1.0-4.8)  k/uL


 


Sodium    (137-145)  mmol/L


 


BUN    (9-20)  mg/dL


 


Glucose    (74-99)  mg/dL


 


POC Glucose (mg/dL)  64 L  57 L  ()  mg/dL

## 2025-03-06 NOTE — P.PN
Subjective


Progress Note Date: 03/06/25


Principal diagnosis: 





Pneumonia, respiratory failure, CHF.





This is a 64-year-old male patient with chronic and ongoing tobacco dependence, 

atrial fibrillation, diabetes mellitus, hypertension, hyperlipidemia who has a 1

week history of increasing shortness of breath, cough congestion fever and 

chills.  He came into the emergency room last evening with severe shortness of 

breath requiring BiPAP support which was 14/5 and 50% FiO2.  Chest x-ray 

revealed cardiomegaly and airspace disease most notably in the left mid lower 

lung zone suggestive of multifocal pneumonia and possible fluid volume overload.

 White count 10.5.  Hemoglobin 14.3.  Platelets 156.  Sodium 135.  Potassium 

4.3.  Bicarb 27.  BUN 29.  Creatinine 1.86.  Glucose 174.  Troponins 0.327, 

0.550, 0.5-2.  He is initiated on a heparin drip.  proBNP was 13,000.  

Procalcitonin 2.38.  He did test positive for influenza A.  Initiated on 

Tamiflu.  Initiated on ceftriaxone.  He is seen today in consultation in the 

emergency department.  He is currently sitting up on the stretcher.  Awake and 

alert.  He is on 6 L high flow nasal cannula.  He is breathing a bit easier 

today compared to yesterday.  Still with a loose nonproductive cough.  He did 

have a Tmax of 102.3.  Current temperature 99.1.  He is hemodynamically stable.





The patient is seen today February 25, 2025 in follow-up on the regular medical 

floor.  He is awake and alert in no acute distress.  Doing slightly better today

compared to yesterday.  He is still requiring 10 L high flow nasal cannula.  

Chest x-ray continues to show evidence of fluid volume overload/CHF.  

Echocardiogram reveals mildly impaired left ventricular systolic function with 

ejection fraction of 45 to 50%.  Moderately reduced global LV function.  Blood 

culture is pending.  White count 7.2.  Hemoglobin 14.1.  Platelets 168.  Sodium 

133. Potassium 4.2.  Bicarb 27.  BUN 44.  Creatinine 2.12.  Stool for occult 

blood was negative.  Since.  Anticoagulated with Eliquis.  Antibiotics in the 

form of ceftriaxone and azithromycin.  He is continued on Tamiflu.  Lasix 

discontinued per cardiology.





The patient is seen today February 26, 2025 in follow-up on the regular medical 

floor.  He is currently on BiPAP 14/5 and 80% FiO2.  He was trialed on 12 L high

flow earlier this morning with O2 saturations in the 70s and 80s.  Arterial 

blood gases on the 80% FiO2 revealed a PaO2 of 74, pCO2 of 38 and a pH of 7.42. 

A chest x-ray revealed similar findings of persistent cardiomegaly with 

bilateral multifocal acute infiltrates and/or edema.  He received Lasix 40 mg 

IVP x 1.  Blood culture reveals no growth to date.  White count 8.7.  Hemoglobin

12.8.  Platelets 139.  Sodium 128.  Potassium 3.6.  Bicarb 29.  BUN 45.  

Creatinine 1.99.  He is continued on DuoNeb inhalations.  Continued on Tamiflu. 

Initiated on Solu-Cortef.  Remains on antibiotics in the form of ceftriaxone and

azithromycin.  Anticoagulated with Eliquis.  Currently on a 1500 mL fluid 

restriction.  Nephrology is following.  ACE inhibitor remains on hold.





The patient is seen today February 27, 2025 in follow-up on the regular medical 

floor.  He is currently sitting up in bed.  Still requiring BiPAP support 

currently 14/5 and 60% FiO2.  He remains on DuoNeb and elations, Solu-Cortef, 

Tamiflu.  Anticoagulated with Eliquis.  Antibiotics in the form of ceftriaxone. 

Blood culture reveals no growth.  White count 7.6.  Hemoglobin 13.3.  Platelets 

167.  Sodium 133.  Potassium 3.9.  Bicarb 28.  BUN 47.  Creatinine 1.54.  

Glucose 132.  Chest x-ray continues to show worsening bilateral diffuse acute 

infiltrates and/or edema.  Developing ARDS is not excluded.  He is given Lasix 

40 mg IVP today.  Currently in a negative balance.





The patient is seen today February 28, 2025 in follow-up on the regular medical 

floor.  Resting in bed.  Currently afebrile.  Hemodynamically stable.  He 

continues to require BiPAP support 10/5 and 50% FiO2.  He has been tolerating 

some time off on 15 L high flow nasal cannula with O2 saturations in the upper 

80s.  Blood culture revealed no growth.  White count 6.7.  Hemoglobin 13.6.  

Platelets 185.  Sodium 133.  Potassium 4.0.  Bicarb 26.  BUN 54.  Creatinine 

1.44.  Glucose 178.  He is continued on DuoNeb inhalations, Pulmicort and 

Perforomist inhalations, Solu-Medrol.  Anticoagulated with Eliquis.  Continued 

on Tamiflu.  Completed a course of ceftriaxone.  Received Lasix 40 mg IVP x 1 

again today.  Remains in a negative balance.  X-ray continues to show bilateral 

diffuse acute infiltrates and/or edema.  ARDS is not excluded.  No significant 

change.





The patient is seen today March 1, 2025 in follow-up on the selective care unit.

 He is awake and alert in no acute distress.  He is sitting up in bed.  He is 

currently on 15 L high flow nasal cannula.  He has been off the BiPAP since 

earlier this morning which was set at 14/5 and 50% FiO2.  He has been slow to 

progress.  He did receive Lasix 40 mg IVP x 1 again today.  Blood culture 

revealed no growth.  White count 12.5.  Hemoglobin 12.8.  Platelets 219.  Sodium

132.  Potassium 3.9.  Bicarb 27.  BUN 58.  Creatinine 1.40.  Glucose 236.  He 

remains on DuoNeb inhalations, Pulmicort and performance and elations, IV Solu-

Medrol.  He is anticoagulated with Eliquis.  Remains on antibiotics in the form 

of ceftriaxone.  Chest x-ray continues to show similar diffuse patchy airspace 

opacities throughout the lungs likely acute infiltrates and/or edema.  

Developing ARDS is not excluded.





The patient is seen today March 2, 2025 in follow-up on the selective care unit.

 He is awake and alert in no acute distress.  Sitting up in bed.  Maintaining O2

saturations in the 90s on 12 L high flow nasal cannula.  He did wear BiPAP last 

night 14/5 and 50% FiO2.  He has been slow to progress but is feeling better tod

ay compared to yesterday.  Chest x-ray shows similar diffuse patchy airspace 

opacities representing acute infiltrates versus edema versus ARDS.  Remains on 

IV diuretics.  Currently in a negative balance. White count 14.4.  Hemoglobin 

12.7.  Platelets 242.  Sodium 133.  Potassium 3.9.  Bicarb 27.  BUN 53.  

Creatinine 1.31.  Glucose 238.  He remains on DuoNeb inhalations, Pulmicort and 

Perforomist inhalations, IV Solu-Medrol.  He is anticoagulated with Eliquis.  

Remains on ceftriaxone. 





The patient is seen today March 3, 2025 in follow-up on the selective care unit.

 He is awake.  Sitting up in bed.  Maintaining good O2 saturations in the 90s 

now on 10 L high flow nasal cannula.  Did wear the BiPAP from midnight to about 

4 AM.  Settings are 14/5 and 50% FiO2.  Procalcitonin 0.33.  He remains on 

DuoNeb inhalations, Pulmicort and Perforomist inhalations, Solu-Medrol.  Remains

on IV diuretics.  Anticoagulated with Eliquis.  Currently in a -500 mL balance.





Progress note dated March 4, 2025.





64-year-old male admitted with a diagnosis of pneumonia, CHF, and acute 

respiratory failure.  He is currently on 6 L nasal cannula.  He does have BiPAP 

in the room, with settings of 14/5, 50%.  He is not receiving any IV fluids.  He

is awake and alert.  No distress.  Current labs include a white count 18.1, 

hemoglobin 13, hematocrit 40.8, and platelet count of 387,000.  Sodium 137, 

potassium 4.3, chlorides 97, CO2 32, BUN 60, and creatinine 1.31.  Glucose is 

170.  Calcium 8.9.  Chest x-ray shows improved aeration of the lungs 

bilaterally.





Progress note dated March 6, 2025.





64-year-old male seen today in room 378.  The patient is on 2 L of oxygen.  No 

IV fluids.  Chest x-ray continues show bilateral infiltrates, but, his chest x-

ray is improved.  Clinically, the patient appears to be doing much better, and 

is much more stable.  He is not having any significant shortness of breath, co

ugh, wheezing, chest tightness, or phlegm production.  White count 21.4, 

hemoglobin 11.7, hematocrit 37.6, and platelet count 382,000.  Sodium 133, 

potassium 4, chloride 98, CO2 29, BUN 68, creatinine 1.41.  Glucose is 212.  

Calcium is 8.5.  Chest x-ray again shows diffuse bilateral infiltrates.





Objective





- Vital Signs


Vital signs: 


                                   Vital Signs











Temp  98.5 F   03/06/25 04:04


 


Pulse  94   03/06/25 12:25


 


Resp  18   03/06/25 09:46


 


BP  135/61   03/06/25 12:25


 


Pulse Ox  86 L  03/06/25 10:27


 


FiO2  50   03/04/25 00:32








                                 Intake & Output











 03/05/25 03/06/25 03/06/25





 18:59 06:59 18:59


 


Intake Total  1194 240


 


Output Total  800 300


 


Balance  394 -60


 


Weight  77 kg 


 


Intake:   


 


  Oral  1194 240


 


Output:   


 


  Urine  800 300


 


Other:   


 


  Voiding Method  Bedside Commode 





  Urinal 














- Exam





No acute distress, oriented 3.  Currently on nasal O2 at 2 L.





HEENT examination is grossly unremarkable.  Mucous membranes are moist.  No oral

lesions.





Neck supple.  Full range of motion.  No adenopathy thyromegaly or neck vein 

distention.





Cardiovascular examination reveals regular rhythm rate.  S1-S2 normal.  No S3 or

S4.  No discernible murmur noted.





Lungs reveal bibasilar crackles.  No wheezes.  No rhonchi.  Breath sounds are 

equal bilaterally.





Abdomen soft bowel sounds are heard.  No masses or tenderness.





Extremities are intact.  No cyanosis or clubbing.  There is 1+ edema.





Skin is without rash or lesion.





Neurologic examination is brief but nonfocal.





- Labs


CBC & Chem 7: 


                                 03/06/25 07:25 03/06/25 07:25


Labs: 


                  Abnormal Lab Results - Last 24 Hours (Table)











  03/05/25 03/05/25 03/06/25 Range/Units





  17:05 20:18 00:02 


 


WBC     (3.8-10.6)  k/uL


 


RBC     (4.30-5.90)  m/uL


 


Hgb     (13.0-17.5)  gm/dL


 


Hct     (39.0-53.0)  %


 


Neutrophils #     (1.3-7.7)  k/uL


 


Lymphocytes #     (1.0-4.8)  k/uL


 


Monocytes #     (0-1.0)  k/uL


 


Sodium     (137-145)  mmol/L


 


BUN     (9-20)  mg/dL


 


Creatinine     (0.66-1.25)  mg/dL


 


Glucose     (74-99)  mg/dL


 


POC Glucose (mg/dL)  273 H  316 H  171 H  ()  mg/dL














  03/06/25 03/06/25 03/06/25 Range/Units





  05:46 06:01 07:25 


 


WBC    21.4 H  (3.8-10.6)  k/uL


 


RBC    3.94 L  (4.30-5.90)  m/uL


 


Hgb    11.7 L  (13.0-17.5)  gm/dL


 


Hct    37.6 L  (39.0-53.0)  %


 


Neutrophils #    19.1 H  (1.3-7.7)  k/uL


 


Lymphocytes #    0.6 L  (1.0-4.8)  k/uL


 


Monocytes #    1.4 H  (0-1.0)  k/uL


 


Sodium     (137-145)  mmol/L


 


BUN     (9-20)  mg/dL


 


Creatinine     (0.66-1.25)  mg/dL


 


Glucose     (74-99)  mg/dL


 


POC Glucose (mg/dL)  64 L  57 L   ()  mg/dL














  03/06/25 03/06/25 03/06/25 Range/Units





  07:25 09:54 11:51 


 


WBC     (3.8-10.6)  k/uL


 


RBC     (4.30-5.90)  m/uL


 


Hgb     (13.0-17.5)  gm/dL


 


Hct     (39.0-53.0)  %


 


Neutrophils #     (1.3-7.7)  k/uL


 


Lymphocytes #     (1.0-4.8)  k/uL


 


Monocytes #     (0-1.0)  k/uL


 


Sodium  133 L    (137-145)  mmol/L


 


BUN  68 H    (9-20)  mg/dL


 


Creatinine  1.41 H    (0.66-1.25)  mg/dL


 


Glucose  157 H    (74-99)  mg/dL


 


POC Glucose (mg/dL)   242 H  212 H  ()  mg/dL














Assessment and Plan


Assessment: 





Acute hypoxemic respiratory failure secondary to an acute exacerbation of 

chronic obstructive pulmonary disease complicated by influenza A and underlying 

pneumonia.  





Acute influenza A infection.





Acute exacerbation of chronic systolic congestive heart failure with an ejection

fraction now 45-50%.





Troponin leak possible non-ST segment elevation myocardial infarction.





Acute kidney injury.





History of atrial fibrillation with previous cardioversion.





Chronic tobacco dependence with suspected underlying COPD.





Hypertension.





Hyperlipidemia.





Diabetes mellitus, type II.





Plan: 





Plan dated March 4, 2025.





The patient is seen today in room 378.  He is currently on 6 L.  Chest x-ray is 

clearly improved.  We will continue to follow make recommendations where 

appropriate.  All labs, x-rays, medications are reviewed.  The patient is not 

receiving any IV fluids.  Yesterday, he was on 10 L high flow.  BiPAP is in the 

room, with settings of 14/5 and 50%.  We will continue to follow make 

recommendations where appropriate.  The patients overall prognosis remains 

guarded.  Dictation was produced using Dragon dictation software.  Please excuse

any grammatical, word or spelling errors.





Plan dated March 6, 2025.





The patient appears to be doing relatively well.  The patient is stable.  He 

denies any increasing or worsening shortness of breath, cough, wheezing, chest 

tightness, or phlegm production.  The patient's chest x-ray has been reviewed.  

All labs, x-rays, and medications are reviewed.  The patient has been weaned 

down to 2 L.  We will continue to follow.  Prognosis is guarded.  No additional 

recommendations at this time.  Dictation was produced using Dragon dictation 

software.  Please excuse any grammatical, word or spelling errors.








Time with Patient: Less than 30

## 2025-03-06 NOTE — P.PN
Subjective


Progress Note Date: 03/06/25


Patient seen for follow-up for HERNAN and hyponatremia.


Renal function and sodium level remained stable.  Saturating the 90%'s on 2 L 

nasal cannula.


Creatinine this morning 1.41 and BUN 68.  Sodium this morning 133, stable.


No active complaints.  Oral intake fair.








Objective





- Vital Signs


Vital signs: 


                                   Vital Signs











Temp  98.5 F   03/06/25 04:04


 


Pulse  74   03/06/25 08:45


 


Resp  16   03/06/25 04:04


 


BP  128/68   03/06/25 04:04


 


Pulse Ox  88 L  03/06/25 04:04


 


FiO2  50   03/04/25 00:32








                                 Intake & Output











 03/05/25 03/06/25 03/06/25





 18:59 06:59 18:59


 


Intake Total  1194 


 


Output Total  800 


 


Balance  394 


 


Weight  77 kg 


 


Intake:   


 


  Oral  1194 


 


Output:   


 


  Urine  800 


 


Other:   


 


  Voiding Method  Bedside Commode 





  Urinal 














- Exam


Patient is awake, comfortable, no acute distress.


Heart: S1 and S2 heard


Lungs: Bilateral breath sounds are heard


Abdomen: Soft and nontender


Lower extremities: No edema


CNS: grossly intact








- Labs


CBC & Chem 7: 


                                 03/06/25 07:25





                                 03/06/25 07:25


Labs: 


                  Abnormal Lab Results - Last 24 Hours (Table)











  03/05/25 03/05/25 03/05/25 Range/Units





  11:54 17:05 20:18 


 


WBC     (3.8-10.6)  k/uL


 


RBC     (4.30-5.90)  m/uL


 


Hgb     (13.0-17.5)  gm/dL


 


Hct     (39.0-53.0)  %


 


Neutrophils #     (1.3-7.7)  k/uL


 


Lymphocytes #     (1.0-4.8)  k/uL


 


Monocytes #     (0-1.0)  k/uL


 


Sodium     (137-145)  mmol/L


 


BUN     (9-20)  mg/dL


 


Creatinine     (0.66-1.25)  mg/dL


 


Glucose     (74-99)  mg/dL


 


POC Glucose (mg/dL)  157 H  273 H  316 H  ()  mg/dL














  03/06/25 03/06/25 03/06/25 Range/Units





  00:02 05:46 06:01 


 


WBC     (3.8-10.6)  k/uL


 


RBC     (4.30-5.90)  m/uL


 


Hgb     (13.0-17.5)  gm/dL


 


Hct     (39.0-53.0)  %


 


Neutrophils #     (1.3-7.7)  k/uL


 


Lymphocytes #     (1.0-4.8)  k/uL


 


Monocytes #     (0-1.0)  k/uL


 


Sodium     (137-145)  mmol/L


 


BUN     (9-20)  mg/dL


 


Creatinine     (0.66-1.25)  mg/dL


 


Glucose     (74-99)  mg/dL


 


POC Glucose (mg/dL)  171 H  64 L  57 L  ()  mg/dL














  03/06/25 03/06/25 Range/Units





  07:25 07:25 


 


WBC  21.4 H   (3.8-10.6)  k/uL


 


RBC  3.94 L   (4.30-5.90)  m/uL


 


Hgb  11.7 L   (13.0-17.5)  gm/dL


 


Hct  37.6 L   (39.0-53.0)  %


 


Neutrophils #  19.1 H   (1.3-7.7)  k/uL


 


Lymphocytes #  0.6 L   (1.0-4.8)  k/uL


 


Monocytes #  1.4 H   (0-1.0)  k/uL


 


Sodium   133 L  (137-145)  mmol/L


 


BUN   68 H  (9-20)  mg/dL


 


Creatinine   1.41 H  (0.66-1.25)  mg/dL


 


Glucose   157 H  (74-99)  mg/dL


 


POC Glucose (mg/dL)    ()  mg/dL














Assessment and Plan


Assessment: 


#HERNAN secondary to ATN secondary to cardiorenal syndrome; renal function 

improving - currently stable, creatinine today 1.41.


#Stage IIIa chronic kidney disease, baseline creatinine 1.3-1.5


#Hyponatremia secondary to hypervolemia and acute respiratory infection


#Sepsis secondary to influenza type a pneumonia, s/p Tamiflu


#Acute hypoxic respiratory failure secondary to above


#HFrEF w/EF 45 to 50%


#Type II NSTEMI





Plan: 


-Patient appears to be euvolemic at this time. 


-Recheck labs in a.m.


-Maintain fluid restriction 2000 cc


-Avoid nephrotoxic agents


-Stable for discharge from nephrology standpoint

## 2025-03-07 VITALS — SYSTOLIC BLOOD PRESSURE: 130 MMHG | DIASTOLIC BLOOD PRESSURE: 71 MMHG | TEMPERATURE: 97.7 F

## 2025-03-07 VITALS — HEART RATE: 74 BPM

## 2025-03-07 LAB
ANION GAP SERPL CALC-SCNC: 4 MMOL/L
BASOPHILS # BLD AUTO: 0 K/UL (ref 0–0.2)
BASOPHILS NFR BLD AUTO: 0 %
BUN SERPL-SCNC: 67 MG/DL (ref 9–20)
CALCIUM SPEC-MCNC: 8.7 MG/DL (ref 8.4–10.2)
CHLORIDE SERPL-SCNC: 99 MMOL/L (ref 98–107)
CO2 SERPL-SCNC: 31 MMOL/L (ref 22–30)
EOSINOPHIL # BLD AUTO: 0 K/UL (ref 0–0.7)
EOSINOPHIL NFR BLD AUTO: 0 %
ERYTHROCYTE [DISTWIDTH] IN BLOOD BY AUTOMATED COUNT: 3.76 M/UL (ref 4.3–5.9)
ERYTHROCYTE [DISTWIDTH] IN BLOOD: 13.1 % (ref 11.5–15.5)
GLUCOSE BLD-MCNC: 114 MG/DL (ref 70–110)
GLUCOSE BLD-MCNC: 84 MG/DL (ref 70–110)
GLUCOSE SERPL-MCNC: 78 MG/DL (ref 74–99)
HCT VFR BLD AUTO: 35.7 % (ref 39–53)
HGB BLD-MCNC: 11.1 GM/DL (ref 13–17.5)
LYMPHOCYTES # SPEC AUTO: 0.8 K/UL (ref 1–4.8)
LYMPHOCYTES NFR SPEC AUTO: 5 %
MCH RBC QN AUTO: 29.4 PG (ref 25–35)
MCHC RBC AUTO-ENTMCNC: 31 G/DL (ref 31–37)
MCV RBC AUTO: 94.8 FL (ref 80–100)
MONOCYTES # BLD AUTO: 0.9 K/UL (ref 0–1)
MONOCYTES NFR BLD AUTO: 5 %
NEUTROPHILS # BLD AUTO: 14.6 K/UL (ref 1.3–7.7)
NEUTROPHILS NFR BLD AUTO: 88 %
PLATELET # BLD AUTO: 389 K/UL (ref 150–450)
POTASSIUM SERPL-SCNC: 4.1 MMOL/L (ref 3.5–5.1)
SODIUM SERPL-SCNC: 134 MMOL/L (ref 137–145)
WBC # BLD AUTO: 16.6 K/UL (ref 3.8–10.6)

## 2025-03-07 NOTE — P.DS
Providers


Date of admission: 


02/24/25 00:25





Attending physician: 


Ailyn Arce MD





Consults: 





                                        





02/24/25 00:25


Consult Physician Routine 


   Consulting Provider: Paola Joshua


   Consult Reason/Comments: hypoxia


   Do you want consulting provider notified?: Yes





02/24/25 03:54


Consult Physician Urgent 


   Consulting Provider: Syd Wilson


   Consult Reason/Comments: nstemi


   Do you want consulting provider notified?: Yes





02/25/25 11:59


Consult Physician Routine 


   Consulting Provider: Maria L Rangel


   Consult Reason/Comments: HERNAN


   Do you want consulting provider notified?: Yes











Primary care physician: 


Physician Nonstaff





Hospital Course: 








Hospital Course:





Patient is a 64-year-old male with a PMH of A-fib on Eliquis, COPD not on home 

oxygen, type II DM, hypertension, hyperlipidemia, CKD stage IIIa who presents to

the emergency room with complaints of shortness of breath and cough. 





Chest x-ray in the emergency room revealed cardiomegaly along with findings of 

multifocal pneumonia.  EKG revealed sinus rhythm at 93 bpm with intraventricular

conduction delay as reviewed by me.  Laboratory evaluation did reveal influenza 

type a positive, troponin 0.102, proBNP 12,900, lactic acid 3.3, glucose 228, 

BUN 25, creatinine 1.59, and WBC count 12.7 with neutrophilic predominance.  

Patient was reportedly severely hypoxic and in respiratory distress upon EMS 

arrival at the scene and was started on BiPAP.





Patient was admitted for evaluation of sepsis and acute hypoxic respiratory 

failure secondary to influenza pneumonia.  Ordered Tamiflu, DuoNebs, 

supplemental oxygen, blood and sputum cultures, procalcitonin to rule out 

bacterial infection.  Troponins trending upward on admission and cardiology was 

consulted echocardiogram ordered.  IV heparin initiated for 48 hours.  

Echocardiogram showed left ejection fraction estimated at 45 to 50% with 

moderately increased septal wall thickness and moderately reduced global left 

ventricular systolic function, biatrial dilatation, moderate tricuspid 

regurgitation.  Lasix for diuresis, strict ENE's, fluid restriction ordered.  

Patient noted to have increasing creatinine while on hospital stay and Lasix was

slowly discontinued.  Patient continued to progress to ARDS and was placed on IV

steroids.  Patient was able to complete course of Tamiflu and IV antibiotics for

bacterial pneumonia.  Patient's symptoms improved throughout hospital stay.  

Patient was cleared to discharge today with home oxygen and was prescribed 

aspirin, prednisone taper, Symbicort, Aldactone, Flonase, metformin, Lipitor, 

and Norvasc.  Patient is advised to follow-up with pulmonologist, cardiologist 

and PCP on outpatient basis.





Final Diagnosis:


#. Sepsis secondary to influenza pneumonia, unable to rule out bacterial 

pneumonia


#. Acute hypoxic respiratory failure, secondary to above


#. Severe ARDS secondary to above


#. Cardiorenal syndrome


#. Heart failure with reduced ejection fraction EF of 45 to 50% (2/2025)


#.  NSTEMI, type II


#.  A-fib


#.  COPD


#.  Type II DM


#.  Hypertension


#.  Hyperlipidemia





Physical examination:


Vital signs reviewed


General: non toxic, no distress, on nasal cannula


Derm: no unusual rashes/lesions, warm


Head: atraumatic, normocephalic, symmetric


Eyes: EOMI, anicteric sclera, pupils equal round reactive to light


ENT: Nose and ears atraumatic


Neck: No cervical lymphadenopathy, trachea midline, supple


Mouth: no lip lesion, mucus membranes moist


Cardiovascular: S1S2 reg, no murmur


Lungs: CTA bilateral, no rhonchi, no rales, no accessory muscle use


Abdominal: soft, nondistended, nontender to palpation, no guarding


Ext: muscle strength 5 out of 5 in all 4 extremities grossly, no gross muscle 

atrophy, no contractures,  positive dorsalis pedis pulse bilateral, no edema


Neuro:  CN II-XI grossly intact, no gross focal neuro deficits


Psych: Alert, oriented, appropriate affect and mood





I saw and evaluated the patient during the key and critical portions of this 

encounter, and discussed the case in detail with the resident author of this 

note, I agree with the Assessment and Plan, and my changes, if any, are 

highlighted in blue.





A total of 35 minutes were spent discharging this patient today


Patient Condition at Discharge: Serious





Plan - Discharge Summary


Discharge Rx Participant: No


New Discharge Prescriptions: 


New


   Aspirin 81 mg PO DAILY #90 tab


   predniSONE See Taper PO DAILY #32 tab


   Budesonide-Formot 160-4.5 Mcg [Symbicort 160-4.5 Mcg Inhaler] 2 puff 

INHALATION RT-BID 30 Days #1 each


   Spironolactone [Aldactone] 25 mg PO DAILY #90 tablet


   Fluticasone Nasal Spray [Flonase Nasal Spray] 2 spray EA NOSTRIL DAILY PRN  

ml


     PRN Reason: Allergy Symptoms


   metFORMIN HCL [Glucophage] 500 mg PO BID #60 tab


   Atorvastatin [Lipitor] 40 mg PO HS #90 tab


   amLODIPine [Norvasc] 5 mg PO DAILY #90 tab





Continue


   Amiodarone [Cordarone] 200 mg PO DAILY


   Metoprolol Succinate (ER) [Toprol XL] 25 mg PO DAILY


   Apixaban [Eliquis] 5 mg PO BID


   Furosemide [Lasix] 40 mg PO DAILY


   lisinopriL [Zestril] 20 mg PO BID


Discharge Medication List





Apixaban [Eliquis] 5 mg PO BID 11/17/22 [History]


Furosemide [Lasix] 40 mg PO DAILY 11/17/22 [History]


Amiodarone [Cordarone] 200 mg PO DAILY 08/07/23 [History]


Metoprolol Succinate (ER) [Toprol XL] 25 mg PO DAILY 02/24/25 [History]


lisinopriL [Zestril] 20 mg PO BID 02/24/25 [History]


Aspirin 81 mg PO DAILY #90 tab 03/07/25 [Rx]


Atorvastatin [Lipitor] 40 mg PO HS #90 tab 03/07/25 [Rx]


Budesonide-Formot 160-4.5 Mcg [Symbicort 160-4.5 Mcg Inhaler] 2 puff INHALATION 

RT-BID 30 Days #1 each 03/07/25 [Rx]


Fluticasone Nasal Spray [Flonase Nasal Spray] 2 spray EA NOSTRIL DAILY PRN  ml 

03/07/25 [Rx]


Spironolactone [Aldactone] 25 mg PO DAILY #90 tablet 03/07/25 [Rx]


amLODIPine [Norvasc] 5 mg PO DAILY #90 tab 03/07/25 [Rx]


metFORMIN HCL [Glucophage] 500 mg PO BID #60 tab 03/07/25 [Rx]


predniSONE See Taper PO DAILY #32 tab 03/07/25 [Rx]








Follow up Appointment(s)/Referral(s): 


Radha Linder MD [STAFF PHYSICIAN] - 03/28/25 9:00 am (Friday)


Geovani Ray DO [STAFF PHYSICIAN] - 03/20/25 1:45 pm (Thursday with NP)


[facility] [NON-STAFF] - 


Paola Boyd MD [RESIDENT] - 03/12/25 8:00 am (Family Medicine Office [address] [place] [address] St-Wednesday )


Patient Instructions/Handouts:  Type 2 Diabetes in Adults: New Diagnosis (DC)


Activity/Diet/Wound Care/Special Instructions: 


Wear 2L of oxygen per NC at all times


Discharge/Stand Alone Forms:  Who Do I Call?, Personal Care Manager,  Area 

PCPs


Discharge Disposition: HOME SELF-CARE

## 2025-03-07 NOTE — P.PN
Subjective


Progress Note Date: 03/07/25


Principal diagnosis: 





Pneumonia, respiratory failure, CHF.





This is a 64-year-old male patient with chronic and ongoing tobacco dependence, 

atrial fibrillation, diabetes mellitus, hypertension, hyperlipidemia who has a 1

week history of increasing shortness of breath, cough congestion fever and 

chills.  He came into the emergency room last evening with severe shortness of 

breath requiring BiPAP support which was 14/5 and 50% FiO2.  Chest x-ray 

revealed cardiomegaly and airspace disease most notably in the left mid lower 

lung zone suggestive of multifocal pneumonia and possible fluid volume overload.

 White count 10.5.  Hemoglobin 14.3.  Platelets 156.  Sodium 135.  Potassium 

4.3.  Bicarb 27.  BUN 29.  Creatinine 1.86.  Glucose 174.  Troponins 0.327, 

0.550, 0.5-2.  He is initiated on a heparin drip.  proBNP was 13,000.  

Procalcitonin 2.38.  He did test positive for influenza A.  Initiated on 

Tamiflu.  Initiated on ceftriaxone.  He is seen today in consultation in the 

emergency department.  He is currently sitting up on the stretcher.  Awake and 

alert.  He is on 6 L high flow nasal cannula.  He is breathing a bit easier 

today compared to yesterday.  Still with a loose nonproductive cough.  He did 

have a Tmax of 102.3.  Current temperature 99.1.  He is hemodynamically stable.





The patient is seen today February 25, 2025 in follow-up on the regular medical 

floor.  He is awake and alert in no acute distress.  Doing slightly better today

compared to yesterday.  He is still requiring 10 L high flow nasal cannula.  

Chest x-ray continues to show evidence of fluid volume overload/CHF.  

Echocardiogram reveals mildly impaired left ventricular systolic function with 

ejection fraction of 45 to 50%.  Moderately reduced global LV function.  Blood 

culture is pending.  White count 7.2.  Hemoglobin 14.1.  Platelets 168.  Sodium 

133. Potassium 4.2.  Bicarb 27.  BUN 44.  Creatinine 2.12.  Stool for occult 

blood was negative.  Since.  Anticoagulated with Eliquis.  Antibiotics in the 

form of ceftriaxone and azithromycin.  He is continued on Tamiflu.  Lasix 

discontinued per cardiology.





The patient is seen today February 26, 2025 in follow-up on the regular medical 

floor.  He is currently on BiPAP 14/5 and 80% FiO2.  He was trialed on 12 L high

flow earlier this morning with O2 saturations in the 70s and 80s.  Arterial 

blood gases on the 80% FiO2 revealed a PaO2 of 74, pCO2 of 38 and a pH of 7.42. 

A chest x-ray revealed similar findings of persistent cardiomegaly with 

bilateral multifocal acute infiltrates and/or edema.  He received Lasix 40 mg 

IVP x 1.  Blood culture reveals no growth to date.  White count 8.7.  Hemoglobin

12.8.  Platelets 139.  Sodium 128.  Potassium 3.6.  Bicarb 29.  BUN 45.  

Creatinine 1.99.  He is continued on DuoNeb inhalations.  Continued on Tamiflu. 

Initiated on Solu-Cortef.  Remains on antibiotics in the form of ceftriaxone and

azithromycin.  Anticoagulated with Eliquis.  Currently on a 1500 mL fluid 

restriction.  Nephrology is following.  ACE inhibitor remains on hold.





The patient is seen today February 27, 2025 in follow-up on the regular medical 

floor.  He is currently sitting up in bed.  Still requiring BiPAP support 

currently 14/5 and 60% FiO2.  He remains on DuoNeb and elations, Solu-Cortef, 

Tamiflu.  Anticoagulated with Eliquis.  Antibiotics in the form of ceftriaxone. 

Blood culture reveals no growth.  White count 7.6.  Hemoglobin 13.3.  Platelets 

167.  Sodium 133.  Potassium 3.9.  Bicarb 28.  BUN 47.  Creatinine 1.54.  

Glucose 132.  Chest x-ray continues to show worsening bilateral diffuse acute 

infiltrates and/or edema.  Developing ARDS is not excluded.  He is given Lasix 

40 mg IVP today.  Currently in a negative balance.





The patient is seen today February 28, 2025 in follow-up on the regular medical 

floor.  Resting in bed.  Currently afebrile.  Hemodynamically stable.  He 

continues to require BiPAP support 10/5 and 50% FiO2.  He has been tolerating 

some time off on 15 L high flow nasal cannula with O2 saturations in the upper 

80s.  Blood culture revealed no growth.  White count 6.7.  Hemoglobin 13.6.  

Platelets 185.  Sodium 133.  Potassium 4.0.  Bicarb 26.  BUN 54.  Creatinine 

1.44.  Glucose 178.  He is continued on DuoNeb inhalations, Pulmicort and 

Perforomist inhalations, Solu-Medrol.  Anticoagulated with Eliquis.  Continued 

on Tamiflu.  Completed a course of ceftriaxone.  Received Lasix 40 mg IVP x 1 

again today.  Remains in a negative balance.  X-ray continues to show bilateral 

diffuse acute infiltrates and/or edema.  ARDS is not excluded.  No significant 

change.





The patient is seen today March 1, 2025 in follow-up on the selective care unit.

 He is awake and alert in no acute distress.  He is sitting up in bed.  He is 

currently on 15 L high flow nasal cannula.  He has been off the BiPAP since 

earlier this morning which was set at 14/5 and 50% FiO2.  He has been slow to 

progress.  He did receive Lasix 40 mg IVP x 1 again today.  Blood culture 

revealed no growth.  White count 12.5.  Hemoglobin 12.8.  Platelets 219.  Sodium

132.  Potassium 3.9.  Bicarb 27.  BUN 58.  Creatinine 1.40.  Glucose 236.  He 

remains on DuoNeb inhalations, Pulmicort and performance and elations, IV Solu-

Medrol.  He is anticoagulated with Eliquis.  Remains on antibiotics in the form 

of ceftriaxone.  Chest x-ray continues to show similar diffuse patchy airspace 

opacities throughout the lungs likely acute infiltrates and/or edema.  

Developing ARDS is not excluded.





The patient is seen today March 2, 2025 in follow-up on the selective care unit.

 He is awake and alert in no acute distress.  Sitting up in bed.  Maintaining O2

saturations in the 90s on 12 L high flow nasal cannula.  He did wear BiPAP last 

night 14/5 and 50% FiO2.  He has been slow to progress but is feeling better tod

ay compared to yesterday.  Chest x-ray shows similar diffuse patchy airspace 

opacities representing acute infiltrates versus edema versus ARDS.  Remains on 

IV diuretics.  Currently in a negative balance. White count 14.4.  Hemoglobin 

12.7.  Platelets 242.  Sodium 133.  Potassium 3.9.  Bicarb 27.  BUN 53.  

Creatinine 1.31.  Glucose 238.  He remains on DuoNeb inhalations, Pulmicort and 

Perforomist inhalations, IV Solu-Medrol.  He is anticoagulated with Eliquis.  

Remains on ceftriaxone. 





The patient is seen today March 3, 2025 in follow-up on the selective care unit.

 He is awake.  Sitting up in bed.  Maintaining good O2 saturations in the 90s 

now on 10 L high flow nasal cannula.  Did wear the BiPAP from midnight to about 

4 AM.  Settings are 14/5 and 50% FiO2.  Procalcitonin 0.33.  He remains on 

DuoNeb inhalations, Pulmicort and Perforomist inhalations, Solu-Medrol.  Remains

on IV diuretics.  Anticoagulated with Eliquis.  Currently in a -500 mL balance.





Progress note dated March 4, 2025.





64-year-old male admitted with a diagnosis of pneumonia, CHF, and acute 

respiratory failure.  He is currently on 6 L nasal cannula.  He does have BiPAP 

in the room, with settings of 14/5, 50%.  He is not receiving any IV fluids.  He

is awake and alert.  No distress.  Current labs include a white count 18.1, 

hemoglobin 13, hematocrit 40.8, and platelet count of 387,000.  Sodium 137, 

potassium 4.3, chlorides 97, CO2 32, BUN 60, and creatinine 1.31.  Glucose is 

170.  Calcium 8.9.  Chest x-ray shows improved aeration of the lungs 

bilaterally.





Progress note dated March 6, 2025.





64-year-old male seen today in room 378.  The patient is on 2 L of oxygen.  No 

IV fluids.  Chest x-ray continues show bilateral infiltrates, but, his chest x-

ray is improved.  Clinically, the patient appears to be doing much better, and 

is much more stable.  He is not having any significant shortness of breath, co

ugh, wheezing, chest tightness, or phlegm production.  White count 21.4, 

hemoglobin 11.7, hematocrit 37.6, and platelet count 382,000.  Sodium 133, 

potassium 4, chloride 98, CO2 29, BUN 68, creatinine 1.41.  Glucose is 212.  

Calcium is 8.5.  Chest x-ray again shows diffuse bilateral infiltrates.





Progress note dated March 7, 2025.





64-year-old male seen today in room 378.  The patient is currently on 2 L of 

oxygen.  He is awake and alert.  No distress.  Today's labs include a white 

count of 16.6, hemoglobin 9.1, hematocrit 35.7, and a platelet count of 389,000.

 Sodium 134, potassium 4.1, chloride 79, CO2 31, BUN 67, and creatinine 1.25.  

Glucose was 84.  Calcium was 8.7.  Chest x-ray, has continued to show 

improvement.  His last chest x-ray was done on March 6, 2025.





Objective





- Vital Signs


Vital signs: 


                                   Vital Signs











Temp  97.7 F   03/07/25 09:07


 


Pulse  74   03/07/25 11:49


 


Resp  20   03/07/25 09:07


 


BP  130/71   03/07/25 09:07


 


Pulse Ox  90 L  03/07/25 09:07


 


FiO2  50   03/04/25 00:32








                                 Intake & Output











 03/06/25 03/07/25 03/07/25





 18:59 06:59 18:59


 


Intake Total 920 452 240


 


Output Total 475 750 


 


Balance 445 -298 240


 


Weight  97.5 kg 


 


Intake:   


 


  IV  10 


 


    Invasive Line 4  10 


 


  Oral 920 442 240


 


Output:   


 


  Urine 475 750 


 


Other:   


 


  Voiding Method  Bedside Commode 





  Urinal 


 


  # Voids  1 0


 


  # Bowel Movements   0














- Exam





No acute distress, oriented 3.  Currently on nasal O2 at 2 L.





HEENT examination is grossly unremarkable.  Mucous membranes are moist.  No oral

lesions.





Neck supple.  Full range of motion.  No adenopathy thyromegaly or neck vein 

distention.





Cardiovascular examination reveals regular rhythm rate.  S1-S2 normal.  No S3 or

S4.  No discernible murmur noted.





Lungs reveal bibasilar crackles.  No wheezes.  No rhonchi.  Breath sounds are 

equal bilaterally.





Abdomen soft bowel sounds are heard.  No masses or tenderness.





Extremities are intact.  No cyanosis or clubbing.  There is 1+ edema.





Skin is without rash or lesion.





Neurologic examination is brief but nonfocal.





- Labs


CBC & Chem 7: 


                                 03/07/25 06:42





                                 03/07/25 06:42


Labs: 


                  Abnormal Lab Results - Last 24 Hours (Table)











  03/06/25 03/06/25 03/07/25 Range/Units





  16:52 19:46 06:04 


 


WBC     (3.8-10.6)  k/uL


 


RBC     (4.30-5.90)  m/uL


 


Hgb     (13.0-17.5)  gm/dL


 


Hct     (39.0-53.0)  %


 


Neutrophils #     (1.3-7.7)  k/uL


 


Lymphocytes #     (1.0-4.8)  k/uL


 


Sodium     (137-145)  mmol/L


 


Carbon Dioxide     (22-30)  mmol/L


 


BUN     (9-20)  mg/dL


 


POC Glucose (mg/dL)  126 H  240 H  114 H  ()  mg/dL














  03/07/25 03/07/25 Range/Units





  06:42 06:42 


 


WBC  16.6 H   (3.8-10.6)  k/uL


 


RBC  3.76 L   (4.30-5.90)  m/uL


 


Hgb  11.1 L   (13.0-17.5)  gm/dL


 


Hct  35.7 L   (39.0-53.0)  %


 


Neutrophils #  14.6 H   (1.3-7.7)  k/uL


 


Lymphocytes #  0.8 L   (1.0-4.8)  k/uL


 


Sodium   134 L  (137-145)  mmol/L


 


Carbon Dioxide   31 H  (22-30)  mmol/L


 


BUN   67 H  (9-20)  mg/dL


 


POC Glucose (mg/dL)    ()  mg/dL














Assessment and Plan


Assessment: 





Acute hypoxemic respiratory failure secondary to an acute exacerbation of 

chronic obstructive pulmonary disease complicated by influenza A and underlying 

pneumonia.  





Acute influenza A infection.





Acute exacerbation of chronic systolic congestive heart failure with an ejection

fraction now 45-50%.





Troponin leak possible non-ST segment elevation myocardial infarction.





Acute kidney injury.





History of atrial fibrillation with previous cardioversion.





Chronic tobacco dependence with suspected underlying COPD.





Hypertension.





Hyperlipidemia.





Diabetes mellitus, type II.





Plan: 





Plan dated March 4, 2025.





The patient is seen today in room 378.  He is currently on 6 L.  Chest x-ray is 

clearly improved.  We will continue to follow make recommendations where 

appropriate.  All labs, x-rays, medications are reviewed.  The patient is not 

receiving any IV fluids.  Yesterday, he was on 10 L high flow.  BiPAP is in the 

room, with settings of 14/5 and 50%.  We will continue to follow make 

recommendations where appropriate.  The patients overall prognosis remains 

guarded.  Dictation was produced using Dragon dictation software.  Please excuse

any grammatical, word or spelling errors.





Plan dated March 6, 2025.





The patient appears to be doing relatively well.  The patient is stable.  He 

denies any increasing or worsening shortness of breath, cough, wheezing, chest 

tightness, or phlegm production.  The patient's chest x-ray has been reviewed.  

All labs, x-rays, and medications are reviewed.  The patient has been weaned 

down to 2 L.  We will continue to follow.  Prognosis is guarded.  No additional 

recommendations at this time.  Dictation was produced using Dragon dictation 

software.  Please excuse any grammatical, word or spelling errors.





Plan dated March 7, 2025.





The patient continues to show improvement.  He is currently on 2 L.  Saturations

are excellent.  From my perspective, the patient could be considered for 

possible discharge.  Labs, x-rays, and all medications have been reviewed.  We 

will continue to follow patient, should he not be discharged.  The patient's 

overall prognosis remains guarded.  The patient denies any shortness of breath, 

cough, wheezing, chest tightness, or phlegm production.  The patient also denies

any chest pain or pressure.  Dictation was produced using Dragon dictation 

software.  Please excuse any grammatical, word or spelling errors.








Time with Patient: Less than 30

## 2025-03-07 NOTE — P.PN
Subjective


Progress Note Date: 03/07/25


Patient seen for follow-up for HERNAN and hyponatremia.


Renal function and sodium level remained stable.  Saturating the 90%'s on 2 L 

nasal cannula.


Creatinine this morning 1.41 and BUN 68.  Sodium this morning 134, stable.


No active complaints.  Oral intake fair.








Objective





- Vital Signs


Vital signs: 


                                   Vital Signs











Temp  97.7 F   03/07/25 09:07


 


Pulse  75   03/07/25 09:07


 


Resp  20   03/07/25 09:07


 


BP  130/71   03/07/25 09:07


 


Pulse Ox  90 L  03/07/25 09:07


 


FiO2  50   03/04/25 00:32








                                 Intake & Output











 03/06/25 03/07/25 03/07/25





 18:59 06:59 18:59


 


Intake Total 920 452 240


 


Output Total 475 750 


 


Balance 445 -298 240


 


Weight  97.5 kg 


 


Intake:   


 


  IV  10 


 


    Invasive Line 4  10 


 


  Oral 920 442 240


 


Output:   


 


  Urine 475 750 


 


Other:   


 


  Voiding Method  Bedside Commode 





  Urinal 


 


  # Voids  1 0


 


  # Bowel Movements   0














- Exam


Patient is awake, comfortable, no acute distress.


Heart: S1 and S2 heard


Lungs: Bilateral breath sounds are heard


Abdomen: Soft and nontender


Lower extremities: No edema


CNS: grossly intact








- Labs


CBC & Chem 7: 


                                 03/07/25 06:42





                                 03/07/25 06:42


Labs: 


                  Abnormal Lab Results - Last 24 Hours (Table)











  03/06/25 03/06/25 03/06/25 Range/Units





  09:54 11:51 16:52 


 


WBC     (3.8-10.6)  k/uL


 


RBC     (4.30-5.90)  m/uL


 


Hgb     (13.0-17.5)  gm/dL


 


Hct     (39.0-53.0)  %


 


Neutrophils #     (1.3-7.7)  k/uL


 


Lymphocytes #     (1.0-4.8)  k/uL


 


Sodium     (137-145)  mmol/L


 


Carbon Dioxide     (22-30)  mmol/L


 


BUN     (9-20)  mg/dL


 


POC Glucose (mg/dL)  242 H  212 H  126 H  ()  mg/dL














  03/06/25 03/07/25 03/07/25 Range/Units





  19:46 06:04 06:42 


 


WBC    16.6 H  (3.8-10.6)  k/uL


 


RBC    3.76 L  (4.30-5.90)  m/uL


 


Hgb    11.1 L  (13.0-17.5)  gm/dL


 


Hct    35.7 L  (39.0-53.0)  %


 


Neutrophils #    14.6 H  (1.3-7.7)  k/uL


 


Lymphocytes #    0.8 L  (1.0-4.8)  k/uL


 


Sodium     (137-145)  mmol/L


 


Carbon Dioxide     (22-30)  mmol/L


 


BUN     (9-20)  mg/dL


 


POC Glucose (mg/dL)  240 H  114 H   ()  mg/dL














  03/07/25 Range/Units





  06:42 


 


WBC   (3.8-10.6)  k/uL


 


RBC   (4.30-5.90)  m/uL


 


Hgb   (13.0-17.5)  gm/dL


 


Hct   (39.0-53.0)  %


 


Neutrophils #   (1.3-7.7)  k/uL


 


Lymphocytes #   (1.0-4.8)  k/uL


 


Sodium  134 L  (137-145)  mmol/L


 


Carbon Dioxide  31 H  (22-30)  mmol/L


 


BUN  67 H  (9-20)  mg/dL


 


POC Glucose (mg/dL)   ()  mg/dL














Assessment and Plan


Assessment: 


#HERNAN secondary to ATN secondary to cardiorenal syndrome; renal function 

improving - currently stable, creatinine today 1.25.


#Stage IIIa chronic kidney disease, baseline creatinine 1.3-1.5


#Hyponatremia secondary to hypervolemia and acute respiratory infection


#Sepsis secondary to influenza type a pneumonia, s/p Tamiflu


#Acute hypoxic respiratory failure secondary to above


#HFrEF w/EF 45 to 50%


#Type II NSTEMI





Plan: 


-Patient appears to be euvolemic at this time. 


-Recheck labs in a.m.


-Maintain fluid restriction 2000 cc


-Avoid nephrotoxic agents


-Stable for discharge from nephrology standpoint

## 2025-03-11 ENCOUNTER — HOSPITAL ENCOUNTER (INPATIENT)
Dept: HOSPITAL 47 - EC | Age: 65
LOS: 15 days | Discharge: NURSING, REHAB OR LONG-TERM CARE | DRG: 871 | End: 2025-03-26
Attending: HOSPITALIST | Admitting: HOSPITALIST
Payer: MEDICARE

## 2025-03-11 VITALS — BODY MASS INDEX: 33.8 KG/M2

## 2025-03-11 DIAGNOSIS — R04.0: ICD-10-CM

## 2025-03-11 DIAGNOSIS — J44.0: ICD-10-CM

## 2025-03-11 DIAGNOSIS — I27.22: ICD-10-CM

## 2025-03-11 DIAGNOSIS — Z99.81: ICD-10-CM

## 2025-03-11 DIAGNOSIS — E87.1: ICD-10-CM

## 2025-03-11 DIAGNOSIS — J12.9: ICD-10-CM

## 2025-03-11 DIAGNOSIS — E87.20: ICD-10-CM

## 2025-03-11 DIAGNOSIS — I07.1: ICD-10-CM

## 2025-03-11 DIAGNOSIS — J44.1: ICD-10-CM

## 2025-03-11 DIAGNOSIS — Z79.51: ICD-10-CM

## 2025-03-11 DIAGNOSIS — R65.20: ICD-10-CM

## 2025-03-11 DIAGNOSIS — I44.7: ICD-10-CM

## 2025-03-11 DIAGNOSIS — I50.23: ICD-10-CM

## 2025-03-11 DIAGNOSIS — I48.19: ICD-10-CM

## 2025-03-11 DIAGNOSIS — F17.210: ICD-10-CM

## 2025-03-11 DIAGNOSIS — I13.0: ICD-10-CM

## 2025-03-11 DIAGNOSIS — E78.5: ICD-10-CM

## 2025-03-11 DIAGNOSIS — I77.89: ICD-10-CM

## 2025-03-11 DIAGNOSIS — A41.89: Primary | ICD-10-CM

## 2025-03-11 DIAGNOSIS — J10.08: ICD-10-CM

## 2025-03-11 DIAGNOSIS — Z87.01: ICD-10-CM

## 2025-03-11 DIAGNOSIS — Z66: ICD-10-CM

## 2025-03-11 DIAGNOSIS — Z79.01: ICD-10-CM

## 2025-03-11 DIAGNOSIS — Z79.82: ICD-10-CM

## 2025-03-11 DIAGNOSIS — E11.22: ICD-10-CM

## 2025-03-11 DIAGNOSIS — J96.21: ICD-10-CM

## 2025-03-11 DIAGNOSIS — N17.0: ICD-10-CM

## 2025-03-11 DIAGNOSIS — I21.A1: ICD-10-CM

## 2025-03-11 DIAGNOSIS — J84.9: ICD-10-CM

## 2025-03-11 DIAGNOSIS — N18.31: ICD-10-CM

## 2025-03-11 DIAGNOSIS — T50.916A: ICD-10-CM

## 2025-03-11 DIAGNOSIS — I26.93: ICD-10-CM

## 2025-03-11 DIAGNOSIS — E11.65: ICD-10-CM

## 2025-03-11 DIAGNOSIS — Z91.120: ICD-10-CM

## 2025-03-11 DIAGNOSIS — T38.0X5A: ICD-10-CM

## 2025-03-11 DIAGNOSIS — Z87.19: ICD-10-CM

## 2025-03-11 DIAGNOSIS — Z79.84: ICD-10-CM

## 2025-03-11 DIAGNOSIS — Z79.899: ICD-10-CM

## 2025-03-11 DIAGNOSIS — Q21.12: ICD-10-CM

## 2025-03-11 DIAGNOSIS — D62: ICD-10-CM

## 2025-03-11 DIAGNOSIS — I42.8: ICD-10-CM

## 2025-03-11 LAB
ALBUMIN SERPL-MCNC: 3 G/DL (ref 3.5–5)
ALP SERPL-CCNC: 65 U/L (ref 38–126)
ALT SERPL-CCNC: 37 U/L (ref 4–49)
ANION GAP SERPL CALC-SCNC: 8 MMOL/L
APTT BLD: 28 SEC (ref 22–30)
AST SERPL-CCNC: 39 U/L (ref 17–59)
BASOPHILS # BLD AUTO: 0 K/UL (ref 0–0.2)
BASOPHILS NFR BLD AUTO: 0 %
BUN SERPL-SCNC: 30 MG/DL (ref 9–20)
CALCIUM SPEC-MCNC: 8 MG/DL (ref 8.4–10.2)
CHLORIDE SERPL-SCNC: 101 MMOL/L (ref 98–107)
CO2 SERPL-SCNC: 24 MMOL/L (ref 22–30)
EOSINOPHIL # BLD AUTO: 0.1 K/UL (ref 0–0.7)
EOSINOPHIL NFR BLD AUTO: 0 %
ERYTHROCYTE [DISTWIDTH] IN BLOOD BY AUTOMATED COUNT: 3.76 M/UL (ref 4.3–5.9)
ERYTHROCYTE [DISTWIDTH] IN BLOOD: 12.8 % (ref 11.5–15.5)
GLUCOSE SERPL-MCNC: 135 MG/DL (ref 74–99)
HCO3 BLDV-SCNC: 26 MMOL/L (ref 24–28)
HCT VFR BLD AUTO: 35.2 % (ref 39–53)
HGB BLD-MCNC: 11 GM/DL (ref 13–17.5)
INR PPP: 1.1 (ref ?–1.2)
LYMPHOCYTES # SPEC AUTO: 1.3 K/UL (ref 1–4.8)
LYMPHOCYTES NFR SPEC AUTO: 6 %
MCH RBC QN AUTO: 29.4 PG (ref 25–35)
MCHC RBC AUTO-ENTMCNC: 31.4 G/DL (ref 31–37)
MCV RBC AUTO: 93.7 FL (ref 80–100)
MONOCYTES # BLD AUTO: 0.9 K/UL (ref 0–1)
MONOCYTES NFR BLD AUTO: 4 %
NEUTROPHILS # BLD AUTO: 20 K/UL (ref 1.3–7.7)
NEUTROPHILS NFR BLD AUTO: 88 %
NT-PROBNP SERPL-MCNC: 8810 PG/ML
PCO2 BLDV: 32 MMHG (ref 37–51)
PH BLDV: 7.52 [PH] (ref 7.31–7.41)
PLATELET # BLD AUTO: 322 K/UL (ref 150–450)
POTASSIUM SERPL-SCNC: 4.2 MMOL/L (ref 3.5–5.1)
PROT SERPL-MCNC: 6.1 G/DL (ref 6.3–8.2)
PT BLD: 11.8 SEC (ref 10–12.5)
SODIUM SERPL-SCNC: 133 MMOL/L (ref 137–145)
WBC # BLD AUTO: 22.6 K/UL (ref 3.8–10.6)

## 2025-03-11 PROCEDURE — 86255 FLUORESCENT ANTIBODY SCREEN: CPT

## 2025-03-11 PROCEDURE — 83550 IRON BINDING TEST: CPT

## 2025-03-11 PROCEDURE — 99291 CRITICAL CARE FIRST HOUR: CPT

## 2025-03-11 PROCEDURE — 84145 PROCALCITONIN (PCT): CPT

## 2025-03-11 PROCEDURE — 36410 VNPNXR 3YR/> PHY/QHP DX/THER: CPT

## 2025-03-11 PROCEDURE — 82728 ASSAY OF FERRITIN: CPT

## 2025-03-11 PROCEDURE — 85730 THROMBOPLASTIN TIME PARTIAL: CPT

## 2025-03-11 PROCEDURE — 80048 BASIC METABOLIC PNL TOTAL CA: CPT

## 2025-03-11 PROCEDURE — 86038 ANTINUCLEAR ANTIBODIES: CPT

## 2025-03-11 PROCEDURE — 93308 TTE F-UP OR LMTD: CPT

## 2025-03-11 PROCEDURE — 86140 C-REACTIVE PROTEIN: CPT

## 2025-03-11 PROCEDURE — 86850 RBC ANTIBODY SCREEN: CPT

## 2025-03-11 PROCEDURE — 71045 X-RAY EXAM CHEST 1 VIEW: CPT

## 2025-03-11 PROCEDURE — 71275 CT ANGIOGRAPHY CHEST: CPT

## 2025-03-11 PROCEDURE — 86431 RHEUMATOID FACTOR QUANT: CPT

## 2025-03-11 PROCEDURE — 85025 COMPLETE CBC W/AUTO DIFF WBC: CPT

## 2025-03-11 PROCEDURE — 84100 ASSAY OF PHOSPHORUS: CPT

## 2025-03-11 PROCEDURE — 96366 THER/PROPH/DIAG IV INF ADDON: CPT

## 2025-03-11 PROCEDURE — 82040 ASSAY OF SERUM ALBUMIN: CPT

## 2025-03-11 PROCEDURE — 94660 CPAP INITIATION&MGMT: CPT

## 2025-03-11 PROCEDURE — 36415 COLL VENOUS BLD VENIPUNCTURE: CPT

## 2025-03-11 PROCEDURE — 82803 BLOOD GASES ANY COMBINATION: CPT

## 2025-03-11 PROCEDURE — 94760 N-INVAS EAR/PLS OXIMETRY 1: CPT

## 2025-03-11 PROCEDURE — 96367 TX/PROPH/DG ADDL SEQ IV INF: CPT

## 2025-03-11 PROCEDURE — 85610 PROTHROMBIN TIME: CPT

## 2025-03-11 PROCEDURE — 94640 AIRWAY INHALATION TREATMENT: CPT

## 2025-03-11 PROCEDURE — 93970 EXTREMITY STUDY: CPT

## 2025-03-11 PROCEDURE — 83880 ASSAY OF NATRIURETIC PEPTIDE: CPT

## 2025-03-11 PROCEDURE — 86900 BLOOD TYPING SEROLOGIC ABO: CPT

## 2025-03-11 PROCEDURE — 83605 ASSAY OF LACTIC ACID: CPT

## 2025-03-11 PROCEDURE — 87040 BLOOD CULTURE FOR BACTERIA: CPT

## 2025-03-11 PROCEDURE — 83735 ASSAY OF MAGNESIUM: CPT

## 2025-03-11 PROCEDURE — 86698 HISTOPLASMA ANTIBODY: CPT

## 2025-03-11 PROCEDURE — 76770 US EXAM ABDO BACK WALL COMP: CPT

## 2025-03-11 PROCEDURE — 87340 HEPATITIS B SURFACE AG IA: CPT

## 2025-03-11 PROCEDURE — 82805 BLOOD GASES W/O2 SATURATION: CPT

## 2025-03-11 PROCEDURE — 86901 BLOOD TYPING SEROLOGIC RH(D): CPT

## 2025-03-11 PROCEDURE — 96368 THER/DIAG CONCURRENT INF: CPT

## 2025-03-11 PROCEDURE — 85379 FIBRIN DEGRADATION QUANT: CPT

## 2025-03-11 PROCEDURE — 87449 NOS EACH ORGANISM AG IA: CPT

## 2025-03-11 PROCEDURE — 96375 TX/PRO/DX INJ NEW DRUG ADDON: CPT

## 2025-03-11 PROCEDURE — 86920 COMPATIBILITY TEST SPIN: CPT

## 2025-03-11 PROCEDURE — 85027 COMPLETE CBC AUTOMATED: CPT

## 2025-03-11 PROCEDURE — 87636 SARSCOV2 & INF A&B AMP PRB: CPT

## 2025-03-11 PROCEDURE — 81001 URINALYSIS AUTO W/SCOPE: CPT

## 2025-03-11 PROCEDURE — 86706 HEP B SURFACE ANTIBODY: CPT

## 2025-03-11 PROCEDURE — 83540 ASSAY OF IRON: CPT

## 2025-03-11 PROCEDURE — 83036 HEMOGLOBIN GLYCOSYLATED A1C: CPT

## 2025-03-11 PROCEDURE — 96365 THER/PROPH/DIAG IV INF INIT: CPT

## 2025-03-11 PROCEDURE — 93005 ELECTROCARDIOGRAM TRACING: CPT

## 2025-03-11 PROCEDURE — 96376 TX/PRO/DX INJ SAME DRUG ADON: CPT

## 2025-03-11 PROCEDURE — 76937 US GUIDE VASCULAR ACCESS: CPT

## 2025-03-11 PROCEDURE — 5A09357 ASSISTANCE WITH RESPIRATORY VENTILATION, LESS THAN 24 CONSECUTIVE HOURS, CONTINUOUS POSITIVE AIRWAY PRESSURE: ICD-10-PCS

## 2025-03-11 PROCEDURE — 36600 WITHDRAWAL OF ARTERIAL BLOOD: CPT

## 2025-03-11 PROCEDURE — 80053 COMPREHEN METABOLIC PANEL: CPT

## 2025-03-11 PROCEDURE — 84484 ASSAY OF TROPONIN QUANT: CPT

## 2025-03-11 RX ADMIN — LEVOFLOXACIN SCH: 750 INJECTION, SOLUTION INTRAVENOUS at 22:10

## 2025-03-11 RX ADMIN — CEFAZOLIN SCH MLS/HR: 330 INJECTION, POWDER, FOR SOLUTION INTRAMUSCULAR; INTRAVENOUS at 22:54

## 2025-03-11 RX ADMIN — LEVOFLOXACIN STA MLS/HR: 750 INJECTION, SOLUTION INTRAVENOUS at 23:23

## 2025-03-11 RX ADMIN — POTASSIUM CHLORIDE SCH MLS/HR: 14.9 INJECTION, SOLUTION INTRAVENOUS at 22:53

## 2025-03-11 RX ADMIN — PIPERACILLIN AND TAZOBACTAM STA MLS/HR: 3; .375 INJECTION, POWDER, FOR SOLUTION INTRAVENOUS at 22:57

## 2025-03-11 RX ADMIN — IPRATROPIUM BROMIDE AND ALBUTEROL SULFATE STA ML: .5; 3 SOLUTION RESPIRATORY (INHALATION) at 21:47

## 2025-03-11 RX ADMIN — CEFAZOLIN ONE MLS/HR: 330 INJECTION, POWDER, FOR SOLUTION INTRAMUSCULAR; INTRAVENOUS at 23:28

## 2025-03-11 NOTE — CT
EXAM:

CT Angiography Chest With Intravenous Contrast

 

CLINICAL HISTORY:

ITS.REASON CT Reason: PE

 

TECHNIQUE:

Axial computed tomographic angiography images of the chest with 

intravenous contrast.  CTDI is 26.6 mGy and DLP is 542.6 mGy-cm.  This CT 

exam was performed using one or more of the following dose reduction 

techniques: automated exposure control, adjustment of the mA and/or kV 

according to patient size, and/or use of iterative reconstruction 

technique.

MIP reconstructed images were created and reviewed.

 

COMPARISON:

No relevant prior studies available.

 

FINDINGS:

Pulmonary arteries:  Suboptimal pulmonary artery opacification.  Main 

pulmonary mildly enlarged suggesting pulmonary arterial hypertension.  

Acute segmental and subsegmental pulmonary emboli within the right middle 

and right lower lobes.

Aorta:  Thoracic aortic atherosclerosis without aneurysm or dissection.

Lungs:  Chronic interstitial lung disease with nonspecific features.  

There are stacked cystic changes predominantly in the upper lung zones.  

There is no basilar predominance.  There are bilateral coarse reticular 

opacities as well as geographic regions of ground-glass lung attenuation. 

 No pulmonary infarct identified.

Pleural space:  Trace pleural effusions.  No pneumothorax.

Heart:  No RV strain.  Mild cardiomegaly.  Coronary artery 

atherosclerosis.  No pericardial effusion.

Bones/joints:  No acute fracture or dislocation.

Soft tissues:  Unremarkable.

Lymph nodes:  Unremarkable.  No enlarged lymph nodes.

Gallbladder and bile ducts:  Cholecystectomy.

 

IMPRESSION:     

1.  Acute segmental and subsegmental pulmonary emboli within the right 

middle and right lower lobes.  No saddle embolus.  No RV strain.

2.  Chronic interstitial lung disease with nonspecific features.  

Recommend pulmonology follow-up.

 

<MYCVCSECTION>

 

Communications:

 

03/11/25 23:13 Call Doctor Regarding Pulmonary Embolism, called  Dr. Nielson on 03/11 23:13 (-04:00)

## 2025-03-11 NOTE — ED
SOB HPI





- General


Chief Complaint: Shortness of Breath


Stated Complaint: SHIREEN


Source: patient, RN notes reviewed


Mode of arrival: EMS





- History of Present Illness


Initial Comments: 





This is a 64-year-old male presenting in severe dyspnea and shortness of breath 

initial oxygen by EMS was in the low 70s, patient has been on home O2 after 

discharge for admission with influenza pneumonia, patient states his shortness 

of breath has been increasing for the last few days worse today, no recurrent 

fevers no chest pain.





Patient states during recent hospital admission His oxygen was not improving and

while he was placed on O2


MD Complaint: shortness of breath, cough, "asthma attack" (Possible history of 

COPD.  Currently on home O2)


-: hour(s)


Severity: severe


Severity scale (1-10): 10


Consistency: constant


Improves With: rest, bronchodilators


Worsens With: exertion, movement


Known History Of: COPD, congestive heart failure, recurrent pneumonia, other 

(Recent influenza with pneumonia)


Context: recent URI, recent illness


Associated Symptoms: cough, sputum production


Treatments Prior to Arrival: oxygen, bronchodilator, NIPPV





- Related Data


                                Home Medications











 Medication  Instructions  Recorded  Confirmed


 


Apixaban [Eliquis] 5 mg PO BID 11/17/22 03/12/25


 


Furosemide [Lasix] 40 mg PO DAILY 11/17/22 03/12/25


 


Amiodarone [Cordarone] 200 mg PO DAILY 08/07/23 03/12/25


 


Metoprolol Succinate (ER) [Toprol 25 mg PO DAILY 02/24/25 03/12/25





XL]   


 


lisinopriL [Zestril] 20 mg PO BID 02/24/25 03/12/25








                                  Previous Rx's











 Medication  Instructions  Recorded


 


Aspirin 81 mg PO DAILY #90 tab 03/07/25


 


Atorvastatin [Lipitor] 40 mg PO HS #90 tab 03/07/25


 


Budesonide-Formot 160-4.5 Mcg 2 puff INHALATION RT-BID 30 Days 03/07/25





[Symbicort 160-4.5 Mcg Inhaler] #1 each 


 


Fluticasone Nasal Spray [Flonase 2 spray EA NOSTRIL DAILY PRN  ml 03/07/25





Nasal Spray]  


 


Spironolactone [Aldactone] 25 mg PO DAILY #90 tablet 03/07/25


 


amLODIPine [Norvasc] 5 mg PO DAILY #90 tab 03/07/25


 


metFORMIN HCL [Glucophage] 500 mg PO BID #60 tab 03/07/25


 


predniSONE See Taper PO DAILY #32 tab 03/07/25


 


Apixaban [Eliquis Starter Pack 5 - 10 mg PO AS DIRECTED 30 Days 03/13/25





(for VTE)] #1 each 











                                    Allergies











Allergy/AdvReac Type Severity Reaction Status Date / Time


 


No Known Allergies Allergy   Verified 03/11/25 20:36














Review of Systems


ROS Statement: 


Those systems with pertinent positive or pertinent negative responses have been 

documented in the HPI.





ROS Other: All systems not noted in ROS Statement are negative.





Past Medical History


Past Medical History: Atrial Fibrillation, Diabetes Mellitus, Hyperlipidemia, 

Hypertension, Pneumonia, Supraventricular Tachycardia (SVT)


Additional Past Medical History / Comment(s): a fib, causes shortness of breath,

can feel irregular heartbeat


History of Any Multi-Drug Resistant Organisms: None Reported


Past Surgical History: Cholecystectomy, Heart Catheterization


Additional Past Surgical History / Comment(s): Colonoscopy


Past Anesthesia/Blood Transfusion Reactions: No Reported Reaction


Past Psychological History: No Psychological Hx Reported


Smoking Status: Current every day smoker


Past Alcohol Use History: Occasional


Past Drug Use History: Marijuana





- Past Family History


  ** Father


Family Medical History: No Reported History





  ** Mother


Family Medical History: Coronary Artery Disease (CAD)





General Exam


General appearance: alert, anxious, in distress


Head exam: Present: atraumatic, normocephalic, normal inspection


Eye exam: Present: normal appearance, PERRL, EOMI.  Absent: scleral icterus, 

conjunctival injection, periorbital swelling


ENT exam: Present: normal exam, mucous membranes moist


Neck exam: Present: normal inspection.  Absent: tenderness, meningismus, 

lymphadenopathy


Respiratory exam: Present: normal lung sounds bilaterally, respiratory distress,

wheezes, rhonchi, accessory muscle use, decreased breath sounds, prolonged 

expiratory.  Absent: rales, stridor


Cardiovascular Exam: Present: regular rate, normal rhythm, normal heart sounds. 

Absent: systolic murmur, diastolic murmur, rubs, gallop, clicks


GI/Abdominal exam: Present: soft, normal bowel sounds.  Absent: distended, 

tenderness, guarding, rebound, rigid


Extremities exam: Present: normal inspection, full ROM, normal capillary refill.

 Absent: tenderness, pedal edema, joint swelling, calf tenderness


Back exam: Present: normal inspection


Neurological exam: Present: alert, oriented X3, CN II-XII intact


Psychiatric exam: Present: normal affect, normal mood


Skin exam: Present: warm, dry, intact, normal color.  Absent: rash





Course


                                   Vital Signs











  03/11/25 03/11/25 03/11/25





  20:33 20:37 20:39


 


Temperature 98.2 F  


 


Pulse Rate 80  


 


Respiratory 26 H  26 H





Rate   


 


Blood Pressure 163/77  


 


O2 Sat by Pulse 90 L 96 





Oximetry   


 


Fraction of   





Inspired Oxygen   





(FIO2)   














  03/11/25 03/11/25 03/11/25





  20:41 21:15 21:47


 


Temperature   


 


Pulse Rate   61


 


Respiratory   





Rate   


 


Blood Pressure   


 


O2 Sat by Pulse   





Oximetry   


 


Fraction of 100 80 





Inspired Oxygen   





(FIO2)   














  03/11/25 03/11/25 03/11/25





  22:03 22:04 22:30


 


Temperature   


 


Pulse Rate 67  66


 


Respiratory   22





Rate   


 


Blood Pressure   138/68


 


O2 Sat by Pulse   99





Oximetry   


 


Fraction of  60 





Inspired Oxygen   





(FIO2)   














  03/11/25 03/12/25 03/12/25





  23:00 00:00 00:06


 


Temperature   


 


Pulse Rate 68 68 


 


Respiratory 22 22 





Rate   


 


Blood Pressure 152/75 141/78 


 


O2 Sat by Pulse 99 95 





Oximetry   


 


Fraction of   60





Inspired Oxygen   





(FIO2)   














  03/12/25 03/12/25 03/12/25





  01:00 02:00 03:00


 


Temperature   


 


Pulse Rate 64 61 56 L


 


Respiratory 24 22 22





Rate   


 


Blood Pressure 144/74 143/92 131/71


 


O2 Sat by Pulse 95 98 97





Oximetry   


 


Fraction of   





Inspired Oxygen   





(FIO2)   














  03/12/25 03/12/25 03/12/25





  03:52 04:00 05:00


 


Temperature  98.9 F 


 


Pulse Rate  68 57 L


 


Respiratory  22 18





Rate   


 


Blood Pressure  135/85 147/76


 


O2 Sat by Pulse  98 99





Oximetry   


 


Fraction of 60  





Inspired Oxygen   





(FIO2)   














  03/12/25 03/12/25 03/12/25





  05:37 05:39 05:48


 


Temperature   


 


Pulse Rate 56 L  


 


Respiratory   





Rate   


 


Blood Pressure   


 


O2 Sat by Pulse   





Oximetry   


 


Fraction of  40 60





Inspired Oxygen   





(FIO2)   














  03/12/25 03/12/25 03/12/25





  05:49 06:00 07:37


 


Temperature   


 


Pulse Rate 66 59 L 59 L


 


Respiratory  20 19





Rate   


 


Blood Pressure  156/72 130/68


 


O2 Sat by Pulse  99 99





Oximetry   


 


Fraction of   





Inspired Oxygen   





(FIO2)   














  03/12/25 03/12/25 03/12/25





  08:10 08:23 08:30


 


Temperature   


 


Pulse Rate 55 L 60 62


 


Respiratory   16





Rate   


 


Blood Pressure   140/66


 


O2 Sat by Pulse   96





Oximetry   


 


Fraction of 60  





Inspired Oxygen   





(FIO2)   














  03/12/25 03/12/25 03/12/25





  08:35 09:30 10:30


 


Temperature   


 


Pulse Rate 64 73 69


 


Respiratory  20 23





Rate   


 


Blood Pressure  148/70 131/72


 


O2 Sat by Pulse  86 L 92 L





Oximetry   


 


Fraction of   





Inspired Oxygen   





(FIO2)   














  03/12/25 03/12/25 03/12/25





  11:30 11:33 11:38


 


Temperature   


 


Pulse Rate 73 71 70


 


Respiratory 32 H  





Rate   


 


Blood Pressure 110/47  


 


O2 Sat by Pulse 85 L  





Oximetry   


 


Fraction of   





Inspired Oxygen   





(FIO2)   














  03/12/25 03/12/25 03/12/25





  12:30 13:30 14:30


 


Temperature   


 


Pulse Rate 74 68 72


 


Respiratory 22 22 20





Rate   


 


Blood Pressure 128/59 146/71 137/67


 


O2 Sat by Pulse 89 L 95 92 L





Oximetry   


 


Fraction of   





Inspired Oxygen   





(FIO2)   














  03/12/25 03/12/25 03/12/25





  15:34 15:44 16:00


 


Temperature   


 


Pulse Rate 70 78 71


 


Respiratory   32 H





Rate   


 


Blood Pressure   154/75


 


O2 Sat by Pulse   95





Oximetry   


 


Fraction of   





Inspired Oxygen   





(FIO2)   














  03/12/25 03/12/25 03/12/25





  18:00 19:31 20:00


 


Temperature   


 


Pulse Rate 86 93 80


 


Respiratory 20 28 H 24





Rate   


 


Blood Pressure 146/72 118/82 149/79


 


O2 Sat by Pulse 89 L 85 L 92 L





Oximetry   


 


Fraction of   





Inspired Oxygen   





(FIO2)   














- Reevaluation(s)


Reevaluation #1: 





03/11/25 22:28


Records reviewed





Patient hospitalization with persistent hypoxia from influenza pneumonia





Do not feel comfortable when he was discharged because he was still very short 

of breath and has been short of breath for days


Reevaluation #2: 





03/11/25 22:28


Patient improving on BiPAP placed on BiPAP on arrival in the ER


Reevaluation #3: 





03/11/25 22:28


Patient informed of results and questions answered


Reevaluation #4: 





Was pt. sent in by a medical professional or institution (, PA, NP, urgent 

care, hospital, or nursing home...) When possible be specific


@  -no


Did you speak to anyone other than the patient for history (EMS, parent, family,

police, friend...)? What history was obtained from this source 


@  -no


Did you review nursing and triage notes (agree or disagree)?  Why? 


@  -agree


Are old charts reviewed (outside hosp., previous admission, EMS record, old EKG,

old radiological studies, urgent care reports/EKG's, nursing home records)? 

Report findings 


@  -yes


Differential Diagnosis (chest pain, altered mental status, abdominal pain women,

abdominal pain men, vaginal bleeding, weakness, fever, dyspnea, syncope, 

headache, dizziness, GI bleed, back pain, seizure, CVA, palpatations, mental 

health, musculoskeletal)? 


@  -prior


EKG interpreted by me (3pts min.).


@  -yes


X-rays interpreted by me (1pt min.).


@  -yes yes positive for pneumonia


CT interpreted by me (1pt min.).


@  -Yes positive for PE


U/S interpreted by me (1pt. min.).


@  -no


What testing was considered but not performed or refused? (CT, X-rays, U/S, 

labs)? Why?


@  -none


What meds were considered but not given or refused? Why?


@  -none


Did you discuss the management of the patient with other professionals 

(professionals i.e. , PA, NP, lab, RT, psych nurse, , , 

teacher, , )? Give summary


@  -no


Was smoking cessation discussed for >3mins.?


@  -no


Was critical care preformed (if so, how long)?


@  -yes31


Were there social determinants of health that impacted care today? How? 

(Homelessness, low income, unemployed, alcoholism, drug addiction, 

transportation, low edu. Level, literacy, decrease access to med. care, correction, 

rehab)?


@  -none


Was there de-escalation of care discussed even if they declined (Discuss DNR or 

withdrawal of care, Hospice)? DNR status


@  -no


What co-morbidities impacted this encounter? (DM, HTN, Smoking, COPD, CAD, 

Cancer, CVA, ARF, Chemo, Hep., AIDS, mental health diagnosis, sleep apnea, 

morbid obesity)?


@  -none


Was patient admitted / discharged? Hospital course, mention meds given and 

route, prescriptions, significant lab abnormalities, going to OR and other 

pertinent info.


@  - 64 male to the ER for eval for hypoxic respiratory failure multifocal 

pneumonia and influenza positive PE on CtT scan and CT, patient will be admitted

for anticoagulation and treatment of PE consistent treatment of pneumonia


Admitted


Undiagnosed new problem with uncertain prognosis?


@  -no


Drug Therapy requiring intensive monitoring for toxicity (Heparin, Nitro, 

Insulin, Cardizem)?


@  -no


Were any procedures done?


@  -no


Diagnosis/symptom?


@  -Multifocal pneumonia hypoxia and PE


Acute, or Chronic, or Acute on Chronic?


@  -Acute


Uncomplicated (without systemic symptoms) or Complicated (systemic symptoms)?


@  -Complicated


Side effects of treatment?


@  -no


Exacerbation, Progression, or Severe Exacerbation?


@  -exacerbation


Poses a threat to life or bodily function? How? (Chest pain, USA, MI, pneumonia,

PE, COPD, DKA, ARF, appy, cholecystitis, CVA, Diverticulitis, Homicidal, Suicida

l, threat to staff... and all critical care pts)


@  -yes hypoxia





Reevaluation #5: 





Differential Dyspnea:


Coronary syndrome, arrhythmia, tamponade, asthma, COPD, pulmonary embolism, 

pneumonia, pneumothorax, pulmonary effusion, anaphylaxis, diabetic ketoacidosis,

flailed chest, pulmonary contusion, diaphragmatic rupture, anemia, 

neuromuscular, this is not meant to be an all-inclusive list. 








- Consultations


Consultation #1: 





Spoke with [facility] who agrees to admit this patient





Medical Decision Making





- Medical Decision Making





64 male to the ER for eval for hypoxic respiratory failure multifocal pneumonia 

and influenza also with findings of positive PE on CT scan and CT, patient will 

be admitted for anticoagulation and treatment of PE consistent treatment of 

pneumonia





- Lab Data


Result diagrams: 


                                 03/16/25 06:55





                                 03/16/25 06:55


                                   Lab Results











  03/11/25 03/11/25 03/11/25 Range/Units





  20:38 20:38 20:38 


 


WBC  22.6 H    (3.8-10.6)  k/uL


 


RBC  3.76 L    (4.30-5.90)  m/uL


 


Hgb  11.0 L    (13.0-17.5)  gm/dL


 


Hct  35.2 L    (39.0-53.0)  %


 


MCV  93.7    (80.0-100.0)  fL


 


MCH  29.4    (25.0-35.0)  pg


 


MCHC  31.4    (31.0-37.0)  g/dL


 


RDW  12.8    (11.5-15.5)  %


 


Plt Count  322    (150-450)  k/uL


 


MPV  9.2    


 


Neutrophils %  88    %


 


Lymphocytes %  6    %


 


Monocytes %  4    %


 


Eosinophils %  0    %


 


Basophils %  0    %


 


Neutrophils #  20.0 H    (1.3-7.7)  k/uL


 


Lymphocytes #  1.3    (1.0-4.8)  k/uL


 


Monocytes #  0.9    (0-1.0)  k/uL


 


Eosinophils #  0.1    (0-0.7)  k/uL


 


Basophils #  0.0    (0-0.2)  k/uL


 


PT   11.8   (10.0-12.5)  sec


 


INR   1.1   (<1.2)  


 


APTT   28.0   (22.0-30.0)  sec


 


D-Dimer     (<0.60)  mg/L FEU


 


VBG pH     (7.31-7.41)  


 


VBG pCO2     (37-51)  mmHg


 


VBG HCO3     (24-28)  mmol/L


 


Sodium    133 L  (137-145)  mmol/L


 


Potassium    4.2  (3.5-5.1)  mmol/L


 


Chloride    101  ()  mmol/L


 


Carbon Dioxide    24  (22-30)  mmol/L


 


Anion Gap    8  mmol/L


 


BUN    30 H  (9-20)  mg/dL


 


Creatinine    1.27 H  (0.66-1.25)  mg/dL


 


Est GFR (CKD-EPI)AfAm    68  (>60 ml/min/1.73 sqM)  


 


Est GFR (CKD-EPI)NonAf    59  (>60 ml/min/1.73 sqM)  


 


Glucose    135 H  (74-99)  mg/dL


 


Plasma Lactic Acid Jeronimo     (0.7-2.0)  mmol/L


 


Calcium    8.0 L  (8.4-10.2)  mg/dL


 


Total Bilirubin    1.2  (0.2-1.3)  mg/dL


 


AST    39  (17-59)  U/L


 


ALT    37  (4-49)  U/L


 


Alkaline Phosphatase    65  ()  U/L


 


Troponin I     (0.000-0.034)  ng/mL


 


NT-Pro-B Natriuret Pep    8810  pg/mL


 


Total Protein    6.1 L  (6.3-8.2)  g/dL


 


Albumin    3.0 L  (3.5-5.0)  g/dL


 


Influenza Type A (PCR)     (Not Detectd)  


 


Influenza Type B (PCR)     (Not Detectd)  


 


RSV (PCR)     (Not Detectd)  


 


SARS-CoV-2 (PCR)     (Not Detectd)  














  03/11/25 03/11/25 03/11/25 Range/Units





  20:38 20:38 20:38 


 


WBC     (3.8-10.6)  k/uL


 


RBC     (4.30-5.90)  m/uL


 


Hgb     (13.0-17.5)  gm/dL


 


Hct     (39.0-53.0)  %


 


MCV     (80.0-100.0)  fL


 


MCH     (25.0-35.0)  pg


 


MCHC     (31.0-37.0)  g/dL


 


RDW     (11.5-15.5)  %


 


Plt Count     (150-450)  k/uL


 


MPV     


 


Neutrophils %     %


 


Lymphocytes %     %


 


Monocytes %     %


 


Eosinophils %     %


 


Basophils %     %


 


Neutrophils #     (1.3-7.7)  k/uL


 


Lymphocytes #     (1.0-4.8)  k/uL


 


Monocytes #     (0-1.0)  k/uL


 


Eosinophils #     (0-0.7)  k/uL


 


Basophils #     (0-0.2)  k/uL


 


PT     (10.0-12.5)  sec


 


INR     (<1.2)  


 


APTT     (22.0-30.0)  sec


 


D-Dimer     (<0.60)  mg/L FEU


 


VBG pH    7.52 H  (7.31-7.41)  


 


VBG pCO2    32 L  (37-51)  mmHg


 


VBG HCO3    26  (24-28)  mmol/L


 


Sodium     (137-145)  mmol/L


 


Potassium     (3.5-5.1)  mmol/L


 


Chloride     ()  mmol/L


 


Carbon Dioxide     (22-30)  mmol/L


 


Anion Gap     mmol/L


 


BUN     (9-20)  mg/dL


 


Creatinine     (0.66-1.25)  mg/dL


 


Est GFR (CKD-EPI)AfAm     (>60 ml/min/1.73 sqM)  


 


Est GFR (CKD-EPI)NonAf     (>60 ml/min/1.73 sqM)  


 


Glucose     (74-99)  mg/dL


 


Plasma Lactic Acid Jeronimo  1.5    (0.7-2.0)  mmol/L


 


Calcium     (8.4-10.2)  mg/dL


 


Total Bilirubin     (0.2-1.3)  mg/dL


 


AST     (17-59)  U/L


 


ALT     (4-49)  U/L


 


Alkaline Phosphatase     ()  U/L


 


Troponin I   0.049 H*   (0.000-0.034)  ng/mL


 


NT-Pro-B Natriuret Pep     pg/mL


 


Total Protein     (6.3-8.2)  g/dL


 


Albumin     (3.5-5.0)  g/dL


 


Influenza Type A (PCR)     (Not Detectd)  


 


Influenza Type B (PCR)     (Not Detectd)  


 


RSV (PCR)     (Not Detectd)  


 


SARS-CoV-2 (PCR)     (Not Detectd)  














  03/11/25 03/11/25 Range/Units





  20:38 20:39 


 


WBC    (3.8-10.6)  k/uL


 


RBC    (4.30-5.90)  m/uL


 


Hgb    (13.0-17.5)  gm/dL


 


Hct    (39.0-53.0)  %


 


MCV    (80.0-100.0)  fL


 


MCH    (25.0-35.0)  pg


 


MCHC    (31.0-37.0)  g/dL


 


RDW    (11.5-15.5)  %


 


Plt Count    (150-450)  k/uL


 


MPV    


 


Neutrophils %    %


 


Lymphocytes %    %


 


Monocytes %    %


 


Eosinophils %    %


 


Basophils %    %


 


Neutrophils #    (1.3-7.7)  k/uL


 


Lymphocytes #    (1.0-4.8)  k/uL


 


Monocytes #    (0-1.0)  k/uL


 


Eosinophils #    (0-0.7)  k/uL


 


Basophils #    (0-0.2)  k/uL


 


PT    (10.0-12.5)  sec


 


INR    (<1.2)  


 


APTT    (22.0-30.0)  sec


 


D-Dimer  4.87 H   (<0.60)  mg/L FEU


 


VBG pH    (7.31-7.41)  


 


VBG pCO2    (37-51)  mmHg


 


VBG HCO3    (24-28)  mmol/L


 


Sodium    (137-145)  mmol/L


 


Potassium    (3.5-5.1)  mmol/L


 


Chloride    ()  mmol/L


 


Carbon Dioxide    (22-30)  mmol/L


 


Anion Gap    mmol/L


 


BUN    (9-20)  mg/dL


 


Creatinine    (0.66-1.25)  mg/dL


 


Est GFR (CKD-EPI)AfAm    (>60 ml/min/1.73 sqM)  


 


Est GFR (CKD-EPI)NonAf    (>60 ml/min/1.73 sqM)  


 


Glucose    (74-99)  mg/dL


 


Plasma Lactic Acid Jeronimo    (0.7-2.0)  mmol/L


 


Calcium    (8.4-10.2)  mg/dL


 


Total Bilirubin    (0.2-1.3)  mg/dL


 


AST    (17-59)  U/L


 


ALT    (4-49)  U/L


 


Alkaline Phosphatase    ()  U/L


 


Troponin I    (0.000-0.034)  ng/mL


 


NT-Pro-B Natriuret Pep    pg/mL


 


Total Protein    (6.3-8.2)  g/dL


 


Albumin    (3.5-5.0)  g/dL


 


Influenza Type A (PCR)   Detected A  (Not Detectd)  


 


Influenza Type B (PCR)   Not Detected  (Not Detectd)  


 


RSV (PCR)   Not Detected  (Not Detectd)  


 


SARS-CoV-2 (PCR)   Not Detected  (Not Detectd)  














- EKG Data


-: EKG Interpreted by Me (EKG is sinus 65   QTc 451)





- Radiology Data


Radiology results: report reviewed (Chest x-ray multifocal pneumonia CTA 

positive PE), image reviewed





Critical Care Time


Critical Care Time: Yes


Total Critical Care Time: 31





Disposition


Clinical Impression: 


 Acute exacerbation of chronic obstructive pulmonary disease, Hypoxia, Acute 

respiratory failure, Community acquired pneumonia, Bilateral pneumonia, 

Pulmonary embolism





Narrative: 





Recent Influenza


Disposition: ADMITTED AS IP TO THIS HOSP


Condition: Serious


Is patient prescribed a controlled substance at d/c from ED?: No


Time of Disposition: 22:30

## 2025-03-11 NOTE — XR
EXAMINATION TYPE: XR chest 1V portable

 

DATE OF EXAM: 3/11/2025 9:15 PM

 

COMPARISON: Chest radiographs from 3/6/2025

 

CLINICAL INDICATION: Male, 64 years old with history of SHIREEN; Merged with [facility]

 

TECHNIQUE: XR chest 1V portable Frontal view of the chest.

 

FINDINGS: 

Lungs/Pleura: Multifocal airspace opacities. No evidence of pneumothorax or pleural effusion. 

Pulmonary vascularity: Unremarkable.

Heart/mediastinum: Cardiomediastinal silhouette is unremarkable.

Musculoskeletal: No acute osseous pathology.

 

Other findings: None

 

IMPRESSION: 

Multifocal airspace opacities concerning for pneumonia.

 

[facility], Workstation: [id number], 3/11/2025 9:26 PM

## 2025-03-12 LAB
ALBUMIN SERPL-MCNC: 2.9 G/DL (ref 3.5–5)
ALP SERPL-CCNC: 78 U/L (ref 38–126)
ALT SERPL-CCNC: 34 U/L (ref 4–49)
ANION GAP SERPL CALC-SCNC: 9 MMOL/L
AST SERPL-CCNC: 35 U/L (ref 17–59)
BASOPHILS # BLD AUTO: 0 K/UL (ref 0–0.2)
BASOPHILS NFR BLD AUTO: 0 %
BUN SERPL-SCNC: 28 MG/DL (ref 9–20)
CALCIUM SPEC-MCNC: 8 MG/DL (ref 8.4–10.2)
CHLORIDE SERPL-SCNC: 102 MMOL/L (ref 98–107)
CO2 SERPL-SCNC: 25 MMOL/L (ref 22–30)
EOSINOPHIL # BLD AUTO: 0 K/UL (ref 0–0.7)
EOSINOPHIL NFR BLD AUTO: 0 %
ERYTHROCYTE [DISTWIDTH] IN BLOOD BY AUTOMATED COUNT: 3.37 M/UL (ref 4.3–5.9)
ERYTHROCYTE [DISTWIDTH] IN BLOOD: 13 % (ref 11.5–15.5)
GLUCOSE BLD-MCNC: 234 MG/DL (ref 70–110)
GLUCOSE BLD-MCNC: 238 MG/DL (ref 70–110)
GLUCOSE BLD-MCNC: 276 MG/DL (ref 70–110)
GLUCOSE BLD-MCNC: 334 MG/DL (ref 70–110)
GLUCOSE BLD-MCNC: 338 MG/DL (ref 70–110)
GLUCOSE SERPL-MCNC: 213 MG/DL (ref 74–99)
HCT VFR BLD AUTO: 32.3 % (ref 39–53)
HGB BLD-MCNC: 10.3 GM/DL (ref 13–17.5)
LYMPHOCYTES # SPEC AUTO: 0.6 K/UL (ref 1–4.8)
LYMPHOCYTES NFR SPEC AUTO: 3 %
MAGNESIUM SPEC-SCNC: 1.8 MG/DL (ref 1.6–2.3)
MCH RBC QN AUTO: 30.4 PG (ref 25–35)
MCHC RBC AUTO-ENTMCNC: 31.8 G/DL (ref 31–37)
MCV RBC AUTO: 95.8 FL (ref 80–100)
MONOCYTES # BLD AUTO: 0.4 K/UL (ref 0–1)
MONOCYTES NFR BLD AUTO: 2 %
NEUTROPHILS # BLD AUTO: 16.1 K/UL (ref 1.3–7.7)
NEUTROPHILS NFR BLD AUTO: 94 %
PLATELET # BLD AUTO: 242 K/UL (ref 150–450)
POTASSIUM SERPL-SCNC: 3.8 MMOL/L (ref 3.5–5.1)
PROT SERPL-MCNC: 6.1 G/DL (ref 6.3–8.2)
SODIUM SERPL-SCNC: 136 MMOL/L (ref 137–145)
WBC # BLD AUTO: 17.2 K/UL (ref 3.8–10.6)

## 2025-03-12 RX ADMIN — ATORVASTATIN CALCIUM SCH MG: 40 TABLET, FILM COATED ORAL at 22:25

## 2025-03-12 RX ADMIN — HEPARIN SODIUM SCH MLS/HR: 10000 INJECTION, SOLUTION INTRAVENOUS at 00:04

## 2025-03-12 RX ADMIN — FUROSEMIDE SCH MG: 10 INJECTION, SOLUTION INTRAMUSCULAR; INTRAVENOUS at 09:00

## 2025-03-12 RX ADMIN — HEPARIN SODIUM ONE UNIT: 1000 INJECTION, SOLUTION INTRAVENOUS; SUBCUTANEOUS at 00:03

## 2025-03-12 RX ADMIN — BUDESONIDE SCH MG: 1 SUSPENSION RESPIRATORY (INHALATION) at 08:08

## 2025-03-12 RX ADMIN — FUROSEMIDE STA MG: 10 INJECTION, SOLUTION INTRAMUSCULAR; INTRAVENOUS at 02:35

## 2025-03-12 RX ADMIN — CEFEPIME HYDROCHLORIDE SCH MLS/HR: 2 INJECTION, POWDER, FOR SOLUTION INTRAVENOUS at 10:14

## 2025-03-12 RX ADMIN — FORMOTEROL FUMARATE DIHYDRATE SCH MCG: 20 SOLUTION RESPIRATORY (INHALATION) at 08:08

## 2025-03-12 RX ADMIN — PANTOPRAZOLE SODIUM SCH MG: 40 INJECTION, POWDER, FOR SOLUTION INTRAVENOUS at 09:01

## 2025-03-12 RX ADMIN — SODIUM CHLORIDE ONE MLS/HR: 9 INJECTION, SOLUTION INTRAVENOUS at 05:13

## 2025-03-12 RX ADMIN — PIPERACILLIN AND TAZOBACTAM SCH: 3; .375 INJECTION, POWDER, FOR SOLUTION INTRAVENOUS at 09:00

## 2025-03-12 RX ADMIN — IPRATROPIUM BROMIDE AND ALBUTEROL SULFATE SCH ML: .5; 3 SOLUTION RESPIRATORY (INHALATION) at 05:37

## 2025-03-12 RX ADMIN — FUROSEMIDE SCH MG: 10 INJECTION, SOLUTION INTRAMUSCULAR; INTRAVENOUS at 22:24

## 2025-03-12 RX ADMIN — METHYLPREDNISOLONE SODIUM SUCCINATE SCH MG: 125 INJECTION, POWDER, FOR SOLUTION INTRAMUSCULAR; INTRAVENOUS at 05:11

## 2025-03-12 RX ADMIN — CEFEPIME HYDROCHLORIDE SCH MLS/HR: 2 INJECTION, POWDER, FOR SOLUTION INTRAVENOUS at 22:25

## 2025-03-12 RX ADMIN — SODIUM CHLORIDE SCH MLS/HR: 9 INJECTION, SOLUTION INTRAVENOUS at 22:26

## 2025-03-12 NOTE — P.PN
Progress Note - Text


Progress Note Date: 03/12/25





Patient is a 64-year-old male with a PMH of atrial fibrillation on Eliquis, COPD

on 2 L home oxygen, non-insulin-dependent diabetes mellitus, hyperlipidemia, 

hypertension presenting with shortness of breath.  Patient was previously 

admitted here for acute hypoxic respiratory failure secondary to influenza 

pneumonia and was discharged on 2 L of home oxygen.  Patient states he has been 

feeling short of breath while at rest.  He reports that since being discharged 

the home oxygen has not been sufficient. He states that he had to keep 

increasing his oxygen.    Patient says shortness of breath is slightly relieved 

when he is laying down.  Denies any orthopnea or PND.  Patient endorses a nonpr

oductive cough that is chronic in nature, but says it has been getting worse.  

Patient admits to having fever, chills.  Patient also admits to having non-

radiating, pressure-like chest pain over the last few days.  Chest pain is not 

related to exertion and he had no alleviating or aggravating factors.  Patient 

denies any headache, vision changes, nausea, vomiting, diarrhea, abdominal pain,

urinary symptoms.





EKG independently interpreted displaying sinus rhythm with left bundle branch 

block that has been seen on prior EKG, rate 65 bpm, QTc 451 ms


CXR independently interpreted displaying bilateral multifocal airspace opacities


Chest CTA displaying acute segmental and subsegmental pulmonary emboli within 

the right and middle lower lobes, no saddle embolus, no RV strain, chronic 

interstitial loading disease


Venous Doppler B/L LE displaying no evidence of acute DVT


Troponin 0.049, 0.055, proBNP 8810, D-dimer 4.87, WBC 22.6, Hgb 11.0, HCT 35.2, 

MCV 93.7, platelet 322, PT 11.8, INR 1.1, APTT 28, sodium 133, potassium 4.2, 

CO2 24, BUN 30, creatinine 1.27, glucose 135


VBG pH 7.52, pCO2 32


T98.2 F, OH 80, RR 26, /77, O2 sat 90% on BiPAP with FiO2 of 100%





March 12: Overflowing the ER.  Up in the bed.  Short of breath.  Patient was on 

BiPAP overnight.  This morning on 15 L high flow nasal cannula.  Decreased 

appetite.  Has got a cough.  Some wheezing.  Some sputum production.  Decreased 

appetite.  Patient is on IV heparin.  Also on IV cefepime.  Pulmonary following





Active Medications





Albuterol/Ipratropium (Ipratropium-Albuterol 3 Ml Neb)  3 ml INHALATION RT-QID 

PRN


   PRN Reason: Shortness Of Breath Or Wheezing


Albuterol/Ipratropium (Ipratropium-Albuterol 3 Ml Neb)  3 ml INHALATION RT-Q4H 

[facility]


   Last Admin: 03/12/25 15:34 Dose:  3 ml


   


Amiodarone HCl (Amiodarone 200 Mg Tab)  200 mg PO DAILY [facility]


Amlodipine Besylate (Amlodipine 5 Mg Tab)  5 mg PO DAILY [facility]


Aspirin (Aspirin 81 Mg)  81 mg PO DAILY [facility]


Atorvastatin Calcium (Atorvastatin 40 Mg Tab)  40 mg PO HS [facility]


Budesonide (Budesonide 1 Mg/2 Ml Nebu)  1 mg INHALATION RT-BID [facility]


   Last Admin: 03/12/25 08:08 Dose:  1 mg


   


Formoterol Fumarate (Formoterol Fumarate 20 Mcg/2 Ml Nebu)  20 mcg INHALATION 

RT-BID [facility]


   Last Admin: 03/12/25 08:08 Dose:  20 mcg


   


Furosemide (Furosemide 10 Mg/Ml 4 Ml Vial)  40 mg IV Q12HR [facility]


Heparin Sodium (Porcine) (Heparin Sodium 1,000 Un/Ml (10ml Vl))  0 unit IV PER 

PROTOCOL PRN; Protocol


   PRN Reason: Low PTT


Heparin Sodium/Sodium Chloride (25,000 unit/ Sodium Chloride)  250 mls @ 20.412 

mls/hr IV .[id number] [facility]; Protocol


   Last Titration: 03/12/25 17:48 Dose:  0 units/kg/hr, 0 mls/hr


   


Vancomycin HCl 1,750 mg/ (Sodium Chloride)  500 mls @ 167 mls/hr IVPB Q16H [facility]


Cefepime HCl 2 gm/ Sodium (Chloride)  100 mls @ 25 mls/hr IVPB Q12H [facility]; 

Protocol


Insulin Human Lispro (Insulin Lispro (Humalog) 100 Unit/Ml 10 Ml Vl)  0 unit SQ 

ACHS [facility]; Protocol


   Last Admin: 03/12/25 17:49 Dose:  6 unit


   


Lisinopril (Lisinopril 20 Mg Tab)  20 mg PO BID [facility]


Methylprednisolone Sodium Succinate (Methylprednisolone Sod Succi 125 Mg/2 Ml 

Vial)  60 mg IV Q6HR [facility]


   Last Admin: 03/12/25 17:49 Dose:  60 mg


   


Metoprolol Succinate (Metoprolol Succinate (Er) 25 Mg Tab.Er.24h)  25 mg PO DA

TATUM [facility]


Morphine Sulfate (Morphine Sulfate 4 Mg/Ml Syringe)  4 mg IV Q4HR PRN


   PRN Reason: Severe Pain (Scale 7 to 10)


Naloxone HCl (Naloxone 0.4 Mg/Ml 1 Ml Vial)  0.2 mg IV Q2M PRN


   PRN Reason: Opioid Reversal


Ondansetron HCl (Ondansetron 4 Mg/2 Ml Vial)  4 mg IVP Q8HR PRN


   PRN Reason: Nausea And Vomiting


Pantoprazole Sodium (Pantoprazole 40 Mg/10 Ml Vial)  40 mg IV DAILY [facility]


   Last Admin: 03/12/25 09:01 Dose:  40 mg


   


Spironolactone (Spironolactone 25 Mg Tab)  25 mg PO DAILY [facility]











Social history:


Tobacco: Current 50-pack-year smoker


Alcohol: Occasional alcohol use


Recreational drugs: Marijuana


Travel: No recent travel





On examination:


VITAL SIGNS: [Afebrile, 73, 32, 110 x 47, 85% on 15 L high flow nasal cannula]


GENERAL APPEARANCE: BMI 38.  Sitting up, short of breath. 


HEENT: Normal external appearance of nose and ear.  Oral cavity normal


EYES: Pupils equal. Conjunctiva normal. 


NECK: JVD not raised. Mass not palpable. 


RESPIRATORY: Respiratory effort increased, not able to speak in full sentences. 

Accessory muscles working. Lungs diminished breath sounds some scattered 

crackles prolonged expiration. 


CARDIOVASCULAR: First and second sounds normal. No edema. 


ABDOMEN: Soft. Liver and spleen not palpable. No tenderness. No mass palpable. 


PSYCHIATRY: Alert and oriented x3. Mood and affect anxious. 





INVESTIGATIONS, reviewed in the clinical context:





March 12: White count 7.2 hemoglobin 10.3 platelets 242 sodium 136 potassium 3.8

BUN 28 creatinine 1.37


Troponin I: 0.049, 0.055, 0.051


Chest x-ray film personally reviewed by me-bilateral infiltrates.  Effusion/pulm

edema cardiomegaly.


Venous Doppler: No DVT


Chest CTA: Acute segmental and subsegmental pulm embolism within the right 

middle and right lower lobes.  No RV strain.  Chronic interstitial lung disease.





Assessment/Plan:








#.  Acute pulmonary embolism, non-massive [segmental and subsegmental within the

 right middle and lower lobes.  No RV strain


IV heparin





-IV heparin monitoring


Follow PTT protocol





#.  Sepsis likely secondary to -viral pneumonia.  Bacterial component cannot be 

ruled out


IV cefepime.  Vancomycin


Pulmonary following





#.  Acute hypoxic respiratory failure, secondary to above: Slow to respond


Was on BiPAP overnight.  This morning high flow 15 L oxygen





#.  Acute COPD exacerbation, and a prior smoker: Slow to respond


DuoNeb Q4.  IV Solu-Medrol.  Nebulized Pulmicort








#.  Acute on chronic heart failure with reduced ejection fraction (EF 45 to 50%)


IV Lasix 40 mg every 12.  Aldactone.





#.  Chronic hypoxic respiratory failure from underlying COPD 2 L home O2








#.  Non-insulin-dependent type 2 diabetes


Insulin subcu sliding scale


Accu-Cheks ACHS








#.  Essential hypertension


Amlodipine.  Zestril.





#.  Hyperlipidemia


Lipitor





-Full code











Past Medical History


Past Medical History: Atrial Fibrillation, Diabetes Mellitus, Hyperlipidemia, 

Hypertension, Pneumonia, Supraventricular Tachycardia (SVT)


Additional Past Medical History / Comment(s): a fib, causes shortness of breath,

 can feel irregular heartbeat


History of Any Multi-Drug Resistant Organisms: None Reported


Past Surgical History: Cholecystectomy, Heart Catheterization


Additional Past Surgical History / Comment(s): Colonoscopy


Past Anesthesia/Blood Transfusion Reactions: No Reported Reaction


Past Psychological History: No Psychological Hx Reported


Smoking Status: Current every day smoker


Past Alcohol Use History: Occasional


Past Drug Use History: Marijuana

## 2025-03-12 NOTE — P.HPIM
History of Present Illness


H&P Date: 03/12/25


Patient is a 64-year-old male with a PMH of atrial fibrillation on Eliquis, COPD

on 2 L home oxygen, non-insulin-dependent diabetes mellitus, hyperlipidemia, 

hypertension presenting with shortness of breath.  Patient was previously 

admitted here for acute hypoxic respiratory failure secondary to influenza 

pneumonia and was discharged on 2 L of home oxygen.  Patient states he has been 

feeling short of breath while at rest.  He reports that since being discharged 

the home oxygen has not been sufficient. He states that he had to keep 

increasing his oxygen.    Patient says shortness of breath is slightly relieved 

when he is laying down.  Denies any orthopnea or PND.  Patient endorses a 

nonproductive cough that is chronic in nature, but says it has been getting 

worse.  Patient admits to having fever, chills.  Patient also admits to having 

non-radiating, pressure-like chest pain over the last few days.  Chest pain is 

not related to exertion and he had no alleviating or aggravating factors.  P

atient denies any headache, vision changes, nausea, vomiting, diarrhea, 

abdominal pain, urinary symptoms.





EKG independently interpreted displaying sinus rhythm with left bundle branch 

block that has been seen on prior EKG, rate 65 bpm, QTc 451 ms


CXR independently interpreted displaying bilateral multifocal airspace opacities


Chest CTA displaying acute segmental and subsegmental pulmonary emboli within 

the right and middle lower lobes, no saddle embolus, no RV strain, chronic 

interstitial loading disease


Venous Doppler B/L LE displaying no evidence of acute DVT


Troponin 0.049, 0.055, proBNP 8810, D-dimer 4.87, WBC 22.6, Hgb 11.0, HCT 35.2, 

MCV 93.7, platelet 322, PT 11.8, INR 1.1, APTT 28, sodium 133, potassium 4.2, 

CO2 24, BUN 30, creatinine 1.27, glucose 135


VBG pH 7.52, pCO2 32


T98.2 F, NM 80, RR 26, /77, O2 sat 90% on BiPAP with FiO2 of 100%





ED documentation reviewed.





Review of systems:


Pertinent positives and negatives as discussed in HPI, a complete review of 

systems was performed and all other systems are negative.





Social history:


Tobacco: Current 50-pack-year smoker


Alcohol: Occasional alcohol use


Recreational drugs: Marijuana


Travel: No recent travel





Physical examination:


Vital signs reviewed


General: sick appearing male, on Bipap,  in respiratory distress, appears at 

stated age, obese


Derm: no unusual rashes/lesions, warm


Head: atraumatic, normocephalic, symmetric


Eyes: EOMI, anicteric sclera, pupils equal round reactive to light


ENT: Nose and ears atraumatic


Mouth: no lip lesion, mucus membranes moist


Cardiovascular: S1S2 reg, no murmur, positive dorsalis pedis pulse bilateral


Lungs: Decreased breath sounds, bilateral rhonchi, accessory muscle use


Abdominal: soft, non-tender to palpation, no guarding


Ext: muscle strength 5 out of 5 in all 4 extremities grossly, 2+ edema on left 

lower extremity


Neuro:  CN II-XI grossly intact, no gross focal neuro deficits


Psych: Alert, oriented to person, place, and time





Assessment/Plan:


Patient is a 64-year-old male with a PMH of atrial fibrillation on Eliquis, COPD

on home oxygen, non-insulin-dependent diabetes mellitus, hyperlipidemia, 

hypertension presenting with shortness of breath.





#.  Acute pulmonary embolism, non-massive


#.  Sepsis likely secondary to post-viral pneumonia


#.  Acute hypoxic respiratory failure, secondary to above


#.  Acute COPD exacerbation


Currently on BiPAP 12/5, FiO2 60%


D-dimer 4.87, proBNP 8810, troponin 0.049, 0.055


WBC 22.6, RR 26


Chest CTA displaying acute segmental and subsegmental pulmonary emboli within 

the right and middle lower lobes, no saddle embolus, no RV strain


CXR displaying bilateral multifocal airspace opacity


Placed on IV LMWH by ED


C/w Zosyn 3.375 g IVPB every 8 hours


Initiate Vancomycin per pharmacy dosing


Blood cultures ordered


Procalcitonin ordered


Echocardiogram ordered


Cardiac monitoring


Pulmonology and ID consulted by ED


F/u Legionella and sputum cultures


C/w Solumedrol 60 mg IV q6h


Initiate Duonebs RTC and prn





#.  Heart failure with reduced ejection fraction (EF 45 to 50%)


#.  COPD on 2 L home O2


Currently on BiPAP


C/w IV Lasix 40 mg q12hr


Resume home breathing treatments


Resume GDMT once reconciled


Intake and output


Daily weights


Monitor electrolytes daily


Cardiology consulted





#.  Non-insulin-dependent type 2 diabetes


Insulin subcu sliding scale


Accu-Cheks ACHS


Hypoglycemic precaution


Hold metformin





#.  Hypertension


#.  Hyperlipidemia


Continue home meds once reconciled





F: NS at 75 cc an hour


E: Replete electrolytes as needed


N: Heart healthy diet


A: Independently ambulatory





DVT prophylaxis: IV heparin





The patient is admitted with an anticipated greater than than 2 midnight stay 

for evaluation of acute pulmonary embolism.


CODE STATUS: Full code


Discussed with: Patient


Anticipated discharge place: Pending clinical course





Manuel Reyez MD


PGY-1 IM





Dictation was produced using dragon dictation software. please excuse any 

grammatical, word or spelling errors.





Past Medical History


Past Medical History: Atrial Fibrillation, Diabetes Mellitus, Hyperlipidemia, 

Hypertension, Pneumonia, Supraventricular Tachycardia (SVT)


Additional Past Medical History / Comment(s): a fib, causes shortness of breath,

can feel irregular heartbeat


History of Any Multi-Drug Resistant Organisms: None Reported


Past Surgical History: Cholecystectomy, Heart Catheterization


Additional Past Surgical History / Comment(s): Colonoscopy


Past Anesthesia/Blood Transfusion Reactions: No Reported Reaction


Past Psychological History: No Psychological Hx Reported


Smoking Status: Current every day smoker


Past Alcohol Use History: Occasional


Past Drug Use History: Marijuana





- Past Family History


  ** Father


Family Medical History: No Reported History





  ** Mother


Family Medical History: Coronary Artery Disease (CAD)





Medications and Allergies


                                Home Medications











 Medication  Instructions  Recorded  Confirmed  Type


 


Apixaban [Eliquis] 5 mg PO BID 11/17/22 02/24/25 History


 


Furosemide [Lasix] 40 mg PO DAILY 11/17/22 02/24/25 History


 


Amiodarone [Cordarone] 200 mg PO DAILY 08/07/23 02/24/25 History


 


Metoprolol Succinate (ER) [Toprol 25 mg PO DAILY 02/24/25 02/24/25 History





XL]    


 


lisinopriL [Zestril] 20 mg PO BID 02/24/25 02/24/25 History


 


Aspirin 81 mg PO DAILY #90 tab 03/07/25  Rx


 


Atorvastatin [Lipitor] 40 mg PO HS #90 tab 03/07/25  Rx


 


Budesonide-Formot 160-4.5 Mcg 2 puff INHALATION RT-BID 30 Days 03/07/25  Rx





[Symbicort 160-4.5 Mcg Inhaler] #1 each   


 


Fluticasone Nasal Spray [Flonase 2 spray EA NOSTRIL DAILY PRN  ml 03/07/25  Rx





Nasal Spray]    


 


Spironolactone [Aldactone] 25 mg PO DAILY #90 tablet 03/07/25  Rx


 


amLODIPine [Norvasc] 5 mg PO DAILY #90 tab 03/07/25  Rx


 


metFORMIN HCL [Glucophage] 500 mg PO BID #60 tab 03/07/25  Rx


 


predniSONE See Taper PO DAILY #32 tab 03/07/25  Rx








                                    Allergies











Allergy/AdvReac Type Severity Reaction Status Date / Time


 


No Known Allergies Allergy   Verified 03/11/25 20:36














Physical Exam


Vitals: 


                                   Vital Signs











  Temp Pulse Resp BP Pulse Ox FiO2


 


 03/12/25 01:00   64  24  144/74  95 


 


 03/12/25 00:06       60


 


 03/12/25 00:00   68  22  141/78  95 


 


 03/11/25 23:00   68  22  152/75  99 


 


 03/11/25 22:30   66  22  138/68  99 


 


 03/11/25 22:04       60


 


 03/11/25 22:03   67    


 


 03/11/25 21:47   61    


 


 03/11/25 21:15       80


 


 03/11/25 20:41       100


 


 03/11/25 20:39    26 H   


 


 03/11/25 20:37      96 


 


 03/11/25 20:33  98.2 F  80  26 H  163/77  90 L 








                                Intake and Output











 03/11/25 03/11/25 03/12/25





 14:59 22:59 06:59


 


Other:   


 


  Weight  113.398 kg 














Results


CBC & Chem 7: 


                                 03/11/25 20:38





                                 03/11/25 20:38


Labs: 


                  Abnormal Lab Results - Last 24 Hours (Table)











  03/11/25 03/11/25 03/11/25 Range/Units





  20:38 20:38 20:38 


 


WBC  22.6 H    (3.8-10.6)  k/uL


 


RBC  3.76 L    (4.30-5.90)  m/uL


 


Hgb  11.0 L    (13.0-17.5)  gm/dL


 


Hct  35.2 L    (39.0-53.0)  %


 


Neutrophils #  20.0 H    (1.3-7.7)  k/uL


 


D-Dimer     (<0.60)  mg/L FEU


 


VBG pH     (7.31-7.41)  


 


VBG pCO2     (37-51)  mmHg


 


Sodium   133 L   (137-145)  mmol/L


 


BUN   30 H   (9-20)  mg/dL


 


Creatinine   1.27 H   (0.66-1.25)  mg/dL


 


Glucose   135 H   (74-99)  mg/dL


 


Calcium   8.0 L   (8.4-10.2)  mg/dL


 


Troponin I    0.049 H*  (0.000-0.034)  ng/mL


 


Total Protein   6.1 L   (6.3-8.2)  g/dL


 


Albumin   3.0 L   (3.5-5.0)  g/dL














  03/11/25 03/11/25 03/12/25 Range/Units





  20:38 20:38 00:25 


 


WBC     (3.8-10.6)  k/uL


 


RBC     (4.30-5.90)  m/uL


 


Hgb     (13.0-17.5)  gm/dL


 


Hct     (39.0-53.0)  %


 


Neutrophils #     (1.3-7.7)  k/uL


 


D-Dimer   4.87 H   (<0.60)  mg/L FEU


 


VBG pH  7.52 H    (7.31-7.41)  


 


VBG pCO2  32 L    (37-51)  mmHg


 


Sodium     (137-145)  mmol/L


 


BUN     (9-20)  mg/dL


 


Creatinine     (0.66-1.25)  mg/dL


 


Glucose     (74-99)  mg/dL


 


Calcium     (8.4-10.2)  mg/dL


 


Troponin I    0.055 H*  (0.000-0.034)  ng/mL


 


Total Protein     (6.3-8.2)  g/dL


 


Albumin     (3.5-5.0)  g/dL

## 2025-03-12 NOTE — XR
EXAMINATION TYPE: XR chest 1V

 

DATE OF EXAM: 3/12/2025

 

CLINICAL INDICATION: Male, 64 years old with history of multifocal infiltrates, progress study.  

 

TECHNIQUE: Single AP portable upright view of the chest is obtained.

 

COMPARISON: Chest x-ray and CT from one day earlier

 

FINDINGS:  Bilateral Multifocal increased opacities demonstrated. Persistent cardiomegaly with athero
sclerotic changes in the aortic knob. Osseous structures are intact.

 

IMPRESSION: Persistent cardiomegaly with bilateral multifocal acute infiltrates and/or edema. Finding
s are worse in the bilateral lower lungs from one day earlier.

 

[facility], Workstation: [id number], 3/12/2025 6:15 AM

## 2025-03-12 NOTE — P.CONS
History of Present Illness





- Reason for Consult


Consult date: 03/12/25


Multifocal pneumonia


Requesting physician: Romain Nielson





- Chief Complaint


Shortness of breath and cough x few days





- History of Present Illness





Patient is a 64-year-old  male with a past medical history significant 

for diabetes mellitus hypertension hyperlipidemia pneumonia atrial fibrillation 

currently everyday smoker presenting to the hospital for evaluation of 

increasing shortness of breath in this patient who was recently admitted to this

facility from 2/24/2025 till 3/7/2025 with the patient was treated for acute 

influenza A that was treated with the Tamiflu patient on presenting back to the 

hospital concerning for increasing shortness of breath that has been getting 

worse for the last 2 days patient also complaining of cough which has been 

moderate in intensity and bring up some sputum no hemoptysis has been 

complaining of fever and chills also left-sided chest pain with the symptoms 

patient has been evaluated on presentation to the hospital the patient was 

afebrile no fever have been ordered subsequently patient was nontachycardic h

ypotensive was hypoxic requiring supplemental oxygen including BiPAP patient did

have a white count of 17.2 creatinine is 1.37 electrolytes are normal her liver 

enzymes are normal patient tested positive for influenza A RSV COVID testing was

negative he did have a chest x-ray multifocal airspace opacity concerning for 

pneumonia also have a CT angiogram of the chest did not evidence of right middle

lower lobe PE no saddle embolus patient has been started on cefepime and 

vancomycin infectious disease was consulted for further management of antibiotic

therapy





Review of Systems





Positive point and negatives has been  mentioned in the HPI, complete review of 

systems was performed and all other systems are negative





Past Medical History


Past Medical History: Atrial Fibrillation, Diabetes Mellitus, Hyperlipidemia, 

Hypertension, Pneumonia, Supraventricular Tachycardia (SVT)


Additional Past Medical History / Comment(s): a fib, causes shortness of breath,

can feel irregular heartbeat


History of Any Multi-Drug Resistant Organisms: None Reported


Past Surgical History: Cholecystectomy, Heart Catheterization


Additional Past Surgical History / Comment(s): Colonoscopy


Past Anesthesia/Blood Transfusion Reactions: No Reported Reaction


Past Psychological History: No Psychological Hx Reported


Smoking Status: Current every day smoker


Past Alcohol Use History: Occasional


Past Drug Use History: Marijuana





- Past Family History


  ** Father


Family Medical History: No Reported History





  ** Mother


Family Medical History: Coronary Artery Disease (CAD)





Medications and Allergies


                                Home Medications











 Medication  Instructions  Recorded  Confirmed  Type


 


Apixaban [Eliquis] 5 mg PO BID 11/17/22 03/12/25 History


 


Furosemide [Lasix] 40 mg PO DAILY 11/17/22 03/12/25 History


 


Amiodarone [Cordarone] 200 mg PO DAILY 08/07/23 03/12/25 History


 


Metoprolol Succinate (ER) [Toprol 25 mg PO DAILY 02/24/25 03/12/25 History





XL]    


 


lisinopriL [Zestril] 20 mg PO BID 02/24/25 03/12/25 History


 


Aspirin 81 mg PO DAILY #90 tab 03/07/25 03/12/25 Rx


 


Atorvastatin [Lipitor] 40 mg PO HS #90 tab 03/07/25 03/12/25 Rx


 


Budesonide-Formot 160-4.5 Mcg 2 puff INHALATION RT-BID 30 Days 03/07/25 03/12/25

 Rx





[Symbicort 160-4.5 Mcg Inhaler] #1 each   


 


Fluticasone Nasal Spray [Flonase 2 spray EA NOSTRIL DAILY PRN  ml 03/07/25 03/12/25 Rx





Nasal Spray]    


 


Spironolactone [Aldactone] 25 mg PO DAILY #90 tablet 03/07/25 03/12/25 Rx


 


amLODIPine [Norvasc] 5 mg PO DAILY #90 tab 03/07/25 03/12/25 Rx


 


metFORMIN HCL [Glucophage] 500 mg PO BID #60 tab 03/07/25 03/12/25 Rx


 


predniSONE See Taper PO DAILY #32 tab 03/07/25 03/12/25 Rx


 


Apixaban [Eliquis Starter Pack 5 - 10 mg PO AS DIRECTED 30 Days 03/13/25  Rx





(for VTE)] #1 each   








                                    Allergies











Allergy/AdvReac Type Severity Reaction Status Date / Time


 


No Known Allergies Allergy   Verified 03/11/25 20:36














Physical Exam


Vitals: 


                                   Vital Signs











  Temp Pulse Resp BP Pulse Ox FiO2


 


 03/12/25 08:35   64    


 


 03/12/25 08:23   60    


 


 03/12/25 08:10   55 L     60


 


 03/12/25 06:00   59 L  20  156/72  99 


 


 03/12/25 05:49   66    


 


 03/12/25 05:48       60


 


 03/12/25 05:39       40


 


 03/12/25 05:37   56 L    


 


 03/12/25 05:00   57 L  18  147/76  99 


 


 03/12/25 04:00  98.9 F  68  22  135/85  98 


 


 03/12/25 03:52       60


 


 03/12/25 03:00   56 L  22  131/71  97 


 


 03/12/25 02:00   61  22  143/92  98 


 


 03/12/25 01:00   64  24  144/74  95 


 


 03/12/25 00:06       60


 


 03/12/25 00:00   68  22  141/78  95 


 


 03/11/25 23:00   68  22  152/75  99 


 


 03/11/25 22:30   66  22  138/68  99 


 


 03/11/25 22:04       60


 


 03/11/25 22:03   67    


 


 03/11/25 21:47   61    


 


 03/11/25 21:15       80


 


 03/11/25 20:41       100


 


 03/11/25 20:39    26 H   


 


 03/11/25 20:37      96 


 


 03/11/25 20:33  98.2 F  80  26 H  163/77  90 L 








                                Intake and Output











 03/11/25 03/12/25 03/12/25





 22:59 06:59 14:59


 


Intake Total   177.244


 


Output Total  900 


 


Balance  -900 177.244


 


Intake:   


 


  Intake, IV Titration   177.244





  Amount   


 


    Heparin Sod,Pork in 0.45%   177.244





    NaCl 25,000 unit In 0.45   





    % NaCl 1 250ml.bag @ 18   





    UNITS/KG/HR 20.412 mls/hr   





    IV .[id number] [facility] Rx#:   





    [id number]   


 


Output:   


 


  Urine  900 


 


Other:   


 


  Weight 113.398 kg  














GENERAL DESCRIPTION: Elderly male lying in bed, no distress. No tachypnea or 

accessory muscle of respiration use.


HEENT: Shows Pallor , no scleral icterus. Oral mucous membrane is dry.


NECK: Trachea central, no thyromegaly.


LUNGS: Unlabored breathing.  Coarse breath sounds bilaterally


HEART: S1, S2, regular rate and rhythm. No loud murmur


ABDOMEN: Soft, no tenderness , guarding or rigidity, no organomegaly


EXTREMITIES: No edema of feet.


SKIN: No rash, no masses palpable.


NEUROLOGICAL: The patient is awake, alert, oriented x3, mood and affect normal.





Results


CBC & Chem 7: 


                                 03/12/25 05:48





                                 03/13/25 07:16


Labs: 


                  Abnormal Lab Results - Last 24 Hours (Table)











  03/11/25 03/11/25 03/11/25 Range/Units





  20:38 20:38 20:38 


 


WBC  22.6 H    (3.8-10.6)  k/uL


 


RBC  3.76 L    (4.30-5.90)  m/uL


 


Hgb  11.0 L    (13.0-17.5)  gm/dL


 


Hct  35.2 L    (39.0-53.0)  %


 


Neutrophils #  20.0 H    (1.3-7.7)  k/uL


 


Lymphocytes #     (1.0-4.8)  k/uL


 


APTT     (22.0-30.0)  sec


 


D-Dimer     (<0.60)  mg/L FEU


 


VBG pH     (7.31-7.41)  


 


VBG pCO2     (37-51)  mmHg


 


Sodium   133 L   (137-145)  mmol/L


 


BUN   30 H   (9-20)  mg/dL


 


Creatinine   1.27 H   (0.66-1.25)  mg/dL


 


Glucose   135 H   (74-99)  mg/dL


 


POC Glucose (mg/dL)     ()  mg/dL


 


Calcium   8.0 L   (8.4-10.2)  mg/dL


 


Phosphorus     (2.5-4.5)  mg/dL


 


Troponin I    0.049 H*  (0.000-0.034)  ng/mL


 


Total Protein   6.1 L   (6.3-8.2)  g/dL


 


Albumin   3.0 L   (3.5-5.0)  g/dL


 


Influenza Type A (PCR)     (Not Detectd)  














  03/11/25 03/11/25 03/11/25 Range/Units





  20:38 20:38 20:39 


 


WBC     (3.8-10.6)  k/uL


 


RBC     (4.30-5.90)  m/uL


 


Hgb     (13.0-17.5)  gm/dL


 


Hct     (39.0-53.0)  %


 


Neutrophils #     (1.3-7.7)  k/uL


 


Lymphocytes #     (1.0-4.8)  k/uL


 


APTT     (22.0-30.0)  sec


 


D-Dimer   4.87 H   (<0.60)  mg/L FEU


 


VBG pH  7.52 H    (7.31-7.41)  


 


VBG pCO2  32 L    (37-51)  mmHg


 


Sodium     (137-145)  mmol/L


 


BUN     (9-20)  mg/dL


 


Creatinine     (0.66-1.25)  mg/dL


 


Glucose     (74-99)  mg/dL


 


POC Glucose (mg/dL)     ()  mg/dL


 


Calcium     (8.4-10.2)  mg/dL


 


Phosphorus     (2.5-4.5)  mg/dL


 


Troponin I     (0.000-0.034)  ng/mL


 


Total Protein     (6.3-8.2)  g/dL


 


Albumin     (3.5-5.0)  g/dL


 


Influenza Type A (PCR)    Detected A  (Not Detectd)  














  03/12/25 03/12/25 03/12/25 Range/Units





  00:25 02:33 03:11 


 


WBC     (3.8-10.6)  k/uL


 


RBC     (4.30-5.90)  m/uL


 


Hgb     (13.0-17.5)  gm/dL


 


Hct     (39.0-53.0)  %


 


Neutrophils #     (1.3-7.7)  k/uL


 


Lymphocytes #     (1.0-4.8)  k/uL


 


APTT     (22.0-30.0)  sec


 


D-Dimer     (<0.60)  mg/L FEU


 


VBG pH     (7.31-7.41)  


 


VBG pCO2     (37-51)  mmHg


 


Sodium     (137-145)  mmol/L


 


BUN     (9-20)  mg/dL


 


Creatinine     (0.66-1.25)  mg/dL


 


Glucose     (74-99)  mg/dL


 


POC Glucose (mg/dL)   234 H   ()  mg/dL


 


Calcium     (8.4-10.2)  mg/dL


 


Phosphorus     (2.5-4.5)  mg/dL


 


Troponin I  0.055 H*   0.051 H*  (0.000-0.034)  ng/mL


 


Total Protein     (6.3-8.2)  g/dL


 


Albumin     (3.5-5.0)  g/dL


 


Influenza Type A (PCR)     (Not Detectd)  














  03/12/25 03/12/25 03/12/25 Range/Units





  05:48 05:48 05:48 


 


WBC  17.2 H    (3.8-10.6)  k/uL


 


RBC  3.37 L    (4.30-5.90)  m/uL


 


Hgb  10.3 L    (13.0-17.5)  gm/dL


 


Hct  32.3 L    (39.0-53.0)  %


 


Neutrophils #  16.1 H    (1.3-7.7)  k/uL


 


Lymphocytes #  0.6 L    (1.0-4.8)  k/uL


 


APTT    >200.0 H*  (22.0-30.0)  sec


 


D-Dimer     (<0.60)  mg/L FEU


 


VBG pH     (7.31-7.41)  


 


VBG pCO2     (37-51)  mmHg


 


Sodium   136 L   (137-145)  mmol/L


 


BUN   28 H   (9-20)  mg/dL


 


Creatinine   1.37 H   (0.66-1.25)  mg/dL


 


Glucose   213 H   (74-99)  mg/dL


 


POC Glucose (mg/dL)     ()  mg/dL


 


Calcium   8.0 L   (8.4-10.2)  mg/dL


 


Phosphorus   5.9 H   (2.5-4.5)  mg/dL


 


Troponin I     (0.000-0.034)  ng/mL


 


Total Protein   6.1 L   (6.3-8.2)  g/dL


 


Albumin   2.9 L   (3.5-5.0)  g/dL


 


Influenza Type A (PCR)     (Not Detectd)  














  03/12/25 Range/Units





  07:42 


 


WBC   (3.8-10.6)  k/uL


 


RBC   (4.30-5.90)  m/uL


 


Hgb   (13.0-17.5)  gm/dL


 


Hct   (39.0-53.0)  %


 


Neutrophils #   (1.3-7.7)  k/uL


 


Lymphocytes #   (1.0-4.8)  k/uL


 


APTT   (22.0-30.0)  sec


 


D-Dimer   (<0.60)  mg/L FEU


 


VBG pH   (7.31-7.41)  


 


VBG pCO2   (37-51)  mmHg


 


Sodium   (137-145)  mmol/L


 


BUN   (9-20)  mg/dL


 


Creatinine   (0.66-1.25)  mg/dL


 


Glucose   (74-99)  mg/dL


 


POC Glucose (mg/dL)  238 H  ()  mg/dL


 


Calcium   (8.4-10.2)  mg/dL


 


Phosphorus   (2.5-4.5)  mg/dL


 


Troponin I   (0.000-0.034)  ng/mL


 


Total Protein   (6.3-8.2)  g/dL


 


Albumin   (3.5-5.0)  g/dL


 


Influenza Type A (PCR)   (Not Detectd)  














Assessment and Plan


(1) Leukocytosis


Current Visit: Yes   Status: Acute   Code(s): D72.829 - ELEVATED WHITE BLOOD 

CELL COUNT, UNSPECIFIED   SNOMED Code(s): 266622312


   





(2) Bilateral pneumonia


Current Visit: Yes   Status: Acute   Code(s): J18.9 - PNEUMONIA, UNSPECIFIED 

ORGANISM   SNOMED Code(s): 880318906


   


Plan: 





1patient presented hospital with increasing shortness of breath and chest pain 

which is likely multifactorial in this patient who did have evidence of PE on 

the CT there is also evidence of multifocal pneumonia with elevated white count 

and recently acute influenza A will need to cover for resistant gram-positive as

well as gram-negative pathogen for post influenza pneumonia


2-try to obtain sputum for Gram stain and culture


3-patient empirically treated with vancomycin pharmacy to dose of cefepime while

waiting for the workup to be completed


We will follow on clinical condition and cultures to further adjust medication 

if needed


Thank you for this consultation we will follow the patient along with you


Dictation was produced using dragon dictation software. please excuse any 

grammatical, word or spelling errors. 








Time with Patient: Greater than 30

## 2025-03-12 NOTE — P.CNPUL
History of Present Illness


Consult date: 03/12/25


Requesting physician: Romain Nielson


Reason for consult: hypoxemia, pneumonia


Chief complaint: Difficulty in breathing


History of present illness: 





Patient is a 64-year-old male with past medical history significant for atrial 

fibrillation, diabetes mellitus, CKD stage III, hypertension, hyperlipidemia, 

tobacco smoker.  Of note, recently had a prolonged hospitalization 02/23/2025 

through 03/07/2025.  Treated for combination of influenza A, pneumonia, CHF.  

Completed course of antibiotics and Tamiflu.  At this time, did require 

noninvasive BiPAP, eventually discharged on home O2.  Returns with a chief 

complaint of shortness of breath progressing over the last 2 to 3 days.  Also, 

reports sudden onset substernal chest pain that developed the night prior and 

has been persistent.  On arrival to the ED, found to be in some respiratory 

distress, and placed on BiPAP.  Workup in the emergency department including a 

chest x-ray showing multifocal infiltrates concerning for pneumonia or pulmonary

edema.  D-dimer was elevated in setting CT angio protocol which was positive for

segmental and subsegmental right middle lobe and right lower lobe pulmonary 

emboli.  No saddle pulmonary embolus. Pulmonary artery mildly enlarged.  

Preserved RV to LV ratio.  There were diffuse coarse reticular infiltrates, as 

well as, groundglass lung attenuation, cystic changes, with concerns for interst

itial lung disease. Patient does take Eliquis for his history of atrial 

fibrillation.  Does state he has missed some doses lately.  CBC: WBC count 22.6,

hemoglobin 11, platelets 322.  CMP: Sodium 133, potassium 4.2, chloride 101, 

serum bicarb 24, BUN 30, creatinine 1.27, glucose 135.  Lactic 1.5.  VBG with a 

pH of 7.5 and pCO2 of 32.  EKG: Sinus rhythm, rate 65 bpm, left bundle branch 

block, not acute.  Elevated serial troponins 0.049 and 0.055 respectively.  NT 

proBNP elevated 8807.  Patient is currently being evaluated in the emergency 

department.  He is alert and some moderate respiratory distress.  On BiPAP with 

settings 12/5 and FiO2 60%.  Tachypneic, breathing around 40 breaths/min. En

dorses progressive worsening difficulty in breathing over last couple days.  He 

has tried to increase his supplemental oxygen at home without any relief. Denies

previous history of DVT or PE.  Associated nonproductive cough.  Denies sputum 

production or hemoptysis.  Substernal nonradiating chest pain persist.  Denies 

any fevers or chills.  Denies heart palpitations or syncopal events.  Denies 

swelling in the lower extremities.  Heart rhythm is normal sinus on bedside 

monitor.  Blood pressure has been normotensive, did receive 2 L of crystalloid 

fluid bolus earlier.  Not requiring any vasopressors.  Normal saline is infusing

at 150 mL/h.  IV heparin infusing per protocol





Review of Systems


Constitutional: Reports fatigue, Reports sweats, Denies chills, Denies fever, 

Denies poor appetite, Denies weight gain, Denies weight loss


Ears, nose, mouth and throat: Denies epistaxis, Denies headache, Denies nasal 

congestion, Denies nasal discharge, Denies post-nasal drip, Denies sinus pain, 

Denies sinus pressure, Denies sore throat


Cardiovascular: Reports chest pain, Reports dyspnea on exertion, Reports sh

ortness of breath, Denies leg edema, Denies lightheadedness, Denies orthopnea, 

Denies palpitations, Denies paroxysmal nocturnal dyspnea, Denies syncope


Respiratory: Reports cough, Reports dyspnea, Reports home oxygen, Reports 

wheezing, Denies congestion, Denies cough with sputum, Denies hemoptysis, Denies

pain on inspiration


Gastrointestinal: Denies abdominal pain, Denies change in bowel habits, Denies 

diarrhea, Denies hematemesis, Denies hematochezia, Denies melena, Denies nausea,

Denies vomiting


Genitourinary: Denies dysuria


Musculoskeletal: Denies limitation of motion


Integumentary: Denies rash, Denies unusual bruising


Neurological: Denies seizures, Denies syncope


Psychiatric: Denies anxiety, Denies depression





Past Medical History


Past Medical History: Atrial Fibrillation, Diabetes Mellitus, Hyperlipidemia, 

Hypertension, Pneumonia, Supraventricular Tachycardia (SVT)


Additional Past Medical History / Comment(s): a fib, causes shortness of breath,

can feel irregular heartbeat


History of Any Multi-Drug Resistant Organisms: None Reported


Past Surgical History: Cholecystectomy, Heart Catheterization


Additional Past Surgical History / Comment(s): Colonoscopy


Past Anesthesia/Blood Transfusion Reactions: No Reported Reaction


Past Psychological History: No Psychological Hx Reported


Smoking Status: Current every day smoker


Past Alcohol Use History: Occasional


Past Drug Use History: Marijuana





- Past Family History


  ** Father


Family Medical History: No Reported History





  ** Mother


Family Medical History: Coronary Artery Disease (CAD)





Medications and Allergies


                                Home Medications











 Medication  Instructions  Recorded  Confirmed  Type


 


Apixaban [Eliquis] 5 mg PO BID 11/17/22 03/12/25 History


 


Furosemide [Lasix] 40 mg PO DAILY 11/17/22 03/12/25 History


 


Amiodarone [Cordarone] 200 mg PO DAILY 08/07/23 03/12/25 History


 


Metoprolol Succinate (ER) [Toprol 25 mg PO DAILY 02/24/25 03/12/25 History





XL]    


 


lisinopriL [Zestril] 20 mg PO BID 02/24/25 03/12/25 History


 


Aspirin 81 mg PO DAILY #90 tab 03/07/25 03/12/25 Rx


 


Atorvastatin [Lipitor] 40 mg PO HS #90 tab 03/07/25 03/12/25 Rx


 


Budesonide-Formot 160-4.5 Mcg 2 puff INHALATION RT-BID 30 Days 03/07/25 03/12/25

 Rx





[Symbicort 160-4.5 Mcg Inhaler] #1 each   


 


Fluticasone Nasal Spray [Flonase 2 spray EA NOSTRIL DAILY PRN  ml 03/07/25 03/12/25 Rx





Nasal Spray]    


 


Spironolactone [Aldactone] 25 mg PO DAILY #90 tablet 03/07/25 03/12/25 Rx


 


amLODIPine [Norvasc] 5 mg PO DAILY #90 tab 03/07/25 03/12/25 Rx


 


metFORMIN HCL [Glucophage] 500 mg PO BID #60 tab 03/07/25 03/12/25 Rx


 


predniSONE See Taper PO DAILY #32 tab 03/07/25 03/12/25 Rx








                                    Allergies











Allergy/AdvReac Type Severity Reaction Status Date / Time


 


No Known Allergies Allergy   Verified 03/11/25 20:36














Physical Exam


Vitals: 


                                   Vital Signs











  Temp Pulse Resp BP Pulse Ox FiO2


 


 03/12/25 01:00   64  24  144/74  95 


 


 03/12/25 00:06       60


 


 03/12/25 00:00   68  22  141/78  95 


 


 03/11/25 23:00   68  22  152/75  99 


 


 03/11/25 22:30   66  22  138/68  99 


 


 03/11/25 22:04       60


 


 03/11/25 22:03   67    


 


 03/11/25 21:47   61    


 


 03/11/25 21:15       80


 


 03/11/25 20:41       100


 


 03/11/25 20:39    26 H   


 


 03/11/25 20:37      96 


 


 03/11/25 20:33  98.2 F  80  26 H  163/77  90 L 








                                Intake and Output











 03/11/25 03/11/25 03/12/25





 14:59 22:59 06:59


 


Other:   


 


  Weight  113.398 kg 














GENERAL EXAM: Alert, 64-year-old male, in a moderate amount of respiratory 

distress on BiPAP


HEAD: Normocephalic and atraumatic


EYES: Normal reaction of pupils, equal size.


NOSE: Clear with pink turbinates.


THROAT: No erythema or exudates.


NECK: No masses, no JVD.


CHEST: No chest wall deformity.


LUNGS: Equal air entry with crackles heard on inspiration bilaterally.  On BiPAP

 with settings 12/5 and FiO2 60%.  Tachypneic breathing around 40 breaths/min.  

SpO2 reading 97% on bedside monitor.  Conversational dyspnea.


CVS: S1 and S2 normal with soft systolic murmur, regular rhythm. No other extra 

heart sounds


ABDOMEN: No hepatosplenomegaly, active bowel sounds, no guarding or rigidity. 


SPINE: No scoliosis or deformity


SKIN: No rashes


CENTRAL NERVOUS SYSTEM: No focal deficits, tone is normal in all 4 extremities.


EXTREMITIES: There is no peripheral edema, clubbing, or cyanosis.  Peripheral 

pulses are intact.





Results





- Laboratory Findings


CBC and BMP: 


                                 03/12/25 05:48





                                 03/12/25 05:48


PT/INR, D-dimer











PT  11.8 sec (10.0-12.5)   03/11/25  20:38    


 


INR  1.1  (<1.2)   03/11/25  20:38    


 


D-Dimer  4.87 mg/L FEU (<0.60)  H  03/11/25  20:38    








Abnormal lab findings: 


                                  Abnormal Labs











  03/11/25 03/11/25 03/11/25





  20:38 20:38 20:38


 


WBC  22.6 H  


 


RBC  3.76 L  


 


Hgb  11.0 L  


 


Hct  35.2 L  


 


Neutrophils #  20.0 H  


 


D-Dimer   


 


VBG pH   


 


VBG pCO2   


 


Sodium   133 L 


 


BUN   30 H 


 


Creatinine   1.27 H 


 


Glucose   135 H 


 


Calcium   8.0 L 


 


Troponin I    0.049 H*


 


Total Protein   6.1 L 


 


Albumin   3.0 L 














  03/11/25 03/11/25 03/12/25





  20:38 20:38 00:25


 


WBC   


 


RBC   


 


Hgb   


 


Hct   


 


Neutrophils #   


 


D-Dimer   4.87 H 


 


VBG pH  7.52 H  


 


VBG pCO2  32 L  


 


Sodium   


 


BUN   


 


Creatinine   


 


Glucose   


 


Calcium   


 


Troponin I    0.055 H*


 


Total Protein   


 


Albumin   














- Diagnostic Findings


Chest x-ray: image reviewed





Assessment and Plan


Assessment: 











Acute on chronic hypoxic respiratory failure.  Multifactorial.  Reviewed the CAT

 scan of the chest.  The patient is changes with coarse infiltrates and cystic 

changes which could potentially suggest an underlying ILD.  Nevertheless, the 

patient has developed bilateral pulmonary infiltrates could could be 

CHF/pneumonia/acute lung injury.  The contrast administration was put on the CTA

 and I doubt the possibility of a pulmonary rhythm.  The patient is maintained 

on anticoagulation with Eliquis on an outpatient basis.  He is currently on IV 

heparin.  He is currently on diuretics.  Is currently on a combination of 

cefepime and vancomycin.  He is also on bronchodilators and steroids.  He 

remains on amiodarone.





Acute hypoxemic respiratory failure, currently requiring BiPAP support, 

secondary to a combination of above, chest x-ray showing multifocal infiltrates 

concerning for pneumonia or pulmonary edema.  Follow-up chest CT showing diffuse

 coarse reticular infiltrates, as well as, groundglass lung attenuation upper 

lobe predominant cystic changes, with concerns for interstitial lung disease.  

The patient was given diuretics with improvement in his oxygenation and the 

patient seems to be less short of breath and currently on 15 L of oxygen nasal 

cannula.





Acute exacerbation of chronic systolic congestive heart failure, recent 

echocardiogram from 02/24/2025 estimating a left ventricular ejection fraction 

of 45 to 50%, moderate pulmonary hypertension, and moderate tricuspid 

regurgitation.





Acute leukocytosis





Recent hospitalization with influenza A infection, completed course of Tamiflu





CKD stage IIIa





History of atrial fibrillation with previous cardioversion, currently normal 

sinus, normally maintained on Eliquis and amiodarone





Chronic tobacco dependence with suspected underlying COPD





Hypertension.





Hyperlipidemia.





Diabetes mellitus, type II.











Plan:


Patient's medications, labs, imaging reviewed


Continue BiPAP support with current settings


Continue IV heparin per protocol


No CT evidence of right-sided heart strain


Add high-dose IV Solu-Medrol


Add Lasix 40 mg daily


Continue empiric antibiotics 


Blood cultures are pending


Check procalcitonin level


Case to be discussed with Dr. Joshua





I have personally seen and examined the patient, performed the documentation and

 the assessment and plan as written.  Number of minutes spent on the visit:20








This is a joint evaluation that was done along with the nurse practitioner.  

This evaluation was done and 32 minutes.  This is a 64-year-old male patient who

 is coming in with worsening shortness of breath.  The patient was in the 

hospital from 2/23/2025 and 3/7/2025 and the patient was treated for influenza A

 infection and CHF/pneumonia during his earlier hospitalization.  The patient 

was discharged home on 2 L of oxygen by nasal cannula.  He is currently 

readmitted with worsening shortness of breath and a chest x-ray showing diffuse 

multifocal airspace disease concerning for pneumonia/CHF.  CTA of the chest was 

also done and it showed some changes to suggest chronic interstitial lung 

disease as the patient has some chronic interstitial infiltrates subpleural 

distribution and some stacked cystic changes predominantly in the upper lung 

zone.  There is also bilateral coarse reticular opacities seen bilaterally.  

There is also mild cardiomegaly.  The contrast administration was poor and the 

CAT scan raised the suspicion for a subsegmental pulmonary embolism in the right

 middle lobe and right lower lobe.  Doppler of the lower extremities were 

essentially negative.  Noted, the patient has been maintained on anticoagulation

 on outpatient basis and he has been taking Eliquis.  D-dimer was elevated 

during this current admission.  Further blood work shows a white cell count of 

17, hemoglobin 10.3, BUN 28 and creatinine is at 1.3 as the patient has chronic 

stage III kidney disease.  Troponins were mildly elevated at 0.04 and 0.05.  

proBNP level was 8810.  The viral screen showed persistence of influenza A.  

Procalcitonin level is at 0.18.  The white cell count was initially 22 dropped 

down to 17.2.  The EKG showed a sinus rhythm with a left bundle branch block 

pattern.  Hemoglobin is at 10.3.  For now, the patient is subjected to diuresis 

and the patient is on Lasix 40 mg IV every 12 hours.  He is producing excellent 

urine output and his shortness of breath seems to have improved.  Nevertheless, 

continues to be hypoxic and he remains on high flow oxygen 15 L/min nasal 

cannula.  He was also covered with IV cefepime and vancomycin as broad-spectrum 

antibiotics.  He was started on DuoNeb nebulizer treatments around-the-clock and

 IV Solu-Medrol.  Note that he has been also on albuterol which could 

potentially cause an interstitial lung disease as the patient has some changes 

in the CAT scan of the chest to suggest ILD.





The patient has presented with history of atrial fibrillation.  The patient has 

undergone a ROSANNA cardioversion back in 2022 in 2023 and has been maintained on 

amiodarone in combination with Eliquis and metoprolol on an outpatient basis.  

Most recent echocardiogram from 2/24/2025 showed an ejection fraction of 45 to 

50%


Time with Patient: Greater than 30

## 2025-03-12 NOTE — US
EXAM:

US Duplex Bilateral Lower Extremities Veins

 

CLINICAL HISTORY:

ITS.REASON US Reason: dvt

 

TECHNIQUE:

Real-time duplex ultrasound scan of the bilateral lower extremity veins 

integrating B-mode two-dimensional vascular structure, Doppler spectral 

analysis, color flow Doppler imaging and compression.

 

COMPARISON:

No relevant prior studies available.

 

FINDINGS:

Right deep veins:  Unremarkable.  No DVT in the right common femoral, 

femoral, proximal deep femoral or popliteal veins.  The veins demonstrate 

normal color flow, are normally compressible, with normal phasic flow 

and/or augmentation response.

Right superficial veins:  Unremarkable.  No thrombus in the visualized 

right great saphenous vein.

 

Left deep veins:  Unremarkable.  No DVT in the left common femoral, 

femoral, proximal deep femoral or popliteal veins.  The veins demonstrate 

normal color flow, are normally compressible, with normal phasic flow 

and/or augmentation response.

Left superficial veins:  Unremarkable.  No thrombus in the visualized 

left great saphenous vein.

 

Soft tissues:  No acute findings.

 

IMPRESSION:     

No evidence of acute DVT.

## 2025-03-12 NOTE — P.CRDCN
History of Present Illness


Consult date: 03/12/25


Reason for Consult (text): 





PE


History of present illness: 





This is a 64-year-old male patient of Dr. Ray with past medical history of 

chronic kidney disease stage III, COPD tobacco use, alcohol abuse, hypertension,

diabetes mellitus type 2, persistent atrial fibrillation, systolic heart 

failure, recent GI bleed, family history of premature coronary artery disease.  

We have been asked to evaluate the patient for PE.  Patient was recently 

hospitalized 2/24 - 3/7 and was seen by cardiology at that time for NSTEMI 

secondary to sepsis, influenza and pneumonia.  Patient states that he was 

discharged home but he has had problems with getting his medications and has not

been taking his medications after he was discharged.  He states he could not 

afford it because he no longer has Medicaid.  Patient did go home on oxygen at 2

L nasal cannula.  Patient states that he was having weakness at home could not 

do anything and he had significant dyspnea with even a small amount of activity.

 Patient is seen today in the emergency center on BiPAP.  He is waiting for a 

bed on the cardiac stepdown unit.  Pulmonary medicine is also on consult.  Blood

pressure 156/72, heart rate 59, pulse ox 99% on BiPAP.  Patient has been started

on heparin drip, IV Solu-Medrol, IV antibiotics.





-EKG: Sinus rhythm with left bundle branch block, left axis deviation.


-Chest x-ray: Multifocal airspace opacities concerning for pneumonia.  #2 

persistent cardiomegaly with bilateral multifocal acute infiltrates and/or 

edema.  Findings are worse in the bilateral lower lungs from 1 day earlier.


-CTA chest: Acute segmental and subsegmental pulmonary emboli within the right 

middle and right lower lobes.  No saddle embolus.  No RV strain.  Chronic 

interstitial lung disease with nonspecific features.


-Laboratory studies: WBC 17.2, hemoglobin 10.3, D-dimer 4.87.  Sodium 136, 

potassium 28 and creatinine 1.37, troponin 0.0490, 0.055 and 0.051.  proBNP 

8810.  Influenza A detected.  Procalcitonin 0.18..


-Home cardiac medications: Amiodarone 200 mg daily, Norvasc 5 mg daily, Eliquis 

5 mg twice daily, atorvastatin 40 mg at bedtime, Lasix 40 mg daily, lisinopril 

20 mg twice daily, metoprolol succinate 25 mg daily, spironolactone 25 mg daily.


-ROSANNA and cardioversion performed on 8/10/2023 revealed aortic valve is 

tricuspid, functioning normally with trace AI.  Mitral valve appears to be 

normal with mild regurgitation.  Tricuspid valve appears to be normal with 

moderate to severe tricuspid regurgitation.  There is a PFO.  Left atrial 

appendage is free of clot.  EF 35%.


-ROSANNA and cardioversion performed 11/21/2022.


-Dobutamine stress echocardiogram performed in the office on 8/3/2021 revealed 

nondiagnostic EKG portion secondary to baseline EKG abnormalities.  Normal 

stress echo portion without inducible ischemia.  Normal left ventricular 

ejection fraction of 60%.


-Echocardiogram performed 2/24/2025: EF 45 to 50%, moderately increased septal 

wall thickness, moderate biatrial dilatation, moderate tricuspid regurgitation.





Review Of Systems:


At the time of my exam:


CONSTITUTIONAL: Denies fever or chills.


HEENT: Denies blurred vision, vision changes, or eye pain. Denies hemoptysis 


CARDIOVASCULAR: Denies chest pain. Denies orthopnea. Denies PND. Denies 

palpitations


RESPIRATORY: Reports significant dyspnea on exertion, reports shortness of 

breath. 


GASTROINTESTINAL: Denies abdominal pain. Denies nausea or vomiting. 


HEMATOLOGIC: Denies bleeding disorders.


GENITOURINARY:  Denies any blood in urine.


SKIN: Denies puritis. Denies rash.





Physical examination:


Gen: This is a 64-year-old male appears to be comfortable on BiPAP.


VS: reviewed


HEENT: Head is atraumatic, normocephalic. Pupils equal, round. Sclerae is 

anicteric. 


NECK: Supple. No JVD. 


LUNGS: Scattered rhonchi bilaterally.  No intercostal retractions.


HEART: Regular rate and rhythm. No murmur. 


ABDOMEN: Soft  No tenderness.


EXTREMITIES: No pedal edema.  No calf tenderness.


NEUROLOGICAL: Patient is awake, alert and oriented x3.


 


Assessment:


Acute pulmonary emboli due to prolonged hospitalization and unable to afford 

Eliquis


No RV strain on CT


Acute hypoxic respiratory failure requiring BiPAP secondary to influenza, 

pneumonia, and component of heart failure


NSTEMI secondary to PE, influenza and pneumonia


Influenza A


Pneumonia


Acute on chronic systolic heart failure


Acute kidney injury


Cardiomyopathy with previous EF of 15 to 20%, possibly tachycardia induced


Hypertension


Hyperlipidemia


Persistent atrial fibrillation status post previous cardioversion x 2, currently

in sinus rhythm


Diabetes mellitus type 2





Plan:


No evidence of RV strain on CT.  Patient has no hypotension or shock symptoms.  

Patient does not require any local or systemic thrombolytics at this time.


Resume patient's home cardiac medications


Continue patient on heparin drip for 24 hours and resume Eliquis tomorrow


Continue IV Lasix 40 mg every 12 hours


Monitor ENE, daily weights, electrolytes and renal function


No need to repeat echocardiogram


Further recommendations to follow based upon clinical course


Thank you kindly for this consultation.





Nurse practitioner note has been reviewed, I agree with documented findings and 

plan of care.  Patient was seen and examined.








Past Medical History


Past Medical History: Atrial Fibrillation, Diabetes Mellitus, Hyperlipidemia, 

Hypertension, Pneumonia, Supraventricular Tachycardia (SVT)


Additional Past Medical History / Comment(s): a fib, causes shortness of breath,

can feel irregular heartbeat


History of Any Multi-Drug Resistant Organisms: None Reported


Past Surgical History: Cholecystectomy, Heart Catheterization


Additional Past Surgical History / Comment(s): Colonoscopy


Past Anesthesia/Blood Transfusion Reactions: No Reported Reaction


Past Psychological History: No Psychological Hx Reported


Smoking Status: Current every day smoker


Past Alcohol Use History: Occasional


Past Drug Use History: Marijuana





- Past Family History


  ** Father


Family Medical History: No Reported History





  ** Mother


Family Medical History: Coronary Artery Disease (CAD)





Medications and Allergies


                                Home Medications











 Medication  Instructions  Recorded  Confirmed  Type


 


Apixaban [Eliquis] 5 mg PO BID 11/17/22 03/12/25 History


 


Furosemide [Lasix] 40 mg PO DAILY 11/17/22 03/12/25 History


 


Amiodarone [Cordarone] 200 mg PO DAILY 08/07/23 03/12/25 History


 


Metoprolol Succinate (ER) [Toprol 25 mg PO DAILY 02/24/25 03/12/25 History





XL]    


 


lisinopriL [Zestril] 20 mg PO BID 02/24/25 03/12/25 History


 


Aspirin 81 mg PO DAILY #90 tab 03/07/25 03/12/25 Rx


 


Atorvastatin [Lipitor] 40 mg PO HS #90 tab 03/07/25 03/12/25 Rx


 


Budesonide-Formot 160-4.5 Mcg 2 puff INHALATION RT-BID 30 Days 03/07/25 03/12/25

Rx





[Symbicort 160-4.5 Mcg Inhaler] #1 each   


 


Fluticasone Nasal Spray [Flonase 2 spray EA NOSTRIL DAILY PRN  ml 03/07/25 03/12/25 Rx





Nasal Spray]    


 


Spironolactone [Aldactone] 25 mg PO DAILY #90 tablet 03/07/25 03/12/25 Rx


 


amLODIPine [Norvasc] 5 mg PO DAILY #90 tab 03/07/25 03/12/25 Rx


 


metFORMIN HCL [Glucophage] 500 mg PO BID #60 tab 03/07/25 03/12/25 Rx


 


predniSONE See Taper PO DAILY #32 tab 03/07/25 03/12/25 Rx








                                    Allergies











Allergy/AdvReac Type Severity Reaction Status Date / Time


 


No Known Allergies Allergy   Verified 03/11/25 20:36














Physical Exam


Vitals: 


                                   Vital Signs











  Temp Pulse Resp BP Pulse Ox FiO2


 


 03/12/25 06:00   59 L  20  156/72  99 


 


 03/12/25 05:49   66    


 


 03/12/25 05:48       60


 


 03/12/25 05:39       40


 


 03/12/25 05:37   56 L    


 


 03/12/25 05:00   57 L  18  147/76  99 


 


 03/12/25 04:00  98.9 F  68  22  135/85  98 


 


 03/12/25 03:52       60


 


 03/12/25 03:00   56 L  22  131/71  97 


 


 03/12/25 02:00   61  22  143/92  98 


 


 03/12/25 01:00   64  24  144/74  95 


 


 03/12/25 00:06       60


 


 03/12/25 00:00   68  22  141/78  95 


 


 03/11/25 23:00   68  22  152/75  99 


 


 03/11/25 22:30   66  22  138/68  99 


 


 03/11/25 22:04       60


 


 03/11/25 22:03   67    


 


 03/11/25 21:47   61    


 


 03/11/25 21:15       80


 


 03/11/25 20:41       100


 


 03/11/25 20:39    26 H   


 


 03/11/25 20:37      96 


 


 03/11/25 20:33  98.2 F  80  26 H  163/77  90 L 








                                Intake and Output











 03/11/25 03/12/25 03/12/25





 22:59 06:59 14:59


 


Output Total  900 


 


Balance  -900 


 


Output:   


 


  Urine  900 


 


Other:   


 


  Weight 113.398 kg  














Results





                                 03/12/25 05:48





                                 03/12/25 05:48


                                 Cardiac Enzymes











  03/11/25 03/11/25 03/12/25 Range/Units





  20:38 20:38 00:25 


 


AST  39    (17-59)  U/L


 


Troponin I   0.049 H*  0.055 H*  (0.000-0.034)  ng/mL














  03/12/25 03/12/25 Range/Units





  03:11 05:48 


 


AST   35  (17-59)  U/L


 


Troponin I  0.051 H*   (0.000-0.034)  ng/mL








                                   Coagulation











  03/11/25 Range/Units





  20:38 


 


PT  11.8  (10.0-12.5)  sec


 


APTT  28.0  (22.0-30.0)  sec








                                       CBC











  03/11/25 03/12/25 Range/Units





  20:38 05:48 


 


WBC  22.6 H  17.2 H  (3.8-10.6)  k/uL


 


RBC  3.76 L  3.37 L  (4.30-5.90)  m/uL


 


Hgb  11.0 L  10.3 L  (13.0-17.5)  gm/dL


 


Hct  35.2 L  32.3 L  (39.0-53.0)  %


 


Plt Count  322  242  (150-450)  k/uL








                          Comprehensive Metabolic Panel











  03/11/25 03/12/25 Range/Units





  20:38 05:48 


 


Sodium  133 L  136 L  (137-145)  mmol/L


 


Potassium  4.2  3.8  (3.5-5.1)  mmol/L


 


Chloride  101  102  ()  mmol/L


 


Carbon Dioxide  24  25  (22-30)  mmol/L


 


BUN  30 H  28 H  (9-20)  mg/dL


 


Creatinine  1.27 H  1.37 H  (0.66-1.25)  mg/dL


 


Glucose  135 H  213 H  (74-99)  mg/dL


 


Calcium  8.0 L  8.0 L  (8.4-10.2)  mg/dL


 


AST  39  35  (17-59)  U/L


 


ALT  37  34  (4-49)  U/L


 


Alkaline Phosphatase  65  78  ()  U/L


 


Total Protein  6.1 L  6.1 L  (6.3-8.2)  g/dL


 


Albumin  3.0 L  2.9 L  (3.5-5.0)  g/dL








                               Current Medications











Generic Name Dose Route Start Last Admin





  Trade Name Freq  PRN Reason Stop Dose Admin


 


Albuterol/Ipratropium  3 ml  03/12/25 03:08 





  Ipratropium-Albuterol 3 Ml Neb  INHALATION  





  RT-QID PRN  





  Shortness Of Breath Or Wheezing  


 


Albuterol/Ipratropium  3 ml  03/12/25 04:00  03/12/25 05:37





  Ipratropium-Albuterol 3 Ml Neb  INHALATION   3 ml





  RT-Q4H BRADY   Administration


 


Budesonide  1 mg  03/12/25 08:00 





  Budesonide 1 Mg/2 Ml Nebu  INHALATION  





  RT-BID BRADY  


 


Formoterol Fumarate  20 mcg  03/12/25 08:00 





  Formoterol Fumarate 20 Mcg/2 Ml Nebu  INHALATION  





  RT-BID [facility]  


 


Furosemide  40 mg  03/12/25 09:00 





  Furosemide 10 Mg/Ml 4 Ml Vial  IV  





  DAILY [facility]  


 


Heparin Sodium (Porcine)  0 unit  03/11/25 23:13 





  Heparin Sodium 1,000 Un/Ml (10ml Vl)  IV  





  PER PROTOCOL PRN  





  Low PTT  





  Protocol  


 


Piperacillin Sod/Tazobactam  100 mls @ 25 mls/hr  03/12/25 08:00 





  Sod 3.375 gm/ Sodium Chloride  IVPB  





  Q8HR [facility]  





  Protocol  


 


Sodium Chloride  1,000 mls @ 75 mls/hr  03/11/25 22:15  03/11/25 22:54





  Saline 0.9%  IV   150 mls/hr





  .[id number] BRADY   Administration


 


Heparin Sodium/Sodium Chloride  250 mls @ 20.412 mls/hr  03/11/25 23:15  

03/12/25 00:04





  25,000 unit/ Sodium Chloride  IV   18 units/kg/hr





  .[id number] BRADY   20.412 mls/hr





    Administration





  Protocol  





  18 UNITS/KG/HR  


 


Vancomycin HCl 2,500 mg/  500 mls @ 167 mls/hr  03/12/25 05:30  03/12/25 05:13





  Sodium Chloride  IVPB  03/12/25 08:29  167 mls/hr





  ONCE ONE   Administration


 


Vancomycin HCl 2,000 mg/  500 mls @ 167 mls/hr  03/12/25 17:00 





  Sodium Chloride  IVPB  





  Q12H [facility]  


 


Insulin Human Lispro  0 unit  03/12/25 07:30 





  Insulin Lispro (Humalog) 100 Unit/Ml 10 Ml Vl  SQ  





  [facility]  





  Protocol  


 


Methylprednisolone Sodium Succinate  60 mg  03/12/25 06:00  03/12/25 05:11





  Methylprednisolone Sod Succi 125 Mg/2 Ml Vial  IV   60 mg





  Q6HR BRADY   Administration


 


Miscellaneous Information  1 each  03/12/25 04:37 





  Vancomycin Iv Per Pharmacy 1 Each Misc  MISCELLANE  





  AS DIRECTED PRN  





  Per Protocol  





  Protocol  


 


Morphine Sulfate  4 mg  03/11/25 23:15 





  Morphine Sulfate 4 Mg/Ml Syringe  IV  





  Q4HR PRN  





  Severe Pain (Scale 7 to 10)  


 


Naloxone HCl  0.2 mg  03/11/25 23:15 





  Naloxone 0.4 Mg/Ml 1 Ml Vial  IV  





  Q2M PRN  





  Opioid Reversal  


 


Ondansetron HCl  4 mg  03/11/25 23:15 





  Ondansetron 4 Mg/2 Ml Vial  IVP  





  Q8HR PRN  





  Nausea And Vomiting  


 


Pantoprazole Sodium  40 mg  03/12/25 09:00 





  Pantoprazole 40 Mg/10 Ml Vial  IV  





  DAILY RBADY  








                                Intake and Output











 03/11/25 03/12/25 03/12/25





 22:59 06:59 14:59


 


Output Total  900 


 


Balance  -900 


 


Output:   


 


  Urine  900 


 


Other:   


 


  Weight 113.398 kg  








                                        





                                 03/12/25 05:48 





                                 03/12/25 05:48

## 2025-03-13 LAB
ANION GAP SERPL CALC-SCNC: 9 MMOL/L
BUN SERPL-SCNC: 35 MG/DL (ref 9–20)
CALCIUM SPEC-MCNC: 7.9 MG/DL (ref 8.4–10.2)
CHLORIDE SERPL-SCNC: 102 MMOL/L (ref 98–107)
CO2 SERPL-SCNC: 21 MMOL/L (ref 22–30)
GLUCOSE BLD-MCNC: 252 MG/DL (ref 70–110)
GLUCOSE BLD-MCNC: 341 MG/DL (ref 70–110)
GLUCOSE BLD-MCNC: 363 MG/DL (ref 70–110)
GLUCOSE BLD-MCNC: 382 MG/DL (ref 70–110)
GLUCOSE SERPL-MCNC: 303 MG/DL (ref 74–99)
PH UR: 6 [PH] (ref 5–8)
POTASSIUM SERPL-SCNC: 3.4 MMOL/L (ref 3.5–5.1)
PROT UR QL: (no result)
RBC UR QL: <1 /HPF (ref 0–5)
SODIUM SERPL-SCNC: 132 MMOL/L (ref 137–145)
SP GR UR: 1.02 (ref 1–1.03)
UROBILINOGEN UR QL STRIP: <2 MG/DL (ref ?–2)
WBC #/AREA URNS HPF: 2 /HPF (ref 0–5)

## 2025-03-13 RX ADMIN — ASPIRIN 81 MG CHEWABLE TABLET SCH MG: 81 TABLET CHEWABLE at 09:44

## 2025-03-13 RX ADMIN — SPIRONOLACTONE SCH MG: 25 TABLET, FILM COATED ORAL at 09:44

## 2025-03-13 RX ADMIN — APIXABAN SCH MG: 5 TABLET, FILM COATED ORAL at 12:58

## 2025-03-13 RX ADMIN — METOPROLOL SUCCINATE SCH MG: 25 TABLET, EXTENDED RELEASE ORAL at 09:44

## 2025-03-13 RX ADMIN — AMIODARONE HYDROCHLORIDE SCH MG: 200 TABLET ORAL at 09:44

## 2025-03-13 NOTE — P.PN
Subjective


Progress Note Date: 03/13/25





Reason for Consult (text): 


PE


History of present illness: 


This is a 64-year-old male patient of Dr. Ray with past medical history of 

chronic kidney disease stage III, COPD tobacco use, alcohol abuse, hypertension,

diabetes mellitus type 2, persistent atrial fibrillation, systolic heart 

failure, recent GI bleed, family history of premature coronary artery disease.  

We have been asked to evaluate the patient for PE.  Patient was recently 

hospitalized 2/24 - 3/7 and was seen by cardiology at that time for NSTEMI 

secondary to sepsis, influenza and pneumonia.  Patient states that he was 

discharged home but he has had problems with getting his medications and has not

been taking his medications after he was discharged.  He states he could not af

ford it because he no longer has Medicaid.  Patient did go home on oxygen at 2 L

nasal cannula.  Patient states that he was having weakness at home could not do 

anything and he had significant dyspnea with even a small amount of activity.  

Patient is seen today in the emergency center on BiPAP.  He is waiting for a bed

on the cardiac stepdown unit.  Pulmonary medicine is also on consult.  Blood pr

essure 156/72, heart rate 59, pulse ox 99% on BiPAP.  Patient has been started 

on heparin drip, IV Solu-Medrol, IV antibiotics.





-EKG: Sinus rhythm with left bundle branch block, left axis deviation.


-Chest x-ray: Multifocal airspace opacities concerning for pneumonia.  #2 

persistent cardiomegaly with bilateral multifocal acute infiltrates and/or 

edema.  Findings are worse in the bilateral lower lungs from 1 day earlier.


-CTA chest: Acute segmental and subsegmental pulmonary emboli within the right 

middle and right lower lobes.  No saddle embolus.  No RV strain.  Chronic 

interstitial lung disease with nonspecific features.


-Laboratory studies: WBC 17.2, hemoglobin 10.3, D-dimer 4.87.  Sodium 136, 

potassium 28 and creatinine 1.37, troponin 0.0490, 0.055 and 0.051.  proBNP 

8810.  Influenza A detected.  Procalcitonin 0.18..


-Home cardiac medications: Amiodarone 200 mg daily, Norvasc 5 mg daily, Eliquis 

5 mg twice daily, atorvastatin 40 mg at bedtime, Lasix 40 mg daily, lisinopril 

20 mg twice daily, metoprolol succinate 25 mg daily, spironolactone 25 mg daily.


-ROSANNA and cardioversion performed on 8/10/2023 revealed aortic valve is 

tricuspid, functioning normally with trace AI.  Mitral valve appears to be 

normal with mild regurgitation.  Tricuspid valve appears to be normal with 

moderate to severe tricuspid regurgitation.  There is a PFO.  Left atrial 

appendage is free of clot.  EF 35%.


-ROSANNA and cardioversion performed 11/21/2022.


-Dobutamine stress echocardiogram performed in the office on 8/3/2021 revealed 

nondiagnostic EKG portion secondary to baseline EKG abnormalities.  Normal 

stress echo portion without inducible ischemia.  Normal left ventricular 

ejection fraction of 60%.


-Echocardiogram performed 2/24/2025: EF 45 to 50%, moderately increased septal 

wall thickness, moderate biatrial dilatation, moderate tricuspid regurgitation.





3/13


Patient is seen and examined on the cardiac stepdown unit.  Blood pressure 

143/70, heart rate 78, pulse ox 99% on BiPAP.  Repeat blood work reveals BUN 35,

creatinine 1.51, potassium 3.4.





Physical examination:


Gen: This is a 64-year-old male appears to be comfortable on BiPAP.


VS: reviewed


HEENT: Head is atraumatic, normocephalic. Pupils equal, round. Sclerae is 

anicteric. 


NECK: Supple. No JVD. 


LUNGS: Scattered rhonchi bilaterally.  No intercostal retractions.


HEART: Regular rate and rhythm. No murmur. 


ABDOMEN: Soft  No tenderness.


EXTREMITIES: No pedal edema.  No calf tenderness.


NEUROLOGICAL: Patient is awake, alert and oriented x3.


 


Assessment:


Acute pulmonary emboli due to prolonged hospitalization and unable to afford 

Eliquis


No RV strain on CT


Acute hypoxic respiratory failure requiring BiPAP secondary to influenza, 

pneumonia, and component of heart failure


NSTEMI secondary to PE, influenza and pneumonia


Influenza A


Pneumonia


Acute on chronic systolic heart failure


Acute kidney injury


Cardiomyopathy with previous EF of 15 to 20%, possibly tachycardia induced


Hypertension


Hyperlipidemia


Persistent atrial fibrillation status post previous cardioversion x 2, currently

in sinus rhythm


Diabetes mellitus type 2





Plan:


No evidence of RV strain on CT.  Patient has no hypotension or shock symptoms.  

Patient does not require any local or systemic thrombolytics at this time.


Resume patient's home cardiac medications


Transition patient from IV heparin to Eliquis VTE protocol, prescription will be

sent to the pharmacy to check coverage


Continue IV Lasix 40 mg every 12 hours


Monitor ENE, daily weights, electrolytes and renal function


No need to repeat echocardiogram


Further recommendations to follow based upon clinical course





Nurse practitioner note has been reviewed, I agree with documented findings and 

plan of care.  Patient was seen and examined.











Objective





- Vital Signs


Vital signs: 


                                   Vital Signs











Temp  97.1 F L  03/13/25 08:30


 


Pulse  74   03/13/25 10:39


 


Resp  26 H  03/13/25 08:30


 


BP  143/70   03/13/25 08:30


 


Pulse Ox  91 L  03/13/25 10:19


 


FiO2  60   03/13/25 04:23








                                 Intake & Output











 03/12/25 03/13/25 03/13/25





 18:59 06:59 18:59


 


Intake Total 285.192 757 480


 


Output Total 1300 900 500


 


Balance -1014.808 -143 -20


 


Weight  113.5 kg 


 


Intake:   


 


  Intake, IV Titration 285.192 0 





  Amount   


 


    Heparin Sod,Pork in 0.45% 285.192 0 





    NaCl 25,000 unit In 0.45   





    % NaCl 1 250ml.bag @ 18   





    UNITS/KG/HR 20.412 mls/hr   





    IV .[id number] [facility] Rx#:   





    [id number]   


 


  Oral  757 480


 


Output:   


 


  Urine 1300 900 500


 


Other:   


 


  Voiding Method  Bedside Commode Bedside Commode





  External Catheter External Catheter














- Labs


CBC & Chem 7: 


                                 03/12/25 05:48





                                 03/13/25 07:16


Labs: 


                  Abnormal Lab Results - Last 24 Hours (Table)











  03/12/25 03/12/25 03/12/25 Range/Units





  16:53 16:57 21:24 


 


APTT  130.6 H*    (22.0-30.0)  sec


 


Sodium     (137-145)  mmol/L


 


Potassium     (3.5-5.1)  mmol/L


 


Carbon Dioxide     (22-30)  mmol/L


 


BUN     (9-20)  mg/dL


 


Creatinine     (0.66-1.25)  mg/dL


 


Glucose     (74-99)  mg/dL


 


POC Glucose (mg/dL)   276 H  334 H  ()  mg/dL


 


Calcium     (8.4-10.2)  mg/dL














  03/13/25 03/13/25 03/13/25 Range/Units





  01:25 06:31 07:16 


 


APTT  61.1 H    (22.0-30.0)  sec


 


Sodium    132 L  (137-145)  mmol/L


 


Potassium    3.4 L  (3.5-5.1)  mmol/L


 


Carbon Dioxide    21 L  (22-30)  mmol/L


 


BUN    35 H  (9-20)  mg/dL


 


Creatinine    1.51 H  (0.66-1.25)  mg/dL


 


Glucose    303 H  (74-99)  mg/dL


 


POC Glucose (mg/dL)   341 H   ()  mg/dL


 


Calcium    7.9 L  (8.4-10.2)  mg/dL














  03/13/25 03/13/25 Range/Units





  07:16 11:12 


 


APTT  65.9 H   (22.0-30.0)  sec


 


Sodium    (137-145)  mmol/L


 


Potassium    (3.5-5.1)  mmol/L


 


Carbon Dioxide    (22-30)  mmol/L


 


BUN    (9-20)  mg/dL


 


Creatinine    (0.66-1.25)  mg/dL


 


Glucose    (74-99)  mg/dL


 


POC Glucose (mg/dL)   382 H  ()  mg/dL


 


Calcium    (8.4-10.2)  mg/dL








                      Microbiology - Last 24 Hours (Table)











 03/11/25 20:48 Blood Culture - Preliminary





 Blood

## 2025-03-13 NOTE — P.PN
Subjective


Progress Note Date: 03/13/25


Principal diagnosis: 





Reason for follow-up is pneumonia





Patient is a 64-year-old  male with a past medical history significant 

for diabetes mellitus hypertension hyperlipidemia pneumonia atrial fibrillation 

currently everyday smoker presenting to the hospital for evaluation of 

increasing shortness of breath patient has been diagnosed with multifocal 

pneumonia prompting this consultation.


On today's evaluation that is 03/13/2025,the patient remains to be afebrile, pat

ient is on 15 L high flow nasal cannula supplemental oxygen and denies any 

worsening shortness of breath no chest pain or any worsening cough.Patient 

denies having any nausea or vomiting, no abdominal pain and no diarrhea has been

reported.


Patient did have a creatinine 1.51 no CBC was done today cultures are pending





Objective





- Vital Signs


Vital signs: 


                                   Vital Signs











Temp  97.1 F L  03/13/25 08:30


 


Pulse  74   03/13/25 10:39


 


Resp  26 H  03/13/25 08:30


 


BP  143/70   03/13/25 08:30


 


Pulse Ox  91 L  03/13/25 10:19


 


FiO2  60   03/13/25 04:23








                                 Intake & Output











 03/12/25 03/13/25 03/13/25





 18:59 06:59 18:59


 


Intake Total 285.192 757 480


 


Output Total 1300 900 500


 


Balance -1014.808 -143 -20


 


Weight  113.5 kg 


 


Intake:   


 


  Intake, IV Titration 285.192 0 





  Amount   


 


    Heparin Sod,Pork in 0.45% 285.192 0 





    NaCl 25,000 unit In 0.45   





    % NaCl 1 250ml.bag @ 18   





    UNITS/KG/HR 20.412 mls/hr   





    IV .[id number] [facility] Rx#:   





    [id number]   


 


  Oral  757 480


 


Output:   


 


  Urine 1300 900 500


 


Other:   


 


  Voiding Method  Bedside Commode Bedside Commode





  External Catheter External Catheter














- Exam





GENERAL DESCRIPTION: An elderly male lying in bed in no distress





RESPIRATORY SYSTEM: Unlabored breathing , coarse breath sounds bilaterally





HEART: S1 S2 regular rate and rhythm ,





ABDOMEN: Soft , no tenderness





EXTREMITIES: No edema feet





- Labs


CBC & Chem 7: 


                                 03/12/25 05:48





                                 03/13/25 07:16


Labs: 


                  Abnormal Lab Results - Last 24 Hours (Table)











  03/12/25 03/12/25 03/12/25 Range/Units





  16:53 16:57 21:24 


 


APTT  130.6 H*    (22.0-30.0)  sec


 


Sodium     (137-145)  mmol/L


 


Potassium     (3.5-5.1)  mmol/L


 


Carbon Dioxide     (22-30)  mmol/L


 


BUN     (9-20)  mg/dL


 


Creatinine     (0.66-1.25)  mg/dL


 


Glucose     (74-99)  mg/dL


 


POC Glucose (mg/dL)   276 H  334 H  ()  mg/dL


 


Calcium     (8.4-10.2)  mg/dL














  03/13/25 03/13/25 03/13/25 Range/Units





  01:25 06:31 07:16 


 


APTT  61.1 H    (22.0-30.0)  sec


 


Sodium    132 L  (137-145)  mmol/L


 


Potassium    3.4 L  (3.5-5.1)  mmol/L


 


Carbon Dioxide    21 L  (22-30)  mmol/L


 


BUN    35 H  (9-20)  mg/dL


 


Creatinine    1.51 H  (0.66-1.25)  mg/dL


 


Glucose    303 H  (74-99)  mg/dL


 


POC Glucose (mg/dL)   341 H   ()  mg/dL


 


Calcium    7.9 L  (8.4-10.2)  mg/dL














  03/13/25 03/13/25 Range/Units





  07:16 11:12 


 


APTT  65.9 H   (22.0-30.0)  sec


 


Sodium    (137-145)  mmol/L


 


Potassium    (3.5-5.1)  mmol/L


 


Carbon Dioxide    (22-30)  mmol/L


 


BUN    (9-20)  mg/dL


 


Creatinine    (0.66-1.25)  mg/dL


 


Glucose    (74-99)  mg/dL


 


POC Glucose (mg/dL)   382 H  ()  mg/dL


 


Calcium    (8.4-10.2)  mg/dL








                      Microbiology - Last 24 Hours (Table)











 03/11/25 20:48 Blood Culture - Preliminary





 Blood 














Assessment and Plan


(1) Leukocytosis


Current Visit: Yes   Status: Acute   Code(s): D72.829 - ELEVATED WHITE BLOOD 

CELL COUNT, UNSPECIFIED   SNOMED Code(s): 837210422


   





(2) Bilateral pneumonia


Current Visit: Yes   Status: Acute   Code(s): J18.9 - PNEUMONIA, UNSPECIFIED 

ORGANISM   SNOMED Code(s): 002917047


   


Plan: 





1patient presented hospital with increasing shortness of breath and chest pain 

which is likely multifactorial in this patient who did have evidence of PE on 

the CT there is also evidence of multifocal pneumonia with elevated white count 

and recently acute influenza A will need to cover for resistant gram-positive as

well as gram-negative pathogen for post influenza pneumonia


2-cultures are currently pending


3-continue  with vancomycin pharmacy to dose while watching his kidney function 

closely along with  cefepime while waiting for the culture to finalize


Dictation was produced using dragon dictation software. please excuse any 

grammatical, word or spelling errors. 








Time with Patient: Less than 30

## 2025-03-13 NOTE — P.PN
Progress Note - Text


Progress Note Date: 03/13/25





Patient is a 64-year-old male with a PMH of atrial fibrillation on Eliquis, COPD

on 2 L home oxygen, non-insulin-dependent diabetes mellitus, hyperlipidemia, 

hypertension presenting with shortness of breath.  Patient was previously 

admitted here for acute hypoxic respiratory failure secondary to influenza 

pneumonia and was discharged on 2 L of home oxygen.  Patient states he has been 

feeling short of breath while at rest.  He reports that since being discharged 

the home oxygen has not been sufficient. He states that he had to keep 

increasing his oxygen.    Patient says shortness of breath is slightly relieved 

when he is laying down.  Denies any orthopnea or PND.  Patient endorses a nonpr

oductive cough that is chronic in nature, but says it has been getting worse.  

Patient admits to having fever, chills.  Patient also admits to having non-

radiating, pressure-like chest pain over the last few days.  Chest pain is not 

related to exertion and he had no alleviating or aggravating factors.  Patient 

denies any headache, vision changes, nausea, vomiting, diarrhea, abdominal pain,

urinary symptoms.





EKG independently interpreted displaying sinus rhythm with left bundle branch 

block that has been seen on prior EKG, rate 65 bpm, QTc 451 ms


CXR independently interpreted displaying bilateral multifocal airspace opacities


Chest CTA displaying acute segmental and subsegmental pulmonary emboli within 

the right and middle lower lobes, no saddle embolus, no RV strain, chronic 

interstitial loading disease


Venous Doppler B/L LE displaying no evidence of acute DVT


Troponin 0.049, 0.055, proBNP 8810, D-dimer 4.87, WBC 22.6, Hgb 11.0, HCT 35.2, 

MCV 93.7, platelet 322, PT 11.8, INR 1.1, APTT 28, sodium 133, potassium 4.2, 

CO2 24, BUN 30, creatinine 1.27, glucose 135


VBG pH 7.52, pCO2 32


T98.2 F, NH 80, RR 26, /77, O2 sat 90% on BiPAP with FiO2 of 100%





March 12: Overflow the ER.  Up in the bed.  Short of breath.  Patient was on 

BiPAP overnight.  This morning on 15 L high flow nasal cannula.  Decreased 

appetite.  Has got a cough.  Some wheezing.  Some sputum production.  Decreased 

appetite.  Patient is on IV heparin.  Also on IV cefepime.  Pulmonary following


March 13: Patient did confirm that because Eliquis is very expensive patient was

not taking it properly did and take it sparingly at home.  Explains patient's 

PE.  Started on Eliquis today by cardiology.  IV heparin discontinued.  Getting 

easily short of breath.  Requiring BiPAP..  Some cough.  Oral intake fair.  On 

IV cefepime and vancomycin.  ID following.  Also on IV Lasix.  Due to worsening 

renal function will DC vancomycin





Active Medications





Albuterol/Ipratropium (Ipratropium-Albuterol 3 Ml Neb)  3 ml INHALATION RT-QID 

PRN


   PRN Reason: Shortness Of Breath Or Wheezing


Albuterol/Ipratropium (Ipratropium-Albuterol 3 Ml Neb)  3 ml INHALATION RT-Q4H 

[facility]


   Last Admin: 03/13/25 16:04 Dose:  3 ml


   


Alprazolam (Alprazolam 0.5 Mg Tab)  0.5 mg PO TID PRN


   PRN Reason: Anxiety


   Last Admin: 03/13/25 16:02 Dose:  0.5 mg


   


Amiodarone HCl (Amiodarone 200 Mg Tab)  200 mg PO DAILY [facility]


   Last Admin: 03/13/25 09:44 Dose:  200 mg


   


Amlodipine Besylate (Amlodipine 5 Mg Tab)  5 mg PO DAILY [facility]


   Last Admin: 03/13/25 09:44 Dose:  5 mg


   


Apixaban (Apixaban Initiation Dose--Vte 5 Mg Tab)  10 mg PO BID [facility]; Taper


   Stop: 04/12/25 10:14


   Last Admin: 03/13/25 12:58 Dose:  10 mg


   


Aspirin (Aspirin 81 Mg)  81 mg PO DAILY [facility]


   Last Admin: 03/13/25 09:44 Dose:  81 mg


   


Atorvastatin Calcium (Atorvastatin 40 Mg Tab)  40 mg PO HS [facility]


   Last Admin: 03/12/25 22:25 Dose:  40 mg


   


Budesonide (Budesonide 1 Mg/2 Ml Nebu)  1 mg INHALATION RT-BID [facility]


   Last Admin: 03/13/25 10:19 Dose:  1 mg


   


Formoterol Fumarate (Formoterol Fumarate 20 Mcg/2 Ml Nebu)  20 mcg INHALATION 

RT-BID [facility]


   Last Admin: 03/13/25 10:19 Dose:  20 mcg


   


Furosemide (Furosemide 10 Mg/Ml 4 Ml Vial)  40 mg IV Q12HR [facility]


   Last Admin: 03/13/25 09:43 Dose:  40 mg


   


Vancomycin HCl 1,750 mg/ (Sodium Chloride)  500 mls @ 167 mls/hr IVPB Q16H [facility]


   Last Admin: 03/13/25 12:58 Dose:  167 mls/hr


   


Cefepime HCl 2 gm/ Sodium (Chloride)  100 mls @ 25 mls/hr IVPB Q12H [facility]; 

Protocol


   Last Admin: 03/13/25 09:43 Dose:  25 mls/hr


   


Insulin Human Lispro (Insulin Lispro (Humalog) 100 Unit/Ml 10 Ml Vl)  0 unit SQ 

ACHS [facility]; Protocol


   Last Admin: 03/13/25 17:15 Dose:  10 unit


   


Lisinopril (Lisinopril 20 Mg Tab)  20 mg PO BID [facility]


   Last Admin: 03/13/25 09:44 Dose:  20 mg


   


Lorazepam (Lorazepam 1 Mg Tab)  1 mg PO HS PRN


   PRN Reason: Anxiety


   Last Admin: 03/13/25 00:09 Dose:  1 mg


   


Methylprednisolone Sodium Succinate (Methylprednisolone Sod Succi 125 Mg/2 Ml 

Vial)  60 mg IV Q6HR [facility]


   Last Admin: 03/13/25 17:15 Dose:  60 mg


   


Metoprolol Succinate (Metoprolol Succinate (Er) 25 Mg Tab.Er.24h)  25 mg PO 

DAILY [facility]


   Last Admin: 03/13/25 09:44 Dose:  25 mg


   


Morphine Sulfate (Morphine Sulfate 4 Mg/Ml Syringe)  4 mg IV Q4HR PRN


   PRN Reason: Severe Pain (Scale 7 to 10)


Naloxone HCl (Naloxone 0.4 Mg/Ml 1 Ml Vial)  0.2 mg IV Q2M PRN


   PRN Reason: Opioid Reversal


Ondansetron HCl (Ondansetron 4 Mg/2 Ml Vial)  4 mg IVP Q8HR PRN


   PRN Reason: Nausea And Vomiting


Pantoprazole Sodium (Pantoprazole 40 Mg/10 Ml Vial)  40 mg IV DAILY [facility]


   Last Admin: 03/13/25 09:43 Dose:  40 mg


   


Spironolactone (Spironolactone 25 Mg Tab)  25 mg PO DAILY [facility]


   Last Admin: 03/13/25 09:44 Dose:  25 mg


   











Social history:


Tobacco: Current 50-pack-year smoker


Alcohol: Occasional alcohol use


Recreational drugs: Marijuana


Travel: No recent travel





On examination:


VITAL SIGNS: 97.9, 76, 22, 150 x 67, 94% on BiPAP


GENERAL APPEARANCE: Lying in bed, short of breath


HEENT: Normal external appearance of nose and ear.  Oral cavity normal


EYES: Pupils equal. Conjunctiva normal. 


NECK: JVD not raised. Mass not palpable. 


RESPIRATORY: Respiratory effort increased, not able to speak in full sentences. 

Accessory muscles working. Lungs diminished breath sounds some scattered 

crackles prolonged expiration. 


CARDIOVASCULAR: First and second sounds normal. No edema. 


ABDOMEN: Soft. Liver and spleen not palpable. No tenderness. No mass palpable. 


PSYCHIATRY: Alert and oriented x3. Mood and affect anxious. 





INVESTIGATIONS, reviewed in the clinical context:


March 13: Sodium 132 potassium 3.4 BUN 35 creatinine 1.51


March 12: White count 7.2 hemoglobin 10.3 platelets 242 sodium 136 potassium 3.8

BUN 28 creatinine 1.37


Troponin I: 0.049, 0.055, 0.051


Chest x-ray film personally reviewed by me-bilateral infiltrates.  Effusion/pulm

edema cardiomegaly.


Venous Doppler: No DVT


Chest CTA: Acute segmental and subsegmental pulm embolism within the right 

middle and right lower lobes.  No RV strain.  Chronic interstitial lung disease.





Assessment/Plan:








#.  Acute pulmonary embolism, non-massive [segmental and subsegmental within the

 right middle and lower lobes.  No RV strain


IV heparin, switched over to Eliquis today





-IV heparin monitoring: Discontinued








#.  Sepsis likely secondary to -viral pneumonia.  Bacterial component cannot be 

ruled out


IV cefepime.  Vancomycin-ID following


Nasal MRSA screen ordered


Pulmonary following





#.  Acute hypoxic respiratory failure, secondary to above: Slow to respond


Remains on BiPAP





#.  Acute COPD exacerbation, and a prior smoker: Slow to respond


DuoNeb Q4.  IV Solu-Medrol 60 mg Q6.  Nebulized Pulmicort








#.  Acute on chronic heart failure with reduced ejection fraction (EF 45 to 50%)


IV Lasix 40 mg every 12.  Aldactone.





#.  Chronic hypoxic respiratory failure from underlying COPD, 2 L home O2








#.  Non-insulin-dependent type 2 diabetes, uncontrolled with hyperglycemia 

secondary steroids


Insulin subcu sliding scale.  Start Levemir 18 units SQ nightly


Accu-Cheks ACHS





-Suspect underlying chronic kidney disease


Renal ultrasound.  Check UA.








#.  Essential hypertension


Amlodipine.  Zestril.





#.  Hyperlipidemia


Lipitor





-Full code











Past Medical History


Past Medical History: Atrial Fibrillation, Diabetes Mellitus, Hyperlipidemia, 

Hypertension, Pneumonia, Supraventricular Tachycardia (SVT)


Additional Past Medical History / Comment(s): a fib, causes shortness of breath,

 can feel irregular heartbeat


History of Any Multi-Drug Resistant Organisms: None Reported


Past Surgical History: Cholecystectomy, Heart Catheterization


Additional Past Surgical History / Comment(s): Colonoscopy


Past Anesthesia/Blood Transfusion Reactions: No Reported Reaction


Past Psychological History: No Psychological Hx Reported


Smoking Status: Current every day smoker


Past Alcohol Use History: Occasional


Past Drug Use History: Marijuana

## 2025-03-13 NOTE — P.PN
Subjective


Progress Note Date: 03/13/25











Patient is a 64-year-old male with past medical history significant for atrial 

fibrillation, diabetes mellitus, CKD stage III, hypertension, hyperlipidemia, 

tobacco smoker.  Of note, recently had a prolonged hospitalization 02/23/2025 

through 03/07/2025.  Treated for combination of influenza A, pneumonia, CHF.  

Completed course of antibiotics and Tamiflu.  At this time, did require 

noninvasive BiPAP, eventually discharged on home O2.  Returns with a chief 

complaint of shortness of breath progressing over the last 2 to 3 days.  Also, 

reports sudden onset substernal chest pain that developed the night prior and 

has been persistent.  On arrival to the ED, found to be in some respiratory 

distress, and placed on BiPAP.  Workup in the emergency department including a 

chest x-ray showing multifocal infiltrates concerning for pneumonia or pulmonary

edema.  D-dimer was elevated in setting CT angio protocol which was positive for

segmental and subsegmental right middle lobe and right lower lobe pulmonary 

emboli.  No saddle pulmonary embolus. Pulmonary artery mildly enlarged.  

Preserved RV to LV ratio.  There were diffuse coarse reticular infiltrates, as 

well as, groundglass lung attenuation, cystic changes, with concerns for 

interstitial lung disease. Patient does take Eliquis for his history of atrial 

fibrillation.  Does state he has missed some doses lately.  CBC: WBC count 22.6,

hemoglobin 11, platelets 322.  CMP: Sodium 133, potassium 4.2, chloride 101, 

serum bicarb 24, BUN 30, creatinine 1.27, glucose 135.  Lactic 1.5.  VBG with a 

pH of 7.5 and pCO2 of 32.  EKG: Sinus rhythm, rate 65 bpm, left bundle branch 

block, not acute.  Elevated serial troponins 0.049 and 0.055 respectively.  NT 

proBNP elevated 8807.  Patient is currently being evaluated in the emergency 

department.  He is alert and some moderate respiratory distress.  On BiPAP with 

settings 12/5 and FiO2 60%.  Tachypneic, breathing around 40 breaths/min. 

Endorses progressive worsening difficulty in breathing over last couple days.  

He has tried to increase his supplemental oxygen at home without any relief. 

Denies previous history of DVT or PE.  Associated nonproductive cough.  Denies 

sputum production or hemoptysis.  Substernal nonradiating chest pain persist.  

Denies any fevers or chills.  Denies heart palpitations or syncopal events.  

Denies swelling in the lower extremities.  Heart rhythm is normal sinus on 

bedside monitor.  Blood pressure has been normotensive, did receive 2 L of 

crystalloid fluid bolus earlier.  Not requiring any vasopressors.  Normal saline

is infusing at 150 mL/h.  IV heparin infusing per protocol








3/13/2025, the patient is being seen for a follow-up.  The patient is 

alternating between BiPAP at a pressure of 12/5 with an FiO2 of 60% and 15 L of 

oxygen by nasal cannula.  He is getting slightly anxious and the patient is 

being provided Xanax accordingly.  Fluid balance is -1.1 L over the past 24 

hours.  Limited echocardiogram was also done and it showed preserved LV function

with an EF of around 55 to 60%.  Valvular functions could not be accurately 

assessed as the patient's study was technically difficult study.  There was 

however RV dilatation.  Unable to estimate RV pressures.  The electrolytes from 

today showed a creatinine of 1.5 with a BUN of 35.  Bicarb is at 21.  Sodium is 

at 132.  The patient remains on DuoNeb updrafts.  The patient remains on a 

combination of cefepime and vancomycin.  The patient was taken off the IV 

heparin and the patient was started on anticoagulation with Eliquis.  Remains on

IV Solu-Medrol 60 mg every 6 hours.  Remains on Aldactone.  Remains on IV Lasix 

40 mg every 12 hours.





Objective





- Vital Signs


Vital signs: 


                                   Vital Signs











Temp  96.7 F L  03/13/25 04:00


 


Pulse  74   03/13/25 10:39


 


Resp  20   03/13/25 04:29


 


BP  130/67   03/13/25 04:00


 


Pulse Ox  91 L  03/13/25 10:19


 


FiO2  60   03/13/25 04:23








                                 Intake & Output











 03/12/25 03/13/25 03/13/25





 18:59 06:59 18:59


 


Intake Total 285.192 757 


 


Output Total 1300 900 


 


Balance -1014.808 -143 


 


Weight  113.5 kg 


 


Intake:   


 


  Intake, IV Titration 285.192 0 





  Amount   


 


    Heparin Sod,Pork in 0.45% 285.192 0 





    NaCl 25,000 unit In 0.45   





    % NaCl 1 250ml.bag @ 18   





    UNITS/KG/HR 20.412 mls/hr   





    IV .[id number] [facility] Rx#:   





    [id number]   


 


  Oral  757 


 


Output:   


 


  Urine 1300 900 


 


Other:   


 


  Voiding Method  Bedside Commode 





  External Catheter 














- Exam








GENERAL EXAM: Alert, 64-year-old male, 15 L of oxygen by nasal cannula, also 

alternating with BiPAP


HEAD: Normocephalic and atraumatic


EYES: Normal reaction of pupils, equal size.


NOSE: Clear with pink turbinates.


THROAT: No erythema or exudates.


NECK: No masses, no JVD.


CHEST: No chest wall deformity.


LUNGS: Equal air entry with crackles heard on inspiration bilaterally.  On BiPAP

with settings 12/5 and FiO2 60%.  Tachypneic breathing around 40 breaths/min.  

SpO2 reading 97% on bedside monitor.  Conversational dyspnea.


CVS: S1 and S2 normal with soft systolic murmur, regular rhythm. No other extra 

heart sounds


ABDOMEN: No hepatosplenomegaly, active bowel sounds, no guarding or rigidity. 


SPINE: No scoliosis or deformity


SKIN: No rashes


CENTRAL NERVOUS SYSTEM: No focal deficits, tone is normal in all 4 extremities.


EXTREMITIES: There is no peripheral edema, clubbing, or cyanosis.  Peripheral 

pulses are intact.








- Labs


CBC & Chem 7: 


                                 03/12/25 05:48





                                 03/13/25 07:16


Labs: 


                  Abnormal Lab Results - Last 24 Hours (Table)











  03/12/25 03/12/25 03/12/25 Range/Units





  12:01 16:53 16:57 


 


APTT   130.6 H*   (22.0-30.0)  sec


 


Sodium     (137-145)  mmol/L


 


Potassium     (3.5-5.1)  mmol/L


 


Carbon Dioxide     (22-30)  mmol/L


 


BUN     (9-20)  mg/dL


 


Creatinine     (0.66-1.25)  mg/dL


 


Glucose     (74-99)  mg/dL


 


POC Glucose (mg/dL)  338 H   276 H  ()  mg/dL


 


Calcium     (8.4-10.2)  mg/dL














  03/12/25 03/13/25 03/13/25 Range/Units





  21:24 01:25 06:31 


 


APTT   61.1 H   (22.0-30.0)  sec


 


Sodium     (137-145)  mmol/L


 


Potassium     (3.5-5.1)  mmol/L


 


Carbon Dioxide     (22-30)  mmol/L


 


BUN     (9-20)  mg/dL


 


Creatinine     (0.66-1.25)  mg/dL


 


Glucose     (74-99)  mg/dL


 


POC Glucose (mg/dL)  334 H   341 H  ()  mg/dL


 


Calcium     (8.4-10.2)  mg/dL














  03/13/25 03/13/25 03/13/25 Range/Units





  07:16 07:16 11:12 


 


APTT   65.9 H   (22.0-30.0)  sec


 


Sodium  132 L    (137-145)  mmol/L


 


Potassium  3.4 L    (3.5-5.1)  mmol/L


 


Carbon Dioxide  21 L    (22-30)  mmol/L


 


BUN  35 H    (9-20)  mg/dL


 


Creatinine  1.51 H    (0.66-1.25)  mg/dL


 


Glucose  303 H    (74-99)  mg/dL


 


POC Glucose (mg/dL)    382 H  ()  mg/dL


 


Calcium  7.9 L    (8.4-10.2)  mg/dL








                      Microbiology - Last 24 Hours (Table)











 03/11/25 20:48 Blood Culture - Preliminary





 Blood 














Assessment and Plan


Assessment: 











Acute on chronic hypoxic respiratory failure.  Multifactorial.  Reviewed the CAT

scan of the chest.  The patient is changes with coarse infiltrates and cystic 

changes which could potentially suggest an underlying ILD.  Nevertheless, the 

patient has developed bilateral pulmonary infiltrates could could be 

CHF/pneumonia/acute lung injury.  The contrast administration was put on the CTA

and I doubt the possibility of a pulmonary embolism.  The patient is maintained 

on anticoagulation with Eliquis on an outpatient basis.  He is currently on IV 

heparin.  He is currently on diuretics.  Is currently on a combination of 

cefepime and vancomycin.  He is also on bronchodilators and steroids.  He 

remains on amiodarone.





Acute hypoxemic respiratory failure, currently requiring BiPAP support, 

secondary to a combination of above, chest x-ray showing multifocal infiltrates 

concerning for pneumonia or pulmonary edema.  Follow-up chest CT showing diffuse

coarse reticular infiltrates, as well as, groundglass lung attenuation upper 

lobe predominant cystic changes, with concerns for interstitial lung disease.  

The patient was given diuretics with improvement in his oxygenation and the 

patient seems to be less short of breath and currently on 15 L of oxygen nasal 

cannula.  He is on and off still requiring BiPAP for respiratory support.





Acute exacerbation of chronic systolic congestive heart failure, recent 

echocardiogram from 02/24/2025 estimating a left ventricular ejection fraction 

of 45 to 50%, moderate pulmonary hypertension, and moderate tricuspid 

regurgitation.  Repeat echocardiogram showed improvement in LV function with 

ejection fraction 55%





Acute leukocytosis





Recent hospitalization with influenza A infection, completed course of Tamiflu





CKD stage IIIa





History of atrial fibrillation with previous cardioversion, currently normal 

sinus, normally maintained on Eliquis and amiodarone





Chronic tobacco dependence with suspected underlying COPD





Hypertension.





Hyperlipidemia.





Diabetes mellitus, type II.











Plan:





Continue BiPAP support with current settings, alternate with high flow nasal 

cannula at 15 L


Anticoagulation with Eliquis has been started


Continue bronchodilators


Continue IV Solu-Medrol


Continue IV Lasix 40 mg every 12 hours


Continue empiric antibiotics and the patient is currently on a combination of 

cefepime and vancomycin


Blood cultures are pending


Check procalcitonin level is at 0.18


proBNP level is elevated


Repeat chest x-ray in the morning


Repeat blood work in the morning


May need to transfer to the ICU if there is any further decompensation in 

respiratory status.


Time with Patient: Greater than 30

## 2025-03-13 NOTE — CA
Transthoracic Echo Report 

 Name: Ranjan Vieira 

 MRN:    [id number] 

 Age:    64     Gender:     M 

 

 :    1960 

 Exam Date:     2025 08:09 

 Exam Location: [facility] 

 Ht (in):     68     Wt (lb):     250 

 Ordering Physician:        Romain Nielson DO 

 Attending/Referring Phys:         [id number], Naga 

 Technician         Arabella Catherine RDCS 

 Procedure CPT: 

 Indications:       PE 

 

 Cardiac Hx:        Limited for LVEF and walls. Last echo 25 

 Technical Quality:      Good 

 Contrast 1:                                Total Dose (mL): 

 Contrast 2:                                Total Dose (mL): 

 

 MEASUREMENTS  (Male / Female) Normal Values 

 2D ECHO 

 LV Diastolic Diameter PLAX        4.3 cm                4.2 - 5.9 / 3.9 - 5.3 cm 

 LV Systolic Diameter PLAX         3.2 cm                 

 IVS Diastolic Thickness           1.3 cm                0.6 - 1.0 / 0.6 - 0.9 cm 

 LVPW Diastolic Thickness          1.6 cm                0.6 - 1.0 / 0.6 - 0.9 cm 

 LV Relative Wall Thickness        0.7                    

 LVOT Diameter                     2.2 cm                 

 

 DOPPLER 

 AV Peak Velocity                  191.7 cm/s             

 AV Peak Gradient                  14.7 mmHg              

 AV Mean Velocity                  132.5 cm/s             

 AV Mean Gradient                  7.8 mmHg               

 AV Velocity Time Integral         37.3 cm                

 LVOT Peak Velocity                131.4 cm/s             

 LVOT Peak Gradient                6.9 mmHg               

 LVOT Velocity Time Integral       26.7 cm                

 LVOT Stroke Volume                97.2 cm???               

 LVOT Stroke Volume Index          43.2 ml/m???             

 LVOT Cardiac Index                3508.5 cm???/min???m???      

 AV Area Cont Eq vti               2.6 cm???                

 AV Area Cont Eq pk                2.5 cm???                

 

 

 FINDINGS 

 Left Ventricle 

 Left ventricular ejection fraction is estimated at 55-60 %. Normal left  

 ventricular systolic function with no obvious regional wall motion  

 abnormalities. Left ventricular cavity size normal. Mildly increased left  

 ventricular wall thickness. 

 

 Right Ventricle 

 Right ventricular dilatation. Normal right ventricular global systolic  

 function. Unable to estimate the right ventricular systolic pressure. 

 

 Right Atrium 

 Right atrium not well visualized. 

 

 Left Atrium 

 Left atrium not assessed. 

 

 Mitral Valve 

 Mitral valve thickened. Mitral annular calcification.  Mild mitral  

 regurgitation. 

 

 Aortic Valve 

 Trileaflet aortic valve. Diffuse thickening (sclerosis) of the aortic valve  

 cusps with reduced excursion. No aortic valve stenosis or regurgitation. 

 

 Tricuspid Valve 

 Structurally normal tricuspid valve. No tricuspid stenosis.  Mild tricuspid  

 regurgitation. 

 

 Pulmonic Valve 

 Pulmonic valve not well visualized. 

 

 Pericardium 

 No pericardial effusion. 

 

 Aorta 

 Aortic root and proximal ascending aorta not assessed. 

 

 CONCLUSIONS 

 Technically difficult study.  

 Normal left ventricular size and systolic function 

 Limited Doppler study with mild mitral and tricuspid regurgitation 

 Previewed by:  

 Dr. Caprice Vasquez MD 

 (Electronically Signed) 

 Final Date:      2025 13:16

## 2025-03-14 LAB
ALBUMIN SERPL-MCNC: 2.9 G/DL (ref 3.5–5)
ALP SERPL-CCNC: 94 U/L (ref 38–126)
ALT SERPL-CCNC: 39 U/L (ref 4–49)
ANION GAP SERPL CALC-SCNC: 10 MMOL/L
AST SERPL-CCNC: 31 U/L (ref 17–59)
BASOPHILS # BLD AUTO: 0 K/UL (ref 0–0.2)
BASOPHILS NFR BLD AUTO: 0 %
BUN SERPL-SCNC: 44 MG/DL (ref 9–20)
CALCIUM SPEC-MCNC: 8.2 MG/DL (ref 8.4–10.2)
CHLORIDE SERPL-SCNC: 101 MMOL/L (ref 98–107)
CO2 SERPL-SCNC: 23 MMOL/L (ref 22–30)
EOSINOPHIL # BLD AUTO: 0 K/UL (ref 0–0.7)
EOSINOPHIL NFR BLD AUTO: 0 %
ERYTHROCYTE [DISTWIDTH] IN BLOOD BY AUTOMATED COUNT: 3.16 M/UL (ref 4.3–5.9)
ERYTHROCYTE [DISTWIDTH] IN BLOOD: 13.1 % (ref 11.5–15.5)
GLUCOSE BLD-MCNC: 299 MG/DL (ref 70–110)
GLUCOSE BLD-MCNC: 317 MG/DL (ref 70–110)
GLUCOSE BLD-MCNC: 336 MG/DL (ref 70–110)
GLUCOSE BLD-MCNC: 375 MG/DL (ref 70–110)
GLUCOSE SERPL-MCNC: 282 MG/DL (ref 74–99)
HCT VFR BLD AUTO: 29.7 % (ref 39–53)
HGB BLD-MCNC: 9.2 GM/DL (ref 13–17.5)
LYMPHOCYTES # SPEC AUTO: 0.4 K/UL (ref 1–4.8)
LYMPHOCYTES NFR SPEC AUTO: 2 %
MCH RBC QN AUTO: 29.2 PG (ref 25–35)
MCHC RBC AUTO-ENTMCNC: 31.1 G/DL (ref 31–37)
MCV RBC AUTO: 94 FL (ref 80–100)
MONOCYTES # BLD AUTO: 0.4 K/UL (ref 0–1)
MONOCYTES NFR BLD AUTO: 2 %
NEUTROPHILS # BLD AUTO: 20 K/UL (ref 1.3–7.7)
NEUTROPHILS NFR BLD AUTO: 96 %
PLATELET # BLD AUTO: 265 K/UL (ref 150–450)
POTASSIUM SERPL-SCNC: 3.4 MMOL/L (ref 3.5–5.1)
PROT SERPL-MCNC: 5.8 G/DL (ref 6.3–8.2)
SODIUM SERPL-SCNC: 134 MMOL/L (ref 137–145)
WBC # BLD AUTO: 20.8 K/UL (ref 3.8–10.6)

## 2025-03-14 RX ADMIN — ITRACONAZOLE SCH MG: 100 CAPSULE ORAL at 14:52

## 2025-03-14 RX ADMIN — ONDANSETRON PRN MG: 2 INJECTION INTRAMUSCULAR; INTRAVENOUS at 14:52

## 2025-03-14 RX ADMIN — POTASSIUM CHLORIDE SCH MEQ: 20 TABLET, EXTENDED RELEASE ORAL at 14:52

## 2025-03-14 RX ADMIN — LEVOFLOXACIN SCH MG: 750 TABLET, FILM COATED ORAL at 14:52

## 2025-03-14 NOTE — P.PN
Subjective


Progress Note Date: 03/14/25


Principal diagnosis: 





Reason for follow-up is pneumonia





Patient is a 64-year-old  male with a past medical history significant 

for diabetes mellitus hypertension hyperlipidemia pneumonia atrial fibrillation 

currently everyday smoker presenting to the hospital for evaluation of 

increasing shortness of breath patient has been diagnosed with multifocal 

pneumonia prompting this consultation.


On today's evaluation that is 03/14/2025, the patient continues to be afebrile, 

the patient has been on BiPAP requiring 60% FiO2 with complaining of shortness 

of breath chest pain and cough no vomiting or diarrhea has been reported.


Patient white count is 20.8, creatinine is 1.54 blood cultures pending sputum 

culture not collected





Objective





- Vital Signs


Vital signs: 


                                   Vital Signs











Temp  98.3 F   03/14/25 08:20


 


Pulse  80   03/14/25 15:38


 


Resp  28 H  03/14/25 08:20


 


BP  145/67   03/14/25 08:20


 


Pulse Ox  94 L  03/14/25 08:20


 


FiO2  60   03/14/25 11:38








                                 Intake & Output











 03/13/25 03/14/25 03/14/25





 18:59 06:59 18:59


 


Intake Total 480  


 


Output Total 850 400 600


 


Balance -370 -400 -600


 


Weight  113.5 kg 


 


Intake:   


 


  Oral 480  


 


Output:   


 


  Urine 850 400 600


 


Other:   


 


  Voiding Method Bedside Commode Bedside Commode Bedside Commode





 External Catheter External Catheter External Catheter














- Exam





GENERAL DESCRIPTION: An elderly male lying in bed in no distress





RESPIRATORY SYSTEM: Unlabored breathing , coarse breath sounds bilaterally





HEART: S1 S2 regular rate and rhythm ,





ABDOMEN: Soft , no tenderness





EXTREMITIES: No edema feet





- Labs


CBC & Chem 7: 


                                 03/14/25 06:52





                                 03/14/25 06:52


Labs: 


                  Abnormal Lab Results - Last 24 Hours (Table)











  03/13/25 03/13/25 03/13/25 Range/Units





  16:24 20:11 20:52 


 


WBC     (3.8-10.6)  k/uL


 


RBC     (4.30-5.90)  m/uL


 


Hgb     (13.0-17.5)  gm/dL


 


Hct     (39.0-53.0)  %


 


Neutrophils #     (1.3-7.7)  k/uL


 


Lymphocytes #     (1.0-4.8)  k/uL


 


Sodium     (137-145)  mmol/L


 


Potassium     (3.5-5.1)  mmol/L


 


BUN     (9-20)  mg/dL


 


Creatinine     (0.66-1.25)  mg/dL


 


Glucose     (74-99)  mg/dL


 


POC Glucose (mg/dL)  363 H  252 H   ()  mg/dL


 


Calcium     (8.4-10.2)  mg/dL


 


Total Protein     (6.3-8.2)  g/dL


 


Albumin     (3.5-5.0)  g/dL


 


Urine Protein    1+ H  (Negative)  


 


Urine Bacteria    Occasional H  (None)  /hpf


 


Urine Mucus    Rare H  (None)  /hpf














  03/14/25 03/14/25 03/14/25 Range/Units





  06:09 06:52 06:52 


 


WBC    20.8 H  (3.8-10.6)  k/uL


 


RBC    3.16 L  (4.30-5.90)  m/uL


 


Hgb    9.2 L  (13.0-17.5)  gm/dL


 


Hct    29.7 L  (39.0-53.0)  %


 


Neutrophils #    20.0 H  (1.3-7.7)  k/uL


 


Lymphocytes #    0.4 L  (1.0-4.8)  k/uL


 


Sodium   134 L   (137-145)  mmol/L


 


Potassium   3.4 L   (3.5-5.1)  mmol/L


 


BUN   44 H   (9-20)  mg/dL


 


Creatinine   1.54 H   (0.66-1.25)  mg/dL


 


Glucose   282 H   (74-99)  mg/dL


 


POC Glucose (mg/dL)  317 H    ()  mg/dL


 


Calcium   8.2 L   (8.4-10.2)  mg/dL


 


Total Protein   5.8 L   (6.3-8.2)  g/dL


 


Albumin   2.9 L   (3.5-5.0)  g/dL


 


Urine Protein     (Negative)  


 


Urine Bacteria     (None)  /hpf


 


Urine Mucus     (None)  /hpf














  03/14/25 Range/Units





  11:18 


 


WBC   (3.8-10.6)  k/uL


 


RBC   (4.30-5.90)  m/uL


 


Hgb   (13.0-17.5)  gm/dL


 


Hct   (39.0-53.0)  %


 


Neutrophils #   (1.3-7.7)  k/uL


 


Lymphocytes #   (1.0-4.8)  k/uL


 


Sodium   (137-145)  mmol/L


 


Potassium   (3.5-5.1)  mmol/L


 


BUN   (9-20)  mg/dL


 


Creatinine   (0.66-1.25)  mg/dL


 


Glucose   (74-99)  mg/dL


 


POC Glucose (mg/dL)  375 H  ()  mg/dL


 


Calcium   (8.4-10.2)  mg/dL


 


Total Protein   (6.3-8.2)  g/dL


 


Albumin   (3.5-5.0)  g/dL


 


Urine Protein   (Negative)  


 


Urine Bacteria   (None)  /hpf


 


Urine Mucus   (None)  /hpf








                      Microbiology - Last 24 Hours (Table)











 03/11/25 20:48 Blood Culture - Preliminary





 Blood 














Assessment and Plan


(1) Leukocytosis


Current Visit: Yes   Status: Acute   Code(s): D72.829 - ELEVATED WHITE BLOOD 

CELL COUNT, UNSPECIFIED   SNOMED Code(s): 352714454


   





(2) Bilateral pneumonia


Current Visit: Yes   Status: Acute   Code(s): J18.9 - PNEUMONIA, UNSPECIFIED 

ORGANISM   SNOMED Code(s): 992856270


   


Plan: 





1patient presented hospital with increasing shortness of breath and chest pain 

which is likely multifactorial in this patient who did have evidence of PE on 

the CT there is also evidence of multifocal pneumonia with elevated white count 

and recently acute influenza A will need to cover for resistant gram-positive as

well as gram-negative pathogen for post influenza pneumonia


2-sputum has not been collected blood culture currently pending


3-patient is currently on cefepime itraconazole has been added concerning for 

possible fungal component discussed with the pulmonary patient is considered 

high risk bronchoscopy and will monitor clinical course closely


Dictation was produced using dragon dictation software. please excuse any g

rammatical, word or spelling errors. 








Time with Patient: Less than 30

## 2025-03-14 NOTE — XR
EXAMINATION TYPE: XR chest 1V

 

DATE OF EXAM: 3/14/2025

 

CLINICAL INDICATION: Male, 64 years old with history of compare to previous, pneumonia, progress stud
y.  

 

TECHNIQUE: Single AP portable upright view of the chest is obtained.

 

COMPARISON: Chest x-ray from 2 days earlier

 

FINDINGS:  Bilateral Multifocal increased opacities redemonstrated. Relative sparing of the left apex
. Persistent cardiomegaly with atherosclerotic changes in the aortic knob. Osseous structures are int
act.

 

IMPRESSION: Persistent cardiomegaly with bilateral multifocal acute infiltrates and/or edema. Finding
s are not significantly changed from most recent study.

 

[facility], Workstation: [id number], 3/14/2025 7:29 AM

## 2025-03-14 NOTE — P.PN
Subjective


Progress Note Date: 03/14/25





Reason for Consult (text): 


PE


History of present illness: 


This is a 64-year-old male patient of Dr. Ray with past medical history of 

chronic kidney disease stage III, COPD tobacco use, alcohol abuse, hypertension,

diabetes mellitus type 2, persistent atrial fibrillation, systolic heart 

failure, recent GI bleed, family history of premature coronary artery disease.  

We have been asked to evaluate the patient for PE.  Patient was recently 

hospitalized 2/24 - 3/7 and was seen by cardiology at that time for NSTEMI 

secondary to sepsis, influenza and pneumonia.  Patient states that he was 

discharged home but he has had problems with getting his medications and has not

been taking his medications after he was discharged.  He states he could not af

ford it because he no longer has Medicaid.  Patient did go home on oxygen at 2 L

nasal cannula.  Patient states that he was having weakness at home could not do 

anything and he had significant dyspnea with even a small amount of activity.  

Patient is seen today in the emergency center on BiPAP.  He is waiting for a bed

on the cardiac stepdown unit.  Pulmonary medicine is also on consult.  Blood pr

essure 156/72, heart rate 59, pulse ox 99% on BiPAP.  Patient has been started 

on heparin drip, IV Solu-Medrol, IV antibiotics.





-EKG: Sinus rhythm with left bundle branch block, left axis deviation.


-Chest x-ray: Multifocal airspace opacities concerning for pneumonia.  #2 

persistent cardiomegaly with bilateral multifocal acute infiltrates and/or 

edema.  Findings are worse in the bilateral lower lungs from 1 day earlier.


-CTA chest: Acute segmental and subsegmental pulmonary emboli within the right 

middle and right lower lobes.  No saddle embolus.  No RV strain.  Chronic 

interstitial lung disease with nonspecific features.


-Laboratory studies: WBC 17.2, hemoglobin 10.3, D-dimer 4.87.  Sodium 136, 

potassium 28 and creatinine 1.37, troponin 0.0490, 0.055 and 0.051.  proBNP 

8810.  Influenza A detected.  Procalcitonin 0.18..


-Home cardiac medications: Amiodarone 200 mg daily, Norvasc 5 mg daily, Eliquis 

5 mg twice daily, atorvastatin 40 mg at bedtime, Lasix 40 mg daily, lisinopril 

20 mg twice daily, metoprolol succinate 25 mg daily, spironolactone 25 mg daily.


-ROSANNA and cardioversion performed on 8/10/2023 revealed aortic valve is 

tricuspid, functioning normally with trace AI.  Mitral valve appears to be 

normal with mild regurgitation.  Tricuspid valve appears to be normal with 

moderate to severe tricuspid regurgitation.  There is a PFO.  Left atrial 

appendage is free of clot.  EF 35%.


-ROSANNA and cardioversion performed 11/21/2022.


-Dobutamine stress echocardiogram performed in the office on 8/3/2021 revealed 

nondiagnostic EKG portion secondary to baseline EKG abnormalities.  Normal 

stress echo portion without inducible ischemia.  Normal left ventricular 

ejection fraction of 60%.


-Echocardiogram performed 2/24/2025: EF 45 to 50%, moderately increased septal 

wall thickness, moderate biatrial dilatation, moderate tricuspid regurgitation.





3/13


Patient is seen and examined on the cardiac stepdown unit.  Blood pressure 

143/70, heart rate 78, pulse ox 99% on BiPAP.  Repeat blood work reveals BUN 35,

creatinine 1.51, potassium 3.4.





3/14


Patient seen and examined.  Patient has developed more hypoxia overnight 

requiring BiPAP and high flow nasal cannula was attempted yesterday.  He has had

no tachycardia no hypotension.  Heart rate is in the 70s, blood pressure 145/67.

 Repeat blood work reveals WBC 20.8, hemoglobin 9.2, BUN 44 creatinine 1.54, 

potassium 3.4.  Repeat chest x-ray reveals persistent cardiomegaly with 

bilateral multifocal acute infiltrates and/or edema.





Physical examination:


Gen: This is a 64-year-old male appears to be comfortable on BiPAP.


VS: reviewed


HEENT: Head is atraumatic, normocephalic. Pupils equal, round. Sclerae is 

anicteric. 


NECK: Supple. No JVD. 


LUNGS: Scattered rhonchi bilaterally.  No intercostal retractions.


HEART: Regular rate and rhythm. No murmur. 


ABDOMEN: Soft  No tenderness.


EXTREMITIES: No pedal edema.  No calf tenderness.


NEUROLOGICAL: Patient is awake, alert and oriented x3.


 


Assessment:


Acute pulmonary emboli due to prolonged hospitalization and unable to afford 

Eliquis


No RV strain on CT


Acute hypoxic respiratory failure requiring BiPAP secondary to influenza, 

pneumonia, and component of heart failure


NSTEMI secondary to PE, influenza and pneumonia


Influenza A


Pneumonia


Acute on chronic systolic heart failure


Acute kidney injury


Cardiomyopathy with previous EF of 15 to 20%, possibly tachycardia induced


Hypertension


Hyperlipidemia


Persistent atrial fibrillation status post previous cardioversion x 2, currently

in sinus rhythm


Diabetes mellitus type 2





Plan:


No evidence of RV strain on CT.  Patient has no hypotension or shock symptoms.  

Patient does not require any local or systemic thrombolytics at this time.


Continue patient's home cardiac medications


Continue Eliquis VTE protocol, prescription will be sent to the pharmacy and is 

covered by insurance


Continue IV Lasix 40 mg every 12 hours


Monitor ENE, daily weights, electrolytes and renal function


No need to repeat echocardiogram


Further recommendations to follow based upon clinical course





Nurse practitioner note has been reviewed, I agree with documented findings and 

plan of care.  Patient was seen and examined.











Objective





- Vital Signs


Vital signs: 


                                   Vital Signs











Temp  98.3 F   03/14/25 08:20


 


Pulse  72   03/14/25 11:57


 


Resp  28 H  03/14/25 08:20


 


BP  145/67   03/14/25 08:20


 


Pulse Ox  94 L  03/14/25 08:20


 


FiO2  60   03/14/25 11:38








                                 Intake & Output











 03/13/25 03/14/25 03/14/25





 18:59 06:59 18:59


 


Intake Total 480  


 


Output Total 850 400 600


 


Balance -370 -400 -600


 


Weight  113.5 kg 


 


Intake:   


 


  Oral 480  


 


Output:   


 


  Urine 850 400 600


 


Other:   


 


  Voiding Method Bedside Commode Bedside Commode Bedside Commode





 External Catheter External Catheter External Catheter














- Labs


CBC & Chem 7: 


                                 03/14/25 06:52





                                 03/14/25 06:52


Labs: 


                  Abnormal Lab Results - Last 24 Hours (Table)











  03/13/25 03/13/25 03/13/25 Range/Units





  16:24 20:11 20:52 


 


WBC     (3.8-10.6)  k/uL


 


RBC     (4.30-5.90)  m/uL


 


Hgb     (13.0-17.5)  gm/dL


 


Hct     (39.0-53.0)  %


 


Neutrophils #     (1.3-7.7)  k/uL


 


Lymphocytes #     (1.0-4.8)  k/uL


 


Sodium     (137-145)  mmol/L


 


Potassium     (3.5-5.1)  mmol/L


 


BUN     (9-20)  mg/dL


 


Creatinine     (0.66-1.25)  mg/dL


 


Glucose     (74-99)  mg/dL


 


POC Glucose (mg/dL)  363 H  252 H   ()  mg/dL


 


Calcium     (8.4-10.2)  mg/dL


 


Total Protein     (6.3-8.2)  g/dL


 


Albumin     (3.5-5.0)  g/dL


 


Urine Protein    1+ H  (Negative)  


 


Urine Bacteria    Occasional H  (None)  /hpf


 


Urine Mucus    Rare H  (None)  /hpf














  03/14/25 03/14/25 03/14/25 Range/Units





  06:09 06:52 06:52 


 


WBC    20.8 H  (3.8-10.6)  k/uL


 


RBC    3.16 L  (4.30-5.90)  m/uL


 


Hgb    9.2 L  (13.0-17.5)  gm/dL


 


Hct    29.7 L  (39.0-53.0)  %


 


Neutrophils #    20.0 H  (1.3-7.7)  k/uL


 


Lymphocytes #    0.4 L  (1.0-4.8)  k/uL


 


Sodium   134 L   (137-145)  mmol/L


 


Potassium   3.4 L   (3.5-5.1)  mmol/L


 


BUN   44 H   (9-20)  mg/dL


 


Creatinine   1.54 H   (0.66-1.25)  mg/dL


 


Glucose   282 H   (74-99)  mg/dL


 


POC Glucose (mg/dL)  317 H    ()  mg/dL


 


Calcium   8.2 L   (8.4-10.2)  mg/dL


 


Total Protein   5.8 L   (6.3-8.2)  g/dL


 


Albumin   2.9 L   (3.5-5.0)  g/dL


 


Urine Protein     (Negative)  


 


Urine Bacteria     (None)  /hpf


 


Urine Mucus     (None)  /hpf














  03/14/25 Range/Units





  11:18 


 


WBC   (3.8-10.6)  k/uL


 


RBC   (4.30-5.90)  m/uL


 


Hgb   (13.0-17.5)  gm/dL


 


Hct   (39.0-53.0)  %


 


Neutrophils #   (1.3-7.7)  k/uL


 


Lymphocytes #   (1.0-4.8)  k/uL


 


Sodium   (137-145)  mmol/L


 


Potassium   (3.5-5.1)  mmol/L


 


BUN   (9-20)  mg/dL


 


Creatinine   (0.66-1.25)  mg/dL


 


Glucose   (74-99)  mg/dL


 


POC Glucose (mg/dL)  375 H  ()  mg/dL


 


Calcium   (8.4-10.2)  mg/dL


 


Total Protein   (6.3-8.2)  g/dL


 


Albumin   (3.5-5.0)  g/dL


 


Urine Protein   (Negative)  


 


Urine Bacteria   (None)  /hpf


 


Urine Mucus   (None)  /hpf








                      Microbiology - Last 24 Hours (Table)











 03/11/25 20:48 Blood Culture - Preliminary





 Blood

## 2025-03-14 NOTE — P.PN
Subjective


Progress Note Date: 03/14/25











Patient is a 64-year-old male with past medical history significant for atrial 

fibrillation, diabetes mellitus, CKD stage III, hypertension, hyperlipidemia, 

tobacco smoker.  Of note, recently had a prolonged hospitalization 02/23/2025 

through 03/07/2025.  Treated for combination of influenza A, pneumonia, CHF.  

Completed course of antibiotics and Tamiflu.  At this time, did require 

noninvasive BiPAP, eventually discharged on home O2.  Returns with a chief 

complaint of shortness of breath progressing over the last 2 to 3 days.  Also, 

reports sudden onset substernal chest pain that developed the night prior and 

has been persistent.  On arrival to the ED, found to be in some respiratory 

distress, and placed on BiPAP.  Workup in the emergency department including a 

chest x-ray showing multifocal infiltrates concerning for pneumonia or pulmonary

edema.  D-dimer was elevated in setting CT angio protocol which was positive for

segmental and subsegmental right middle lobe and right lower lobe pulmonary 

emboli.  No saddle pulmonary embolus. Pulmonary artery mildly enlarged.  

Preserved RV to LV ratio.  There were diffuse coarse reticular infiltrates, as 

well as, groundglass lung attenuation, cystic changes, with concerns for 

interstitial lung disease. Patient does take Eliquis for his history of atrial 

fibrillation.  Does state he has missed some doses lately.  CBC: WBC count 22.6,

hemoglobin 11, platelets 322.  CMP: Sodium 133, potassium 4.2, chloride 101, 

serum bicarb 24, BUN 30, creatinine 1.27, glucose 135.  Lactic 1.5.  VBG with a 

pH of 7.5 and pCO2 of 32.  EKG: Sinus rhythm, rate 65 bpm, left bundle branch 

block, not acute.  Elevated serial troponins 0.049 and 0.055 respectively.  NT 

proBNP elevated 8807.  Patient is currently being evaluated in the emergency 

department.  He is alert and some moderate respiratory distress.  On BiPAP with 

settings 12/5 and FiO2 60%.  Tachypneic, breathing around 40 breaths/min. 

Endorses progressive worsening difficulty in breathing over last couple days.  

He has tried to increase his supplemental oxygen at home without any relief. 

Denies previous history of DVT or PE.  Associated nonproductive cough.  Denies 

sputum production or hemoptysis.  Substernal nonradiating chest pain persist.  

Denies any fevers or chills.  Denies heart palpitations or syncopal events.  

Denies swelling in the lower extremities.  Heart rhythm is normal sinus on 

bedside monitor.  Blood pressure has been normotensive, did receive 2 L of 

crystalloid fluid bolus earlier.  Not requiring any vasopressors.  Normal saline

is infusing at 150 mL/h.  IV heparin infusing per protocol








3/13/2025, the patient is being seen for a follow-up.  The patient is 

alternating between BiPAP at a pressure of 12/5 with an FiO2 of 60% and 15 L of 

oxygen by nasal cannula.  He is getting slightly anxious and the patient is 

being provided Xanax accordingly.  Fluid balance is -1.1 L over the past 24 

hours.  Limited echocardiogram was also done and it showed preserved LV function

with an EF of around 55 to 60%.  Valvular functions could not be accurately 

assessed as the patient's study was technically difficult study.  There was 

however RV dilatation.  Unable to estimate RV pressures.  The electrolytes from 

today showed a creatinine of 1.5 with a BUN of 35.  Bicarb is at 21.  Sodium is 

at 132.  The patient remains on DuoNeb updrafts.  The patient remains on a 

combination of cefepime and vancomycin.  The patient was taken off the IV 

heparin and the patient was started on anticoagulation with Eliquis.  Remains on

IV Solu-Medrol 60 mg every 6 hours.  Remains on Aldactone.  Remains on IV Lasix 

40 mg every 12 hours.





On today's evaluation of 3/14/2025, the patient is overall condition is 

essentially unchanged.  Continues to be hypoxic, continues to be on BiPAP and 

the patient remains at a pressure of 12/5 with an FiO2 of 60%.  Continues to h

ave crackles in lung base bilaterally.  White cell count remains elevated at 20 

with a hemoglobin 9.2.  Very much BiPAP dependent as the patient desaturates 

once taken off the BiPAP.  BUN is 44 with a platelet of 1.5.  Sodium levels at 

134 and a potassium level is at 3.4.  Remains on DuoNeb updrafts.  Remains on IV

Lasix 40 mg every 12 hours.  Remains on Aldactone.  Remains on bronchodilators. 

Remains on steroids.  Empiric antibiotic coverage with IV cefepime.  Reviewed 

the CAT scan again and there is some chronic interstitial changes and the 

patient has diffuse infiltrates with areas of groundglass.  Consider acute 

interstitial pneumonias.  Consider fungal pneumonias.





Objective





- Vital Signs


Vital signs: 


                                   Vital Signs











Temp  98.3 F   03/14/25 08:20


 


Pulse  72   03/14/25 11:57


 


Resp  28 H  03/14/25 08:20


 


BP  145/67   03/14/25 08:20


 


Pulse Ox  94 L  03/14/25 08:20


 


FiO2  60   03/14/25 11:38








                                 Intake & Output











 03/13/25 03/14/25 03/14/25





 18:59 06:59 18:59


 


Intake Total 480  


 


Output Total 850 400 600


 


Balance -370 -400 -600


 


Weight  113.5 kg 


 


Intake:   


 


  Oral 480  


 


Output:   


 


  Urine 850 400 600


 


Other:   


 


  Voiding Method Bedside Commode Bedside Commode Bedside Commode





 External Catheter External Catheter External Catheter














- Exam








GENERAL EXAM: Alert, 64-year-old male, on BiPAP 12/5 with an FiO2 of 60%


HEAD: Normocephalic and atraumatic


EYES: Normal reaction of pupils, equal size.


NOSE: Clear with pink turbinates.


THROAT: No erythema or exudates.


NECK: No masses, no JVD.


CHEST: No chest wall deformity.


LUNGS: Equal air entry with crackles heard on inspiration bilaterally.  On BiPAP

with settings 12/5 and FiO2 60%.    SpO2 reading 97% on bedside monitor.  

Conversational dyspnea.


CVS: S1 and S2 normal with soft systolic murmur, regular rhythm. No other extra 

heart sounds


ABDOMEN: No hepatosplenomegaly, active bowel sounds, no guarding or rigidity. 


SPINE: No scoliosis or deformity


SKIN: No rashes


CENTRAL NERVOUS SYSTEM: No focal deficits, tone is normal in all 4 extremities.


EXTREMITIES: There is no peripheral edema, clubbing, or cyanosis.  Peripheral 

pulses are intact.








- Labs


CBC & Chem 7: 


                                 03/14/25 06:52





                                 03/14/25 06:52


Labs: 


                  Abnormal Lab Results - Last 24 Hours (Table)











  03/13/25 03/13/25 03/13/25 Range/Units





  16:24 20:11 20:52 


 


WBC     (3.8-10.6)  k/uL


 


RBC     (4.30-5.90)  m/uL


 


Hgb     (13.0-17.5)  gm/dL


 


Hct     (39.0-53.0)  %


 


Neutrophils #     (1.3-7.7)  k/uL


 


Lymphocytes #     (1.0-4.8)  k/uL


 


Sodium     (137-145)  mmol/L


 


Potassium     (3.5-5.1)  mmol/L


 


BUN     (9-20)  mg/dL


 


Creatinine     (0.66-1.25)  mg/dL


 


Glucose     (74-99)  mg/dL


 


POC Glucose (mg/dL)  363 H  252 H   ()  mg/dL


 


Calcium     (8.4-10.2)  mg/dL


 


Total Protein     (6.3-8.2)  g/dL


 


Albumin     (3.5-5.0)  g/dL


 


Urine Protein    1+ H  (Negative)  


 


Urine Bacteria    Occasional H  (None)  /hpf


 


Urine Mucus    Rare H  (None)  /hpf














  03/14/25 03/14/25 03/14/25 Range/Units





  06:09 06:52 06:52 


 


WBC    20.8 H  (3.8-10.6)  k/uL


 


RBC    3.16 L  (4.30-5.90)  m/uL


 


Hgb    9.2 L  (13.0-17.5)  gm/dL


 


Hct    29.7 L  (39.0-53.0)  %


 


Neutrophils #    20.0 H  (1.3-7.7)  k/uL


 


Lymphocytes #    0.4 L  (1.0-4.8)  k/uL


 


Sodium   134 L   (137-145)  mmol/L


 


Potassium   3.4 L   (3.5-5.1)  mmol/L


 


BUN   44 H   (9-20)  mg/dL


 


Creatinine   1.54 H   (0.66-1.25)  mg/dL


 


Glucose   282 H   (74-99)  mg/dL


 


POC Glucose (mg/dL)  317 H    ()  mg/dL


 


Calcium   8.2 L   (8.4-10.2)  mg/dL


 


Total Protein   5.8 L   (6.3-8.2)  g/dL


 


Albumin   2.9 L   (3.5-5.0)  g/dL


 


Urine Protein     (Negative)  


 


Urine Bacteria     (None)  /hpf


 


Urine Mucus     (None)  /hpf














  03/14/25 Range/Units





  11:18 


 


WBC   (3.8-10.6)  k/uL


 


RBC   (4.30-5.90)  m/uL


 


Hgb   (13.0-17.5)  gm/dL


 


Hct   (39.0-53.0)  %


 


Neutrophils #   (1.3-7.7)  k/uL


 


Lymphocytes #   (1.0-4.8)  k/uL


 


Sodium   (137-145)  mmol/L


 


Potassium   (3.5-5.1)  mmol/L


 


BUN   (9-20)  mg/dL


 


Creatinine   (0.66-1.25)  mg/dL


 


Glucose   (74-99)  mg/dL


 


POC Glucose (mg/dL)  375 H  ()  mg/dL


 


Calcium   (8.4-10.2)  mg/dL


 


Total Protein   (6.3-8.2)  g/dL


 


Albumin   (3.5-5.0)  g/dL


 


Urine Protein   (Negative)  


 


Urine Bacteria   (None)  /hpf


 


Urine Mucus   (None)  /hpf








                      Microbiology - Last 24 Hours (Table)











 03/11/25 20:48 Blood Culture - Preliminary





 Blood 














Assessment and Plan


Assessment: 











Acute on chronic hypoxic respiratory failure.  Multifactorial.  Reviewed the CAT

scan of the chest.  The patient is changes with coarse infiltrates and cystic 

changes which could potentially suggest an underlying ILD.  Nevertheless, the 

patient has developed bilateral pulmonary infiltrates could could be 

CHF/pneumonia/acute lung injury.  The contrast administration was put on the CTA

and I doubt the possibility of a pulmonary embolism.  The patient is maintained 

on anticoagulation with Eliquis on an outpatient basis.  He is currently on IV 

heparin.  He is currently on diuretics.  Is currently on a combination of 

cefepime and vancomycin.  He is also on bronchodilators and steroids.  He 

remains on amiodarone.  Possibility of acute interstitial pneumonia is/acute 

lung injury/ARDS need to be considered.  Fungal infections are also considered.





Acute hypoxemic respiratory failure, currently requiring BiPAP support, 

secondary to a combination of above, chest x-ray showing multifocal infiltrates 

concerning for pneumonia or pulmonary edema.  Follow-up chest CT showing diffuse

coarse reticular infiltrates, as well as, groundglass lung attenuation upper 

lobe predominant cystic changes, with concerns for interstitial lung disease.  

The patient remains on a BiPAP at a pressure of 12/5 with an FiO2 of 60%.  

Overall condition is unchanged compared to yesterday





Acute exacerbation of chronic systolic congestive heart failure, recent 

echocardiogram from 02/24/2025 estimating a left ventricular ejection fraction 

of 45 to 50%, moderate pulmonary hypertension, and moderate tricuspid 

regurgitation.  Repeat echocardiogram showed improvement in LV function with 

ejection fraction 55%





Acute leukocytosis





Recent hospitalization with influenza A infection, completed course of Tamiflu





CKD stage IIIa





History of atrial fibrillation with previous cardioversion, currently normal 

sinus, normally maintained on Eliquis and amiodarone





Chronic tobacco dependence with suspected underlying COPD





Hypertension.





Hyperlipidemia.





Diabetes mellitus, type II.











Plan:





Continue BiPAP support with current settings, 


Anticoagulation with Eliquis has been started


Continue bronchodilators


Continue IV Solu-Medrol


Continue IV Lasix 40 mg and drop the dose to every 24 hours


Continue empiric antibiotics and the patient is currently on a combination of 

cefepime 


Check CARLA, rheumatoid factor, ANCA levels including P and C ANCA


Obtain fungal identification antibodies


Obtain a histoplasma urine antigen


Legionella urine antigen


Start the patient itraconazole 200 mg p.o. twice a day


Start the patient on Levaquin 750 mg p.o. daily


Blood cultures are negative


Check procalcitonin level is at 0.18


proBNP level is elevated


Repeat chest x-ray in the morning


Repeat blood work in the morning


May need to transfer to the ICU if there is any further decompensation in 

respiratory status.


Evaluation was done and 33 minutes.


Time with Patient: Greater than 30

## 2025-03-14 NOTE — US
EXAMINATION TYPE: US kidneys/renal and bladder

 

DATE OF EXAM: 3/14/2025

 

COMPARISON: 2/25/25 renal ultrasound.

 

CLINICAL INDICATION: Male, 64 years old with history of Evaluate for CKD; CKD

 

TECHNIQUE: Grayscale imaging of the bilateral kidneys and urinary bladder:

 

FINDINGS:

 

EXAM MEASUREMENTS:

 

Right Kidney:  12.4x4.3x5.1 cm

Left Kidney: 12.7x4.8x3.8 cm

 

 

 

 

Right Kidney: No hydronephrosis or masses seen  

Left Kidney: small 1.2x0.9x1.0cm anechoic area at the superior lateral aspect of the kidney which can
 also be visualized on 3/11/25 CT angio chest  consistent with simple thin-walled cyst.

Bladder: posterior wall diverticula

 

There is no evidence for hydronephrosis at this point in time.  No nephrolithiasis is seen.  No solid
 masses are identified.  The urinary bladder is anechoic. 

 

Patient scanned semi erect, exam slightly limited by body habitus and labored breathing

 

IMPRESSION: Suboptimal study without hydronephrosis identified bilaterally.

 

 

 

 

[facility], Workstation: Ratio, 3/14/2025 7:47 AM

## 2025-03-14 NOTE — P.PN
Progress Note - Text


Progress Note Date: 03/14/25





Patient is a 64-year-old male with a PMH of atrial fibrillation on Eliquis, COPD

on 2 L home oxygen, non-insulin-dependent diabetes mellitus, hyperlipidemia, 

hypertension presenting with shortness of breath.  Patient was previously 

admitted here for acute hypoxic respiratory failure secondary to influenza 

pneumonia and was discharged on 2 L of home oxygen.  Patient states he has been 

feeling short of breath while at rest.  He reports that since being discharged 

the home oxygen has not been sufficient. He states that he had to keep 

increasing his oxygen.    Patient says shortness of breath is slightly relieved 

when he is laying down.  Denies any orthopnea or PND.  Patient endorses a nonpr

oductive cough that is chronic in nature, but says it has been getting worse.  

Patient admits to having fever, chills.  Patient also admits to having non-

radiating, pressure-like chest pain over the last few days.  Chest pain is not 

related to exertion and he had no alleviating or aggravating factors.  Patient 

denies any headache, vision changes, nausea, vomiting, diarrhea, abdominal pain,

urinary symptoms.





EKG independently interpreted displaying sinus rhythm with left bundle branch 

block that has been seen on prior EKG, rate 65 bpm, QTc 451 ms


CXR independently interpreted displaying bilateral multifocal airspace opacities


Chest CTA displaying acute segmental and subsegmental pulmonary emboli within 

the right and middle lower lobes, no saddle embolus, no RV strain, chronic 

interstitial loading disease


Venous Doppler B/L LE displaying no evidence of acute DVT


Troponin 0.049, 0.055, proBNP 8810, D-dimer 4.87, WBC 22.6, Hgb 11.0, HCT 35.2, 

MCV 93.7, platelet 322, PT 11.8, INR 1.1, APTT 28, sodium 133, potassium 4.2, 

CO2 24, BUN 30, creatinine 1.27, glucose 135


VBG pH 7.52, pCO2 32


T98.2 F, TX 80, RR 26, /77, O2 sat 90% on BiPAP with FiO2 of 100%





March 12: Overflow the ER.  Up in the bed.  Short of breath.  Patient was on 

BiPAP overnight.  This morning on 15 L high flow nasal cannula.  Decreased 

appetite.  Has got a cough.  Some wheezing.  Some sputum production.  Decreased 

appetite.  Patient is on IV heparin.  Also on IV cefepime.  Pulmonary following


March 13: Patient did confirm that because Eliquis is very expensive patient was

not taking it properly did and take it sparingly at home.  Explains patient's 

PE.  Started on Eliquis today by cardiology.  IV heparin discontinued.  Getting 

easily short of breath.  Requiring BiPAP..  Some cough.  Oral intake fair.  On 

IV cefepime and vancomycin.  ID following.  Also on IV Lasix.  Due to worsening 

renal function will DC vancomycin


March 14: Saw this afternoon.  On BiPAP.  60%.  Ensure ordered.  All blood work 

ordered by pulmonary including fungal.  Patient currently on IV cefepime and 

Levaquin.  IV Solu-Medrol.  Remain short of breath.  Tired.  Being followed by 

pulmonary and ID.  Chest x-ray film personally reviewed by me-showing pulm edema

bilateral infiltrates especially at the bases.  Renal ultrasound nonspecific.  

UA showing protein 1+.





Active Medications





Albuterol/Ipratropium (Ipratropium-Albuterol 3 Ml Neb)  3 ml INHALATION RT-QID 

PRN


   PRN Reason: Shortness Of Breath Or Wheezing


Albuterol/Ipratropium (Ipratropium-Albuterol 3 Ml Neb)  3 ml INHALATION RT-Q4H 

[facility]


   Last Admin: 03/14/25 15:37 Dose:  3 ml


   


Alprazolam (Alprazolam 0.5 Mg Tab)  0.5 mg PO TID PRN


   PRN Reason: Anxiety


   Last Admin: 03/14/25 11:32 Dose:  0.5 mg


   


Amlodipine Besylate (Amlodipine 5 Mg Tab)  5 mg PO DAILY [facility]


   Last Admin: 03/14/25 09:18 Dose:  5 mg


   


Apixaban (Apixaban Initiation Dose--Vte 5 Mg Tab)  10 mg PO BID [facility]; Taper


   Stop: 04/12/25 10:14


   Last Admin: 03/14/25 09:18 Dose:  10 mg


   


Aspirin (Aspirin 81 Mg)  81 mg PO DAILY [facility]


   Last Admin: 03/14/25 09:18 Dose:  81 mg


   


Budesonide (Budesonide 1 Mg/2 Ml Nebu)  1 mg INHALATION RT-BID [facility]


   Last Admin: 03/14/25 08:12 Dose:  1 mg


   


Formoterol Fumarate (Formoterol Fumarate 20 Mcg/2 Ml Nebu)  20 mcg INHALATION 

RT-BID [facility]


   Last Admin: 03/14/25 08:12 Dose:  20 mcg


   


Furosemide (Furosemide 10 Mg/Ml 4 Ml Vial)  40 mg IV Q12HR [facility]


   Last Admin: 03/14/25 09:19 Dose:  40 mg


   


Cefepime HCl 2 gm/ Sodium (Chloride)  100 mls @ 25 mls/hr IVPB Q12H [facility]; 

Protocol


   Last Admin: 03/14/25 09:19 Dose:  25 mls/hr


   


Insulin Glargine (Insulin Glargine (Lantus) 100 Unit/Ml Syr)  18 unit SQ HS [facility]


   Last Admin: 03/13/25 20:52 Dose:  18 unit


   


Insulin Human Lispro (Insulin Lispro (Humalog) 100 Unit/Ml 10 Ml Vl)  0 unit SQ 

ACHS [facility]; Protocol


   Last Admin: 03/14/25 11:34 Dose:  10 unit


   


Itraconazole (Itraconazole 100 Mg Cap)  200 mg PO BID [facility]; Protocol


   Last Admin: 03/14/25 14:52 Dose:  200 mg


   


Levofloxacin (Levofloxacin 750 Mg Tab)  750 mg PO Q48H [facility]; Protocol


Lisinopril (Lisinopril 20 Mg Tab)  20 mg PO BID [facility]


   Last Admin: 03/14/25 09:19 Dose:  20 mg


   


Lorazepam (Lorazepam 1 Mg Tab)  1 mg PO HS PRN


   PRN Reason: Anxiety


   Last Admin: 03/13/25 00:09 Dose:  1 mg


   


Methylprednisolone Sodium Succinate (Methylprednisolone Sod Succi 125 Mg/2 Ml 

Vial)  60 mg IV Q6HR [facility]


   Last Admin: 03/14/25 11:33 Dose:  60 mg


   


Metoprolol Succinate (Metoprolol Succinate (Er) 25 Mg Tab.Er.24h)  25 mg PO 

DAILY [facility]


   Last Admin: 03/14/25 09:18 Dose:  25 mg


   


Morphine Sulfate (Morphine Sulfate 4 Mg/Ml Syringe)  4 mg IV Q4HR PRN


   PRN Reason: Severe Pain (Scale 7 to 10)


Naloxone HCl (Naloxone 0.4 Mg/Ml 1 Ml Vial)  0.2 mg IV Q2M PRN


   PRN Reason: Opioid Reversal


Ondansetron HCl (Ondansetron 4 Mg/2 Ml Vial)  4 mg IVP Q8HR PRN


   PRN Reason: Nausea And Vomiting


   Last Admin: 03/14/25 14:52 Dose:  4 mg


   


Pantoprazole Sodium (Pantoprazole 40 Mg/10 Ml Vial)  40 mg IV DAILY [facility]


   Last Admin: 03/14/25 09:19 Dose:  40 mg


   


Potassium Chloride (Potassium Chloride Er 20 Meq Tab.Er)  20 meq PO DAILY [facility]


   Last Admin: 03/14/25 14:52 Dose:  20 meq


   


Spironolactone (Spironolactone 25 Mg Tab)  25 mg PO DAILY [facility]


   Last Admin: 03/14/25 09:19 Dose:  25 mg


   














Social history:


Tobacco: Current 50-pack-year smoker


Alcohol: Occasional alcohol use


Recreational drugs: Marijuana


Travel: No recent travel





On examination:


VITAL SIGNS: 98.2, 77, 28, 145 x 67, 94% on BiPAP 60%, 12/5


GENERAL APPEARANCE: Reclining in bed short of breath


HEENT: Normal external appearance of nose and ear.  Oral cavity normal


EYES: Pupils equal. Conjunctiva normal. 


NECK: JVD not raised. Mass not palpable. 


RESPIRATORY: Respiratory effort increased, not able to speak in full sentences. 

Accessory muscles working. Lungs diminished breath sounds some scattered 

crackles prolonged expiration. 


CARDIOVASCULAR: First and second sounds normal. No edema. 


ABDOMEN: Soft. Liver and spleen not palpable. No tenderness. No mass palpable. 


PSYCHIATRY: Alert and oriented x3. Mood and affect anxious. 





INVESTIGATIONS, reviewed in the clinical context:


March 14: White count 20.8 hemoglobin 9.2 platelets 265 sodium 134 BUN 44 

creatinine 1.54.  Chest x-ray: Pulm edema/bilateral infiltrates


March 13: Sodium 132 potassium 3.4 BUN 35 creatinine 1.51


March 12: White count 7.2 hemoglobin 10.3 platelets 242 sodium 136 potassium 3.8

BUN 28 creatinine 1.37


Troponin I: 0.049, 0.055, 0.051


Chest x-ray film personally reviewed by me-bilateral infiltrates.  Effusion/pulm

edema cardiomegaly.


Venous Doppler: No DVT


Chest CTA: Acute segmental and subsegmental pulm embolism within the right 

middle and right lower lobes.  No RV strain.  Chronic interstitial lung disease.





Previous labs:


Creatinine 1.63 in June 2023





Assessment/Plan:








#.  Acute pulmonary embolism, non-massive [segmental and subsegmental within the

 right middle and lower lobes.  No RV strain


IV heparin, switched over to Eliquis 








#.  Sepsis likely secondary to -viral pneumonia.  Secondary bacterial component 

cannot be ruled out


IV cefepime.  And Levaquin-ID following


Nasal MRSA screen ordered


Pulmonary following





#.  Acute hypoxic respiratory failure, secondary to above: Slow to respond


Remains on BiPAP, 60%





#.  Acute COPD exacerbation, and a prior smoker: Slow to respond


DuoNeb Q4.  IV Solu-Medrol 60 mg Q6.  Nebulized Pulmicort








#.  Acute on chronic heart failure with reduced ejection fraction (EF 45 to 

50%): Slow to respond


IV Lasix 40 mg every 12.  Aldactone.





#.  Chronic hypoxic respiratory failure from underlying COPD, 2 L home O2








#.  Non-insulin-dependent type 2 diabetes, uncontrolled with hyperglycemia 

secondary steroids


Insulin subcu sliding scale.  Increase Levemir 26 units SQ nightly


Accu-Cheks ACHS





- chronic kidney disease stage III from nephrosclerosis and diabetic nephropathy

 [creatinine 1.63 in June 2023]


Renal ultrasound.:  Suboptimal study.  No corticomedullary comment.


UA protein 1+








#.  Essential hypertension


Amlodipine.  Zestril.





#.  Hyperlipidemia


Lipitor





-Full code











Past Medical History


Past Medical History: Atrial Fibrillation, Diabetes Mellitus, Hyperlipidemia, 

Hypertension, Pneumonia, Supraventricular Tachycardia (SVT)


Additional Past Medical History / Comment(s): a fib, causes shortness of breath,

can feel irregular heartbeat


History of Any Multi-Drug Resistant Organisms: None Reported


Past Surgical History: Cholecystectomy, Heart Catheterization


Additional Past Surgical History / Comment(s): Colonoscopy


Past Anesthesia/Blood Transfusion Reactions: No Reported Reaction


Past Psychological History: No Psychological Hx Reported


Smoking Status: Current every day smoker


Past Alcohol Use History: Occasional


Past Drug Use History: Marijuana

## 2025-03-15 LAB
ALBUMIN SERPL-MCNC: 2.7 G/DL (ref 3.5–5)
ALP SERPL-CCNC: 105 U/L (ref 38–126)
ALT SERPL-CCNC: 43 U/L (ref 4–49)
ANION GAP SERPL CALC-SCNC: 8 MMOL/L
AST SERPL-CCNC: 26 U/L (ref 17–59)
BASOPHILS # BLD AUTO: 0 K/UL (ref 0–0.2)
BASOPHILS NFR BLD AUTO: 0 %
BUN SERPL-SCNC: 54 MG/DL (ref 9–20)
CALCIUM SPEC-MCNC: 8.1 MG/DL (ref 8.4–10.2)
CHLORIDE SERPL-SCNC: 100 MMOL/L (ref 98–107)
CO2 BLDA-SCNC: 26 MMOL/L (ref 19–24)
CO2 SERPL-SCNC: 25 MMOL/L (ref 22–30)
EOSINOPHIL # BLD AUTO: 0 K/UL (ref 0–0.7)
EOSINOPHIL NFR BLD AUTO: 0 %
ERYTHROCYTE [DISTWIDTH] IN BLOOD BY AUTOMATED COUNT: 3.11 M/UL (ref 4.3–5.9)
ERYTHROCYTE [DISTWIDTH] IN BLOOD: 13.1 % (ref 11.5–15.5)
GLUCOSE BLD-MCNC: 115 MG/DL (ref 70–110)
GLUCOSE BLD-MCNC: 207 MG/DL (ref 70–110)
GLUCOSE BLD-MCNC: 343 MG/DL (ref 70–110)
GLUCOSE BLD-MCNC: 344 MG/DL (ref 70–110)
GLUCOSE BLD-MCNC: 385 MG/DL (ref 70–110)
GLUCOSE BLD-MCNC: 409 MG/DL (ref 70–110)
GLUCOSE BLD-MCNC: 424 MG/DL (ref 70–110)
GLUCOSE BLD-MCNC: 99 MG/DL (ref 70–110)
GLUCOSE SERPL-MCNC: 300 MG/DL (ref 74–99)
HCO3 BLDA-SCNC: 24 MMOL/L (ref 21–25)
HCT VFR BLD AUTO: 29.6 % (ref 39–53)
HGB BLD-MCNC: 9.2 GM/DL (ref 13–17.5)
LYMPHOCYTES # SPEC AUTO: 0.4 K/UL (ref 1–4.8)
LYMPHOCYTES NFR SPEC AUTO: 2 %
MCH RBC QN AUTO: 29.7 PG (ref 25–35)
MCHC RBC AUTO-ENTMCNC: 31.2 G/DL (ref 31–37)
MCV RBC AUTO: 95.2 FL (ref 80–100)
MONOCYTES # BLD AUTO: 0.4 K/UL (ref 0–1)
MONOCYTES NFR BLD AUTO: 2 %
NEUTROPHILS # BLD AUTO: 17.4 K/UL (ref 1.3–7.7)
NEUTROPHILS NFR BLD AUTO: 95 %
PCO2 BLDA: 42 MMHG (ref 35–45)
PH BLDA: 7.37 [PH] (ref 7.35–7.45)
PLATELET # BLD AUTO: 232 K/UL (ref 150–450)
PO2 BLDA: 80 MMHG (ref 83–108)
POTASSIUM SERPL-SCNC: 3.6 MMOL/L (ref 3.5–5.1)
PROT SERPL-MCNC: 5.4 G/DL (ref 6.3–8.2)
SODIUM SERPL-SCNC: 133 MMOL/L (ref 137–145)
WBC # BLD AUTO: 18.2 K/UL (ref 3.8–10.6)

## 2025-03-15 RX ADMIN — INSULIN HUMAN SCH MLS/HR: 100 INJECTION, SOLUTION PARENTERAL at 18:22

## 2025-03-15 RX ADMIN — METHYLPREDNISOLONE SODIUM SUCCINATE SCH: 125 INJECTION, POWDER, FOR SOLUTION INTRAMUSCULAR; INTRAVENOUS at 19:12

## 2025-03-15 NOTE — XR
EXAMINATION TYPE: XR chest 1V

 

DATE OF EXAM: 3/15/2025 4:14 AM

 

COMPARISON: Multiple radiographs, with the most recent on 3/14/2025.

 

TECHNIQUE: XR chest 1V Portable AP radiograph of the chest.

 

CLINICAL INDICATION:Male, 64 years old with history of Hypoxic respiratory failure; 

 

FINDINGS: 

Lungs/Pleura: Redemonstration of bilateral multifocal airspace opacities with increased opacities wit
hin the right midlung. No sizable pleural effusion or pneumothorax. 

Pulmonary vascularity: Unremarkable.

Heart/mediastinum: Cardiomediastinal silhouette is enlarged and stable.  Atherosclerotic calcificatio
ns are seen in the aorta.

Musculoskeletal: No acute osseous pathology.

 

IMPRESSION: 

Redemonstration of diffuse multifocal airspace opacities increased opacities within the right midlung
. Etiologies include pulmonary edema versus pneumonia.

 

 

[facility], Workstation: [id number], 3/15/2025 6:49 AM

## 2025-03-15 NOTE — P.PN
Subjective


Progress Note Date: 03/15/25











Patient is a 64-year-old male with past medical history significant for atrial 

fibrillation, diabetes mellitus, CKD stage III, hypertension, hyperlipidemia, 

tobacco smoker.  Of note, recently had a prolonged hospitalization 02/23/2025 

through 03/07/2025.  Treated for combination of influenza A, pneumonia, CHF.  

Completed course of antibiotics and Tamiflu.  At this time, did require 

noninvasive BiPAP, eventually discharged on home O2.  Returns with a chief 

complaint of shortness of breath progressing over the last 2 to 3 days.  Also, 

reports sudden onset substernal chest pain that developed the night prior and 

has been persistent.  On arrival to the ED, found to be in some respiratory 

distress, and placed on BiPAP.  Workup in the emergency department including a 

chest x-ray showing multifocal infiltrates concerning for pneumonia or pulmonary

edema.  D-dimer was elevated in setting CT angio protocol which was positive for

segmental and subsegmental right middle lobe and right lower lobe pulmonary 

emboli.  No saddle pulmonary embolus. Pulmonary artery mildly enlarged.  

Preserved RV to LV ratio.  There were diffuse coarse reticular infiltrates, as 

well as, groundglass lung attenuation, cystic changes, with concerns for 

interstitial lung disease. Patient does take Eliquis for his history of atrial 

fibrillation.  Does state he has missed some doses lately.  CBC: WBC count 22.6,

hemoglobin 11, platelets 322.  CMP: Sodium 133, potassium 4.2, chloride 101, 

serum bicarb 24, BUN 30, creatinine 1.27, glucose 135.  Lactic 1.5.  VBG with a 

pH of 7.5 and pCO2 of 32.  EKG: Sinus rhythm, rate 65 bpm, left bundle branch 

block, not acute.  Elevated serial troponins 0.049 and 0.055 respectively.  NT 

proBNP elevated 8807.  Patient is currently being evaluated in the emergency 

department.  He is alert and some moderate respiratory distress.  On BiPAP with 

settings 12/5 and FiO2 60%.  Tachypneic, breathing around 40 breaths/min. 

Endorses progressive worsening difficulty in breathing over last couple days.  

He has tried to increase his supplemental oxygen at home without any relief. 

Denies previous history of DVT or PE.  Associated nonproductive cough.  Denies 

sputum production or hemoptysis.  Substernal nonradiating chest pain persist.  

Denies any fevers or chills.  Denies heart palpitations or syncopal events.  

Denies swelling in the lower extremities.  Heart rhythm is normal sinus on 

bedside monitor.  Blood pressure has been normotensive, did receive 2 L of 

crystalloid fluid bolus earlier.  Not requiring any vasopressors.  Normal saline

is infusing at 150 mL/h.  IV heparin infusing per protocol








3/13/2025, the patient is being seen for a follow-up.  The patient is 

alternating between BiPAP at a pressure of 12/5 with an FiO2 of 60% and 15 L of 

oxygen by nasal cannula.  He is getting slightly anxious and the patient is 

being provided Xanax accordingly.  Fluid balance is -1.1 L over the past 24 

hours.  Limited echocardiogram was also done and it showed preserved LV function

with an EF of around 55 to 60%.  Valvular functions could not be accurately 

assessed as the patient's study was technically difficult study.  There was 

however RV dilatation.  Unable to estimate RV pressures.  The electrolytes from 

today showed a creatinine of 1.5 with a BUN of 35.  Bicarb is at 21.  Sodium is 

at 132.  The patient remains on DuoNeb updrafts.  The patient remains on a 

combination of cefepime and vancomycin.  The patient was taken off the IV 

heparin and the patient was started on anticoagulation with Eliquis.  Remains on

IV Solu-Medrol 60 mg every 6 hours.  Remains on Aldactone.  Remains on IV Lasix 

40 mg every 12 hours.





On today's evaluation of 3/14/2025, the patient is overall condition is 

essentially unchanged.  Continues to be hypoxic, continues to be on BiPAP and 

the patient remains at a pressure of 12/5 with an FiO2 of 60%.  Continues to h

ave crackles in lung base bilaterally.  White cell count remains elevated at 20 

with a hemoglobin 9.2.  Very much BiPAP dependent as the patient desaturates 

once taken off the BiPAP.  BUN is 44 with a platelet of 1.5.  Sodium levels at 

134 and a potassium level is at 3.4.  Remains on DuoNeb updrafts.  Remains on IV

Lasix 40 mg every 12 hours.  Remains on Aldactone.  Remains on bronchodilators. 

Remains on steroids.  Empiric antibiotic coverage with IV cefepime.  Reviewed 

the CAT scan again and there is some chronic interstitial changes and the 

patient has diffuse infiltrates with areas of groundglass.  Consider acute 

interstitial pneumonias.  Consider fungal pneumonias.





On today's evaluation of 3/15/2025, the patient is feeling slightly better 

compared to yesterday.  The patient is off the BiPAP and the patient is 

currently on 15 L of oxygen by nasal cannula.  Remains on bronchodilators.  

Remains on IV Solu-Medrol.  Remains on IV Lasix.  Antibiotic coverage including 

combination of cefepime, Levaquin orally and itraconazole orally.  Fungal 

antibodies are still pending.  Meanwhile, rheumatologic profile showed a 

negative rheumatoid factor and negative CARLA.  Legionella urine antigen is still 

pending for now.  Clinically however, the patient is feeling slightly improved 

compared to yesterday.  His chest x-ray continues to show multifocal airspace 

disease more so in the right lung.  Fluid balance has been negative.  The 

patient's creatinine is currently at 1.6 with a BUN of 54 and a sodium levels at

133.  I am going to taper the steroids.  The white cell count is 18.2 with a h

emoglobin of 9.2.





Objective





- Vital Signs


Vital signs: 


                                   Vital Signs











Temp  98.2 F   03/15/25 04:00


 


Pulse  80   03/15/25 08:04


 


Resp  27 H  03/15/25 04:00


 


BP  132/59   03/15/25 04:00


 


Pulse Ox  93 L  03/15/25 07:59


 


FiO2  60   03/15/25 07:59








                                 Intake & Output











 03/14/25 03/15/25 03/15/25





 18:59 06:59 18:59


 


Intake Total 118  


 


Output Total 600 700 


 


Balance -482 -700 


 


Weight  113.5 kg 


 


Intake:   


 


  Oral 118  


 


Output:   


 


  Urine 600 700 


 


Other:   


 


  Voiding Method Bedside Commode Bedside Commode 





 External Catheter External Catheter 














- Exam








GENERAL EXAM: Alert, 64-year-old male, on 15 L of oxygen by nasal cannula more 

awake and breathing is less labored compared to yesterday


HEAD: Normocephalic and atraumatic


EYES: Normal reaction of pupils, equal size.


NOSE: Clear with pink turbinates.


THROAT: No erythema or exudates.


NECK: No masses, no JVD.


CHEST: No chest wall deformity.


LUNGS: Equal air entry with crackles heard on inspiration bilaterally.   

Conversational dyspnea.


CVS: S1 and S2 normal with soft systolic murmur, regular rhythm. No other extra 

heart sounds


ABDOMEN: No hepatosplenomegaly, active bowel sounds, no guarding or rigidity. 


SPINE: No scoliosis or deformity


SKIN: No rashes


CENTRAL NERVOUS SYSTEM: No focal deficits, tone is normal in all 4 extremities.


EXTREMITIES: There is no peripheral edema, clubbing, or cyanosis.  Peripheral 

pulses are intact.








- Labs


CBC & Chem 7: 


                                 03/15/25 08:08





                                 03/15/25 08:08


Labs: 


                  Abnormal Lab Results - Last 24 Hours (Table)











  03/14/25 03/14/25 03/14/25 Range/Units





  11:18 16:42 20:12 


 


WBC     (3.8-10.6)  k/uL


 


RBC     (4.30-5.90)  m/uL


 


Hgb     (13.0-17.5)  gm/dL


 


Hct     (39.0-53.0)  %


 


Neutrophils #     (1.3-7.7)  k/uL


 


Lymphocytes #     (1.0-4.8)  k/uL


 


ABG pO2     ()  mmHg


 


ABG Total CO2     (19-24)  mmol/L


 


Hemoglobin     (13.0-17.5)  gm/dL


 


Sodium     (137-145)  mmol/L


 


BUN     (9-20)  mg/dL


 


Creatinine     (0.66-1.25)  mg/dL


 


Glucose     (74-99)  mg/dL


 


POC Glucose (mg/dL)  375 H  299 H  336 H  ()  mg/dL


 


Calcium     (8.4-10.2)  mg/dL


 


Total Protein     (6.3-8.2)  g/dL


 


Albumin     (3.5-5.0)  g/dL














  03/15/25 03/15/25 03/15/25 Range/Units





  06:17 07:37 08:08 


 


WBC    18.2 H  (3.8-10.6)  k/uL


 


RBC    3.11 L  (4.30-5.90)  m/uL


 


Hgb    9.2 L  (13.0-17.5)  gm/dL


 


Hct    29.6 L  (39.0-53.0)  %


 


Neutrophils #    17.4 H  (1.3-7.7)  k/uL


 


Lymphocytes #    0.4 L  (1.0-4.8)  k/uL


 


ABG pO2   80 L   ()  mmHg


 


ABG Total CO2   26 H   (19-24)  mmol/L


 


Hemoglobin   9.2 L   (13.0-17.5)  gm/dL


 


Sodium     (137-145)  mmol/L


 


BUN     (9-20)  mg/dL


 


Creatinine     (0.66-1.25)  mg/dL


 


Glucose     (74-99)  mg/dL


 


POC Glucose (mg/dL)  409 H    ()  mg/dL


 


Calcium     (8.4-10.2)  mg/dL


 


Total Protein     (6.3-8.2)  g/dL


 


Albumin     (3.5-5.0)  g/dL














  03/15/25 Range/Units





  08:08 


 


WBC   (3.8-10.6)  k/uL


 


RBC   (4.30-5.90)  m/uL


 


Hgb   (13.0-17.5)  gm/dL


 


Hct   (39.0-53.0)  %


 


Neutrophils #   (1.3-7.7)  k/uL


 


Lymphocytes #   (1.0-4.8)  k/uL


 


ABG pO2   ()  mmHg


 


ABG Total CO2   (19-24)  mmol/L


 


Hemoglobin   (13.0-17.5)  gm/dL


 


Sodium  133 L  (137-145)  mmol/L


 


BUN  54 H  (9-20)  mg/dL


 


Creatinine  1.69 H  (0.66-1.25)  mg/dL


 


Glucose  300 H  (74-99)  mg/dL


 


POC Glucose (mg/dL)   ()  mg/dL


 


Calcium  8.1 L  (8.4-10.2)  mg/dL


 


Total Protein  5.4 L  (6.3-8.2)  g/dL


 


Albumin  2.7 L  (3.5-5.0)  g/dL








                      Microbiology - Last 24 Hours (Table)











 03/11/25 20:48 Blood Culture - Preliminary





 Blood 














Assessment and Plan


Assessment: 











Acute on chronic hypoxic respiratory failure.  Multifactorial.  Reviewed the CAT

scan of the chest.  The patient is changes with coarse infiltrates and cystic 

changes which could potentially suggest an underlying ILD.  Nevertheless, the 

patient has developed bilateral pulmonary infiltrates could could be 

CHF/pneumonia/acute lung injury.  The contrast administration was put on the CTA

and I doubt the possibility of a pulmonary embolism.  The patient is maintained 

on anticoagulation with Eliquis on an outpatient basis.  He is currently on IV 

heparin.  He is currently on diuretics.  Is currently on a combination of 

cefepime and vancomycin.  He is also on bronchodilators and steroids.  He 

remains on amiodarone.  Possibility of acute interstitial pneumonia is/acute 

lung injury/ARDS need to be considered.  Fungal infections are also considered.





Acute hypoxemic respiratory failure, currently requiring BiPAP support, 

secondary to a combination of above, chest x-ray showing multifocal infiltrates 

concerning for pneumonia or pulmonary edema.  Follow-up chest CT showing diffuse

coarse reticular infiltrates, as well as, groundglass lung attenuation upper 

lobe predominant cystic changes, with concerns for interstitial lung disease.  

The patient is feeling slightly improved compared to yesterday.  He is still 

hypoxic and he was taken off the BiPAP and placed back on 15 L of oxygen nasal 

cannula





Acute exacerbation of chronic systolic congestive heart failure, recent 

echocardiogram from 02/24/2025 estimating a left ventricular ejection fraction 

of 45 to 50%, moderate pulmonary hypertension, and moderate tricuspid regurg

itation.  Repeat echocardiogram showed improvement in LV function with ejection 

fraction 55%





Acute leukocytosis





Recent hospitalization with influenza A infection, completed course of Tamiflu





CKD stage IIIa





History of atrial fibrillation with previous cardioversion, currently normal 

sinus, normally maintained on Eliquis and amiodarone





Chronic tobacco dependence with suspected underlying COPD





Hypertension.





Hyperlipidemia.





Diabetes mellitus, type II.











Plan:





Continue BiPAP support as needed.  Currently on 15 L of O2 nasal cannula.


Anticoagulation with Eliquis


Continue bronchodilators


Continue IV Solu-Medrol


Continue IV Lasix 40 mg and drop the dose to every 24 hours


Continue empiric antibiotics and the patient is currently on a combination of 

cefepime 


Check CARLA, rheumatoid factor are negative, ANCA levels including P and C ANCA 

are still pending


Obtain fungal identification antibodies pending


Histoplasma urine antigen


Legionella urine antigen


Continue itraconazole 200 mg p.o. twice a day


Continue Levaquin 750 mg p.o. daily


Blood cultures are negative


Check procalcitonin level is at 0.18


proBNP level is elevated


Repeat chest x-ray from today is unchanged


Repeat blood work in the morning





Evaluation was done and 33 minutes.


Time with Patient: Greater than 30

## 2025-03-15 NOTE — P.PN
Progress Note - Text


Progress Note Date: 03/15/25





Patient is a 64-year-old male with a PMH of atrial fibrillation on Eliquis, COPD

on 2 L home oxygen, non-insulin-dependent diabetes mellitus, hyperlipidemia, 

hypertension presenting with shortness of breath.  Patient was previously 

admitted here for acute hypoxic respiratory failure secondary to influenza 

pneumonia and was discharged on 2 L of home oxygen.  Patient states he has been 

feeling short of breath while at rest.  He reports that since being discharged 

the home oxygen has not been sufficient. He states that he had to keep 

increasing his oxygen.    Patient says shortness of breath is slightly relieved 

when he is laying down.  Denies any orthopnea or PND.  Patient endorses a nonpr

oductive cough that is chronic in nature, but says it has been getting worse.  

Patient admits to having fever, chills.  Patient also admits to having non-

radiating, pressure-like chest pain over the last few days.  Chest pain is not 

related to exertion and he had no alleviating or aggravating factors.  Patient 

denies any headache, vision changes, nausea, vomiting, diarrhea, abdominal pain,

urinary symptoms.





EKG independently interpreted displaying sinus rhythm with left bundle branch 

block that has been seen on prior EKG, rate 65 bpm, QTc 451 ms


CXR independently interpreted displaying bilateral multifocal airspace opacities


Chest CTA displaying acute segmental and subsegmental pulmonary emboli within 

the right and middle lower lobes, no saddle embolus, no RV strain, chronic 

interstitial loading disease


Venous Doppler B/L LE displaying no evidence of acute DVT


Troponin 0.049, 0.055, proBNP 8810, D-dimer 4.87, WBC 22.6, Hgb 11.0, HCT 35.2, 

MCV 93.7, platelet 322, PT 11.8, INR 1.1, APTT 28, sodium 133, potassium 4.2, 

CO2 24, BUN 30, creatinine 1.27, glucose 135


VBG pH 7.52, pCO2 32


T98.2 F, CT 80, RR 26, /77, O2 sat 90% on BiPAP with FiO2 of 100%





March 12: Overflow the ER.  Up in the bed.  Short of breath.  Patient was on 

BiPAP overnight.  This morning on 15 L high flow nasal cannula.  Decreased 

appetite.  Has got a cough.  Some wheezing.  Some sputum production.  Decreased 

appetite.  Patient is on IV heparin.  Also on IV cefepime.  Pulmonary following


March 13: Patient did confirm that because Eliquis is very expensive patient was

not taking it properly did and take it sparingly at home.  Explains patient's 

PE.  Started on Eliquis today by cardiology.  IV heparin discontinued.  Getting 

easily short of breath.  Requiring BiPAP..  Some cough.  Oral intake fair.  On 

IV cefepime and vancomycin.  ID following.  Also on IV Lasix.  Due to worsening 

renal function will DC vancomycin


March 14: Saw this afternoon.  On BiPAP.  60%.  Ensure ordered.  All blood work 

ordered by pulmonary including fungal.  Patient currently on IV cefepime and 

Levaquin.  IV Solu-Medrol.  Remain short of breath.  Tired.  Being followed by 

pulmonary and ID.  Chest x-ray film personally reviewed by me-showing pulm edema

bilateral infiltrates especially at the bases.  Renal ultrasound nonspecific.  

UA showing protein 1+.


March 15: Remain short of breath.  Sitting up in bed.  Not able to come off the 

BiPAP.  Remains on IV cefepime.  Accu-Cheks running high.  Put on insulin drip. 

On IV Solu-Medrol.  Oral intake fair.





Active Medications





Albuterol/Ipratropium (Ipratropium-Albuterol 3 Ml Neb)  3 ml INHALATION RT-QID 

PRN


   PRN Reason: Shortness Of Breath Or Wheezing


Albuterol/Ipratropium (Ipratropium-Albuterol 3 Ml Neb)  3 ml INHALATION RT-Q4H 

[facility]


   Last Admin: 03/15/25 15:35 Dose:  3 ml


   


Alprazolam (Alprazolam 0.5 Mg Tab)  0.5 mg PO TID PRN


   PRN Reason: Anxiety


   Last Admin: 03/15/25 13:20 Dose:  0.5 mg


   


Amlodipine Besylate (Amlodipine 5 Mg Tab)  5 mg PO DAILY [facility]


   Last Admin: 03/15/25 09:13 Dose:  5 mg


   


Apixaban (Apixaban Initiation Dose--Vte 5 Mg Tab)  10 mg PO BID [facility]; Taper


   Stop: 04/12/25 10:14


   Last Admin: 03/15/25 09:13 Dose:  10 mg


   


Aspirin (Aspirin 81 Mg)  81 mg PO DAILY [facility]


   Last Admin: 03/15/25 09:14 Dose:  81 mg


   


Budesonide (Budesonide 1 Mg/2 Ml Nebu)  1 mg INHALATION RT-BID [facility]


   Last Admin: 03/15/25 07:38 Dose:  1 mg


   


Dextrose/Water (Dextrose 50% Syringe 50 Ml)  25 ml IVP PER PROTOCOL PRN; 

Protocol


   PRN Reason: Hypoglycemia


Dextrose/Water (Dextrose 50% Syringe 50 Ml)  50 ml IVP PER PROTOCOL PRN; 

Protocol


   PRN Reason: Hypoglycemia


Formoterol Fumarate (Formoterol Fumarate 20 Mcg/2 Ml Nebu)  20 mcg INHALATION 

RT-BID [facility]


   Last Admin: 03/15/25 07:58 Dose:  20 mcg


   


Furosemide (Furosemide 10 Mg/Ml 4 Ml Vial)  40 mg IV Q24HR [facility]


Cefepime HCl 2 gm/ Sodium (Chloride)  100 mls @ 25 mls/hr IVPB Q12H [facility]; 

Protocol


   Last Admin: 03/15/25 09:14 Dose:  25 mls/hr


   


Insulin Human Regular 100 unit (/ Sodium Chloride)  100 mls @ 0 mls/hr IV .Q0M 

[facility]; Protocol


Itraconazole (Itraconazole 100 Mg Cap)  200 mg PO BID [facility]; Protocol


   Last Admin: 03/15/25 09:14 Dose:  200 mg


   


Levofloxacin (Levofloxacin 750 Mg Tab)  750 mg PO Q48H [facility]; Protocol


Lisinopril (Lisinopril 20 Mg Tab)  20 mg PO BID [facility]


   Last Admin: 03/15/25 09:13 Dose:  20 mg


   


Lorazepam (Lorazepam 1 Mg Tab)  1 mg PO HS PRN


   PRN Reason: Anxiety


   Last Admin: 03/13/25 00:09 Dose:  1 mg


   


Metoprolol Succinate (Metoprolol Succinate (Er) 25 Mg Tab.Er.24h)  25 mg PO 

DAILY [facility]


   Last Admin: 03/15/25 09:13 Dose:  25 mg


   


Morphine Sulfate (Morphine Sulfate 4 Mg/Ml Syringe)  4 mg IV Q4HR PRN


   PRN Reason: Severe Pain (Scale 7 to 10)


Naloxone HCl (Naloxone 0.4 Mg/Ml 1 Ml Vial)  0.2 mg IV Q2M PRN


   PRN Reason: Opioid Reversal


Ondansetron HCl (Ondansetron 4 Mg/2 Ml Vial)  4 mg IVP Q8HR PRN


   PRN Reason: Nausea And Vomiting


   Last Admin: 03/14/25 14:52 Dose:  4 mg


   


Pantoprazole Sodium (Pantoprazole 40 Mg/10 Ml Vial)  40 mg IV DAILY [facility]


   Last Admin: 03/15/25 09:13 Dose:  40 mg


   


Potassium Chloride (Potassium Chloride Er 20 Meq Tab.Er)  20 meq PO DAILY [facility]


   Last Admin: 03/15/25 09:14 Dose:  20 meq


   


Sodium Chloride (Sodium Chloride 0.65% Nasal Spray 44 Ml Btl)  2 spray NASAL QID

PRN


   PRN Reason: Dry Nasal Passages


Spironolactone (Spironolactone 25 Mg Tab)  25 mg PO DAILY [facility]


   Last Admin: 03/15/25 09:13 Dose:  25 mg


   

















Social history:


Tobacco: Current 50-pack-year smoker


Alcohol: Occasional alcohol use


Recreational drugs: Marijuana


Travel: No recent travel





On examination:


VITAL SIGNS: 98, 82, 22, 177 x 77, on BiPAP


GENERAL APPEARANCE: Sitting up in bed, leaning forward, short of breath


HEENT: Normal external appearance of nose and ear.  Oral cavity normal


EYES: Pupils equal. Conjunctiva normal. 


NECK: JVD not raised. Mass not palpable. 


RESPIRATORY: Respiratory effort increased, not able to speak in full sentences. 

Accessory muscles working. Lungs diminished breath sounds some scattered 

crackles prolonged expiration. 


CARDIOVASCULAR: First and second sounds normal. No edema. 


ABDOMEN: Soft. Liver and spleen not palpable. No tenderness. No mass palpable. 


PSYCHIATRY: Alert and oriented x3. Mood and affect anxious. 





INVESTIGATIONS, reviewed in the clinical context:


March 15: White count 8.2 hemoglobin 9.2 platelets 232 ABG: pH 7.3 pCO2 42 pO2 

80 sodium 133 potassium 3.6 creatinine 1.69.  Accu-Cheks high


March 14: White count 20.8 hemoglobin 9.2 platelets 265 sodium 134 BUN 44 

creatinine 1.54.  Chest x-ray: Pulm edema/bilateral infiltrates


March 13: Sodium 132 potassium 3.4 BUN 35 creatinine 1.51


March 12: White count 7.2 hemoglobin 10.3 platelets 242 sodium 136 potassium 3.8

BUN 28 creatinine 1.37


Troponin I: 0.049, 0.055, 0.051


Chest x-ray film personally reviewed by me-bilateral infiltrates.  Effusion/pulm

edema cardiomegaly.


Venous Doppler: No DVT


Chest CTA: Acute segmental and subsegmental pulm embolism within the right 

middle and right lower lobes.  No RV strain.  Chronic interstitial lung disease.





Previous labs:


Creatinine 1.63 in June 2023





Assessment/Plan:








#.  Acute pulmonary embolism, non-massive [segmental and subsegmental within the

 right middle and lower lobes.  No RV strain


IV heparin, switched over to Eliquis 








#.  Sepsis likely secondary to -viral pneumonia.  Secondary bacterial component 

probable


IV cefepime.  And Levaquin-


ID and pulmonary following








#.  Acute hypoxic respiratory failure, secondary to above: Slow to respond


Remains on BiPAP, 60%





#.  Acute COPD exacerbation, in a prior smoker: Slow to respond


DuoNeb Q4.  IV Solu-Medrol 60 mg q8.  Nebulized Pulmicort








#.  Acute on chronic heart failure with reduced ejection fraction (EF 45 to 

50%): Slow to respond


IV Lasix 40 mg daily.  Aldactone.





#.  Chronic hypoxic respiratory failure from underlying COPD, 2 L home O2








#.  Non-insulin-dependent type 2 diabetes, uncontrolled with hyperglycemia 

secondary steroids: Worsening


Insulin subcu sliding scale.  Stop Levemir.  Placed on insulin drip today


Accu-Cheks ACHS





- chronic kidney disease stage III from nephrosclerosis and diabetic nephropathy

 [creatinine 1.63 in June 2023]


Renal ultrasound.:  Suboptimal study.  No corticomedullary comment.


UA protein 1+








#.  Essential hypertension


Amlodipine.  Zestril.





#.  Hyperlipidemia


Lipitor





-Full code





Not improving





Past Medical History


Past Medical History: Atrial Fibrillation, Diabetes Mellitus, Hyperlipidemia, 

Hypertension, Pneumonia, Supraventricular Tachycardia (SVT)


Additional Past Medical History / Comment(s): a fib, causes shortness of breath,

can feel irregular heartbeat


History of Any Multi-Drug Resistant Organisms: None Reported


Past Surgical History: Cholecystectomy, Heart Catheterization


Additional Past Surgical History / Comment(s): Colonoscopy


Past Anesthesia/Blood Transfusion Reactions: No Reported Reaction


Past Psychological History: No Psychological Hx Reported


Smoking Status: Current every day smoker


Past Alcohol Use History: Occasional


Past Drug Use History: Marijuana

## 2025-03-15 NOTE — P.PN
Subjective


Progress Note Date: 03/15/25





Reason for Consult (text): 


PE


History of present illness: 


This is a 64-year-old male patient of Dr. Ray with past medical history of 

chronic kidney disease stage III, COPD tobacco use, alcohol abuse, hypertension,

diabetes mellitus type 2, persistent atrial fibrillation, systolic heart 

failure, recent GI bleed, family history of premature coronary artery disease.  

We have been asked to evaluate the patient for PE.  Patient was recently 

hospitalized 2/24 - 3/7 and was seen by cardiology at that time for NSTEMI 

secondary to sepsis, influenza and pneumonia.  Patient states that he was 

discharged home but he has had problems with getting his medications and has not

been taking his medications after he was discharged.  He states he could not af

ford it because he no longer has Medicaid.  Patient did go home on oxygen at 2 L

nasal cannula.  Patient states that he was having weakness at home could not do 

anything and he had significant dyspnea with even a small amount of activity.  

Patient is seen today in the emergency center on BiPAP.  He is waiting for a bed

on the cardiac stepdown unit.  Pulmonary medicine is also on consult.  Blood pr

essure 156/72, heart rate 59, pulse ox 99% on BiPAP.  Patient has been started 

on heparin drip, IV Solu-Medrol, IV antibiotics.





-EKG: Sinus rhythm with left bundle branch block, left axis deviation.


-Chest x-ray: Multifocal airspace opacities concerning for pneumonia.  #2 

persistent cardiomegaly with bilateral multifocal acute infiltrates and/or 

edema.  Findings are worse in the bilateral lower lungs from 1 day earlier.


-CTA chest: Acute segmental and subsegmental pulmonary emboli within the right 

middle and right lower lobes.  No saddle embolus.  No RV strain.  Chronic 

interstitial lung disease with nonspecific features.


-Laboratory studies: WBC 17.2, hemoglobin 10.3, D-dimer 4.87.  Sodium 136, 

potassium 28 and creatinine 1.37, troponin 0.0490, 0.055 and 0.051.  proBNP 

8810.  Influenza A detected.  Procalcitonin 0.18..


-Home cardiac medications: Amiodarone 200 mg daily, Norvasc 5 mg daily, Eliquis 

5 mg twice daily, atorvastatin 40 mg at bedtime, Lasix 40 mg daily, lisinopril 

20 mg twice daily, metoprolol succinate 25 mg daily, spironolactone 25 mg daily.


-ROSANNA and cardioversion performed on 8/10/2023 revealed aortic valve is 

tricuspid, functioning normally with trace AI.  Mitral valve appears to be 

normal with mild regurgitation.  Tricuspid valve appears to be normal with 

moderate to severe tricuspid regurgitation.  There is a PFO.  Left atrial 

appendage is free of clot.  EF 35%.


-ROSANNA and cardioversion performed 11/21/2022.


-Dobutamine stress echocardiogram performed in the office on 8/3/2021 revealed 

nondiagnostic EKG portion secondary to baseline EKG abnormalities.  Normal 

stress echo portion without inducible ischemia.  Normal left ventricular 

ejection fraction of 60%.


-Echocardiogram performed 2/24/2025: EF 45 to 50%, moderately increased septal 

wall thickness, moderate biatrial dilatation, moderate tricuspid regurgitation.





3/13


Patient is seen and examined on the cardiac stepdown unit.  Blood pressure 

143/70, heart rate 78, pulse ox 99% on BiPAP.  Repeat blood work reveals BUN 35,

creatinine 1.51, potassium 3.4.





3/14


Patient seen and examined.  Patient has developed more hypoxia overnight 

requiring BiPAP and high flow nasal cannula was attempted yesterday.  He has had

no tachycardia no hypotension.  Heart rate is in the 70s, blood pressure 145/67.

 Repeat blood work reveals WBC 20.8, hemoglobin 9.2, BUN 44 creatinine 1.54, 

potassium 3.4.  Repeat chest x-ray reveals persistent cardiomegaly with 

bilateral multifocal acute infiltrates and/or edema.





3/15


Patient seen and examined.  Patient is still desatting when he is off the BiPAP 

and its only removed for eating.  He states that he feels much better today.  

Wheezing is improved.  Blood pressure 132/59, heart rate 74, pulse ox 92% on 

BiPAP.  Repeat blood work reveals hemoglobin 9.2, WBC 18, sodium 133, BUN 54 

creatinine 1.69.  Patient is noted to have worsening renal function.  Lasix has 

been changed to once daily by pulmonary medicine.





Physical examination:


Gen: This is a 64-year-old male appears to be comfortable on BiPAP.


VS: reviewed


HEENT: Head is atraumatic, normocephalic. Pupils equal, round. Sclerae is 

anicteric. 


NECK: Supple. No JVD. 


LUNGS: Scattered rhonchi bilaterally.  No intercostal retractions.


HEART: Regular rate and rhythm. No murmur. 


ABDOMEN: Soft  No tenderness.


EXTREMITIES: No pedal edema.  No calf tenderness.


NEUROLOGICAL: Patient is awake, alert and oriented x3.


 


Assessment:


Acute pulmonary emboli due to prolonged hospitalization and unable to afford 

Eliquis


No RV strain on CT


Acute hypoxic respiratory failure requiring BiPAP secondary to influenza, pne

umonia, and component of heart failure


NSTEMI secondary to PE, influenza and pneumonia


Influenza A


Pneumonia


Acute on chronic systolic heart failure


Acute kidney injury


Cardiomyopathy with previous EF of 15 to 20%, possibly tachycardia induced


Hypertension


Hyperlipidemia


Persistent atrial fibrillation status post previous cardioversion x 2, currently

in sinus rhythm


Diabetes mellitus type 2





Plan:


No evidence of RV strain on CT.  Patient has no hypotension or shock symptoms.  

Patient does not require any local or systemic thrombolytics at this time.


Continue patient's home cardiac medications


Continue Eliquis VTE protocol, prescription has been sent to the pharmacy and is

covered by insurance


Continue IV Lasix 40 mg reduced to once daily


Monitor ENE, daily weights, electrolytes and renal function


No need to repeat echocardiogram


Further recommendations to follow based upon clinical course





Nurse practitioner note has been reviewed, I agree with documented findings and 

plan of care.  Patient was seen and examined.











Objective





- Vital Signs


Vital signs: 


                                   Vital Signs











Temp  98.2 F   03/15/25 04:00


 


Pulse  80   03/15/25 08:04


 


Resp  27 H  03/15/25 04:00


 


BP  132/59   03/15/25 04:00


 


Pulse Ox  93 L  03/15/25 07:59


 


FiO2  60   03/15/25 07:59








                                 Intake & Output











 03/14/25 03/15/25 03/15/25





 18:59 06:59 18:59


 


Intake Total 118  


 


Output Total 600 700 


 


Balance -482 -700 


 


Weight  113.5 kg 


 


Intake:   


 


  Oral 118  


 


Output:   


 


  Urine 600 700 


 


Other:   


 


  Voiding Method Bedside Commode Bedside Commode 





 External Catheter External Catheter 














- Labs


CBC & Chem 7: 


                                 03/15/25 08:08





                                 03/15/25 08:08


Labs: 


                  Abnormal Lab Results - Last 24 Hours (Table)











  03/14/25 03/14/25 03/14/25 Range/Units





  11:18 16:42 20:12 


 


WBC     (3.8-10.6)  k/uL


 


RBC     (4.30-5.90)  m/uL


 


Hgb     (13.0-17.5)  gm/dL


 


Hct     (39.0-53.0)  %


 


Neutrophils #     (1.3-7.7)  k/uL


 


Lymphocytes #     (1.0-4.8)  k/uL


 


ABG pO2     ()  mmHg


 


ABG Total CO2     (19-24)  mmol/L


 


Hemoglobin     (13.0-17.5)  gm/dL


 


Sodium     (137-145)  mmol/L


 


BUN     (9-20)  mg/dL


 


Creatinine     (0.66-1.25)  mg/dL


 


Glucose     (74-99)  mg/dL


 


POC Glucose (mg/dL)  375 H  299 H  336 H  ()  mg/dL


 


Calcium     (8.4-10.2)  mg/dL


 


Total Protein     (6.3-8.2)  g/dL


 


Albumin     (3.5-5.0)  g/dL














  03/15/25 03/15/25 03/15/25 Range/Units





  06:17 07:37 08:08 


 


WBC    18.2 H  (3.8-10.6)  k/uL


 


RBC    3.11 L  (4.30-5.90)  m/uL


 


Hgb    9.2 L  (13.0-17.5)  gm/dL


 


Hct    29.6 L  (39.0-53.0)  %


 


Neutrophils #    17.4 H  (1.3-7.7)  k/uL


 


Lymphocytes #    0.4 L  (1.0-4.8)  k/uL


 


ABG pO2   80 L   ()  mmHg


 


ABG Total CO2   26 H   (19-24)  mmol/L


 


Hemoglobin   9.2 L   (13.0-17.5)  gm/dL


 


Sodium     (137-145)  mmol/L


 


BUN     (9-20)  mg/dL


 


Creatinine     (0.66-1.25)  mg/dL


 


Glucose     (74-99)  mg/dL


 


POC Glucose (mg/dL)  409 H    ()  mg/dL


 


Calcium     (8.4-10.2)  mg/dL


 


Total Protein     (6.3-8.2)  g/dL


 


Albumin     (3.5-5.0)  g/dL














  03/15/25 Range/Units





  08:08 


 


WBC   (3.8-10.6)  k/uL


 


RBC   (4.30-5.90)  m/uL


 


Hgb   (13.0-17.5)  gm/dL


 


Hct   (39.0-53.0)  %


 


Neutrophils #   (1.3-7.7)  k/uL


 


Lymphocytes #   (1.0-4.8)  k/uL


 


ABG pO2   ()  mmHg


 


ABG Total CO2   (19-24)  mmol/L


 


Hemoglobin   (13.0-17.5)  gm/dL


 


Sodium  133 L  (137-145)  mmol/L


 


BUN  54 H  (9-20)  mg/dL


 


Creatinine  1.69 H  (0.66-1.25)  mg/dL


 


Glucose  300 H  (74-99)  mg/dL


 


POC Glucose (mg/dL)   ()  mg/dL


 


Calcium  8.1 L  (8.4-10.2)  mg/dL


 


Total Protein  5.4 L  (6.3-8.2)  g/dL


 


Albumin  2.7 L  (3.5-5.0)  g/dL








                      Microbiology - Last 24 Hours (Table)











 03/11/25 20:48 Blood Culture - Preliminary





 Blood

## 2025-03-15 NOTE — P.PN
Subjective


Progress Note Date: 03/15/25


Principal diagnosis: 





Reason for follow-up is pneumonia





Patient is a 64-year-old  male with a past medical history significant 

for diabetes mellitus hypertension hyperlipidemia pneumonia atrial fibrillation 

currently everyday smoker presenting to the hospital for evaluation of 

increasing shortness of breath patient has been diagnosed with multifocal 

pneumonia prompting this consultation.


On today's evaluation that is 03/15/2025, patient did not have any fever and den

ies any chills, patient still complaining of shortness of breath and remains to 

be on a BiPAP at times requiring high flow nasal cannula oxygen no chest pain no

worsening cough no abdominal pain or diarrhea.


Patient white count is down to 18.2, creatinine is 1.69 blood cultures are 

negative so far sputum not collected





Objective





- Vital Signs


Vital signs: 


                                   Vital Signs











Temp  97.0 F L  03/15/25 08:00


 


Pulse  81   03/15/25 15:47


 


Resp  20   03/15/25 14:00


 


BP  160/73   03/15/25 12:00


 


Pulse Ox  97   03/15/25 12:00


 


FiO2  60   03/15/25 08:00








                                 Intake & Output











 03/14/25 03/15/25 03/15/25





 18:59 06:59 18:59


 


Intake Total 118  118


 


Output Total 600 700 500


 


Balance -479 -789 -449


 


Weight  113.5 kg 


 


Intake:   


 


  Oral 118  118


 


Output:   


 


  Urine 600 700 500


 


Other:   


 


  Voiding Method Bedside Commode Bedside Commode Bedside Commode





 External Catheter External Catheter External Catheter














- Exam





GENERAL DESCRIPTION: An elderly male lying in bed in no distress





RESPIRATORY SYSTEM: Unlabored breathing , coarse breath sounds bilaterally





HEART: S1 S2 regular rate and rhythm ,





ABDOMEN: Soft , no tenderness





EXTREMITIES: No edema feet





- Labs


CBC & Chem 7: 


                                 03/15/25 08:08





                                 03/15/25 08:08


Labs: 


                  Abnormal Lab Results - Last 24 Hours (Table)











  03/14/25 03/14/25 03/15/25 Range/Units





  16:42 20:12 06:17 


 


WBC     (3.8-10.6)  k/uL


 


RBC     (4.30-5.90)  m/uL


 


Hgb     (13.0-17.5)  gm/dL


 


Hct     (39.0-53.0)  %


 


Neutrophils #     (1.3-7.7)  k/uL


 


Lymphocytes #     (1.0-4.8)  k/uL


 


ABG pO2     ()  mmHg


 


ABG Total CO2     (19-24)  mmol/L


 


Hemoglobin     (13.0-17.5)  gm/dL


 


Sodium     (137-145)  mmol/L


 


BUN     (9-20)  mg/dL


 


Creatinine     (0.66-1.25)  mg/dL


 


Glucose     (74-99)  mg/dL


 


POC Glucose (mg/dL)  299 H  336 H  409 H  ()  mg/dL


 


Calcium     (8.4-10.2)  mg/dL


 


Total Protein     (6.3-8.2)  g/dL


 


Albumin     (3.5-5.0)  g/dL














  03/15/25 03/15/25 03/15/25 Range/Units





  07:37 08:08 08:08 


 


WBC   18.2 H   (3.8-10.6)  k/uL


 


RBC   3.11 L   (4.30-5.90)  m/uL


 


Hgb   9.2 L   (13.0-17.5)  gm/dL


 


Hct   29.6 L   (39.0-53.0)  %


 


Neutrophils #   17.4 H   (1.3-7.7)  k/uL


 


Lymphocytes #   0.4 L   (1.0-4.8)  k/uL


 


ABG pO2  80 L    ()  mmHg


 


ABG Total CO2  26 H    (19-24)  mmol/L


 


Hemoglobin  9.2 L    (13.0-17.5)  gm/dL


 


Sodium    133 L  (137-145)  mmol/L


 


BUN    54 H  (9-20)  mg/dL


 


Creatinine    1.69 H  (0.66-1.25)  mg/dL


 


Glucose    300 H  (74-99)  mg/dL


 


POC Glucose (mg/dL)     ()  mg/dL


 


Calcium    8.1 L  (8.4-10.2)  mg/dL


 


Total Protein    5.4 L  (6.3-8.2)  g/dL


 


Albumin    2.7 L  (3.5-5.0)  g/dL














  03/15/25 Range/Units





  12:11 


 


WBC   (3.8-10.6)  k/uL


 


RBC   (4.30-5.90)  m/uL


 


Hgb   (13.0-17.5)  gm/dL


 


Hct   (39.0-53.0)  %


 


Neutrophils #   (1.3-7.7)  k/uL


 


Lymphocytes #   (1.0-4.8)  k/uL


 


ABG pO2   ()  mmHg


 


ABG Total CO2   (19-24)  mmol/L


 


Hemoglobin   (13.0-17.5)  gm/dL


 


Sodium   (137-145)  mmol/L


 


BUN   (9-20)  mg/dL


 


Creatinine   (0.66-1.25)  mg/dL


 


Glucose   (74-99)  mg/dL


 


POC Glucose (mg/dL)  343 H  ()  mg/dL


 


Calcium   (8.4-10.2)  mg/dL


 


Total Protein   (6.3-8.2)  g/dL


 


Albumin   (3.5-5.0)  g/dL








                      Microbiology - Last 24 Hours (Table)











 03/11/25 20:48 Blood Culture - Preliminary





 Blood 














Assessment and Plan


(1) Leukocytosis


Current Visit: Yes   Status: Acute   Code(s): D72.829 - ELEVATED WHITE BLOOD 

CELL COUNT, UNSPECIFIED   SNOMED Code(s): 431494795


   





(2) Bilateral pneumonia


Current Visit: Yes   Status: Acute   Code(s): J18.9 - PNEUMONIA, UNSPECIFIED 

ORGANISM   SNOMED Code(s): 755143331


   


Plan: 





1patient presented hospital with increasing shortness of breath and chest pain 

which is likely multifactorial in this patient who did have evidence of PE on 

the CT there is also evidence of multifocal pneumonia with elevated white count 

and recently acute influenza A will need to cover for resistant gram-positive as

well as gram-negative pathogen for post influenza pneumonia


2-sputum has not been collected blood culture so far negative


3-patient to continue with broad-spectrum antibiotic cefepime and and 

itraconazole while waiting for the workup to be completed


Dictation was produced using dragon dictation software. please excuse any 

grammatical, word or spelling errors. 








Time with Patient: Less than 30

## 2025-03-16 LAB
ALBUMIN SERPL-MCNC: 2.6 G/DL (ref 3.5–5)
ALP SERPL-CCNC: 100 U/L (ref 38–126)
ALT SERPL-CCNC: 42 U/L (ref 4–49)
ANION GAP SERPL CALC-SCNC: 7 MMOL/L
AST SERPL-CCNC: 24 U/L (ref 17–59)
BASOPHILS # BLD AUTO: 0 K/UL (ref 0–0.2)
BASOPHILS NFR BLD AUTO: 0 %
BUN SERPL-SCNC: 68 MG/DL (ref 9–20)
CALCIUM SPEC-MCNC: 8.5 MG/DL (ref 8.4–10.2)
CHLORIDE SERPL-SCNC: 101 MMOL/L (ref 98–107)
CO2 SERPL-SCNC: 26 MMOL/L (ref 22–30)
EOSINOPHIL # BLD AUTO: 0 K/UL (ref 0–0.7)
EOSINOPHIL NFR BLD AUTO: 0 %
ERYTHROCYTE [DISTWIDTH] IN BLOOD BY AUTOMATED COUNT: 3.07 M/UL (ref 4.3–5.9)
ERYTHROCYTE [DISTWIDTH] IN BLOOD: 13.1 % (ref 11.5–15.5)
GLUCOSE BLD-MCNC: 126 MG/DL (ref 70–110)
GLUCOSE BLD-MCNC: 130 MG/DL (ref 70–110)
GLUCOSE BLD-MCNC: 132 MG/DL (ref 70–110)
GLUCOSE BLD-MCNC: 133 MG/DL (ref 70–110)
GLUCOSE BLD-MCNC: 134 MG/DL (ref 70–110)
GLUCOSE BLD-MCNC: 139 MG/DL (ref 70–110)
GLUCOSE BLD-MCNC: 152 MG/DL (ref 70–110)
GLUCOSE BLD-MCNC: 154 MG/DL (ref 70–110)
GLUCOSE BLD-MCNC: 155 MG/DL (ref 70–110)
GLUCOSE BLD-MCNC: 161 MG/DL (ref 70–110)
GLUCOSE BLD-MCNC: 161 MG/DL (ref 70–110)
GLUCOSE BLD-MCNC: 171 MG/DL (ref 70–110)
GLUCOSE BLD-MCNC: 183 MG/DL (ref 70–110)
GLUCOSE BLD-MCNC: 186 MG/DL (ref 70–110)
GLUCOSE BLD-MCNC: 188 MG/DL (ref 70–110)
GLUCOSE BLD-MCNC: 189 MG/DL (ref 70–110)
GLUCOSE BLD-MCNC: 244 MG/DL (ref 70–110)
GLUCOSE BLD-MCNC: 249 MG/DL (ref 70–110)
GLUCOSE BLD-MCNC: 269 MG/DL (ref 70–110)
GLUCOSE BLD-MCNC: 282 MG/DL (ref 70–110)
GLUCOSE BLD-MCNC: 298 MG/DL (ref 70–110)
GLUCOSE BLD-MCNC: 308 MG/DL (ref 70–110)
GLUCOSE SERPL-MCNC: 147 MG/DL (ref 74–99)
HCT VFR BLD AUTO: 28.9 % (ref 39–53)
HGB BLD-MCNC: 9.1 GM/DL (ref 13–17.5)
LYMPHOCYTES # SPEC AUTO: 0.5 K/UL (ref 1–4.8)
LYMPHOCYTES NFR SPEC AUTO: 2 %
MAGNESIUM SPEC-SCNC: 2.3 MG/DL (ref 1.6–2.3)
MCH RBC QN AUTO: 29.8 PG (ref 25–35)
MCHC RBC AUTO-ENTMCNC: 31.7 G/DL (ref 31–37)
MCV RBC AUTO: 94.2 FL (ref 80–100)
MONOCYTES # BLD AUTO: 0.3 K/UL (ref 0–1)
MONOCYTES NFR BLD AUTO: 1 %
NEUTROPHILS # BLD AUTO: 20.3 K/UL (ref 1.3–7.7)
NEUTROPHILS NFR BLD AUTO: 96 %
PLATELET # BLD AUTO: 245 K/UL (ref 150–450)
POTASSIUM SERPL-SCNC: 4.2 MMOL/L (ref 3.5–5.1)
PROT SERPL-MCNC: 5.4 G/DL (ref 6.3–8.2)
SODIUM SERPL-SCNC: 134 MMOL/L (ref 137–145)
WBC # BLD AUTO: 21.1 K/UL (ref 3.8–10.6)

## 2025-03-16 RX ADMIN — LEVOFLOXACIN SCH MG: 750 TABLET, FILM COATED ORAL at 17:35

## 2025-03-16 RX ADMIN — OXYMETAZOLINE HCL SCH SPRAY: 0.05 SPRAY NASAL at 00:58

## 2025-03-16 RX ADMIN — SODIUM BICARBONATE SCH MLS/HR: 84 INJECTION, SOLUTION INTRAVENOUS at 10:38

## 2025-03-16 RX ADMIN — FUROSEMIDE SCH MG: 10 INJECTION, SOLUTION INTRAMUSCULAR; INTRAVENOUS at 09:17

## 2025-03-16 NOTE — P.PN
Subjective


Progress Note Date: 03/16/25


Principal diagnosis: 





Reason for follow-up is pneumonia





Patient is a 64-year-old  male with a past medical history significant 

for diabetes mellitus hypertension hyperlipidemia pneumonia atrial fibrillation 

currently everyday smoker presenting to the hospital for evaluation of 

increasing shortness of breath patient has been diagnosed with multifocal 

pneumonia prompting this consultation.


On today's evaluation that is 03/16/2025, Patient is afebrile patient was moved 

to the ICU because he was requiring more supplemental oxygen he is currently on 

high flow nasal cannula oxygen with a 40% FiO2 patient mention breathing 

slightly comfortably denies any chest pain or worsening cough no abdominal pain 

or diarrhea.


Patient white count 21.1 creatinine is 1.73 urine for Legionella antigen is 

negative fungal serology pending





Objective





- Vital Signs


Vital signs: 


                                   Vital Signs











Temp  98.2 F   03/16/25 12:30


 


Pulse  70   03/16/25 15:34


 


Resp  22   03/16/25 15:00


 


BP  123/66   03/16/25 15:00


 


Pulse Ox  96   03/16/25 15:23


 


FiO2  90   03/16/25 15:23








                                 Intake & Output











 03/15/25 03/16/25 03/16/25





 18:59 06:59 18:59


 


Intake Total 118 298.489 118.805


 


Output Total 500 600 175


 


Balance -382 -301.511 -56.195


 


Weight  113.5 kg 


 


Intake:   


 


  IV   100


 


    Cefepime 2 gm In Sodium   100





    Chloride 0.9% 100 ml @ 25   





    mls/hr IVPB Q12H [facility] Rx#   





    :[id number]   


 


  Intake, IV Titration  58.489 18.805





  Amount   


 


    Insulin Regular 100 unit  58.489 18.805





    In Sodium Chloride 0.9%   





    100 ml @ Titrate IV .Q0M   





    [facility] Rx#:[id number]   


 


  Oral 118 240 


 


Output:   


 


  Urine 500 600 175


 


Other:   


 


  Voiding Method Bedside Commode Bedside Commode Bedside Commode





 External Catheter External Catheter External Catheter














- Exam





GENERAL DESCRIPTION: An elderly male lying in bed in no distress





RESPIRATORY SYSTEM: Unlabored breathing , coarse breath sounds bilaterally





HEART: S1 S2 regular rate and rhythm ,





ABDOMEN: Soft , no tenderness





EXTREMITIES: No edema feet





- Labs


CBC & Chem 7: 


                                 03/16/25 06:55





                                 03/16/25 06:55


Labs: 


                  Abnormal Lab Results - Last 24 Hours (Table)











  03/15/25 03/15/25 03/15/25 Range/Units





  08:08 16:42 19:41 


 


WBC     (3.8-10.6)  k/uL


 


RBC     (4.30-5.90)  m/uL


 


Hgb     (13.0-17.5)  gm/dL


 


Hct     (39.0-53.0)  %


 


Neutrophils #     (1.3-7.7)  k/uL


 


Lymphocytes #     (1.0-4.8)  k/uL


 


Sodium     (137-145)  mmol/L


 


BUN     (9-20)  mg/dL


 


Creatinine     (0.66-1.25)  mg/dL


 


Glucose     (74-99)  mg/dL


 


POC Glucose (mg/dL)   424 H  385 H  ()  mg/dL


 


Hemoglobin A1c  7.5 H    (<=6.0)  %


 


Total Protein     (6.3-8.2)  g/dL


 


Albumin     (3.5-5.0)  g/dL














  03/15/25 03/15/25 03/15/25 Range/Units





  20:35 21:35 22:37 


 


WBC     (3.8-10.6)  k/uL


 


RBC     (4.30-5.90)  m/uL


 


Hgb     (13.0-17.5)  gm/dL


 


Hct     (39.0-53.0)  %


 


Neutrophils #     (1.3-7.7)  k/uL


 


Lymphocytes #     (1.0-4.8)  k/uL


 


Sodium     (137-145)  mmol/L


 


BUN     (9-20)  mg/dL


 


Creatinine     (0.66-1.25)  mg/dL


 


Glucose     (74-99)  mg/dL


 


POC Glucose (mg/dL)  344 H  207 H  115 H  ()  mg/dL


 


Hemoglobin A1c     (<=6.0)  %


 


Total Protein     (6.3-8.2)  g/dL


 


Albumin     (3.5-5.0)  g/dL














  03/16/25 03/16/25 03/16/25 Range/Units





  00:35 01:30 02:34 


 


WBC     (3.8-10.6)  k/uL


 


RBC     (4.30-5.90)  m/uL


 


Hgb     (13.0-17.5)  gm/dL


 


Hct     (39.0-53.0)  %


 


Neutrophils #     (1.3-7.7)  k/uL


 


Lymphocytes #     (1.0-4.8)  k/uL


 


Sodium     (137-145)  mmol/L


 


BUN     (9-20)  mg/dL


 


Creatinine     (0.66-1.25)  mg/dL


 


Glucose     (74-99)  mg/dL


 


POC Glucose (mg/dL)  139 H  152 H  186 H  ()  mg/dL


 


Hemoglobin A1c     (<=6.0)  %


 


Total Protein     (6.3-8.2)  g/dL


 


Albumin     (3.5-5.0)  g/dL














  03/16/25 03/16/25 03/16/25 Range/Units





  03:35 04:32 05:35 


 


WBC     (3.8-10.6)  k/uL


 


RBC     (4.30-5.90)  m/uL


 


Hgb     (13.0-17.5)  gm/dL


 


Hct     (39.0-53.0)  %


 


Neutrophils #     (1.3-7.7)  k/uL


 


Lymphocytes #     (1.0-4.8)  k/uL


 


Sodium     (137-145)  mmol/L


 


BUN     (9-20)  mg/dL


 


Creatinine     (0.66-1.25)  mg/dL


 


Glucose     (74-99)  mg/dL


 


POC Glucose (mg/dL)  161 H  126 H  133 H  ()  mg/dL


 


Hemoglobin A1c     (<=6.0)  %


 


Total Protein     (6.3-8.2)  g/dL


 


Albumin     (3.5-5.0)  g/dL














  03/16/25 03/16/25 03/16/25 Range/Units





  06:27 06:55 06:55 


 


WBC   21.1 H   (3.8-10.6)  k/uL


 


RBC   3.07 L   (4.30-5.90)  m/uL


 


Hgb   9.1 L   (13.0-17.5)  gm/dL


 


Hct   28.9 L   (39.0-53.0)  %


 


Neutrophils #   20.3 H   (1.3-7.7)  k/uL


 


Lymphocytes #   0.5 L   (1.0-4.8)  k/uL


 


Sodium    134 L  (137-145)  mmol/L


 


BUN    68 H  (9-20)  mg/dL


 


Creatinine    1.73 H  (0.66-1.25)  mg/dL


 


Glucose    147 H  (74-99)  mg/dL


 


POC Glucose (mg/dL)  154 H    ()  mg/dL


 


Hemoglobin A1c     (<=6.0)  %


 


Total Protein    5.4 L  (6.3-8.2)  g/dL


 


Albumin    2.6 L  (3.5-5.0)  g/dL














  03/16/25 03/16/25 03/16/25 Range/Units





  07:46 08:36 09:41 


 


WBC     (3.8-10.6)  k/uL


 


RBC     (4.30-5.90)  m/uL


 


Hgb     (13.0-17.5)  gm/dL


 


Hct     (39.0-53.0)  %


 


Neutrophils #     (1.3-7.7)  k/uL


 


Lymphocytes #     (1.0-4.8)  k/uL


 


Sodium     (137-145)  mmol/L


 


BUN     (9-20)  mg/dL


 


Creatinine     (0.66-1.25)  mg/dL


 


Glucose     (74-99)  mg/dL


 


POC Glucose (mg/dL)  189 H  183 H  161 H  ()  mg/dL


 


Hemoglobin A1c     (<=6.0)  %


 


Total Protein     (6.3-8.2)  g/dL


 


Albumin     (3.5-5.0)  g/dL














  03/16/25 03/16/25 03/16/25 Range/Units





  10:45 11:45 12:19 


 


WBC     (3.8-10.6)  k/uL


 


RBC     (4.30-5.90)  m/uL


 


Hgb     (13.0-17.5)  gm/dL


 


Hct     (39.0-53.0)  %


 


Neutrophils #     (1.3-7.7)  k/uL


 


Lymphocytes #     (1.0-4.8)  k/uL


 


Sodium     (137-145)  mmol/L


 


BUN     (9-20)  mg/dL


 


Creatinine     (0.66-1.25)  mg/dL


 


Glucose     (74-99)  mg/dL


 


POC Glucose (mg/dL)  132 H  130 H  134 H  ()  mg/dL


 


Hemoglobin A1c     (<=6.0)  %


 


Total Protein     (6.3-8.2)  g/dL


 


Albumin     (3.5-5.0)  g/dL














  03/16/25 03/16/25 Range/Units





  13:53 15:29 


 


WBC    (3.8-10.6)  k/uL


 


RBC    (4.30-5.90)  m/uL


 


Hgb    (13.0-17.5)  gm/dL


 


Hct    (39.0-53.0)  %


 


Neutrophils #    (1.3-7.7)  k/uL


 


Lymphocytes #    (1.0-4.8)  k/uL


 


Sodium    (137-145)  mmol/L


 


BUN    (9-20)  mg/dL


 


Creatinine    (0.66-1.25)  mg/dL


 


Glucose    (74-99)  mg/dL


 


POC Glucose (mg/dL)  171 H  249 H  ()  mg/dL


 


Hemoglobin A1c    (<=6.0)  %


 


Total Protein    (6.3-8.2)  g/dL


 


Albumin    (3.5-5.0)  g/dL














Assessment and Plan


(1) Leukocytosis


Current Visit: Yes   Status: Acute   Code(s): D72.829 - ELEVATED WHITE BLOOD 

CELL COUNT, UNSPECIFIED   SNOMED Code(s): 454619460


   





(2) Bilateral pneumonia


Current Visit: Yes   Status: Acute   Code(s): J18.9 - PNEUMONIA, UNSPECIFIED 

ORGANISM   SNOMED Code(s): 175531611


   


Plan: 





1patient presented hospital with increasing shortness of breath and chest pain 

which is likely multifactorial in this patient who did have evidence of PE on 

the CT there is also evidence of multifocal pneumonia with elevated white count 

and recently acute influenza A will need to cover for resistant gram-positive as

well as gram-negative pathogen for post influenza pneumonia


2-patient will bring up sputum and sputum culture to be requested again


3-patient mentions some improvement setting to continue cefepime and  

itraconazole, monitor clinical course closely


Dictation was produced using dragon dictation software. please excuse any 

grammatical, word or spelling errors. 








Time with Patient: Less than 30

## 2025-03-16 NOTE — P.PN
Subjective


Progress Note Date: 03/16/25





Reason for Consult (text): 


PE


History of present illness: 


This is a 64-year-old male patient of Dr. Ray with past medical history of 

chronic kidney disease stage III, COPD tobacco use, alcohol abuse, hypertension,

diabetes mellitus type 2, persistent atrial fibrillation, systolic heart 

failure, recent GI bleed, family history of premature coronary artery disease.  

We have been asked to evaluate the patient for PE.  Patient was recently 

hospitalized 2/24 - 3/7 and was seen by cardiology at that time for NSTEMI 

secondary to sepsis, influenza and pneumonia.  Patient states that he was 

discharged home but he has had problems with getting his medications and has not

been taking his medications after he was discharged.  He states he could not af

ford it because he no longer has Medicaid.  Patient did go home on oxygen at 2 L

nasal cannula.  Patient states that he was having weakness at home could not do 

anything and he had significant dyspnea with even a small amount of activity.  

Patient is seen today in the emergency center on BiPAP.  He is waiting for a bed

on the cardiac stepdown unit.  Pulmonary medicine is also on consult.  Blood pr

essure 156/72, heart rate 59, pulse ox 99% on BiPAP.  Patient has been started 

on heparin drip, IV Solu-Medrol, IV antibiotics.





-EKG: Sinus rhythm with left bundle branch block, left axis deviation.


-Chest x-ray: Multifocal airspace opacities concerning for pneumonia.  #2 

persistent cardiomegaly with bilateral multifocal acute infiltrates and/or 

edema.  Findings are worse in the bilateral lower lungs from 1 day earlier.


-CTA chest: Acute segmental and subsegmental pulmonary emboli within the right 

middle and right lower lobes.  No saddle embolus.  No RV strain.  Chronic 

interstitial lung disease with nonspecific features.


-Laboratory studies: WBC 17.2, hemoglobin 10.3, D-dimer 4.87.  Sodium 136, 

potassium 28 and creatinine 1.37, troponin 0.0490, 0.055 and 0.051.  proBNP 

8810.  Influenza A detected.  Procalcitonin 0.18..


-Home cardiac medications: Amiodarone 200 mg daily, Norvasc 5 mg daily, Eliquis 

5 mg twice daily, atorvastatin 40 mg at bedtime, Lasix 40 mg daily, lisinopril 

20 mg twice daily, metoprolol succinate 25 mg daily, spironolactone 25 mg daily.


-ROSANNA and cardioversion performed on 8/10/2023 revealed aortic valve is 

tricuspid, functioning normally with trace AI.  Mitral valve appears to be 

normal with mild regurgitation.  Tricuspid valve appears to be normal with 

moderate to severe tricuspid regurgitation.  There is a PFO.  Left atrial 

appendage is free of clot.  EF 35%.


-ROSANNA and cardioversion performed 11/21/2022.


-Dobutamine stress echocardiogram performed in the office on 8/3/2021 revealed 

nondiagnostic EKG portion secondary to baseline EKG abnormalities.  Normal 

stress echo portion without inducible ischemia.  Normal left ventricular 

ejection fraction of 60%.


-Echocardiogram performed 2/24/2025: EF 45 to 50%, moderately increased septal 

wall thickness, moderate biatrial dilatation, moderate tricuspid regurgitation.





3/13


Patient is seen and examined on the cardiac stepdown unit.  Blood pressure 

143/70, heart rate 78, pulse ox 99% on BiPAP.  Repeat blood work reveals BUN 35,

creatinine 1.51, potassium 3.4.





3/14


Patient seen and examined.  Patient has developed more hypoxia overnight 

requiring BiPAP and high flow nasal cannula was attempted yesterday.  He has had

no tachycardia no hypotension.  Heart rate is in the 70s, blood pressure 145/67.

 Repeat blood work reveals WBC 20.8, hemoglobin 9.2, BUN 44 creatinine 1.54, 

potassium 3.4.  Repeat chest x-ray reveals persistent cardiomegaly with 

bilateral multifocal acute infiltrates and/or edema.





3/15


Patient seen and examined.  Patient is still desatting when he is off the BiPAP 

and its only removed for eating.  He states that he feels much better today.  

Wheezing is improved.  Blood pressure 132/59, heart rate 74, pulse ox 92% on 

BiPAP.  Repeat blood work reveals hemoglobin 9.2, WBC 18, sodium 133, BUN 54 

creatinine 1.69.  Patient is noted to have worsening renal function.  Lasix has 

been changed to once daily by pulmonary medicine.





3/16


Patient seen and examined on the cardiac stepdown unit.  Patient is continuing 

to have difficulty desatting and is currently on BiPAP with FiO2 of 70% 

increased to 80% this morning, heart rate is in the 70s, blood pressure 131/60, 

respiratory rate 28.  He is afebrile.  Repeat blood work reveals WBC 21, 

hemoglobin 9.1, BUN 68 creatinine 1.73.





Physical examination:


Gen: This is a 64-year-old male appears to be comfortable on BiPAP.


VS: reviewed


HEENT: Head is atraumatic, normocephalic. Pupils equal, round. Sclerae is 

anicteric. 


NECK: Supple. No JVD. 


LUNGS: Scattered rhonchi bilaterally.  No intercostal retractions.


HEART: Regular rate and rhythm. No murmur. 


ABDOMEN: Soft  No tenderness.


EXTREMITIES: No pedal edema.  No calf tenderness.


NEUROLOGICAL: Patient is awake, alert and oriented x3.


 


Assessment:


Acute pulmonary emboli due to prolonged hospitalization and unable to afford 

Eliquis


No RV strain on CT


Acute hypoxic respiratory failure requiring BiPAP secondary to influenza, 

pneumonia, and component of heart failure


NSTEMI secondary to PE, influenza and pneumonia


Influenza A


Pneumonia


Acute on chronic systolic heart failure


Acute kidney injury


Cardiomyopathy with previous EF of 15 to 20%, possibly tachycardia induced


Hypertension


Hyperlipidemia


Persistent atrial fibrillation status post previous cardioversion x 2, currently

in sinus rhythm


Diabetes mellitus type 2





Plan:


No evidence of RV strain on CT.  Patient has no hypotension or shock symptoms.  

Patient does not require any local or systemic thrombolytics at this time.


Continue current cardiac medications: Amlodipine 5 mg daily, aspirin, 

lisinopril, Toprol XL, spironolactone


Continue Eliquis VTE protocol, prescription has been sent to the pharmacy and is

covered by insurance


He is currently off IV Lasix


Monitor ENE, daily weights, electrolytes and renal function


No need to repeat echocardiogram


Further recommendations to follow based upon clinical course





Nurse practitioner note has been reviewed, I agree with documented findings and 

plan of care.  Patient was seen and examined.











Objective





- Vital Signs


Vital signs: 


                                   Vital Signs











Temp  98.6 F   03/16/25 03:43


 


Pulse  64   03/16/25 08:46


 


Resp  28 H  03/16/25 03:43


 


BP  123/63   03/16/25 03:43


 


Pulse Ox  91 L  03/16/25 03:45


 


FiO2  80   03/16/25 08:29








                                 Intake & Output











 03/15/25 03/16/25 03/16/25





 18:59 06:59 18:59


 


Intake Total 118 298.489 11.744


 


Output Total 500 600 


 


Balance -382 -301.511 11.744


 


Weight  113.5 kg 


 


Intake:   


 


  Intake, IV Titration  58.489 11.744





  Amount   


 


    Insulin Regular 100 unit  58.489 11.744





    In Sodium Chloride 0.9%   





    100 ml @ Titrate IV .Q0M   





    On license of [facility] Rx#:[id number]   


 


  Oral 118 240 


 


Output:   


 


  Urine 500 600 


 


Other:   


 


  Voiding Method Bedside Commode Bedside Commode 





 External Catheter External Catheter 














- Labs


CBC & Chem 7: 


                                 03/16/25 06:55





                                 03/16/25 06:55


Labs: 


                  Abnormal Lab Results - Last 24 Hours (Table)











  03/15/25 03/15/25 03/15/25 Range/Units





  08:08 12:11 16:42 


 


WBC     (3.8-10.6)  k/uL


 


RBC     (4.30-5.90)  m/uL


 


Hgb     (13.0-17.5)  gm/dL


 


Hct     (39.0-53.0)  %


 


Neutrophils #     (1.3-7.7)  k/uL


 


Lymphocytes #     (1.0-4.8)  k/uL


 


Sodium     (137-145)  mmol/L


 


BUN     (9-20)  mg/dL


 


Creatinine     (0.66-1.25)  mg/dL


 


Glucose     (74-99)  mg/dL


 


POC Glucose (mg/dL)   343 H  424 H  ()  mg/dL


 


Hemoglobin A1c  7.5 H    (<=6.0)  %


 


Total Protein     (6.3-8.2)  g/dL


 


Albumin     (3.5-5.0)  g/dL














  03/15/25 03/15/25 03/15/25 Range/Units





  19:41 20:35 21:35 


 


WBC     (3.8-10.6)  k/uL


 


RBC     (4.30-5.90)  m/uL


 


Hgb     (13.0-17.5)  gm/dL


 


Hct     (39.0-53.0)  %


 


Neutrophils #     (1.3-7.7)  k/uL


 


Lymphocytes #     (1.0-4.8)  k/uL


 


Sodium     (137-145)  mmol/L


 


BUN     (9-20)  mg/dL


 


Creatinine     (0.66-1.25)  mg/dL


 


Glucose     (74-99)  mg/dL


 


POC Glucose (mg/dL)  385 H  344 H  207 H  ()  mg/dL


 


Hemoglobin A1c     (<=6.0)  %


 


Total Protein     (6.3-8.2)  g/dL


 


Albumin     (3.5-5.0)  g/dL














  03/15/25 03/16/25 03/16/25 Range/Units





  22:37 00:35 01:30 


 


WBC     (3.8-10.6)  k/uL


 


RBC     (4.30-5.90)  m/uL


 


Hgb     (13.0-17.5)  gm/dL


 


Hct     (39.0-53.0)  %


 


Neutrophils #     (1.3-7.7)  k/uL


 


Lymphocytes #     (1.0-4.8)  k/uL


 


Sodium     (137-145)  mmol/L


 


BUN     (9-20)  mg/dL


 


Creatinine     (0.66-1.25)  mg/dL


 


Glucose     (74-99)  mg/dL


 


POC Glucose (mg/dL)  115 H  139 H  152 H  ()  mg/dL


 


Hemoglobin A1c     (<=6.0)  %


 


Total Protein     (6.3-8.2)  g/dL


 


Albumin     (3.5-5.0)  g/dL














  03/16/25 03/16/25 03/16/25 Range/Units





  02:34 03:35 04:32 


 


WBC     (3.8-10.6)  k/uL


 


RBC     (4.30-5.90)  m/uL


 


Hgb     (13.0-17.5)  gm/dL


 


Hct     (39.0-53.0)  %


 


Neutrophils #     (1.3-7.7)  k/uL


 


Lymphocytes #     (1.0-4.8)  k/uL


 


Sodium     (137-145)  mmol/L


 


BUN     (9-20)  mg/dL


 


Creatinine     (0.66-1.25)  mg/dL


 


Glucose     (74-99)  mg/dL


 


POC Glucose (mg/dL)  186 H  161 H  126 H  ()  mg/dL


 


Hemoglobin A1c     (<=6.0)  %


 


Total Protein     (6.3-8.2)  g/dL


 


Albumin     (3.5-5.0)  g/dL














  03/16/25 03/16/25 03/16/25 Range/Units





  05:35 06:27 06:55 


 


WBC    21.1 H  (3.8-10.6)  k/uL


 


RBC    3.07 L  (4.30-5.90)  m/uL


 


Hgb    9.1 L  (13.0-17.5)  gm/dL


 


Hct    28.9 L  (39.0-53.0)  %


 


Neutrophils #    20.3 H  (1.3-7.7)  k/uL


 


Lymphocytes #    0.5 L  (1.0-4.8)  k/uL


 


Sodium     (137-145)  mmol/L


 


BUN     (9-20)  mg/dL


 


Creatinine     (0.66-1.25)  mg/dL


 


Glucose     (74-99)  mg/dL


 


POC Glucose (mg/dL)  133 H  154 H   ()  mg/dL


 


Hemoglobin A1c     (<=6.0)  %


 


Total Protein     (6.3-8.2)  g/dL


 


Albumin     (3.5-5.0)  g/dL














  03/16/25 03/16/25 03/16/25 Range/Units





  06:55 07:46 08:36 


 


WBC     (3.8-10.6)  k/uL


 


RBC     (4.30-5.90)  m/uL


 


Hgb     (13.0-17.5)  gm/dL


 


Hct     (39.0-53.0)  %


 


Neutrophils #     (1.3-7.7)  k/uL


 


Lymphocytes #     (1.0-4.8)  k/uL


 


Sodium  134 L    (137-145)  mmol/L


 


BUN  68 H    (9-20)  mg/dL


 


Creatinine  1.73 H    (0.66-1.25)  mg/dL


 


Glucose  147 H    (74-99)  mg/dL


 


POC Glucose (mg/dL)   189 H  183 H  ()  mg/dL


 


Hemoglobin A1c     (<=6.0)  %


 


Total Protein  5.4 L    (6.3-8.2)  g/dL


 


Albumin  2.6 L    (3.5-5.0)  g/dL














  03/16/25 Range/Units





  09:41 


 


WBC   (3.8-10.6)  k/uL


 


RBC   (4.30-5.90)  m/uL


 


Hgb   (13.0-17.5)  gm/dL


 


Hct   (39.0-53.0)  %


 


Neutrophils #   (1.3-7.7)  k/uL


 


Lymphocytes #   (1.0-4.8)  k/uL


 


Sodium   (137-145)  mmol/L


 


BUN   (9-20)  mg/dL


 


Creatinine   (0.66-1.25)  mg/dL


 


Glucose   (74-99)  mg/dL


 


POC Glucose (mg/dL)  161 H  ()  mg/dL


 


Hemoglobin A1c   (<=6.0)  %


 


Total Protein   (6.3-8.2)  g/dL


 


Albumin   (3.5-5.0)  g/dL

## 2025-03-16 NOTE — P.PN
Subjective


Progress Note Date: 03/16/25











Patient is a 64-year-old male with past medical history significant for atrial 

fibrillation, diabetes mellitus, CKD stage III, hypertension, hyperlipidemia, 

tobacco smoker.  Of note, recently had a prolonged hospitalization 02/23/2025 

through 03/07/2025.  Treated for combination of influenza A, pneumonia, CHF.  

Completed course of antibiotics and Tamiflu.  At this time, did require 

noninvasive BiPAP, eventually discharged on home O2.  Returns with a chief 

complaint of shortness of breath progressing over the last 2 to 3 days.  Also, 

reports sudden onset substernal chest pain that developed the night prior and 

has been persistent.  On arrival to the ED, found to be in some respiratory 

distress, and placed on BiPAP.  Workup in the emergency department including a 

chest x-ray showing multifocal infiltrates concerning for pneumonia or pulmonary

edema.  D-dimer was elevated in setting CT angio protocol which was positive for

segmental and subsegmental right middle lobe and right lower lobe pulmonary 

emboli.  No saddle pulmonary embolus. Pulmonary artery mildly enlarged.  

Preserved RV to LV ratio.  There were diffuse coarse reticular infiltrates, as 

well as, groundglass lung attenuation, cystic changes, with concerns for 

interstitial lung disease. Patient does take Eliquis for his history of atrial 

fibrillation.  Does state he has missed some doses lately.  CBC: WBC count 22.6,

hemoglobin 11, platelets 322.  CMP: Sodium 133, potassium 4.2, chloride 101, 

serum bicarb 24, BUN 30, creatinine 1.27, glucose 135.  Lactic 1.5.  VBG with a 

pH of 7.5 and pCO2 of 32.  EKG: Sinus rhythm, rate 65 bpm, left bundle branch 

block, not acute.  Elevated serial troponins 0.049 and 0.055 respectively.  NT 

proBNP elevated 8807.  Patient is currently being evaluated in the emergency 

department.  He is alert and some moderate respiratory distress.  On BiPAP with 

settings 12/5 and FiO2 60%.  Tachypneic, breathing around 40 breaths/min. 

Endorses progressive worsening difficulty in breathing over last couple days.  

He has tried to increase his supplemental oxygen at home without any relief. 

Denies previous history of DVT or PE.  Associated nonproductive cough.  Denies 

sputum production or hemoptysis.  Substernal nonradiating chest pain persist.  

Denies any fevers or chills.  Denies heart palpitations or syncopal events.  

Denies swelling in the lower extremities.  Heart rhythm is normal sinus on 

bedside monitor.  Blood pressure has been normotensive, did receive 2 L of 

crystalloid fluid bolus earlier.  Not requiring any vasopressors.  Normal saline

is infusing at 150 mL/h.  IV heparin infusing per protocol








3/13/2025, the patient is being seen for a follow-up.  The patient is 

alternating between BiPAP at a pressure of 12/5 with an FiO2 of 60% and 15 L of 

oxygen by nasal cannula.  He is getting slightly anxious and the patient is 

being provided Xanax accordingly.  Fluid balance is -1.1 L over the past 24 

hours.  Limited echocardiogram was also done and it showed preserved LV function

with an EF of around 55 to 60%.  Valvular functions could not be accurately 

assessed as the patient's study was technically difficult study.  There was 

however RV dilatation.  Unable to estimate RV pressures.  The electrolytes from 

today showed a creatinine of 1.5 with a BUN of 35.  Bicarb is at 21.  Sodium is 

at 132.  The patient remains on DuoNeb updrafts.  The patient remains on a 

combination of cefepime and vancomycin.  The patient was taken off the IV 

heparin and the patient was started on anticoagulation with Eliquis.  Remains on

IV Solu-Medrol 60 mg every 6 hours.  Remains on Aldactone.  Remains on IV Lasix 

40 mg every 12 hours.





On today's evaluation of 3/14/2025, the patient is overall condition is 

essentially unchanged.  Continues to be hypoxic, continues to be on BiPAP and 

the patient remains at a pressure of 12/5 with an FiO2 of 60%.  Continues to h

ave crackles in lung base bilaterally.  White cell count remains elevated at 20 

with a hemoglobin 9.2.  Very much BiPAP dependent as the patient desaturates 

once taken off the BiPAP.  BUN is 44 with a platelet of 1.5.  Sodium levels at 

134 and a potassium level is at 3.4.  Remains on DuoNeb updrafts.  Remains on IV

Lasix 40 mg every 12 hours.  Remains on Aldactone.  Remains on bronchodilators. 

Remains on steroids.  Empiric antibiotic coverage with IV cefepime.  Reviewed 

the CAT scan again and there is some chronic interstitial changes and the 

patient has diffuse infiltrates with areas of groundglass.  Consider acute 

interstitial pneumonias.  Consider fungal pneumonias.





On today's evaluation of 3/15/2025, the patient is feeling slightly better 

compared to yesterday.  The patient is off the BiPAP and the patient is 

currently on 15 L of oxygen by nasal cannula.  Remains on bronchodilators.  

Remains on IV Solu-Medrol.  Remains on IV Lasix.  Antibiotic coverage including 

combination of cefepime, Levaquin orally and itraconazole orally.  Fungal 

antibodies are still pending.  Meanwhile, rheumatologic profile showed a 

negative rheumatoid factor and negative CARLA.  Legionella urine antigen is still 

pending for now.  Clinically however, the patient is feeling slightly improved 

compared to yesterday.  His chest x-ray continues to show multifocal airspace 

disease more so in the right lung.  Fluid balance has been negative.  The 

patient's creatinine is currently at 1.6 with a BUN of 54 and a sodium levels at

133.  I am going to taper the steroids.  The white cell count is 18.2 with a h

emoglobin of 9.2.





On 3/16/2025, the patient is transferred to the intensive care unit for closer 

monitoring.  As mentioned, the patient developed acute hypoxic respiratory 

failure with diffuse bilateral pulmonary filtrates discussed earlier.  The 

patient was able to tolerate Airvo, however, overnight, the patient developed 

epistaxis.  Therefore was discontinued the patient was placed back on BiPAP and 

his current BiPAP is running at a pressure of 12 over 5 cm of water with an FiO2

of 80%.  The patient seems to be quite dependent on the BiPAP.  Unable to drop 

the FiO2 any further.  Repeat chest x-ray was done and shows stable diffuse 

bilateral pulm infiltrates which remains essentially unchanged compared to 

yesterday.  Clinically, the patient is awake and alert.  He is reported to have 

some congested cough.  No significant sputum production.  No chest pain.  No 

hemoptysis.  Noted, over the past 48 hours, the patient was diuresed with IV 

Lasix.  Nevertheless, we have not seen any improvement in the patient's 

oxygenation.  The patient was negative fluid balance.  His BNP today is at 68 

with a creatinine of 1.7.  Based on that, I stopped diuretics.  Rest of the 

electrolytes are all within normal limits.  White cell count of 21 with a 

hemoglobin 9.1 and a platelet count of 245.  The patient remains on broad-

spectrum antibiotics.  The patient remains on IV cefepime, itraconazole and 

Levaquin.  Fungal antibodies are still pending for now.  CARLA and rheumatoid 

factor were both negative.  Legionella urine antigen was also negative.  The 

patient remains on anticoagulation and I am going to drop the Eliquis dose to 5 

mg p.o. twice a day.  I am not absolutely convinced that the patient has a 

pulmonary embolism.  I reviewed the CAT scan of the chest and there could be 

potentially some subsegmental filling defects in the right, however, the overall

contrast administration was essentially poor.  The patient will be monitored 

very closely in the intensive care unit.  On a separate note, the patient was 

started on insulin drip for blood sugar control.





Objective





- Vital Signs


Vital signs: 


                                   Vital Signs











Temp  98.6 F   03/16/25 03:43


 


Pulse  64   03/16/25 08:46


 


Resp  28 H  03/16/25 03:43


 


BP  123/63   03/16/25 03:43


 


Pulse Ox  91 L  03/16/25 03:45


 


FiO2  80   03/16/25 08:29








                                 Intake & Output











 03/15/25 03/16/25 03/16/25





 18:59 06:59 18:59


 


Intake Total 118 298.489 11.744


 


Output Total 500 600 


 


Balance -382 -301.511 11.744


 


Weight  113.5 kg 


 


Intake:   


 


  Intake, IV Titration  58.489 11.744





  Amount   


 


    Insulin Regular 100 unit  58.489 11.744





    In Sodium Chloride 0.9%   





    100 ml @ Titrate IV .Q0M   





    [facility] Rx#:[id number]   


 


  Oral 118 240 


 


Output:   


 


  Urine 500 600 


 


Other:   


 


  Voiding Method Bedside Commode Bedside Commode 





 External Catheter External Catheter 














- Exam








GENERAL EXAM: Alert, 64-year-old male, on BiPAP at a pressure of 12/5 cm of 

water with an FiO2 of 80%


HEAD: Normocephalic and atraumatic


EYES: Normal reaction of pupils, equal size.


NOSE: Clear with pink turbinates.  The right nostril is packed


THROAT: No erythema or exudates.


NECK: No masses, no JVD.


CHEST: No chest wall deformity.


LUNGS: Equal air entry with crackles heard on inspiration bilaterally.   

Conversational dyspnea.


CVS: S1 and S2 normal with soft systolic murmur, regular rhythm. No other extra 

heart sounds


ABDOMEN: No hepatosplenomegaly, active bowel sounds, no guarding or rigidity. 


SPINE: No scoliosis or deformity


SKIN: No rashes


CENTRAL NERVOUS SYSTEM: No focal deficits, tone is normal in all 4 extremities.


EXTREMITIES: There is no peripheral edema, clubbing, or cyanosis.  Peripheral 

pulses are intact.








- Labs


CBC & Chem 7: 


                                 03/16/25 06:55





                                 03/16/25 06:55


Labs: 


                  Abnormal Lab Results - Last 24 Hours (Table)











  03/15/25 03/15/25 03/15/25 Range/Units





  08:08 12:11 16:42 


 


WBC     (3.8-10.6)  k/uL


 


RBC     (4.30-5.90)  m/uL


 


Hgb     (13.0-17.5)  gm/dL


 


Hct     (39.0-53.0)  %


 


Neutrophils #     (1.3-7.7)  k/uL


 


Lymphocytes #     (1.0-4.8)  k/uL


 


Sodium     (137-145)  mmol/L


 


BUN     (9-20)  mg/dL


 


Creatinine     (0.66-1.25)  mg/dL


 


Glucose     (74-99)  mg/dL


 


POC Glucose (mg/dL)   343 H  424 H  ()  mg/dL


 


Hemoglobin A1c  7.5 H    (<=6.0)  %


 


Total Protein     (6.3-8.2)  g/dL


 


Albumin     (3.5-5.0)  g/dL














  03/15/25 03/15/25 03/15/25 Range/Units





  19:41 20:35 21:35 


 


WBC     (3.8-10.6)  k/uL


 


RBC     (4.30-5.90)  m/uL


 


Hgb     (13.0-17.5)  gm/dL


 


Hct     (39.0-53.0)  %


 


Neutrophils #     (1.3-7.7)  k/uL


 


Lymphocytes #     (1.0-4.8)  k/uL


 


Sodium     (137-145)  mmol/L


 


BUN     (9-20)  mg/dL


 


Creatinine     (0.66-1.25)  mg/dL


 


Glucose     (74-99)  mg/dL


 


POC Glucose (mg/dL)  385 H  344 H  207 H  ()  mg/dL


 


Hemoglobin A1c     (<=6.0)  %


 


Total Protein     (6.3-8.2)  g/dL


 


Albumin     (3.5-5.0)  g/dL














  03/15/25 03/16/25 03/16/25 Range/Units





  22:37 00:35 01:30 


 


WBC     (3.8-10.6)  k/uL


 


RBC     (4.30-5.90)  m/uL


 


Hgb     (13.0-17.5)  gm/dL


 


Hct     (39.0-53.0)  %


 


Neutrophils #     (1.3-7.7)  k/uL


 


Lymphocytes #     (1.0-4.8)  k/uL


 


Sodium     (137-145)  mmol/L


 


BUN     (9-20)  mg/dL


 


Creatinine     (0.66-1.25)  mg/dL


 


Glucose     (74-99)  mg/dL


 


POC Glucose (mg/dL)  115 H  139 H  152 H  ()  mg/dL


 


Hemoglobin A1c     (<=6.0)  %


 


Total Protein     (6.3-8.2)  g/dL


 


Albumin     (3.5-5.0)  g/dL














  03/16/25 03/16/25 03/16/25 Range/Units





  02:34 03:35 04:32 


 


WBC     (3.8-10.6)  k/uL


 


RBC     (4.30-5.90)  m/uL


 


Hgb     (13.0-17.5)  gm/dL


 


Hct     (39.0-53.0)  %


 


Neutrophils #     (1.3-7.7)  k/uL


 


Lymphocytes #     (1.0-4.8)  k/uL


 


Sodium     (137-145)  mmol/L


 


BUN     (9-20)  mg/dL


 


Creatinine     (0.66-1.25)  mg/dL


 


Glucose     (74-99)  mg/dL


 


POC Glucose (mg/dL)  186 H  161 H  126 H  ()  mg/dL


 


Hemoglobin A1c     (<=6.0)  %


 


Total Protein     (6.3-8.2)  g/dL


 


Albumin     (3.5-5.0)  g/dL














  03/16/25 03/16/25 03/16/25 Range/Units





  05:35 06:27 06:55 


 


WBC    21.1 H  (3.8-10.6)  k/uL


 


RBC    3.07 L  (4.30-5.90)  m/uL


 


Hgb    9.1 L  (13.0-17.5)  gm/dL


 


Hct    28.9 L  (39.0-53.0)  %


 


Neutrophils #    20.3 H  (1.3-7.7)  k/uL


 


Lymphocytes #    0.5 L  (1.0-4.8)  k/uL


 


Sodium     (137-145)  mmol/L


 


BUN     (9-20)  mg/dL


 


Creatinine     (0.66-1.25)  mg/dL


 


Glucose     (74-99)  mg/dL


 


POC Glucose (mg/dL)  133 H  154 H   ()  mg/dL


 


Hemoglobin A1c     (<=6.0)  %


 


Total Protein     (6.3-8.2)  g/dL


 


Albumin     (3.5-5.0)  g/dL














  03/16/25 03/16/25 03/16/25 Range/Units





  06:55 07:46 08:36 


 


WBC     (3.8-10.6)  k/uL


 


RBC     (4.30-5.90)  m/uL


 


Hgb     (13.0-17.5)  gm/dL


 


Hct     (39.0-53.0)  %


 


Neutrophils #     (1.3-7.7)  k/uL


 


Lymphocytes #     (1.0-4.8)  k/uL


 


Sodium  134 L    (137-145)  mmol/L


 


BUN  68 H    (9-20)  mg/dL


 


Creatinine  1.73 H    (0.66-1.25)  mg/dL


 


Glucose  147 H    (74-99)  mg/dL


 


POC Glucose (mg/dL)   189 H  183 H  ()  mg/dL


 


Hemoglobin A1c     (<=6.0)  %


 


Total Protein  5.4 L    (6.3-8.2)  g/dL


 


Albumin  2.6 L    (3.5-5.0)  g/dL














  03/16/25 Range/Units





  09:41 


 


WBC   (3.8-10.6)  k/uL


 


RBC   (4.30-5.90)  m/uL


 


Hgb   (13.0-17.5)  gm/dL


 


Hct   (39.0-53.0)  %


 


Neutrophils #   (1.3-7.7)  k/uL


 


Lymphocytes #   (1.0-4.8)  k/uL


 


Sodium   (137-145)  mmol/L


 


BUN   (9-20)  mg/dL


 


Creatinine   (0.66-1.25)  mg/dL


 


Glucose   (74-99)  mg/dL


 


POC Glucose (mg/dL)  161 H  ()  mg/dL


 


Hemoglobin A1c   (<=6.0)  %


 


Total Protein   (6.3-8.2)  g/dL


 


Albumin   (3.5-5.0)  g/dL














Assessment and Plan


Assessment: 











Acute on chronic hypoxic respiratory failure.  Multifactorial.  Reviewed the CAT

scan of the chest.  The patient is changes with coarse infiltrates and cystic 

changes which could potentially suggest an underlying ILD.  Nevertheless, the 

patient has developed bilateral pulmonary infiltrates could could be 

CHF/pneumonia/acute lung injury.  The contrast administration was put on the CTA

and I doubt the possibility of a pulmonary embolism.  The patient is maintained 

on anticoagulation with Eliquis on an outpatient basis.   Possibility of acute 

interstitial pneumonia /acute lung injury/ARDS need to be considered.  Fungal 

infections are also considered.  Drug-induced lung toxicity secondary amiodarone

is also possible.  Bacterial pneumonia is felt to be less likely unless there 

are some sort of an opportunistic infections.





Acute hypoxemic respiratory failure, currently requiring BiPAP support, 

secondary to a combination of above, chest x-ray showing multifocal infiltrates 

concerning for pneumonia or pulmonary edema.  Follow-up chest CT showing diffuse

coarse reticular infiltrates, as well as, groundglass lung attenuation upper 

lobe predominant cystic changes, with concerns for interstitial lung disease.  

The patient is feeling slightly improved compared to yesterday.  He is still 

hypoxic and he is on BiPAP pressure of 12/5 cm of water and FiO2 of 80%.





Acute exacerbation of chronic systolic congestive heart failure, recent 

echocardiogram from 02/24/2025 estimating a left ventricular ejection fraction 

of 45 to 50%, moderate pulmonary hypertension, and moderate tricuspid regurgita

tion.  Repeat echocardiogram showed improvement in LV function with ejection 

fraction 55%





Acute leukocytosis





Recent hospitalization with influenza A infection, completed course of Tamiflu





CKD stage IIIa





History of atrial fibrillation with previous cardioversion, currently normal 

sinus, normally maintained on Eliquis and amiodarone





Chronic tobacco dependence with suspected underlying COPD





Hypertension.





Hyperlipidemia.





Diabetes mellitus, type II.





Epistaxis











Plan:





Continue BiPAP support at pressures of 12/5 with an FiO2 of 80%


Anticoagulation with Eliquis, drop the Eliquis dose to 5 mg p.o. twice a day


Continue bronchodilators


Continue IV Solu-Medrol


Discontinue IV Lasix


Continue empiric antibiotics and the patient is currently on a combination of 

cefepime and Levaquin and itraconazole


Check CARLA, rheumatoid factor are negative, ANCA levels including P and C ANCA 

are still pending


Obtain fungal identification antibodies pending


Histoplasma urine antigen


Legionella urine antigen was done and the results are negative


Blood cultures are negative


Check procalcitonin level is at 0.18


proBNP level is elevated


Repeat chest x-ray from today is unchanged


Repeat blood work in the morning


The patient will be transferred to the intensive care unit.  Insulin drip was 

started for blood sugar control.  Condition obviously is critical and will make 

further recommendations based on his progress.  A bronchoscopy and lavage may be

of value should the patient develop further respiratory decompensation requiring

intubation mechanical ventilation.  For now, he cannot handle the bronchoscopy 

as his respiratory status is quite borderline.





Evaluation was done and 33 minutes.


Time with Patient: Greater than 30

## 2025-03-16 NOTE — XR
EXAMINATION TYPE: XR chest 1V

 

DATE OF EXAM: 3/16/2025 4:32 AM

 

COMPARISON: Multiple radiographs, with the most recent on 3/15/2025, CTA chest 3/11/2025

 

TECHNIQUE: XR chest 1V Portable AP radiograph of the chest.

 

CLINICAL INDICATION:Male, 64 years old with history of Hypoxic respiratory failure; 

 

FINDINGS: Patient is rotated with the evaluation.

Lungs/Pleura: Redemonstration of bilateral multifocal airspace opacities. No sizable pleural effusion
 or pneumothorax. 

Pulmonary vascularity: Unremarkable.

Heart/mediastinum: Cardiomediastinal silhouette is enlarged and stable.  Atherosclerotic calcificatio
ns are seen in the aorta.

Musculoskeletal: No acute osseous pathology.

 

IMPRESSION: 

Redemonstration of diffuse multifocal airspace opacities. Etiologies include pulmonary edema versus p
neumonia. This is superimposed on a background of interstitial lung disease.

 

[facility], Workstation: [id number], 3/16/2025 7:37 AM

## 2025-03-16 NOTE — P.PN
Progress Note - Text


Progress Note Date: 03/16/25





Patient is a 64-year-old male with a PMH of atrial fibrillation on Eliquis, COPD

on 2 L home oxygen, non-insulin-dependent diabetes mellitus, hyperlipidemia, 

hypertension presenting with shortness of breath.  Patient was previously 

admitted here for acute hypoxic respiratory failure secondary to influenza 

pneumonia and was discharged on 2 L of home oxygen.  Patient states he has been 

feeling short of breath while at rest.  He reports that since being discharged 

the home oxygen has not been sufficient. He states that he had to keep 

increasing his oxygen.    Patient says shortness of breath is slightly relieved 

when he is laying down.  Denies any orthopnea or PND.  Patient endorses a nonpr

oductive cough that is chronic in nature, but says it has been getting worse.  

Patient admits to having fever, chills.  Patient also admits to having non-

radiating, pressure-like chest pain over the last few days.  Chest pain is not 

related to exertion and he had no alleviating or aggravating factors.  Patient 

denies any headache, vision changes, nausea, vomiting, diarrhea, abdominal pain,

urinary symptoms.





EKG independently interpreted displaying sinus rhythm with left bundle branch 

block that has been seen on prior EKG, rate 65 bpm, QTc 451 ms


CXR independently interpreted displaying bilateral multifocal airspace opacities


Chest CTA displaying acute segmental and subsegmental pulmonary emboli within 

the right and middle lower lobes, no saddle embolus, no RV strain, chronic 

interstitial loading disease


Venous Doppler B/L LE displaying no evidence of acute DVT


Troponin 0.049, 0.055, proBNP 8810, D-dimer 4.87, WBC 22.6, Hgb 11.0, HCT 35.2, 

MCV 93.7, platelet 322, PT 11.8, INR 1.1, APTT 28, sodium 133, potassium 4.2, 

CO2 24, BUN 30, creatinine 1.27, glucose 135


VBG pH 7.52, pCO2 32


T98.2 F, MI 80, RR 26, /77, O2 sat 90% on BiPAP with FiO2 of 100%





March 12: Overflow the ER.  Up in the bed.  Short of breath.  Patient was on 

BiPAP overnight.  This morning on 15 L high flow nasal cannula.  Decreased 

appetite.  Has got a cough.  Some wheezing.  Some sputum production.  Decreased 

appetite.  Patient is on IV heparin.  Also on IV cefepime.  Pulmonary following


March 13: Patient did confirm that because Eliquis is very expensive patient was

not taking it properly did and take it sparingly at home.  Explains patient's 

PE.  Started on Eliquis today by cardiology.  IV heparin discontinued.  Getting 

easily short of breath.  Requiring BiPAP..  Some cough.  Oral intake fair.  On 

IV cefepime and vancomycin.  ID following.  Also on IV Lasix.  Due to worsening 

renal function will DC vancomycin


March 14: Saw this afternoon.  On BiPAP.  60%.  Ensure ordered.  All blood work 

ordered by pulmonary including fungal.  Patient currently on IV cefepime and 

Levaquin.  IV Solu-Medrol.  Remain short of breath.  Tired.  Being followed by 

pulmonary and ID.  Chest x-ray film personally reviewed by me-showing pulm edema

bilateral infiltrates especially at the bases.  Renal ultrasound nonspecific.  

UA showing protein 1+.


March 15: Remain short of breath.  Sitting up in bed.  Not able to come off the 

BiPAP.  Remains on IV cefepime.  Accu-Cheks running high.  Put on insulin drip. 

On IV Solu-Medrol.  Oral intake fair.


March 16: Patient remains short of breath.  Unable to get off BiPAP.  FiO2 

increased to 70%.  12/5.  Per Dr. Joshua: Patient be moved to the ICU.  Poor 

oral intake.  Remains on insulin drip.  IV Solu-Medrol.  Eliquis.  IV cefepime 

and Levaquin.  Tired.  Sitting up in bed leaning forward short of breath.  Chest

x-ray film personally reviewed by me: Scattered bilateral infiltrates





Active Medications





Albuterol/Ipratropium (Ipratropium-Albuterol 3 Ml Neb)  3 ml INHALATION RT-QID 

PRN


   PRN Reason: Shortness Of Breath Or Wheezing


Albuterol/Ipratropium (Ipratropium-Albuterol 3 Ml Neb)  3 ml INHALATION RT-Q4H 

[facility]


   Last Admin: 03/16/25 12:24 Dose:  3 ml


   


Alprazolam (Alprazolam 0.5 Mg Tab)  0.5 mg PO TID PRN


   PRN Reason: Anxiety


   Last Admin: 03/15/25 23:04 Dose:  0.5 mg


   


Amlodipine Besylate (Amlodipine 5 Mg Tab)  5 mg PO DAILY [facility]


   Last Admin: 03/16/25 09:17 Dose:  5 mg


   


Apixaban (Apixaban Initiation Dose--Vte 5 Mg Tab)  10 mg PO BID [facility]; Taper


   Stop: 04/12/25 10:14


   Last Admin: 03/16/25 09:17 Dose:  10 mg


   


Aspirin (Aspirin 81 Mg)  81 mg PO DAILY [facility]


   Last Admin: 03/16/25 09:17 Dose:  81 mg


   


Budesonide (Budesonide 1 Mg/2 Ml Nebu)  1 mg INHALATION RT-BID [facility]


   Last Admin: 03/16/25 08:27 Dose:  1 mg


   


Dextrose/Water (Dextrose 50% Syringe 50 Ml)  25 ml IVP PER PROTOCOL PRN; 

Protocol


   PRN Reason: Hypoglycemia


Dextrose/Water (Dextrose 50% Syringe 50 Ml)  50 ml IVP PER PROTOCOL PRN; 

Protocol


   PRN Reason: Hypoglycemia


Formoterol Fumarate (Formoterol Fumarate 20 Mcg/2 Ml Nebu)  20 mcg INHALATION 

RT-BID [facility]


   Last Admin: 03/16/25 08:41 Dose:  20 mcg


   


Cefepime HCl 2 gm/ Sodium (Chloride)  100 mls @ 25 mls/hr IVPB Q12H [facility]; Pro

tocol


   Last Admin: 03/16/25 10:38 Dose:  25 mls/hr


   


Insulin Human Regular 100 unit (/ Sodium Chloride)  100 mls @ 0 mls/hr IV .Q0M 

[facility]; Protocol


   Last Titration: 03/16/25 10:46 Dose:  0 units/hr, 0 mls/hr


   


Sodium Chloride (Saline 0.45%)  1,000 mls @ 50 mls/hr IV .Q20H [facility]


   Last Admin: 03/16/25 10:38 Dose:  50 mls/hr


   


Itraconazole (Itraconazole 100 Mg Cap)  200 mg PO BID [facility]; Protocol


   Last Admin: 03/16/25 09:17 Dose:  200 mg


   


Levofloxacin (Levofloxacin 750 Mg Tab)  750 mg PO Q48H [facility]; Protocol


Lisinopril (Lisinopril 20 Mg Tab)  20 mg PO BID [facility]


   Last Admin: 03/16/25 09:16 Dose:  20 mg


   


Lorazepam (Lorazepam 1 Mg Tab)  1 mg PO HS PRN


   PRN Reason: Anxiety


   Last Admin: 03/15/25 23:04 Dose:  1 mg


   


Methylprednisolone Sodium Succinate (Methylprednisolone Sod Succi 125 Mg/2 Ml 

Vial)  60 mg IV Q8H [facility]


   Last Admin: 03/16/25 10:38 Dose:  60 mg


   


Metoprolol Succinate (Metoprolol Succinate (Er) 25 Mg Tab.Er.24h)  25 mg PO 

DAILY [facility]


   Last Admin: 03/16/25 09:17 Dose:  25 mg


   


Morphine Sulfate (Morphine Sulfate 4 Mg/Ml Syringe)  4 mg IV Q4HR PRN


   PRN Reason: Severe Pain (Scale 7 to 10)


Naloxone HCl (Naloxone 0.4 Mg/Ml 1 Ml Vial)  0.2 mg IV Q2M PRN


   PRN Reason: Opioid Reversal


Ondansetron HCl (Ondansetron 4 Mg/2 Ml Vial)  4 mg IVP Q8HR PRN


   PRN Reason: Nausea And Vomiting


   Last Admin: 03/14/25 14:52 Dose:  4 mg


   


Oxymetazoline HCl (Oxymetazoline 0.05% Nasl Spray 1 Spray Bottle)  2 spray NASAL

BID [facility]


   Last Admin: 03/16/25 09:18 Dose:  2 spray


   


Pantoprazole Sodium (Pantoprazole 40 Mg/10 Ml Vial)  40 mg IV DAILY [facility]


   Last Admin: 03/16/25 09:17 Dose:  40 mg


   


Potassium Chloride (Potassium Chloride Er 20 Meq Tab.Er)  20 meq PO DAILY [facility]


   Last Admin: 03/16/25 09:20 Dose:  20 meq


   


Sodium Chloride (Sodium Chloride 0.65% Nasal Spray 44 Ml Btl)  2 spray NASAL QID

PRN


   PRN Reason: Dry Nasal Passages


Spironolactone (Spironolactone 25 Mg Tab)  25 mg PO DAILY [facility]


   Last Admin: 03/16/25 09:17 Dose:  25 mg


   











Social history:


Tobacco: Current 50-pack-year smoker


Alcohol: Occasional alcohol use


Recreational drugs: Marijuana


Travel: No recent travel





On examination:


VITAL SIGNS: 98.6, 67, 28, 123 x 63, 88% on BiPAP right


GENERAL APPEARANCE: Sitting up in bed, leaning forward, short of breath


HEENT: Normal external appearance of nose and ear.  Oral cavity normal


EYES: Pupils equal. Conjunctiva normal. 


NECK: JVD unable to assess. Mass not palpable. 


RESPIRATORY: Respiratory effort increased, not able to speak in full sentences. 

Accessory muscles working. Lungs diminished breath sounds some scattered 

crackles prolonged expiration. 


CARDIOVASCULAR: First and second sounds normal. No edema. 


ABDOMEN: Soft. Liver and spleen not palpable. No tenderness. No mass palpable. 


PSYCHIATRY: Tired. Mood and affect tired





INVESTIGATIONS, reviewed in the clinical context:


Mild 16: White count 21.1 hemoglobin 9.1 platelets 245 potassium 4.2 BUN 68 

creatinine 1.73


March 15: White count 8.2 hemoglobin 9.2 platelets 232 ABG: pH 7.3 pCO2 42 pO2 

80 sodium 133 potassium 3.6 creatinine 1.69.  Accu-Cheks high


March 14: White count 20.8 hemoglobin 9.2 platelets 265 sodium 134 BUN 44 

creatinine 1.54.  Chest x-ray: Pulm edema/bilateral infiltrates


March 13: Sodium 132 potassium 3.4 BUN 35 creatinine 1.51


March 12: White count 7.2 hemoglobin 10.3 platelets 242 sodium 136 potassium 3.8

BUN 28 creatinine 1.37


Troponin I: 0.049, 0.055, 0.051


Chest x-ray film personally reviewed by me-bilateral infiltrates.  Effusion/pulm

edema cardiomegaly.


Venous Doppler: No DVT


Chest CTA: Acute segmental and subsegmental pulm embolism within the right 

middle and right lower lobes.  No RV strain.  Chronic interstitial lung disease.





Previous labs:


Creatinine 1.63 in June 2023





Assessment/Plan:








#.  Acute pulmonary embolism, non-massive [segmental and subsegmental within the

 right middle and lower lobes.  No RV strain


IV heparin, switched over to Eliquis 








#.  Sepsis likely secondary to -viral pneumonia.  Secondary bacterial component 

probable


IV cefepime.  And Levaquin-


ID and pulmonary following








#.  Acute hypoxic respiratory failure, secondary to above: Worsening


Remains on BiPAP, 70%


Because of worsening pulmonary status being moved to the ICU





#.  Acute COPD exacerbation, in a prior smoker: Not improving


DuoNeb Q4.  IV Solu-Medrol 60 mg q8.  Nebulized Pulmicort








#.  Acute on chronic heart failure with reduced ejection fraction (EF 45 to 

50%): Slow to respond


Received IV Lasix .  Aldactone.





#.  Chronic hypoxic respiratory failure from underlying COPD, 2 L home O2








#.  Non-insulin-dependent type 2 diabetes, uncontrolled with hyperglycemia 

secondary steroids: Worsening


Insulin subcu sliding scale.  Stop Levemir.  Currently on insulin drip y


Accu-Cheks ACHS





- chronic kidney disease stage III from nephrosclerosis and diabetic nephropathy

 [creatinine 1.63 in June 2023]


Renal ultrasound.:  Suboptimal study.  No corticomedullary comment.


UA protein 1+








#.  Essential hypertension


Amlodipine.  Zestril.





#.  Hyperlipidemia


Lipitor





-Full code





Patient has worsening pulmonary status.  Being moved to the ICU.





Past Medical History


Past Medical History: Atrial Fibrillation, Diabetes Mellitus, Hyperlipidemia, 

Hypertension, Pneumonia, Supraventricular Tachycardia (SVT)


Additional Past Medical History / Comment(s): a fib, causes shortness of breath,

can feel irregular heartbeat


History of Any Multi-Drug Resistant Organisms: None Reported


Past Surgical History: Cholecystectomy, Heart Catheterization


Additional Past Surgical History / Comment(s): Colonoscopy


Past Anesthesia/Blood Transfusion Reactions: No Reported Reaction


Past Psychological History: No Psychological Hx Reported


Smoking Status: Current every day smoker


Past Alcohol Use History: Occasional


Past Drug Use History: Marijuana

## 2025-03-17 LAB
ANION GAP SERPL CALC-SCNC: 4 MMOL/L
BASOPHILS # BLD AUTO: 0 K/UL (ref 0–0.2)
BASOPHILS NFR BLD AUTO: 0 %
BUN SERPL-SCNC: 95 MG/DL (ref 9–20)
C-ANCA TITR SER: (no result) TITER
CALCIUM SPEC-MCNC: 8.6 MG/DL (ref 8.4–10.2)
CHLORIDE SERPL-SCNC: 98 MMOL/L (ref 98–107)
CO2 SERPL-SCNC: 26 MMOL/L (ref 22–30)
EOSINOPHIL # BLD AUTO: 0 K/UL (ref 0–0.7)
EOSINOPHIL NFR BLD AUTO: 0 %
ERYTHROCYTE [DISTWIDTH] IN BLOOD BY AUTOMATED COUNT: 2.62 M/UL (ref 4.3–5.9)
ERYTHROCYTE [DISTWIDTH] IN BLOOD: 13.1 % (ref 11.5–15.5)
GLUCOSE BLD-MCNC: 121 MG/DL (ref 70–110)
GLUCOSE BLD-MCNC: 128 MG/DL (ref 70–110)
GLUCOSE BLD-MCNC: 135 MG/DL (ref 70–110)
GLUCOSE BLD-MCNC: 141 MG/DL (ref 70–110)
GLUCOSE BLD-MCNC: 149 MG/DL (ref 70–110)
GLUCOSE BLD-MCNC: 157 MG/DL (ref 70–110)
GLUCOSE BLD-MCNC: 182 MG/DL (ref 70–110)
GLUCOSE BLD-MCNC: 185 MG/DL (ref 70–110)
GLUCOSE BLD-MCNC: 203 MG/DL (ref 70–110)
GLUCOSE BLD-MCNC: 214 MG/DL (ref 70–110)
GLUCOSE BLD-MCNC: 218 MG/DL (ref 70–110)
GLUCOSE BLD-MCNC: 224 MG/DL (ref 70–110)
GLUCOSE BLD-MCNC: 224 MG/DL (ref 70–110)
GLUCOSE BLD-MCNC: 230 MG/DL (ref 70–110)
GLUCOSE BLD-MCNC: 234 MG/DL (ref 70–110)
GLUCOSE BLD-MCNC: 245 MG/DL (ref 70–110)
GLUCOSE BLD-MCNC: 258 MG/DL (ref 70–110)
GLUCOSE BLD-MCNC: 278 MG/DL (ref 70–110)
GLUCOSE BLD-MCNC: 285 MG/DL (ref 70–110)
GLUCOSE BLD-MCNC: 298 MG/DL (ref 70–110)
GLUCOSE SERPL-MCNC: 186 MG/DL (ref 74–99)
HCT VFR BLD AUTO: 25 % (ref 39–53)
HGB BLD-MCNC: 7.9 GM/DL (ref 13–17.5)
LYMPHOCYTES # SPEC AUTO: 0.4 K/UL (ref 1–4.8)
LYMPHOCYTES NFR SPEC AUTO: 2 %
MCH RBC QN AUTO: 30 PG (ref 25–35)
MCHC RBC AUTO-ENTMCNC: 31.4 G/DL (ref 31–37)
MCV RBC AUTO: 95.5 FL (ref 80–100)
MONOCYTES # BLD AUTO: 0.4 K/UL (ref 0–1)
MONOCYTES NFR BLD AUTO: 2 %
NEUTROPHILS # BLD AUTO: 20.7 K/UL (ref 1.3–7.7)
NEUTROPHILS NFR BLD AUTO: 96 %
PLATELET # BLD AUTO: 201 K/UL (ref 150–450)
POTASSIUM SERPL-SCNC: 4.5 MMOL/L (ref 3.5–5.1)
SODIUM SERPL-SCNC: 128 MMOL/L (ref 137–145)
WBC # BLD AUTO: 21.7 K/UL (ref 3.8–10.6)

## 2025-03-17 RX ADMIN — FUROSEMIDE SCH MG: 10 INJECTION, SOLUTION INTRAMUSCULAR; INTRAVENOUS at 10:16

## 2025-03-17 NOTE — P.PN
Subjective


Progress Note Date: 03/17/25


Principal diagnosis: 





Acute on chronic hypoxic respiratory failure, multifactorial











Patient is a 64-year-old male with past medical history significant for atrial 

fibrillation, diabetes mellitus, CKD stage III, hypertension, hyperlipidemia, 

tobacco smoker.  Of note, recently had a prolonged hospitalization 02/23/2025 

through 03/07/2025.  Treated for combination of influenza A, pneumonia, CHF.  

Completed course of antibiotics and Tamiflu.  At this time, did require 

noninvasive BiPAP, eventually discharged on home O2.  Returns with a chief 

complaint of shortness of breath progressing over the last 2 to 3 days.  Also, 

reports sudden onset substernal chest pain that developed the night prior and 

has been persistent.  On arrival to the ED, found to be in some respiratory 

distress, and placed on BiPAP.  Workup in the emergency department including a 

chest x-ray showing multifocal infiltrates concerning for pneumonia or pulmonary

edema.  D-dimer was elevated in setting CT angio protocol which was positive for

segmental and subsegmental right middle lobe and right lower lobe pulmonary 

emboli.  No saddle pulmonary embolus. Pulmonary artery mildly enlarged.  

Preserved RV to LV ratio.  There were diffuse coarse reticular infiltrates, as 

well as, groundglass lung attenuation, cystic changes, with concerns for 

interstitial lung disease. Patient does take Eliquis for his history of atrial 

fibrillation.  Does state he has missed some doses lately.  CBC: WBC count 22.6,

hemoglobin 11, platelets 322.  CMP: Sodium 133, potassium 4.2, chloride 101, 

serum bicarb 24, BUN 30, creatinine 1.27, glucose 135.  Lactic 1.5.  VBG with a 

pH of 7.5 and pCO2 of 32.  EKG: Sinus rhythm, rate 65 bpm, left bundle branch 

block, not acute.  Elevated serial troponins 0.049 and 0.055 respectively.  NT 

proBNP elevated 8807.  Patient is currently being evaluated in the emergency 

department.  He is alert and some moderate respiratory distress.  On BiPAP with 

settings 12/5 and FiO2 60%.  Tachypneic, breathing around 40 breaths/min. 

Endorses progressive worsening difficulty in breathing over last couple days.  

He has tried to increase his supplemental oxygen at home without any relief. 

Denies previous history of DVT or PE.  Associated nonproductive cough.  Denies 

sputum production or hemoptysis.  Substernal nonradiating chest pain persist.  

Denies any fevers or chills.  Denies heart palpitations or syncopal events.  

Denies swelling in the lower extremities.  Heart rhythm is normal sinus on 

bedside monitor.  Blood pressure has been normotensive, did receive 2 L of 

crystalloid fluid bolus earlier.  Not requiring any vasopressors.  Normal saline

is infusing at 150 mL/h.  IV heparin infusing per protocol








3/13/2025, the patient is being seen for a follow-up.  The patient is 

alternating between BiPAP at a pressure of 12/5 with an FiO2 of 60% and 15 L of 

oxygen by nasal cannula.  He is getting slightly anxious and the patient is 

being provided Xanax accordingly.  Fluid balance is -1.1 L over the past 24 

hours.  Limited echocardiogram was also done and it showed preserved LV function

with an EF of around 55 to 60%.  Valvular functions could not be accurately 

assessed as the patient's study was technically difficult study.  There was 

however RV dilatation.  Unable to estimate RV pressures.  The electrolytes from 

today showed a creatinine of 1.5 with a BUN of 35.  Bicarb is at 21.  Sodium is 

at 132.  The patient remains on DuoNeb updrafts.  The patient remains on a 

combination of cefepime and vancomycin.  The patient was taken off the IV hep

claus and the patient was started on anticoagulation with Eliquis.  Remains on IV

Solu-Medrol 60 mg every 6 hours.  Remains on Aldactone.  Remains on IV Lasix 40 

mg every 12 hours.





On today's evaluation of 3/14/2025, the patient is overall condition is 

essentially unchanged.  Continues to be hypoxic, continues to be on BiPAP and 

the patient remains at a pressure of 12/5 with an FiO2 of 60%.  Continues to 

have crackles in lung base bilaterally.  White cell count remains elevated at 20

with a hemoglobin 9.2.  Very much BiPAP dependent as the patient desaturates 

once taken off the BiPAP.  BUN is 44 with a platelet of 1.5.  Sodium levels at 

134 and a potassium level is at 3.4.  Remains on DuoNeb updrafts.  Remains on IV

Lasix 40 mg every 12 hours.  Remains on Aldactone.  Remains on bronchodilators. 

Remains on steroids.  Empiric antibiotic coverage with IV cefepime.  Reviewed 

the CAT scan again and there is some chronic interstitial changes and the 

patient has diffuse infiltrates with areas of groundglass.  Consider acute 

interstitial pneumonias.  Consider fungal pneumonias.





On today's evaluation of 3/15/2025, the patient is feeling slightly better 

compared to yesterday.  The patient is off the BiPAP and the patient is 

currently on 15 L of oxygen by nasal cannula.  Remains on bronchodilators.  

Remains on IV Solu-Medrol.  Remains on IV Lasix.  Antibiotic coverage including 

combination of cefepime, Levaquin orally and itraconazole orally.  Fungal 

antibodies are still pending.  Meanwhile, rheumatologic profile showed a 

negative rheumatoid factor and negative CARLA.  Legionella urine antigen is still 

pending for now.  Clinically however, the patient is feeling slightly improved 

compared to yesterday.  His chest x-ray continues to show multifocal airspace 

disease more so in the right lung.  Fluid balance has been negative.  The 

patient's creatinine is currently at 1.6 with a BUN of 54 and a sodium levels at

133.  I am going to taper the steroids.  The white cell count is 18.2 with a 

hemoglobin of 9.2.





On 3/16/2025, the patient is transferred to the intensive care unit for closer 

monitoring.  As mentioned, the patient developed acute hypoxic respiratory 

failure with diffuse bilateral pulmonary filtrates discussed earlier.  The 

patient was able to tolerate Airvo, however, overnight, the patient developed 

epistaxis.  Therefore was discontinued the patient was placed back on BiPAP and 

his current BiPAP is running at a pressure of 12 over 5 cm of water with an FiO2

of 80%.  The patient seems to be quite dependent on the BiPAP.  Unable to drop 

the FiO2 any further.  Repeat chest x-ray was done and shows stable diffuse 

bilateral pulm infiltrates which remains essentially unchanged compared to 

yesterday.  Clinically, the patient is awake and alert.  He is reported to have 

some congested cough.  No significant sputum production.  No chest pain.  No 

hemoptysis.  Noted, over the past 48 hours, the patient was diuresed with IV 

Lasix.  Nevertheless, we have not seen any improvement in the patient's 

oxygenation.  The patient was negative fluid balance.  His BNP today is at 68 

with a creatinine of 1.7.  Based on that, I stopped diuretics.  Rest of the 

electrolytes are all within normal limits.  White cell count of 21 with a 

hemoglobin 9.1 and a platelet count of 245.  The patient remains on broad-

spectrum antibiotics.  The patient remains on IV cefepime, itraconazole and 

Levaquin.  Fungal antibodies are still pending for now.  CARLA and rheumatoid 

factor were both negative.  Legionella urine antigen was also negative.  The 

patient remains on anticoagulation and I am going to drop the Eliquis dose to 5 

mg p.o. twice a day.  I am not absolutely convinced that the patient has a 

pulmonary embolism.  I reviewed the CAT scan of the chest and there could be 

potentially some subsegmental filling defects in the right, however, the overall

contrast administration was essentially poor.  The patient will be monitored 

very closely in the intensive care unit.  On a separate note, the patient was 

started on insulin drip for blood sugar control.





Patient was seen today on 3/17/2025, remains in the ICU, remains on fluid 

restrictions for low sodium of 128, he is on BiPAP 12/5/60%, patient is 

presenting this time very much similar to his last presentation few weeks ago.  

I am familiar with this patient, and on his last admission patient responded to 

treatment with mostly diuretics, and steroids.  This time came back with a 

similar presentation, he is receiving diuretics, but has been on hold for the 

last 24 hours, and I recommended we go back on Lasix 40 mg IV push every 12 

hours, patient is receiving Solu-Medrol at 60 mg IV push every 8 hours, he is 

also on Levaquin's, cefepime, and he is marginal at best.  In addition to this 

the patient had non-ST elevation myocardial infarction and flu/influenza A back 

on 3/11.  Blood cultures have been negative, patient has leukocytosis with WBC 

count of 21.7 hemoglobin is 7.9.  Creatinine has been slightly rising 1.51 on 

admission which is about his baseline, today his creatinine is 1.97 with a BUN 

of 95.  Legionella antigen has been negative CARLA screen has been negative 

rheumatoid factor is negative patient continues to complain of shortness of 

breath, intermittent cough, and wheezing.








Objective





- Vital Signs


Vital signs: 


                                   Vital Signs











Temp  97.8 F   03/17/25 08:00


 


Pulse  68   03/17/25 10:30


 


Resp  22   03/17/25 10:30


 


BP  118/55   03/17/25 10:30


 


Pulse Ox  93 L  03/17/25 10:30


 


FiO2  60   03/17/25 12:06








                                 Intake & Output











 03/16/25 03/17/25 03/17/25





 18:59 06:59 18:59


 


Intake Total 5555.654 5007.659 327.383


 


Output Total 400 900 250


 


Balance 839.569 667.659 77.383


 


Intake:   


 


   100 100


 


    Cefepime 2 gm In Sodium 300 100 100





    Chloride 0.9% 100 ml @ 25   





    mls/hr IVPB Q12H [facility] Rx#   





    :[id number]   


 


  Intake, IV Titration 29.569 417.659 227.383





  Amount   


 


    Insulin Regular 100 unit 29.569 67.659 27.383





    In Sodium Chloride 0.9%   





    100 ml @ Titrate IV .Q0M   





    [facility] Rx#:[id number]   


 


    Sodium Chloride 0.45% 1,  350 200





    000 ml @ 50 mls/hr IV .   





    Q20H [facility] Rx#:[id number]   


 


  Oral 910 1050 


 


Output:   


 


  Urine 400 900 250


 


Other:   


 


  Voiding Method Bedside Commode Bedside Commode External Catheter





 External Catheter External Catheter 














- Exam








GENERAL EXAM: 64-year-old white male obese in no distress on BiPAP however seems

to be anxious.


HEAD: Normocephalic and atraumatic


EYES: Normal reaction of pupils, equal size.


NOSE: Clear with pink turbinates.  The right nostril is packed


THROAT: No erythema or exudates.


NECK: No masses, no JVD.


CHEST: No chest wall deformity.


LUNGS: Crackles and rhonchi noted bilaterally.


CVS: S1 and S2 normal with soft systolic murmur, regular rhythm. No other extra 

heart sounds


ABDOMEN: No hepatosplenomegaly, active bowel sounds, no guarding or rigidity. 


SKIN: No rashes


CENTRAL NERVOUS SYSTEM: Alert oriented x 3 no gross focal deficit


EXTREMITIES: 2+ bipedal edema, good pulses bilaterally.





- Labs


CBC & Chem 7: 


                                 03/17/25 05:22





                                 03/17/25 05:22


Labs: 


                  Abnormal Lab Results - Last 24 Hours (Table)











  03/16/25 03/16/25 03/16/25 Range/Units





  13:53 15:29 16:50 


 


WBC     (3.8-10.6)  k/uL


 


RBC     (4.30-5.90)  m/uL


 


Hgb     (13.0-17.5)  gm/dL


 


Hct     (39.0-53.0)  %


 


Neutrophils #     (1.3-7.7)  k/uL


 


Lymphocytes #     (1.0-4.8)  k/uL


 


Sodium     (137-145)  mmol/L


 


BUN     (9-20)  mg/dL


 


Creatinine     (0.66-1.25)  mg/dL


 


Glucose     (74-99)  mg/dL


 


POC Glucose (mg/dL)  171 H  249 H  298 H  ()  mg/dL














  03/16/25 03/16/25 03/16/25 Range/Units





  17:52 19:42 21:10 


 


WBC     (3.8-10.6)  k/uL


 


RBC     (4.30-5.90)  m/uL


 


Hgb     (13.0-17.5)  gm/dL


 


Hct     (39.0-53.0)  %


 


Neutrophils #     (1.3-7.7)  k/uL


 


Lymphocytes #     (1.0-4.8)  k/uL


 


Sodium     (137-145)  mmol/L


 


BUN     (9-20)  mg/dL


 


Creatinine     (0.66-1.25)  mg/dL


 


Glucose     (74-99)  mg/dL


 


POC Glucose (mg/dL)  282 H  308 H  269 H  ()  mg/dL














  03/16/25 03/16/25 03/16/25 Range/Units





  21:56 23:09 23:58 


 


WBC     (3.8-10.6)  k/uL


 


RBC     (4.30-5.90)  m/uL


 


Hgb     (13.0-17.5)  gm/dL


 


Hct     (39.0-53.0)  %


 


Neutrophils #     (1.3-7.7)  k/uL


 


Lymphocytes #     (1.0-4.8)  k/uL


 


Sodium     (137-145)  mmol/L


 


BUN     (9-20)  mg/dL


 


Creatinine     (0.66-1.25)  mg/dL


 


Glucose     (74-99)  mg/dL


 


POC Glucose (mg/dL)  244 H  188 H  155 H  ()  mg/dL














  03/17/25 03/17/25 03/17/25 Range/Units





  02:22 03:26 05:20 


 


WBC     (3.8-10.6)  k/uL


 


RBC     (4.30-5.90)  m/uL


 


Hgb     (13.0-17.5)  gm/dL


 


Hct     (39.0-53.0)  %


 


Neutrophils #     (1.3-7.7)  k/uL


 


Lymphocytes #     (1.0-4.8)  k/uL


 


Sodium     (137-145)  mmol/L


 


BUN     (9-20)  mg/dL


 


Creatinine     (0.66-1.25)  mg/dL


 


Glucose     (74-99)  mg/dL


 


POC Glucose (mg/dL)  128 H  141 H  234 H  ()  mg/dL














  03/17/25 03/17/25 03/17/25 Range/Units





  05:22 05:22 06:10 


 


WBC  21.7 H    (3.8-10.6)  k/uL


 


RBC  2.62 L    (4.30-5.90)  m/uL


 


Hgb  7.9 L    (13.0-17.5)  gm/dL


 


Hct  25.0 L    (39.0-53.0)  %


 


Neutrophils #  20.7 H    (1.3-7.7)  k/uL


 


Lymphocytes #  0.4 L    (1.0-4.8)  k/uL


 


Sodium   128 L   (137-145)  mmol/L


 


BUN   95 H   (9-20)  mg/dL


 


Creatinine   1.97 H   (0.66-1.25)  mg/dL


 


Glucose   186 H   (74-99)  mg/dL


 


POC Glucose (mg/dL)    258 H  ()  mg/dL














  03/17/25 03/17/25 03/17/25 Range/Units





  07:10 08:02 09:14 


 


WBC     (3.8-10.6)  k/uL


 


RBC     (4.30-5.90)  m/uL


 


Hgb     (13.0-17.5)  gm/dL


 


Hct     (39.0-53.0)  %


 


Neutrophils #     (1.3-7.7)  k/uL


 


Lymphocytes #     (1.0-4.8)  k/uL


 


Sodium     (137-145)  mmol/L


 


BUN     (9-20)  mg/dL


 


Creatinine     (0.66-1.25)  mg/dL


 


Glucose     (74-99)  mg/dL


 


POC Glucose (mg/dL)  278 H  245 H  224 H  ()  mg/dL














  03/17/25 03/17/25 03/17/25 Range/Units





  10:03 11:08 12:15 


 


WBC     (3.8-10.6)  k/uL


 


RBC     (4.30-5.90)  m/uL


 


Hgb     (13.0-17.5)  gm/dL


 


Hct     (39.0-53.0)  %


 


Neutrophils #     (1.3-7.7)  k/uL


 


Lymphocytes #     (1.0-4.8)  k/uL


 


Sodium     (137-145)  mmol/L


 


BUN     (9-20)  mg/dL


 


Creatinine     (0.66-1.25)  mg/dL


 


Glucose     (74-99)  mg/dL


 


POC Glucose (mg/dL)  214 H  203 H  218 H  ()  mg/dL








                      Microbiology - Last 24 Hours (Table)











 03/11/25 20:48 Blood Culture - Final





 Blood 














Assessment and Plan


Assessment: 


Impression:


Acute on chronic hypoxic respiratory failure, multifactorial, I suspect this is 

mostly a picture of pulmonary edema, underlying pneumonia is not entirely ruled 

out.  Hence the patient will remain on antibiotics and diuretics.


Acute exacerbation of chronic systolic congestive heart failure, recent 

echocardiogram from 02/24/2025 estimating a left ventricular ejection fraction 

of 45 to 50%, moderate pulmonary hypertension, and moderate tricuspid 

regurgitation.  Repeat echocardiogram showed improvement in LV function with 

ejection fraction 55%


Acute leukocytosis


Acute on chronic kidney disease


Recent influenza A infection


History of atrial fibrillation with previous cardioversion 


History of underlying COPD and chronic tobacco dependence


Benign essential hypertension


Type 2 diabetes





Recommendation:


Continue BiPAP 12/5/60%


Continue bronchodilators


Resume diuretics Lasix 40 mg IV push twice daily


Continue Solu-Medrol


Continue empiric antibiotics and antifungal patient is on cefepime Levaquin and 

itraconazole


Connective tissue disease workup seems to be negative


Histoplasma antigen is negative


Procalcitonin level is not elevated and never was elevated even on his last 

admission


Continues to have elevated BNP level


Patient to remain in the ICU, he is not ready for transfer out of the ICU, 

remains marginal at best, and still requiring BiPAP, did not tolerate Airvo or 

high flow nasal cannula


Patient remains critically ill


Critical care time is 32 minutes





Time with Patient: Greater than 30

## 2025-03-17 NOTE — P.PN
Progress Note - Text


Progress Note Date: 03/17/25





Patient is a 64-year-old male with a PMH of atrial fibrillation on Eliquis, COPD

on 2 L home oxygen, non-insulin-dependent diabetes mellitus, hyperlipidemia, 

hypertension presenting with shortness of breath.  Patient was previously 

admitted here for acute hypoxic respiratory failure secondary to influenza 

pneumonia and was discharged on 2 L of home oxygen.  Patient states he has been 

feeling short of breath while at rest.  He reports that since being discharged 

the home oxygen has not been sufficient. He states that he had to keep 

increasing his oxygen.    Patient says shortness of breath is slightly relieved 

when he is laying down.  Denies any orthopnea or PND.  Patient endorses a nonpr

oductive cough that is chronic in nature, but says it has been getting worse.  

Patient admits to having fever, chills.  Patient also admits to having non-

radiating, pressure-like chest pain over the last few days.  Chest pain is not 

related to exertion and he had no alleviating or aggravating factors.  Patient 

denies any headache, vision changes, nausea, vomiting, diarrhea, abdominal pain,

urinary symptoms.





EKG independently interpreted displaying sinus rhythm with left bundle branch 

block that has been seen on prior EKG, rate 65 bpm, QTc 451 ms


CXR independently interpreted displaying bilateral multifocal airspace opacities


Chest CTA displaying acute segmental and subsegmental pulmonary emboli within 

the right and middle lower lobes, no saddle embolus, no RV strain, chronic 

interstitial loading disease


Venous Doppler B/L LE displaying no evidence of acute DVT


Troponin 0.049, 0.055, proBNP 8810, D-dimer 4.87, WBC 22.6, Hgb 11.0, HCT 35.2, 

MCV 93.7, platelet 322, PT 11.8, INR 1.1, APTT 28, sodium 133, potassium 4.2, 

CO2 24, BUN 30, creatinine 1.27, glucose 135


VBG pH 7.52, pCO2 32


T98.2 F, CO 80, RR 26, /77, O2 sat 90% on BiPAP with FiO2 of 100%





March 12: Overflow the ER.  Up in the bed.  Short of breath.  Patient was on 

BiPAP overnight.  This morning on 15 L high flow nasal cannula.  Decreased 

appetite.  Has got a cough.  Some wheezing.  Some sputum production.  Decreased 

appetite.  Patient is on IV heparin.  Also on IV cefepime.  Pulmonary following


March 13: Patient did confirm that because Eliquis is very expensive patient was

not taking it properly did and take it sparingly at home.  Explains patient's 

PE.  Started on Eliquis today by cardiology.  IV heparin discontinued.  Getting 

easily short of breath.  Requiring BiPAP..  Some cough.  Oral intake fair.  On 

IV cefepime and vancomycin.  ID following.  Also on IV Lasix.  Due to worsening 

renal function will DC vancomycin


March 14: Saw this afternoon.  On BiPAP.  60%.  Ensure ordered.  All blood work 

ordered by pulmonary including fungal.  Patient currently on IV cefepime and 

Levaquin.  IV Solu-Medrol.  Remain short of breath.  Tired.  Being followed by 

pulmonary and ID.  Chest x-ray film personally reviewed by me-showing pulm edema

bilateral infiltrates especially at the bases.  Renal ultrasound nonspecific.  

UA showing protein 1+.


March 15: Remain short of breath.  Sitting up in bed.  Not able to come off the 

BiPAP.  Remains on IV cefepime.  Accu-Cheks running high.  Put on insulin drip. 

On IV Solu-Medrol.  Oral intake fair.


March 16: Patient remains short of breath.  Unable to get off BiPAP.  FiO2 

increased to 70%.  12/5.  Per Dr. Joshua: Patient be moved to the ICU.  Poor 

oral intake.  Remains on insulin drip.  IV Solu-Medrol.  Eliquis.  IV cefepime 

and Levaquin.  Tired.  Sitting up in bed leaning forward short of breath.  Chest

x-ray film personally reviewed by me: Scattered bilateral infiltrates


March 17: ICU.  Remains on BiPAP.  Decreased oral intake.  Sitting up.  Short of

breath.  On IV cefepime.  Insulin drip.  IV Solu-Medrol.  DuoNeb Q4.  Rather 

tired.  Also yesterday evening at epistaxis.  Nasal packing.





Active Medications





Albuterol/Ipratropium (Ipratropium-Albuterol 3 Ml Neb)  3 ml INHALATION RT-QID 

PRN


   PRN Reason: Shortness Of Breath Or Wheezing


Albuterol/Ipratropium (Ipratropium-Albuterol 3 Ml Neb)  3 ml INHALATION RT-Q4H 

[facility]


   Last Admin: 03/17/25 20:13 Dose:  3 ml


   


Alprazolam (Alprazolam 0.5 Mg Tab)  0.5 mg PO TID PRN


   PRN Reason: Anxiety


   Last Admin: 03/16/25 22:25 Dose:  0.5 mg


   


Amlodipine Besylate (Amlodipine 5 Mg Tab)  5 mg PO DAILY [facility]


   Last Admin: 03/17/25 10:17 Dose:  5 mg


   


Aspirin (Aspirin 81 Mg)  81 mg PO DAILY [facility]


   Last Admin: 03/17/25 10:17 Dose:  81 mg


   


Budesonide (Budesonide 1 Mg/2 Ml Nebu)  1 mg INHALATION RT-BID [facility]


   Last Admin: 03/17/25 20:13 Dose:  1 mg


   


Dextrose/Water (Dextrose 50% Syringe 50 Ml)  25 ml IVP PER PROTOCOL PRN; 

Protocol


   PRN Reason: Hypoglycemia


Dextrose/Water (Dextrose 50% Syringe 50 Ml)  50 ml IVP PER PROTOCOL PRN; 

Protocol


   PRN Reason: Hypoglycemia


Formoterol Fumarate (Formoterol Fumarate 20 Mcg/2 Ml Nebu)  20 mcg INHALATION 

RT-BID [facility]


   Last Admin: 03/17/25 20:13 Dose:  20 mcg


   


Furosemide (Furosemide 10 Mg/Ml 4 Ml Vial)  40 mg IV Q12HR [facility]


   Last Admin: 03/17/25 10:16 Dose:  40 mg


   


Cefepime HCl 2 gm/ Sodium (Chloride)  100 mls @ 25 mls/hr IVPB Q12H [facility]; 

Protocol


   Last Admin: 03/17/25 10:16 Dose:  25 mls/hr


   


Insulin Human Regular 100 unit (/ Sodium Chloride)  100 mls @ 0 mls/hr IV .Q0M 

[facility]; Protocol


   Last Admin: 03/17/25 19:04 Dose:  4.5 units/hr, 4.5 mls/hr


   


Itraconazole (Itraconazole 100 Mg Cap)  200 mg PO BID [facility]; Protocol


   Last Admin: 03/17/25 10:36 Dose:  200 mg


   


Levofloxacin (Levofloxacin 750 Mg Tab)  750 mg PO Q48H [facility]; Protocol


   Last Admin: 03/16/25 17:35 Dose:  750 mg


   


Lorazepam (Lorazepam 1 Mg Tab)  1 mg PO HS PRN


   PRN Reason: Anxiety


   Last Admin: 03/16/25 22:25 Dose:  1 mg


   


Methylprednisolone Sodium Succinate (Methylprednisolone Sod Succi 125 Mg/2 Ml 

Vial)  60 mg IV Q8H [facility]


   Last Admin: 03/17/25 18:27 Dose:  60 mg


   


Metoprolol Succinate (Metoprolol Succinate (Er) 25 Mg Tab.Er.24h)  25 mg PO 

DAILY [facility]


   Last Admin: 03/17/25 10:16 Dose:  25 mg


   


Morphine Sulfate (Morphine Sulfate 4 Mg/Ml Syringe)  4 mg IV Q4HR PRN


   PRN Reason: Severe Pain (Scale 7 to 10)


Naloxone HCl (Naloxone 0.4 Mg/Ml 1 Ml Vial)  0.2 mg IV Q2M PRN


   PRN Reason: Opioid Reversal


Ondansetron HCl (Ondansetron 4 Mg/2 Ml Vial)  4 mg IVP Q8HR PRN


   PRN Reason: Nausea And Vomiting


   Last Admin: 03/14/25 14:52 Dose:  4 mg


   


Oxymetazoline HCl (Oxymetazoline 0.05% Nasl Spray 1 Spray Bottle)  2 spray NASAL

BID [facility]


   Last Admin: 03/17/25 11:49 Dose:  Not Given


   


Pantoprazole Sodium (Pantoprazole 40 Mg/10 Ml Vial)  40 mg IV DAILY [facility]


   Last Admin: 03/17/25 10:16 Dose:  40 mg


   


Potassium Chloride (Potassium Chloride Er 20 Meq Tab.Er)  20 meq PO DAILY [facility]


   Last Admin: 03/17/25 10:17 Dose:  20 meq


   


Sodium Chloride (Sodium Chloride 0.65% Nasal Spray 44 Ml Btl)  2 spray NASAL QID

PRN


   PRN Reason: Dry Nasal Passages


Spironolactone (Spironolactone 25 Mg Tab)  25 mg PO DAILY [facility]


   Last Admin: 03/17/25 10:17 Dose:  25 mg


   











Social history:


Tobacco: Current 50-pack-year smoker


Alcohol: Occasional alcohol use


Recreational drugs: Marijuana


Travel: No recent travel





On examination:


VITAL SIGNS: 97.4, 68, 22, 114 x 50, 96% on BiPAP 60%


GENERAL APPEARANCE: Sitting up in bed, leaning forward, short of breath


HEENT: Normal external appearance of nose and ear.  Oral cavity normal


EYES: Pupils equal. Conjunctiva normal. 


NECK: JVD unable to assess. Mass not palpable. 


RESPIRATORY: Respiratory effort increased, not able to speak in full sentences. 

Accessory muscles working. Lungs diminished breath sounds some scattered 

crackles prolonged expiration. 


CARDIOVASCULAR: First and second sounds normal. No edema. 


ABDOMEN: Soft. Liver and spleen not palpable. No tenderness. No mass palpable. 


PSYCHIATRY: Tired. Mood and affect tired





INVESTIGATIONS, reviewed in the clinical context:


March 17: White count 21.7 hemoglobin 7.9 platelets 201 sodium 128 potassium 4.5

BUN 95 creatinine 1.97


Mild 16: White count 21.1 hemoglobin 9.1 platelets 245 potassium 4.2 BUN 68 

creatinine 1.73


March 15: White count 8.2 hemoglobin 9.2 platelets 232 ABG: pH 7.3 pCO2 42 pO2 

80 sodium 133 potassium 3.6 creatinine 1.69.  Accu-Cheks high


March 14: White count 20.8 hemoglobin 9.2 platelets 265 sodium 134 BUN 44 

creatinine 1.54.  Chest x-ray: Pulm edema/bilateral infiltrates


March 13: Sodium 132 potassium 3.4 BUN 35 creatinine 1.51


March 12: White count 7.2 hemoglobin 10.3 platelets 242 sodium 136 potassium 3.8

BUN 28 creatinine 1.37


Troponin I: 0.049, 0.055, 0.051


Chest x-ray film personally reviewed by me-bilateral infiltrates.  Effusion/pulm

edema cardiomegaly.


Venous Doppler: No DVT


Chest CTA: Acute segmental and subsegmental pulm embolism within the right 

middle and right lower lobes.  No RV strain.  Chronic interstitial lung disease.





Previous labs:


Creatinine 1.63 in June 2023





Assessment/Plan:








#.  Acute pulmonary embolism, non-massive [segmental and subsegmental within the

 right middle and lower lobes.  No RV strain


IV heparin, switched over to Eliquis 








#.  Sepsis likely secondary to -viral pneumonia.  Secondary bacterial component 

probable


IV cefepime.  And Levaquin-


ID and pulmonary following








#.  Acute hypoxic respiratory failure, secondary to above: Not improving


Remains on BiPAP, 60 %








#.  Acute COPD exacerbation, in a prior smoker: Not improving


DuoNeb Q4.  IV Solu-Medrol 60 mg q8.  Nebulized Pulmicort








#.  Acute on chronic heart failure with reduced ejection fraction (EF 45 to 

50%): Slow to respond


IV Lasix 40 mg every 12.  Aldactone.





#.  Chronic hypoxic respiratory failure from underlying COPD, 2 L home O2








#.  Non-insulin-dependent type 2 diabetes, uncontrolled with hyperglycemia 

secondary steroids: Not improving


Insulin subcu sliding scale.  Stop Levemir.  Currently on insulin drip y


Accu-Cheks ACHS





- chronic kidney disease stage III from nephrosclerosis and diabetic nephropathy

 [creatinine 1.63 in June 2023]


Renal ultrasound.:  Suboptimal study.  No corticomedullary comment.


UA protein 1+








#.  Essential hypertension


Amlodipine.  Zestril.





#.  Hyperlipidemia


Lipitor





-Full code





Remains rather ill.  ICU.





Past Medical History


Past Medical History: Atrial Fibrillation, Diabetes Mellitus, Hyperlipidemia, 

Hypertension, Pneumonia, Supraventricular Tachycardia (SVT)


Additional Past Medical History / Comment(s): a fib, causes shortness of breath,

can feel irregular heartbeat


History of Any Multi-Drug Resistant Organisms: None Reported


Past Surgical History: Cholecystectomy, Heart Catheterization


Additional Past Surgical History / Comment(s): Colonoscopy


Past Anesthesia/Blood Transfusion Reactions: No Reported Reaction


Past Psychological History: No Psychological Hx Reported


Smoking Status: Current every day smoker


Past Alcohol Use History: Occasional


Past Drug Use History: Marijuana

## 2025-03-17 NOTE — P.PN
Subjective


Progress Note Date: 03/17/25


Principal diagnosis: 





Reason for follow-up is pneumonia





Patient is a 64-year-old  male with a past medical history significant 

for diabetes mellitus hypertension hyperlipidemia pneumonia atrial fibrillation 

currently everyday smoker presenting to the hospital for evaluation of 

increasing shortness of breath patient has been diagnosed with multifocal 

pneumonia prompting this consultation.


On today's evaluation that is 03/17/2025, patient has been afebrile, patient has

been on BiPAP dependent currently requiring 60% FiO2 but denies any worsening 

shortness of breath or cough no abdominal pain or diarrhea.


Patient white count is 21.7 creatinine is 1.97 sputum culture obtained report 

pending





Objective





- Vital Signs


Vital signs: 


                                   Vital Signs











Temp  97.8 F   03/17/25 08:00


 


Pulse  72   03/17/25 12:48


 


Resp  22   03/17/25 10:30


 


BP  118/55   03/17/25 10:30


 


Pulse Ox  95   03/17/25 12:52


 


FiO2  60   03/17/25 12:06








                                 Intake & Output











 03/16/25 03/17/25 03/17/25





 18:59 06:59 18:59


 


Intake Total 0518.815 4515.659 332.183


 


Output Total 400 900 250


 


Balance 839.569 667.659 82.183


 


Intake:   


 


   100 100


 


    Cefepime 2 gm In Sodium 300 100 100





    Chloride 0.9% 100 ml @ 25   





    mls/hr IVPB Q12H [facility] Rx#   





    :[id number]   


 


  Intake, IV Titration 29.569 417.659 232.183





  Amount   


 


    Insulin Regular 100 unit 29.569 67.659 32.183





    In Sodium Chloride 0.9%   





    100 ml @ Titrate IV .Q0M   





    [facility] Rx#:[id number]   


 


    Sodium Chloride 0.45% 1,  350 200





    000 ml @ 50 mls/hr IV .   





    Q20H [facility] Rx#:[id number]   


 


  Oral 910 1050 


 


Output:   


 


  Urine 400 900 250


 


Other:   


 


  Voiding Method Bedside Commode Bedside Commode External Catheter





 External Catheter External Catheter 














- Exam





GENERAL DESCRIPTION: An elderly male lying in bed in no distress





RESPIRATORY SYSTEM: Unlabored breathing , coarse breath sounds bilaterally





HEART: S1 S2 regular rate and rhythm ,





ABDOMEN: Soft , no tenderness





EXTREMITIES: No edema feet





- Labs


CBC & Chem 7: 


                                 03/17/25 05:22





                                 03/17/25 05:22


Labs: 


                  Abnormal Lab Results - Last 24 Hours (Table)











  03/16/25 03/16/25 03/16/25 Range/Units





  13:53 15:29 16:50 


 


WBC     (3.8-10.6)  k/uL


 


RBC     (4.30-5.90)  m/uL


 


Hgb     (13.0-17.5)  gm/dL


 


Hct     (39.0-53.0)  %


 


Neutrophils #     (1.3-7.7)  k/uL


 


Lymphocytes #     (1.0-4.8)  k/uL


 


Sodium     (137-145)  mmol/L


 


BUN     (9-20)  mg/dL


 


Creatinine     (0.66-1.25)  mg/dL


 


Glucose     (74-99)  mg/dL


 


POC Glucose (mg/dL)  171 H  249 H  298 H  ()  mg/dL














  03/16/25 03/16/25 03/16/25 Range/Units





  17:52 19:42 21:10 


 


WBC     (3.8-10.6)  k/uL


 


RBC     (4.30-5.90)  m/uL


 


Hgb     (13.0-17.5)  gm/dL


 


Hct     (39.0-53.0)  %


 


Neutrophils #     (1.3-7.7)  k/uL


 


Lymphocytes #     (1.0-4.8)  k/uL


 


Sodium     (137-145)  mmol/L


 


BUN     (9-20)  mg/dL


 


Creatinine     (0.66-1.25)  mg/dL


 


Glucose     (74-99)  mg/dL


 


POC Glucose (mg/dL)  282 H  308 H  269 H  ()  mg/dL














  03/16/25 03/16/25 03/16/25 Range/Units





  21:56 23:09 23:58 


 


WBC     (3.8-10.6)  k/uL


 


RBC     (4.30-5.90)  m/uL


 


Hgb     (13.0-17.5)  gm/dL


 


Hct     (39.0-53.0)  %


 


Neutrophils #     (1.3-7.7)  k/uL


 


Lymphocytes #     (1.0-4.8)  k/uL


 


Sodium     (137-145)  mmol/L


 


BUN     (9-20)  mg/dL


 


Creatinine     (0.66-1.25)  mg/dL


 


Glucose     (74-99)  mg/dL


 


POC Glucose (mg/dL)  244 H  188 H  155 H  ()  mg/dL














  03/17/25 03/17/25 03/17/25 Range/Units





  02:22 03:26 05:20 


 


WBC     (3.8-10.6)  k/uL


 


RBC     (4.30-5.90)  m/uL


 


Hgb     (13.0-17.5)  gm/dL


 


Hct     (39.0-53.0)  %


 


Neutrophils #     (1.3-7.7)  k/uL


 


Lymphocytes #     (1.0-4.8)  k/uL


 


Sodium     (137-145)  mmol/L


 


BUN     (9-20)  mg/dL


 


Creatinine     (0.66-1.25)  mg/dL


 


Glucose     (74-99)  mg/dL


 


POC Glucose (mg/dL)  128 H  141 H  234 H  ()  mg/dL














  03/17/25 03/17/25 03/17/25 Range/Units





  05:22 05:22 06:10 


 


WBC  21.7 H    (3.8-10.6)  k/uL


 


RBC  2.62 L    (4.30-5.90)  m/uL


 


Hgb  7.9 L    (13.0-17.5)  gm/dL


 


Hct  25.0 L    (39.0-53.0)  %


 


Neutrophils #  20.7 H    (1.3-7.7)  k/uL


 


Lymphocytes #  0.4 L    (1.0-4.8)  k/uL


 


Sodium   128 L   (137-145)  mmol/L


 


BUN   95 H   (9-20)  mg/dL


 


Creatinine   1.97 H   (0.66-1.25)  mg/dL


 


Glucose   186 H   (74-99)  mg/dL


 


POC Glucose (mg/dL)    258 H  ()  mg/dL














  03/17/25 03/17/25 03/17/25 Range/Units





  07:10 08:02 09:14 


 


WBC     (3.8-10.6)  k/uL


 


RBC     (4.30-5.90)  m/uL


 


Hgb     (13.0-17.5)  gm/dL


 


Hct     (39.0-53.0)  %


 


Neutrophils #     (1.3-7.7)  k/uL


 


Lymphocytes #     (1.0-4.8)  k/uL


 


Sodium     (137-145)  mmol/L


 


BUN     (9-20)  mg/dL


 


Creatinine     (0.66-1.25)  mg/dL


 


Glucose     (74-99)  mg/dL


 


POC Glucose (mg/dL)  278 H  245 H  224 H  ()  mg/dL














  03/17/25 03/17/25 03/17/25 Range/Units





  10:03 11:08 12:15 


 


WBC     (3.8-10.6)  k/uL


 


RBC     (4.30-5.90)  m/uL


 


Hgb     (13.0-17.5)  gm/dL


 


Hct     (39.0-53.0)  %


 


Neutrophils #     (1.3-7.7)  k/uL


 


Lymphocytes #     (1.0-4.8)  k/uL


 


Sodium     (137-145)  mmol/L


 


BUN     (9-20)  mg/dL


 


Creatinine     (0.66-1.25)  mg/dL


 


Glucose     (74-99)  mg/dL


 


POC Glucose (mg/dL)  214 H  203 H  218 H  ()  mg/dL














  03/17/25 Range/Units





  12:52 


 


WBC   (3.8-10.6)  k/uL


 


RBC   (4.30-5.90)  m/uL


 


Hgb   (13.0-17.5)  gm/dL


 


Hct   (39.0-53.0)  %


 


Neutrophils #   (1.3-7.7)  k/uL


 


Lymphocytes #   (1.0-4.8)  k/uL


 


Sodium   (137-145)  mmol/L


 


BUN   (9-20)  mg/dL


 


Creatinine   (0.66-1.25)  mg/dL


 


Glucose   (74-99)  mg/dL


 


POC Glucose (mg/dL)  224 H  ()  mg/dL








                      Microbiology - Last 24 Hours (Table)











 03/11/25 20:48 Blood Culture - Final





 Blood 














Assessment and Plan


(1) Leukocytosis


Current Visit: Yes   Status: Acute   Code(s): D72.829 - ELEVATED WHITE BLOOD 

CELL COUNT, UNSPECIFIED   SNOMED Code(s): 106108384


   





(2) Bilateral pneumonia


Current Visit: Yes   Status: Acute   Code(s): J18.9 - PNEUMONIA, UNSPECIFIED 

ORGANISM   SNOMED Code(s): 630916099


   


Plan: 





1patient presented hospital with increasing shortness of breath and chest pain 

which is likely multifactorial in this patient who did have evidence of PE on 

the CT there is also evidence of multifocal pneumonia with elevated white count 

and recently acute influenza A will need to cover for resistant gram-positive as

well as gram-negative pathogen for post influenza pneumonia


2-patient will bring up sputum and sputum culture has been sent results will be 

followed serologies currently pending


3-patient will be treated with  cefepime and  itraconazole and monitor clinical 

course closely


Dictation was produced using dragon dictation software. please excuse any 

grammatical, word or spelling errors. 








Time with Patient: Less than 30

## 2025-03-17 NOTE — P.PN
Subjective


Progress Note Date: 03/17/25


The patient is a 64-year-old male who is currently admitted to the hospital with

acute hypoxic respiratory failure secondary to pulmonary embolism, influenza, 

and pneumonia.  Patient was transferred to the ICU yesterday for hypoxia despite

BiPAP.





Patient interviewed and examined resting in bed.  Patient is dyspneic with 

tripod breathing.  He is currently on BiPAP with oxygen saturations maintaining 

above 90.  Patient denies any current discomfort.





GENERAL: Well-appearing, well-nourished.  Positive for labored breathing


NECK: Supple without JVD or thyromegaly.


LUNGS: Breath sounds diminished to auscultation bilaterally. Respiration equal. 

No wheezes, rales or rhonchi.


HEART: Regular rate and rhythm without murmurs, rubs or gallops. S1 and S2 

heard.


EXTREMITIES: Normal range of motion, no edema.  No clubbing or cyanosis. 

Peripheral pulses intact and strong.





TELEMETRY:


Sinus rhythm overnight





LABS:


WBC 21.7, hemoglobin 7.9, hematocrit 25.0, sodium 128, potassium 4.5, BUN 95, 

creatinine 1.97





IMPRESSION:


Acute pulmonary emboli due to prolonged hospitalization and unable to afford 

Eliquis


Acute hypoxic respiratory failure requiring BiPAP secondary to influenza, 

pneumonia, and component of heart failure


NSTEMI secondary to PE, influenza and pneumonia


Influenza A


Pneumonia


Acute on chronic systolic heart failure


Acute kidney injury, worsening


Cardiomyopathy with previous EF of 15 to 20%


Hypertension


Hyperlipidemia


Persistent atrial fibrillation, currently in sinus rhythm


Diabetes mellitus type 2





PLAN:


Continue supportive treatment


Further recommendations to be based upon clinical course


 





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 

assessment/plan.











Objective





- Vital Signs


Vital signs: 


                                   Vital Signs











Temp  97.6 F   03/17/25 04:00


 


Pulse  69   03/17/25 06:00


 


Resp  24   03/17/25 06:00


 


BP  162/66   03/17/25 06:00


 


Pulse Ox  99   03/17/25 06:00


 


FiO2  90   03/17/25 04:54








                                 Intake & Output











 03/16/25 03/17/25 03/17/25





 18:59 06:59 18:59


 


Intake Total 1908.406 9324.659 5.75


 


Output Total 400 900 


 


Balance 839.569 667.659 5.75


 


Intake:   


 


   100 


 


    Cefepime 2 gm In Sodium 300 100 





    Chloride 0.9% 100 ml @ 25   





    mls/hr IVPB Q12H [facility] Rx#   





    :[id number]   


 


  Intake, IV Titration 29.569 417.659 5.75





  Amount   


 


    Insulin Regular 100 unit 29.569 67.659 5.75





    In Sodium Chloride 0.9%   





    100 ml @ Titrate IV .Q0M   





    [facility] Rx#:[id number]   


 


    Sodium Chloride 0.45% 1,  350 





    000 ml @ 50 mls/hr IV .   





    Q20H [facility] Rx#:[id number]   


 


  Oral 910 1050 


 


Output:   


 


  Urine 400 900 


 


Other:   


 


  Voiding Method Bedside Commode Bedside Commode 





 External Catheter External Catheter 














- Labs


CBC & Chem 7: 


                                 03/17/25 05:22





                                 03/17/25 05:22


Labs: 


                  Abnormal Lab Results - Last 24 Hours (Table)











  03/15/25 03/16/25 03/16/25 Range/Units





  08:08 09:41 10:45 


 


WBC     (3.8-10.6)  k/uL


 


RBC     (4.30-5.90)  m/uL


 


Hgb     (13.0-17.5)  gm/dL


 


Hct     (39.0-53.0)  %


 


Neutrophils #     (1.3-7.7)  k/uL


 


Lymphocytes #     (1.0-4.8)  k/uL


 


Sodium     (137-145)  mmol/L


 


BUN     (9-20)  mg/dL


 


Creatinine     (0.66-1.25)  mg/dL


 


Glucose     (74-99)  mg/dL


 


POC Glucose (mg/dL)   161 H  132 H  ()  mg/dL


 


Hemoglobin A1c  7.5 H    (<=6.0)  %














  03/16/25 03/16/25 03/16/25 Range/Units





  11:45 12:19 13:53 


 


WBC     (3.8-10.6)  k/uL


 


RBC     (4.30-5.90)  m/uL


 


Hgb     (13.0-17.5)  gm/dL


 


Hct     (39.0-53.0)  %


 


Neutrophils #     (1.3-7.7)  k/uL


 


Lymphocytes #     (1.0-4.8)  k/uL


 


Sodium     (137-145)  mmol/L


 


BUN     (9-20)  mg/dL


 


Creatinine     (0.66-1.25)  mg/dL


 


Glucose     (74-99)  mg/dL


 


POC Glucose (mg/dL)  130 H  134 H  171 H  ()  mg/dL


 


Hemoglobin A1c     (<=6.0)  %














  03/16/25 03/16/25 03/16/25 Range/Units





  15:29 16:50 17:52 


 


WBC     (3.8-10.6)  k/uL


 


RBC     (4.30-5.90)  m/uL


 


Hgb     (13.0-17.5)  gm/dL


 


Hct     (39.0-53.0)  %


 


Neutrophils #     (1.3-7.7)  k/uL


 


Lymphocytes #     (1.0-4.8)  k/uL


 


Sodium     (137-145)  mmol/L


 


BUN     (9-20)  mg/dL


 


Creatinine     (0.66-1.25)  mg/dL


 


Glucose     (74-99)  mg/dL


 


POC Glucose (mg/dL)  249 H  298 H  282 H  ()  mg/dL


 


Hemoglobin A1c     (<=6.0)  %














  03/16/25 03/16/25 03/16/25 Range/Units





  19:42 21:10 21:56 


 


WBC     (3.8-10.6)  k/uL


 


RBC     (4.30-5.90)  m/uL


 


Hgb     (13.0-17.5)  gm/dL


 


Hct     (39.0-53.0)  %


 


Neutrophils #     (1.3-7.7)  k/uL


 


Lymphocytes #     (1.0-4.8)  k/uL


 


Sodium     (137-145)  mmol/L


 


BUN     (9-20)  mg/dL


 


Creatinine     (0.66-1.25)  mg/dL


 


Glucose     (74-99)  mg/dL


 


POC Glucose (mg/dL)  308 H  269 H  244 H  ()  mg/dL


 


Hemoglobin A1c     (<=6.0)  %














  03/16/25 03/16/25 03/17/25 Range/Units





  23:09 23:58 02:22 


 


WBC     (3.8-10.6)  k/uL


 


RBC     (4.30-5.90)  m/uL


 


Hgb     (13.0-17.5)  gm/dL


 


Hct     (39.0-53.0)  %


 


Neutrophils #     (1.3-7.7)  k/uL


 


Lymphocytes #     (1.0-4.8)  k/uL


 


Sodium     (137-145)  mmol/L


 


BUN     (9-20)  mg/dL


 


Creatinine     (0.66-1.25)  mg/dL


 


Glucose     (74-99)  mg/dL


 


POC Glucose (mg/dL)  188 H  155 H  128 H  ()  mg/dL


 


Hemoglobin A1c     (<=6.0)  %














  03/17/25 03/17/25 03/17/25 Range/Units





  03:26 05:20 05:22 


 


WBC    21.7 H  (3.8-10.6)  k/uL


 


RBC    2.62 L  (4.30-5.90)  m/uL


 


Hgb    7.9 L  (13.0-17.5)  gm/dL


 


Hct    25.0 L  (39.0-53.0)  %


 


Neutrophils #    20.7 H  (1.3-7.7)  k/uL


 


Lymphocytes #    0.4 L  (1.0-4.8)  k/uL


 


Sodium     (137-145)  mmol/L


 


BUN     (9-20)  mg/dL


 


Creatinine     (0.66-1.25)  mg/dL


 


Glucose     (74-99)  mg/dL


 


POC Glucose (mg/dL)  141 H  234 H   ()  mg/dL


 


Hemoglobin A1c     (<=6.0)  %














  03/17/25 03/17/25 03/17/25 Range/Units





  05:22 06:10 07:10 


 


WBC     (3.8-10.6)  k/uL


 


RBC     (4.30-5.90)  m/uL


 


Hgb     (13.0-17.5)  gm/dL


 


Hct     (39.0-53.0)  %


 


Neutrophils #     (1.3-7.7)  k/uL


 


Lymphocytes #     (1.0-4.8)  k/uL


 


Sodium  128 L    (137-145)  mmol/L


 


BUN  95 H    (9-20)  mg/dL


 


Creatinine  1.97 H    (0.66-1.25)  mg/dL


 


Glucose  186 H    (74-99)  mg/dL


 


POC Glucose (mg/dL)   258 H  278 H  ()  mg/dL


 


Hemoglobin A1c     (<=6.0)  %














  03/17/25 03/17/25 Range/Units





  08:02 09:14 


 


WBC    (3.8-10.6)  k/uL


 


RBC    (4.30-5.90)  m/uL


 


Hgb    (13.0-17.5)  gm/dL


 


Hct    (39.0-53.0)  %


 


Neutrophils #    (1.3-7.7)  k/uL


 


Lymphocytes #    (1.0-4.8)  k/uL


 


Sodium    (137-145)  mmol/L


 


BUN    (9-20)  mg/dL


 


Creatinine    (0.66-1.25)  mg/dL


 


Glucose    (74-99)  mg/dL


 


POC Glucose (mg/dL)  245 H  224 H  ()  mg/dL


 


Hemoglobin A1c    (<=6.0)  %








                      Microbiology - Last 24 Hours (Table)











 03/11/25 20:48 Blood Culture - Final





 Blood

## 2025-03-17 NOTE — P.NPCON
History of Present Illness





- Reason for Consult


acute renal failure





- History of Present Illness





Patient is a 64-year-old male with history of type 2 diabetes hypertension 

hyperlipidemia and chronic A-fib.  He was recently hospitalized for influenza A 

and treated with Tamiflu.  Patient also had volume overload and was diuresed.  

Serum creatinine had peaked at 1.9 mg/dL and improved to 1.2 on 3/7/2025 upon 

discharge.


Patient was readmitted to the hospital with complaints of shortness of breath.  

He has been transferred to the ICU and is maintained on BiPAP.  Patient tested 

again positive for influenza A.





Chest x-ray shows diffuse multifocal airspace opacities.


Patient has been maintained on Lasix which was held yesterday and restarted 

today.





Serum creatinine was 1.2 on initial admission and has increased to 1.9 today.


Blood pressure has been borderline.  Patient is maintained on ACE inhibitors.





Past Medical History


Past Medical History: Atrial Fibrillation, Diabetes Mellitus, Hyperlipidemia, 

Hypertension, Pneumonia, Supraventricular Tachycardia (SVT)


Additional Past Medical History / Comment(s): a fib, causes shortness of breath,

can feel irregular heartbeat


History of Any Multi-Drug Resistant Organisms: None Reported


Past Surgical History: Cholecystectomy, Heart Catheterization


Additional Past Surgical History / Comment(s): Colonoscopy


Past Anesthesia/Blood Transfusion Reactions: No Reported Reaction


Past Psychological History: No Psychological Hx Reported


Smoking Status: Current every day smoker


Past Alcohol Use History: Occasional


Past Drug Use History: Marijuana





- Past Family History


  ** Father


Family Medical History: No Reported History





  ** Mother


Family Medical History: Coronary Artery Disease (CAD)





Medications and Allergies


                                Home Medications











 Medication  Instructions  Recorded  Confirmed  Type


 


Apixaban [Eliquis] 5 mg PO BID 11/17/22 03/12/25 History


 


Furosemide [Lasix] 40 mg PO DAILY 11/17/22 03/12/25 History


 


Amiodarone [Cordarone] 200 mg PO DAILY 08/07/23 03/12/25 History


 


Metoprolol Succinate (ER) [Toprol 25 mg PO DAILY 02/24/25 03/12/25 History





XL]    


 


lisinopriL [Zestril] 20 mg PO BID 02/24/25 03/12/25 History


 


Aspirin 81 mg PO DAILY #90 tab 03/07/25 03/12/25 Rx


 


Atorvastatin [Lipitor] 40 mg PO HS #90 tab 03/07/25 03/12/25 Rx


 


Budesonide-Formot 160-4.5 Mcg 2 puff INHALATION RT-BID 30 Days 03/07/25 03/12/25

 Rx





[Symbicort 160-4.5 Mcg Inhaler] #1 each   


 


Fluticasone Nasal Spray [Flonase 2 spray EA NOSTRIL DAILY PRN  ml 03/07/25 03/12/25 Rx





Nasal Spray]    


 


Spironolactone [Aldactone] 25 mg PO DAILY #90 tablet 03/07/25 03/12/25 Rx


 


amLODIPine [Norvasc] 5 mg PO DAILY #90 tab 03/07/25 03/12/25 Rx


 


metFORMIN HCL [Glucophage] 500 mg PO BID #60 tab 03/07/25 03/12/25 Rx


 


predniSONE See Taper PO DAILY #32 tab 03/07/25 03/12/25 Rx


 


Apixaban [Eliquis Starter Pack 5 - 10 mg PO AS DIRECTED 30 Days 03/13/25  Rx





(for VTE)] #1 each   








                                    Allergies











Allergy/AdvReac Type Severity Reaction Status Date / Time


 


No Known Allergies Allergy   Verified 03/11/25 20:36














Physical Exam


Vitals: 


                                   Vital Signs











  Temp Pulse Resp BP Pulse Ox FiO2


 


 03/17/25 12:52      95 


 


 03/17/25 12:48   72    


 


 03/17/25 12:33   68    


 


 03/17/25 12:06       60


 


 03/17/25 10:30   68  22  118/55  93 L 


 


 03/17/25 10:00   67  26 H  117/53  93 L 


 


 03/17/25 09:52   70    


 


 03/17/25 09:47   77    


 


 03/17/25 09:45   76    


 


 03/17/25 09:30   63  25 H  132/58  95 


 


 03/17/25 09:26   62    98  60


 


 03/17/25 09:25       60


 


 03/17/25 09:00    24  116/54  95 


 


 03/17/25 08:30   61  26 H  121/73  99 


 


 03/17/25 08:00  97.8 F  62  24  121/60  100  70


 


 03/17/25 07:30   60  20  123/54  100 


 


 03/17/25 07:00   64  24  138/76  100 


 


 03/17/25 06:30   63  26 H  172/83  95 


 


 03/17/25 06:00   69  24  162/66  99 


 


 03/17/25 05:30   74  32 H  111/48  97 


 


 03/17/25 05:00   66  28 H  119/57  98 


 


 03/17/25 04:54       90


 


 03/17/25 04:53       70


 


 03/17/25 04:52   61    


 


 03/17/25 04:30   62  24  110/48  94 L 


 


 03/17/25 04:00  97.6 F  60  23  115/85  100  70


 


 03/17/25 03:30   64  19  114/66  94 L 


 


 03/17/25 03:00   59 L  19  101/52  100 


 


 03/17/25 02:30   61  21  111/59  94 L 


 


 03/17/25 02:00   64  21  103/48  96 


 


 03/17/25 01:30   61  24  114/58  99 


 


 03/17/25 01:00   63  23  118/59  94 L 


 


 03/17/25 00:30   65  22  113/71  94 L 


 


 03/17/25 00:00  97.6 F  70  27 H  121/75  95  70


 


 03/16/25 23:44   70    


 


 03/16/25 23:33   68    


 


 03/16/25 23:30   66  27 H  122/60  99 


 


 03/16/25 23:21   67  14  122/60  98 


 


 03/16/25 23:00   67  13  126/70  99  70


 


 03/16/25 22:40       70


 


 03/16/25 22:30   67  13  121/103  95 


 


 03/16/25 22:00   74  31 H  124/75  88 L 


 


 03/16/25 21:30   69  25 H  135/79  94 L 


 


 03/16/25 21:00   72  25 H  141/62  98 


 


 03/16/25 20:30   72  27 H  135/63  92 L 


 


 03/16/25 20:04   78    


 


 03/16/25 20:00  97.8 F  73  19  117/75  98 


 


 03/16/25 19:54   75    


 


 03/16/25 19:53   73    


 


 03/16/25 19:49      94 L  90


 


 03/16/25 19:43   72    


 


 03/16/25 19:30   67  23  127/60  92 L  100


 


 03/16/25 19:00   72  26 H  127/46  87 L 


 


 03/16/25 18:30   70  21  124/59  96 


 


 03/16/25 18:00   70  36 H  137/90  91 L  100


 


 03/16/25 17:30   75  23  129/58  92 L 


 


 03/16/25 17:00  97.9 F  78  20  125/63  92 L  100


 


 03/16/25 16:30   75  29 H  129/62  91 L 


 


 03/16/25 16:00   70  29 H  120/78  93 L  100


 


 03/16/25 15:34   70    


 


 03/16/25 15:30   68  25 H  127/60  99 


 


 03/16/25 15:23   68    96  90


 


 03/16/25 15:00   61  22  123/66  92 L  100


 


 03/16/25 14:30   68  12  124/103  92 L 


 


 03/16/25 14:00   62  20  128/66  92 L 


 


 03/16/25 13:30   62  36 H  126/73  95 








                                Intake and Output











 03/16/25 03/17/25 03/17/25





 22:59 06:59 14:59


 


Intake Total 2057.706 655.717 332.183


 


Output Total 575 550 250


 


Balance 1482.706 105.717 82.183


 


Intake:   


 


    100


 


    Cefepime 2 gm In Sodium 325  100





    Chloride 0.9% 100 ml @ 25   





    mls/hr IVPB Q12H [facility] Rx#   





    :[id number]   


 


  Intake, IV Titration 122.706 305.717 232.183





  Amount   


 


    Insulin Regular 100 unit 22.706 55.717 32.183





    In Sodium Chloride 0.9%   





    100 ml @ Titrate IV .Q0M   





    [facility] Rx#:[id number]   


 


    Sodium Chloride 0.45% 1, 100 250 200





    000 ml @ 50 mls/hr IV .   





    Q20H [facility] Rx#:[id number]   


 


  Oral 1610 350 


 


Output:   


 


  Urine 575 550 250


 


Other:   


 


  Voiding Method Bedside Commode Bedside Commode External Catheter





 External Catheter External Catheter 














Patient is awake, comfortable


On BiPAP


Examination of the heart S1 and S2


Examination of the lungs bilateral breath sounds are heard


Abdomen is soft nontender


Examination of lower extremity shows edema 2+ bilaterally


CNS exam grossly intact





Results





- Lab Results


                             Most recent lab results











ABG pH  7.37  (7.35-7.45)   03/15/25  07:37    


 


ABG pCO2  42 mmHg (35-45)   03/15/25  07:37    


 


ABG pO2  80 mmHg ()  L  03/15/25  07:37    


 


ABG HCO3  24 mmol/L (21-25)   03/15/25  07:37    


 


ABG O2 Saturation  95.9 % (94-97)   03/15/25  07:37    


 


Calcium  8.6 mg/dL (8.4-10.2)   03/17/25  05:22    


 


Phosphorus  4.2 mg/dL (2.5-4.5)   03/16/25  06:55    


 


Magnesium  2.3 mg/dL (1.6-2.3)   03/16/25  06:55    














                                 03/17/25 05:22





                                 03/17/25 05:22





Assessment and Plan


Assessment: 





1.  Acute renal injury, ATN associated with underlying infection and borderline 

low blood pressures in the setting of use of ACE inhibitors.  May continue with 

IV diuretics.  Ultrasound shows no evidence of obstruction.  UA is benign.


2.  Acute hypoxic respiratory failure secondary to pneumonia volume overload


3.  Volume overload


4.  CHF with preserved ejection fraction of 55 to 60% noted this admission


5.  Hypervolemic hyponatremia.  Patient was also maintained on hypotonic IV 

fluids which contributed to the hyponatremia


6.  Influenza A infection status post Tamiflu during previous hospitalization 

about 3 weeks ago


Plan: 





DC half-normal saline


Continue with Lasix


Repeat labs in a.m.


DC lisinopril


Continue with Aldactone


Monitor potassium


Continue to avoid nephrotoxic agents





Thank you for the consultation.  We will continue to follow the patient with you

during his hospitalization.

## 2025-03-18 LAB
ANION GAP SERPL CALC-SCNC: 7 MMOL/L
BASOPHILS # BLD AUTO: 0 K/UL (ref 0–0.2)
BASOPHILS NFR BLD AUTO: 0 %
BUN SERPL-SCNC: 117 MG/DL (ref 9–20)
CALCIUM SPEC-MCNC: 8.9 MG/DL (ref 8.4–10.2)
CHLORIDE SERPL-SCNC: 98 MMOL/L (ref 98–107)
CO2 SERPL-SCNC: 25 MMOL/L (ref 22–30)
EOSINOPHIL # BLD AUTO: 0 K/UL (ref 0–0.7)
EOSINOPHIL NFR BLD AUTO: 0 %
ERYTHROCYTE [DISTWIDTH] IN BLOOD BY AUTOMATED COUNT: 2.44 M/UL (ref 4.3–5.9)
ERYTHROCYTE [DISTWIDTH] IN BLOOD: 13.1 % (ref 11.5–15.5)
GLUCOSE BLD-MCNC: 119 MG/DL (ref 70–110)
GLUCOSE BLD-MCNC: 124 MG/DL (ref 70–110)
GLUCOSE BLD-MCNC: 128 MG/DL (ref 70–110)
GLUCOSE BLD-MCNC: 133 MG/DL (ref 70–110)
GLUCOSE BLD-MCNC: 133 MG/DL (ref 70–110)
GLUCOSE BLD-MCNC: 142 MG/DL (ref 70–110)
GLUCOSE BLD-MCNC: 157 MG/DL (ref 70–110)
GLUCOSE BLD-MCNC: 181 MG/DL (ref 70–110)
GLUCOSE BLD-MCNC: 196 MG/DL (ref 70–110)
GLUCOSE BLD-MCNC: 197 MG/DL (ref 70–110)
GLUCOSE BLD-MCNC: 216 MG/DL (ref 70–110)
GLUCOSE BLD-MCNC: 227 MG/DL (ref 70–110)
GLUCOSE BLD-MCNC: 269 MG/DL (ref 70–110)
GLUCOSE BLD-MCNC: 283 MG/DL (ref 70–110)
GLUCOSE BLD-MCNC: 303 MG/DL (ref 70–110)
GLUCOSE SERPL-MCNC: 106 MG/DL (ref 74–99)
HCT VFR BLD AUTO: 23.3 % (ref 39–53)
HGB BLD-MCNC: 7.4 GM/DL (ref 13–17.5)
LYMPHOCYTES # SPEC AUTO: 0.4 K/UL (ref 1–4.8)
LYMPHOCYTES NFR SPEC AUTO: 2 %
MCH RBC QN AUTO: 30.3 PG (ref 25–35)
MCHC RBC AUTO-ENTMCNC: 31.7 G/DL (ref 31–37)
MCV RBC AUTO: 95.6 FL (ref 80–100)
MONOCYTES # BLD AUTO: 0.6 K/UL (ref 0–1)
MONOCYTES NFR BLD AUTO: 3 %
NEUTROPHILS # BLD AUTO: 21.7 K/UL (ref 1.3–7.7)
NEUTROPHILS NFR BLD AUTO: 95 %
PLATELET # BLD AUTO: 200 K/UL (ref 150–450)
POTASSIUM SERPL-SCNC: 4.2 MMOL/L (ref 3.5–5.1)
SODIUM SERPL-SCNC: 130 MMOL/L (ref 137–145)
WBC # BLD AUTO: 22.8 K/UL (ref 3.8–10.6)

## 2025-03-18 NOTE — P.PN
Subjective


Progress Note Date: 03/18/25


Principal diagnosis: 





Acute on chronic hypoxic respiratory failure, multifactorial











Patient is a 64-year-old male with past medical history significant for atrial 

fibrillation, diabetes mellitus, CKD stage III, hypertension, hyperlipidemia, 

tobacco smoker.  Of note, recently had a prolonged hospitalization 02/23/2025 

through 03/07/2025.  Treated for combination of influenza A, pneumonia, CHF.  

Completed course of antibiotics and Tamiflu.  At this time, did require 

noninvasive BiPAP, eventually discharged on home O2.  Returns with a chief 

complaint of shortness of breath progressing over the last 2 to 3 days.  Also, 

reports sudden onset substernal chest pain that developed the night prior and 

has been persistent.  On arrival to the ED, found to be in some respiratory 

distress, and placed on BiPAP.  Workup in the emergency department including a 

chest x-ray showing multifocal infiltrates concerning for pneumonia or pulmonary

edema.  D-dimer was elevated in setting CT angio protocol which was positive for

segmental and subsegmental right middle lobe and right lower lobe pulmonary 

emboli.  No saddle pulmonary embolus. Pulmonary artery mildly enlarged.  

Preserved RV to LV ratio.  There were diffuse coarse reticular infiltrates, as 

well as, groundglass lung attenuation, cystic changes, with concerns for 

interstitial lung disease. Patient does take Eliquis for his history of atrial 

fibrillation.  Does state he has missed some doses lately.  CBC: WBC count 22.6,

hemoglobin 11, platelets 322.  CMP: Sodium 133, potassium 4.2, chloride 101, 

serum bicarb 24, BUN 30, creatinine 1.27, glucose 135.  Lactic 1.5.  VBG with a 

pH of 7.5 and pCO2 of 32.  EKG: Sinus rhythm, rate 65 bpm, left bundle branch 

block, not acute.  Elevated serial troponins 0.049 and 0.055 respectively.  NT 

proBNP elevated 8807.  Patient is currently being evaluated in the emergency 

department.  He is alert and some moderate respiratory distress.  On BiPAP with 

settings 12/5 and FiO2 60%.  Tachypneic, breathing around 40 breaths/min. 

Endorses progressive worsening difficulty in breathing over last couple days.  

He has tried to increase his supplemental oxygen at home without any relief. 

Denies previous history of DVT or PE.  Associated nonproductive cough.  Denies 

sputum production or hemoptysis.  Substernal nonradiating chest pain persist.  

Denies any fevers or chills.  Denies heart palpitations or syncopal events.  

Denies swelling in the lower extremities.  Heart rhythm is normal sinus on 

bedside monitor.  Blood pressure has been normotensive, did receive 2 L of 

crystalloid fluid bolus earlier.  Not requiring any vasopressors.  Normal saline

is infusing at 150 mL/h.  IV heparin infusing per protocol








3/13/2025, the patient is being seen for a follow-up.  The patient is 

alternating between BiPAP at a pressure of 12/5 with an FiO2 of 60% and 15 L of 

oxygen by nasal cannula.  He is getting slightly anxious and the patient is 

being provided Xanax accordingly.  Fluid balance is -1.1 L over the past 24 

hours.  Limited echocardiogram was also done and it showed preserved LV function

with an EF of around 55 to 60%.  Valvular functions could not be accurately 

assessed as the patient's study was technically difficult study.  There was 

however RV dilatation.  Unable to estimate RV pressures.  The electrolytes from 

today showed a creatinine of 1.5 with a BUN of 35.  Bicarb is at 21.  Sodium is 

at 132.  The patient remains on DuoNeb updrafts.  The patient remains on a 

combination of cefepime and vancomycin.  The patient was taken off the IV hep

claus and the patient was started on anticoagulation with Eliquis.  Remains on IV

Solu-Medrol 60 mg every 6 hours.  Remains on Aldactone.  Remains on IV Lasix 40 

mg every 12 hours.





On today's evaluation of 3/14/2025, the patient is overall condition is 

essentially unchanged.  Continues to be hypoxic, continues to be on BiPAP and 

the patient remains at a pressure of 12/5 with an FiO2 of 60%.  Continues to 

have crackles in lung base bilaterally.  White cell count remains elevated at 20

with a hemoglobin 9.2.  Very much BiPAP dependent as the patient desaturates 

once taken off the BiPAP.  BUN is 44 with a platelet of 1.5.  Sodium levels at 

134 and a potassium level is at 3.4.  Remains on DuoNeb updrafts.  Remains on IV

Lasix 40 mg every 12 hours.  Remains on Aldactone.  Remains on bronchodilators. 

Remains on steroids.  Empiric antibiotic coverage with IV cefepime.  Reviewed 

the CAT scan again and there is some chronic interstitial changes and the 

patient has diffuse infiltrates with areas of groundglass.  Consider acute 

interstitial pneumonias.  Consider fungal pneumonias.





On today's evaluation of 3/15/2025, the patient is feeling slightly better 

compared to yesterday.  The patient is off the BiPAP and the patient is 

currently on 15 L of oxygen by nasal cannula.  Remains on bronchodilators.  

Remains on IV Solu-Medrol.  Remains on IV Lasix.  Antibiotic coverage including 

combination of cefepime, Levaquin orally and itraconazole orally.  Fungal 

antibodies are still pending.  Meanwhile, rheumatologic profile showed a 

negative rheumatoid factor and negative CARLA.  Legionella urine antigen is still 

pending for now.  Clinically however, the patient is feeling slightly improved 

compared to yesterday.  His chest x-ray continues to show multifocal airspace 

disease more so in the right lung.  Fluid balance has been negative.  The 

patient's creatinine is currently at 1.6 with a BUN of 54 and a sodium levels at

133.  I am going to taper the steroids.  The white cell count is 18.2 with a 

hemoglobin of 9.2.





On 3/16/2025, the patient is transferred to the intensive care unit for closer 

monitoring.  As mentioned, the patient developed acute hypoxic respiratory 

failure with diffuse bilateral pulmonary filtrates discussed earlier.  The 

patient was able to tolerate Airvo, however, overnight, the patient developed 

epistaxis.  Therefore was discontinued the patient was placed back on BiPAP and 

his current BiPAP is running at a pressure of 12 over 5 cm of water with an FiO2

of 80%.  The patient seems to be quite dependent on the BiPAP.  Unable to drop 

the FiO2 any further.  Repeat chest x-ray was done and shows stable diffuse 

bilateral pulm infiltrates which remains essentially unchanged compared to 

yesterday.  Clinically, the patient is awake and alert.  He is reported to have 

some congested cough.  No significant sputum production.  No chest pain.  No 

hemoptysis.  Noted, over the past 48 hours, the patient was diuresed with IV 

Lasix.  Nevertheless, we have not seen any improvement in the patient's 

oxygenation.  The patient was negative fluid balance.  His BNP today is at 68 

with a creatinine of 1.7.  Based on that, I stopped diuretics.  Rest of the 

electrolytes are all within normal limits.  White cell count of 21 with a 

hemoglobin 9.1 and a platelet count of 245.  The patient remains on broad-

spectrum antibiotics.  The patient remains on IV cefepime, itraconazole and 

Levaquin.  Fungal antibodies are still pending for now.  CARLA and rheumatoid 

factor were both negative.  Legionella urine antigen was also negative.  The 

patient remains on anticoagulation and I am going to drop the Eliquis dose to 5 

mg p.o. twice a day.  I am not absolutely convinced that the patient has a 

pulmonary embolism.  I reviewed the CAT scan of the chest and there could be 

potentially some subsegmental filling defects in the right, however, the overall

contrast administration was essentially poor.  The patient will be monitored 

very closely in the intensive care unit.  On a separate note, the patient was 

started on insulin drip for blood sugar control.





Patient was seen today on 3/17/2025, remains in the ICU, remains on fluid 

restrictions for low sodium of 128, he is on BiPAP 12/5/60%, patient is 

presenting this time very much similar to his last presentation few weeks ago.  

I am familiar with this patient, and on his last admission patient responded to 

treatment with mostly diuretics, and steroids.  This time came back with a 

similar presentation, he is receiving diuretics, but has been on hold for the 

last 24 hours, and I recommended we go back on Lasix 40 mg IV push every 12 

hours, patient is receiving Solu-Medrol at 60 mg IV push every 8 hours, he is 

also on Levaquin's, cefepime, and he is marginal at best.  In addition to this 

the patient had non-ST elevation myocardial infarction and flu/influenza A back 

on 3/11.  Blood cultures have been negative, patient has leukocytosis with WBC 

count of 21.7 hemoglobin is 7.9.  Creatinine has been slightly rising 1.51 on 

admission which is about his baseline, today his creatinine is 1.97 with a BUN 

of 95.  Legionella antigen has been negative CARLA screen has been negative 

rheumatoid factor is negative patient continues to complain of shortness of 

breath, intermittent cough, and wheezing.





Patient was seen today on 3/18/2025, remains in the ICU, remains on BiPAP, 

patient is down on FiO2 to 50%, so he is on BiPAP 12/5/50%.  Clinically the 

patient is feeling better breathing easier, remains on empiric antibiotics 

including cefepime and Levaquin remains on Lasix 40 mg every 12 hours remains on

steroids, and today I have noticed probably minimal improvement in his chest x-

ray findings.  Hence we will continue the same and will make Lasix 40 mg twice 

daily instead of 40 mg daily.  WBC count is 22.8 hemoglobin 7.4 electrolytes are

normal BUN is 117 creatinine 2.11








Objective





- Vital Signs


Vital signs: 


                                   Vital Signs











Temp  98.9 F   03/18/25 08:00


 


Pulse  74   03/18/25 13:00


 


Resp  23   03/18/25 13:00


 


BP  113/77   03/18/25 13:00


 


Pulse Ox  87 L  03/18/25 13:00


 


FiO2  50   03/18/25 08:00








                                 Intake & Output











 03/17/25 03/18/25 03/18/25





 18:59 06:59 18:59


 


Intake Total 803.435 0514.574 93.530


 


Output Total 1100 750 500


 


Balance -665.717 592.574 -406.470


 


Weight  101.8 kg 


 


Intake:   


 


   210 60


 


    .9 90 110 60


 


    Cefepime 2 gm In Sodium 100 100 





    Chloride 0.9% 100 ml @ 25   





    mls/hr IVPB Q12H [facility] Rx#   





    :[id number]   


 


  Intake, IV Titration 244.283 72.574 33.530





  Amount   


 


    Insulin Regular 100 unit 44.283 72.574 33.530





    In Sodium Chloride 0.9%   





    100 ml @ Titrate IV .Q0M   





    [facility] Rx#:[id number]   


 


    Sodium Chloride 0.45% 1, 200  





    000 ml @ 50 mls/hr IV .   





    Q20H [facility] Rx#:[id number]   


 


  Oral  1060 


 


Output:   


 


  Urine 1100 750 500


 


Other:   


 


  Voiding Method External Catheter External Catheter External Catheter














- Exam








GENERAL EXAM: 64-year-old white male obese , on BiPAP 12/5/50%


HEAD: Normocephalic and atraumatic


EYES: Normal reaction of pupils, equal size.


NOSE: Clear with pink turbinates.  The right nostril is packed


THROAT: No erythema or exudates.


NECK: No masses, no JVD.


CHEST: No chest wall deformity.


LUNGS: Crackles persist bilaterally.


CVS: S1 and S2 normal with soft systolic murmur, regular rhythm. No other extra 

heart sounds


ABDOMEN: No hepatosplenomegaly, active bowel sounds, no guarding or rigidity. 


SKIN: No rashes


CENTRAL NERVOUS SYSTEM: Alert oriented x 3 no gross focal deficit


EXTREMITIES: 2+ bipedal edema, good pulses bilaterally.





- Labs


CBC & Chem 7: 


                                 03/18/25 05:19





                                 03/18/25 05:19


Labs: 


                  Abnormal Lab Results - Last 24 Hours (Table)











  03/17/25 03/17/25 03/17/25 Range/Units





  14:28 15:10 16:10 


 


WBC     (3.8-10.6)  k/uL


 


RBC     (4.30-5.90)  m/uL


 


Hgb     (13.0-17.5)  gm/dL


 


Hct     (39.0-53.0)  %


 


Neutrophils #     (1.3-7.7)  k/uL


 


Lymphocytes #     (1.0-4.8)  k/uL


 


Sodium     (137-145)  mmol/L


 


BUN     (9-20)  mg/dL


 


Creatinine     (0.66-1.25)  mg/dL


 


Glucose     (74-99)  mg/dL


 


POC Glucose (mg/dL)  182 H  157 H  149 H  ()  mg/dL














  03/17/25 03/17/25 03/17/25 Range/Units





  17:06 18:08 19:00 


 


WBC     (3.8-10.6)  k/uL


 


RBC     (4.30-5.90)  m/uL


 


Hgb     (13.0-17.5)  gm/dL


 


Hct     (39.0-53.0)  %


 


Neutrophils #     (1.3-7.7)  k/uL


 


Lymphocytes #     (1.0-4.8)  k/uL


 


Sodium     (137-145)  mmol/L


 


BUN     (9-20)  mg/dL


 


Creatinine     (0.66-1.25)  mg/dL


 


Glucose     (74-99)  mg/dL


 


POC Glucose (mg/dL)  121 H  135 H  185 H  ()  mg/dL














  03/17/25 03/17/25 03/17/25 Range/Units





  20:11 21:52 22:58 


 


WBC     (3.8-10.6)  k/uL


 


RBC     (4.30-5.90)  m/uL


 


Hgb     (13.0-17.5)  gm/dL


 


Hct     (39.0-53.0)  %


 


Neutrophils #     (1.3-7.7)  k/uL


 


Lymphocytes #     (1.0-4.8)  k/uL


 


Sodium     (137-145)  mmol/L


 


BUN     (9-20)  mg/dL


 


Creatinine     (0.66-1.25)  mg/dL


 


Glucose     (74-99)  mg/dL


 


POC Glucose (mg/dL)  230 H  285 H  298 H  ()  mg/dL














  03/18/25 03/18/25 03/18/25 Range/Units





  00:19 01:46 03:06 


 


WBC     (3.8-10.6)  k/uL


 


RBC     (4.30-5.90)  m/uL


 


Hgb     (13.0-17.5)  gm/dL


 


Hct     (39.0-53.0)  %


 


Neutrophils #     (1.3-7.7)  k/uL


 


Lymphocytes #     (1.0-4.8)  k/uL


 


Sodium     (137-145)  mmol/L


 


BUN     (9-20)  mg/dL


 


Creatinine     (0.66-1.25)  mg/dL


 


Glucose     (74-99)  mg/dL


 


POC Glucose (mg/dL)  283 H  216 H  197 H  ()  mg/dL














  03/18/25 03/18/25 03/18/25 Range/Units





  04:18 05:19 05:19 


 


WBC   22.8 H   (3.8-10.6)  k/uL


 


RBC   2.44 L   (4.30-5.90)  m/uL


 


Hgb   7.4 L   (13.0-17.5)  gm/dL


 


Hct   23.3 L   (39.0-53.0)  %


 


Neutrophils #   21.7 H   (1.3-7.7)  k/uL


 


Lymphocytes #   0.4 L   (1.0-4.8)  k/uL


 


Sodium    130 L  (137-145)  mmol/L


 


BUN    117 H*  (9-20)  mg/dL


 


Creatinine    2.11 H  (0.66-1.25)  mg/dL


 


Glucose    106 H  (74-99)  mg/dL


 


POC Glucose (mg/dL)  119 H    ()  mg/dL














  03/18/25 03/18/25 03/18/25 Range/Units





  05:24 06:49 10:20 


 


WBC     (3.8-10.6)  k/uL


 


RBC     (4.30-5.90)  m/uL


 


Hgb     (13.0-17.5)  gm/dL


 


Hct     (39.0-53.0)  %


 


Neutrophils #     (1.3-7.7)  k/uL


 


Lymphocytes #     (1.0-4.8)  k/uL


 


Sodium     (137-145)  mmol/L


 


BUN     (9-20)  mg/dL


 


Creatinine     (0.66-1.25)  mg/dL


 


Glucose     (74-99)  mg/dL


 


POC Glucose (mg/dL)  133 H  196 H  181 H  ()  mg/dL














  03/18/25 Range/Units





  12:48 


 


WBC   (3.8-10.6)  k/uL


 


RBC   (4.30-5.90)  m/uL


 


Hgb   (13.0-17.5)  gm/dL


 


Hct   (39.0-53.0)  %


 


Neutrophils #   (1.3-7.7)  k/uL


 


Lymphocytes #   (1.0-4.8)  k/uL


 


Sodium   (137-145)  mmol/L


 


BUN   (9-20)  mg/dL


 


Creatinine   (0.66-1.25)  mg/dL


 


Glucose   (74-99)  mg/dL


 


POC Glucose (mg/dL)  133 H  ()  mg/dL














Assessment and Plan


Assessment: 


Impression:


Acute on chronic hypoxic respiratory failure, multifactorial, I suspect this is 

mostly a picture of pulmonary edema, underlying pneumonia is not entirely ruled 

out.  Hence the patient will remain on antibiotics and diuretics.


Acute exacerbation of chronic systolic congestive heart failure, recent echocar

diogram from 02/24/2025 estimating a left ventricular ejection fraction of 45 to

50%, moderate pulmonary hypertension, and moderate tricuspid regurgitation.  

Repeat echocardiogram showed improvement in LV function with ejection fraction 

55%


Acute leukocytosis


Acute on chronic kidney disease


Recent influenza A infection


History of atrial fibrillation with previous cardioversion 


History of underlying COPD and chronic tobacco dependence


Benign essential hypertension


Type 2 diabetes





Recommendation:


Continue BiPAP 12/5/50%, titrate FiO2 down accordingly


Continue bronchodilators


Continue Lasix


Continue Solu-Medrol


Continue empiric antibiotics and antifungal patient is on cefepime and Levaquin 

and itraconazole


Connective tissue disease workup seems to be negative


Histoplasma antigen is negative


Procalcitonin level is not elevated and never was elevated even on his last 

admission


Continues to have elevated BNP level


Continue to monitor in the ICU


Will continue to follow


C


Time with Patient: Less than 30

## 2025-03-18 NOTE — P.PN
Subjective





Patient is seen for follow-up for acute kidney injury.


Currently maintained on Lasix


Respiratory status seems to have improved although patient continues to require 

BiPAP on and off.





Serum creatinine at 2.1 today.  BUN disproportionately elevated at 117.  Patient

is maintained on steroids.





24-hour urine output at 1850 mL





Objective





- Vital Signs


Vital signs: 


                                   Vital Signs











Temp  98.9 F   03/18/25 08:00


 


Pulse  65   03/18/25 16:26


 


Resp  24   03/18/25 15:00


 


BP  99/45   03/18/25 15:00


 


Pulse Ox  87 L  03/18/25 13:00


 


FiO2  40   03/18/25 16:20








                                 Intake & Output











 03/17/25 03/18/25 03/18/25





 18:59 06:59 18:59


 


Intake Total 559.286 4784.574 113.530


 


Output Total 1100 750 750


 


Balance -665.717 592.574 -636.470


 


Weight  101.8 kg 


 


Intake:   


 


   210 80


 


    .9 90 110 80


 


    Cefepime 2 gm In Sodium 100 100 





    Chloride 0.9% 100 ml @ 25   





    mls/hr IVPB Q12H [facility] Rx#   





    :[id number]   


 


  Intake, IV Titration 244.283 72.574 33.530





  Amount   


 


    Insulin Regular 100 unit 44.283 72.574 33.530





    In Sodium Chloride 0.9%   





    100 ml @ Titrate IV .Q0M   





    [facility] Rx#:[id number]   


 


    Sodium Chloride 0.45% 1, 200  





    000 ml @ 50 mls/hr IV .   





    Q20H [facility] Rx#:[id number]   


 


  Oral  1060 


 


Output:   


 


  Urine 1100 750 750


 


Other:   


 


  Voiding Method External Catheter External Catheter External Catheter














- Exam





Patient is awake, comfortable


Maintained on BiPAP


Examination of the heart S1 and S2


Examination of the lungs decreased breath sounds at the bases occasional 

wheezing heard bilaterally


Abdomen is soft nontender


Examination of lower extremities shows edema 1+ bilaterally


CNS exam grossly intact





- Labs


CBC & Chem 7: 


                                 03/18/25 05:19





                                 03/18/25 05:19


Labs: 


                  Abnormal Lab Results - Last 24 Hours (Table)











  03/17/25 03/17/25 03/17/25 Range/Units





  17:06 18:08 19:00 


 


WBC     (3.8-10.6)  k/uL


 


RBC     (4.30-5.90)  m/uL


 


Hgb     (13.0-17.5)  gm/dL


 


Hct     (39.0-53.0)  %


 


Neutrophils #     (1.3-7.7)  k/uL


 


Lymphocytes #     (1.0-4.8)  k/uL


 


Sodium     (137-145)  mmol/L


 


BUN     (9-20)  mg/dL


 


Creatinine     (0.66-1.25)  mg/dL


 


Glucose     (74-99)  mg/dL


 


POC Glucose (mg/dL)  121 H  135 H  185 H  ()  mg/dL














  03/17/25 03/17/25 03/17/25 Range/Units





  20:11 21:52 22:58 


 


WBC     (3.8-10.6)  k/uL


 


RBC     (4.30-5.90)  m/uL


 


Hgb     (13.0-17.5)  gm/dL


 


Hct     (39.0-53.0)  %


 


Neutrophils #     (1.3-7.7)  k/uL


 


Lymphocytes #     (1.0-4.8)  k/uL


 


Sodium     (137-145)  mmol/L


 


BUN     (9-20)  mg/dL


 


Creatinine     (0.66-1.25)  mg/dL


 


Glucose     (74-99)  mg/dL


 


POC Glucose (mg/dL)  230 H  285 H  298 H  ()  mg/dL














  03/18/25 03/18/25 03/18/25 Range/Units





  00:19 01:46 03:06 


 


WBC     (3.8-10.6)  k/uL


 


RBC     (4.30-5.90)  m/uL


 


Hgb     (13.0-17.5)  gm/dL


 


Hct     (39.0-53.0)  %


 


Neutrophils #     (1.3-7.7)  k/uL


 


Lymphocytes #     (1.0-4.8)  k/uL


 


Sodium     (137-145)  mmol/L


 


BUN     (9-20)  mg/dL


 


Creatinine     (0.66-1.25)  mg/dL


 


Glucose     (74-99)  mg/dL


 


POC Glucose (mg/dL)  283 H  216 H  197 H  ()  mg/dL














  03/18/25 03/18/25 03/18/25 Range/Units





  04:18 05:19 05:19 


 


WBC   22.8 H   (3.8-10.6)  k/uL


 


RBC   2.44 L   (4.30-5.90)  m/uL


 


Hgb   7.4 L   (13.0-17.5)  gm/dL


 


Hct   23.3 L   (39.0-53.0)  %


 


Neutrophils #   21.7 H   (1.3-7.7)  k/uL


 


Lymphocytes #   0.4 L   (1.0-4.8)  k/uL


 


Sodium    130 L  (137-145)  mmol/L


 


BUN    117 H*  (9-20)  mg/dL


 


Creatinine    2.11 H  (0.66-1.25)  mg/dL


 


Glucose    106 H  (74-99)  mg/dL


 


POC Glucose (mg/dL)  119 H    ()  mg/dL














  03/18/25 03/18/25 03/18/25 Range/Units





  05:24 06:49 10:20 


 


WBC     (3.8-10.6)  k/uL


 


RBC     (4.30-5.90)  m/uL


 


Hgb     (13.0-17.5)  gm/dL


 


Hct     (39.0-53.0)  %


 


Neutrophils #     (1.3-7.7)  k/uL


 


Lymphocytes #     (1.0-4.8)  k/uL


 


Sodium     (137-145)  mmol/L


 


BUN     (9-20)  mg/dL


 


Creatinine     (0.66-1.25)  mg/dL


 


Glucose     (74-99)  mg/dL


 


POC Glucose (mg/dL)  133 H  196 H  181 H  ()  mg/dL














  03/18/25 03/18/25 Range/Units





  12:48 15:26 


 


WBC    (3.8-10.6)  k/uL


 


RBC    (4.30-5.90)  m/uL


 


Hgb    (13.0-17.5)  gm/dL


 


Hct    (39.0-53.0)  %


 


Neutrophils #    (1.3-7.7)  k/uL


 


Lymphocytes #    (1.0-4.8)  k/uL


 


Sodium    (137-145)  mmol/L


 


BUN    (9-20)  mg/dL


 


Creatinine    (0.66-1.25)  mg/dL


 


Glucose    (74-99)  mg/dL


 


POC Glucose (mg/dL)  133 H  124 H  ()  mg/dL














Assessment and Plan


Assessment: 





1.  Acute renal injury, ATN associated with underlying infection and borderline 

low blood pressures in the setting of use of ACE inhibitors.  May continue with 

IV diuretics.  Ultrasound shows no evidence of obstruction.  UA is benign.  BUN 

disproportionately elevated secondary to steroids.


2.  Acute hypoxic respiratory failure secondary to pneumonia and volume overload


3.  Volume overload


4.  CHF with preserved ejection fraction of 55 to 60% noted this admission


5.  Hypervolemic hyponatremia.  Patient was also maintained on hypotonic IV flui

ds which contributed to the hyponatremia, now improved


6.  Influenza A infection status post Tamiflu during previous hospitalization 

about 3 weeks ago   


Plan: 








Continue with Lasix


Repeat labs in a.m.


Continue off of ACE inhibitors


Continue with Aldactone


Monitor potassium


Continue to avoid nephrotoxic agents

## 2025-03-18 NOTE — P.PN
Subjective


Progress Note Date: 03/18/25


Principal diagnosis: 





Reason for follow-up is pneumonia





Patient is a 64-year-old  male with a past medical history significant 

for diabetes mellitus hypertension hyperlipidemia pneumonia atrial fibrillation 

currently everyday smoker presenting to the hospital for evaluation of 

increasing shortness of breath patient has been diagnosed with multifocal 

pneumonia prompting this consultation.


On today's evaluation that is 03/18/2025, Patient is afebrile this morning patie

nt is on high flow nasal cannula oxygen denies any worsening shortness of breath

or cough no chest pain no abdominal pain or diarrhea.


Patient white count is 22.8, platelets 2.11 blood culture negative sputum is 

pending





Objective





- Vital Signs


Vital signs: 


                                   Vital Signs











Temp  98.9 F   03/18/25 08:00


 


Pulse  61   03/18/25 16:16


 


Resp  24   03/18/25 15:00


 


BP  99/45   03/18/25 15:00


 


Pulse Ox  87 L  03/18/25 13:00


 


FiO2  50   03/18/25 08:00








                                 Intake & Output











 03/17/25 03/18/25 03/18/25





 18:59 06:59 18:59


 


Intake Total 918.127 2671.574 113.530


 


Output Total 1100 750 750


 


Balance -665.717 592.574 -636.470


 


Weight  101.8 kg 


 


Intake:   


 


   210 80


 


    .9 90 110 80


 


    Cefepime 2 gm In Sodium 100 100 





    Chloride 0.9% 100 ml @ 25   





    mls/hr IVPB Q12H [facility] Rx#   





    :[id number]   


 


  Intake, IV Titration 244.283 72.574 33.530





  Amount   


 


    Insulin Regular 100 unit 44.283 72.574 33.530





    In Sodium Chloride 0.9%   





    100 ml @ Titrate IV .Q0M   





    [facility] Rx#:[id number]   


 


    Sodium Chloride 0.45% 1, 200  





    000 ml @ 50 mls/hr IV .   





    Q20H [facility] Rx#:[id number]   


 


  Oral  1060 


 


Output:   


 


  Urine 1100 750 750


 


Other:   


 


  Voiding Method External Catheter External Catheter External Catheter














- Exam





GENERAL DESCRIPTION: An elderly male lying in bed in no distress





RESPIRATORY SYSTEM: Unlabored breathing , coarse breath sounds bilaterally





HEART: S1 S2 regular rate and rhythm ,





ABDOMEN: Soft , no tenderness





EXTREMITIES: No edema feet





- Labs


CBC & Chem 7: 


                                 03/18/25 05:19





                                 03/18/25 05:19


Labs: 


                  Abnormal Lab Results - Last 24 Hours (Table)











  03/17/25 03/17/25 03/17/25 Range/Units





  17:06 18:08 19:00 


 


WBC     (3.8-10.6)  k/uL


 


RBC     (4.30-5.90)  m/uL


 


Hgb     (13.0-17.5)  gm/dL


 


Hct     (39.0-53.0)  %


 


Neutrophils #     (1.3-7.7)  k/uL


 


Lymphocytes #     (1.0-4.8)  k/uL


 


Sodium     (137-145)  mmol/L


 


BUN     (9-20)  mg/dL


 


Creatinine     (0.66-1.25)  mg/dL


 


Glucose     (74-99)  mg/dL


 


POC Glucose (mg/dL)  121 H  135 H  185 H  ()  mg/dL














  03/17/25 03/17/25 03/17/25 Range/Units





  20:11 21:52 22:58 


 


WBC     (3.8-10.6)  k/uL


 


RBC     (4.30-5.90)  m/uL


 


Hgb     (13.0-17.5)  gm/dL


 


Hct     (39.0-53.0)  %


 


Neutrophils #     (1.3-7.7)  k/uL


 


Lymphocytes #     (1.0-4.8)  k/uL


 


Sodium     (137-145)  mmol/L


 


BUN     (9-20)  mg/dL


 


Creatinine     (0.66-1.25)  mg/dL


 


Glucose     (74-99)  mg/dL


 


POC Glucose (mg/dL)  230 H  285 H  298 H  ()  mg/dL














  03/18/25 03/18/25 03/18/25 Range/Units





  00:19 01:46 03:06 


 


WBC     (3.8-10.6)  k/uL


 


RBC     (4.30-5.90)  m/uL


 


Hgb     (13.0-17.5)  gm/dL


 


Hct     (39.0-53.0)  %


 


Neutrophils #     (1.3-7.7)  k/uL


 


Lymphocytes #     (1.0-4.8)  k/uL


 


Sodium     (137-145)  mmol/L


 


BUN     (9-20)  mg/dL


 


Creatinine     (0.66-1.25)  mg/dL


 


Glucose     (74-99)  mg/dL


 


POC Glucose (mg/dL)  283 H  216 H  197 H  ()  mg/dL














  03/18/25 03/18/25 03/18/25 Range/Units





  04:18 05:19 05:19 


 


WBC   22.8 H   (3.8-10.6)  k/uL


 


RBC   2.44 L   (4.30-5.90)  m/uL


 


Hgb   7.4 L   (13.0-17.5)  gm/dL


 


Hct   23.3 L   (39.0-53.0)  %


 


Neutrophils #   21.7 H   (1.3-7.7)  k/uL


 


Lymphocytes #   0.4 L   (1.0-4.8)  k/uL


 


Sodium    130 L  (137-145)  mmol/L


 


BUN    117 H*  (9-20)  mg/dL


 


Creatinine    2.11 H  (0.66-1.25)  mg/dL


 


Glucose    106 H  (74-99)  mg/dL


 


POC Glucose (mg/dL)  119 H    ()  mg/dL














  03/18/25 03/18/25 03/18/25 Range/Units





  05:24 06:49 10:20 


 


WBC     (3.8-10.6)  k/uL


 


RBC     (4.30-5.90)  m/uL


 


Hgb     (13.0-17.5)  gm/dL


 


Hct     (39.0-53.0)  %


 


Neutrophils #     (1.3-7.7)  k/uL


 


Lymphocytes #     (1.0-4.8)  k/uL


 


Sodium     (137-145)  mmol/L


 


BUN     (9-20)  mg/dL


 


Creatinine     (0.66-1.25)  mg/dL


 


Glucose     (74-99)  mg/dL


 


POC Glucose (mg/dL)  133 H  196 H  181 H  ()  mg/dL














  03/18/25 03/18/25 Range/Units





  12:48 15:26 


 


WBC    (3.8-10.6)  k/uL


 


RBC    (4.30-5.90)  m/uL


 


Hgb    (13.0-17.5)  gm/dL


 


Hct    (39.0-53.0)  %


 


Neutrophils #    (1.3-7.7)  k/uL


 


Lymphocytes #    (1.0-4.8)  k/uL


 


Sodium    (137-145)  mmol/L


 


BUN    (9-20)  mg/dL


 


Creatinine    (0.66-1.25)  mg/dL


 


Glucose    (74-99)  mg/dL


 


POC Glucose (mg/dL)  133 H  124 H  ()  mg/dL














Assessment and Plan


(1) Leukocytosis


Current Visit: Yes   Status: Acute   Code(s): D72.829 - ELEVATED WHITE BLOOD 

CELL COUNT, UNSPECIFIED   SNOMED Code(s): 148322782


   





(2) Bilateral pneumonia


Current Visit: Yes   Status: Acute   Code(s): J18.9 - PNEUMONIA, UNSPECIFIED 

ORGANISM   SNOMED Code(s): 024676533


   


Plan: 





1patient presented hospital with increasing shortness of breath and chest pain 

which is likely multifactorial in this patient who did have evidence of PE on 

the CT there is also evidence of multifocal pneumonia with elevated white count 

and recently acute influenza A will need to cover for resistant gram-positive as

well as gram-negative pathogen for post influenza pneumonia


2-patient will bring up sputum and sputum culture has been sent results will be 

followed serologies currently pending


3-patient is currently being treated cefepime and  itraconazole, elevated white 

count more likely steroid related will monitor closely


Dictation was produced using dragon dictation software. please excuse any 

grammatical, word or spelling errors. 








Time with Patient: Less than 30

## 2025-03-18 NOTE — P.PN
Subjective


Progress Note Date: 03/18/25


The patient is a 64-year-old male who is currently admitted to the hospital with

acute hypoxic respiratory failure secondary to pulmonary embolism, influenza, 

and pneumonia.  Patient was transferred to the ICU  for hypoxia despite BiPAP 

therapy.  Over the last 24 hours his oxygen requirements have increased as well 

as had worsening kidney function





Patient interviewed and examined resting in bed.  Breathing appears to be better

today, however he is more obtunded.





GENERAL: Well-appearing, well-nourished.  Positive for labored breathing


NECK: Supple without JVD or thyromegaly.


LUNGS: Breath sounds diminished to auscultation bilaterally. Respiration equal. 

No wheezes, rales or rhonchi.


HEART: Regular rate and rhythm without murmurs, rubs or gallops. S1 and S2 

heard.


EXTREMITIES: Normal range of motion, no edema.  No clubbing or cyanosis. 

Peripheral pulses intact and strong.





TELEMETRY:


Sinus rhythm overnight





LABS:


WBC 22, hemoglobin 7.4, hematocrit 23.3, platelet 200, sodium 130, potassium 4.2

, , creatinine 2.11





IMPRESSION:


Acute pulmonary emboli due to prolonged hospitalization and unable to afford 

Eliquis


Acute hypoxic respiratory failure requiring BiPAP secondary to influenza, p

neumonia, and component of heart failure


NSTEMI secondary to PE, influenza and pneumonia


Influenza A


Pneumonia


Acute on chronic systolic heart failure


Acute kidney injury, worsening


Cardiomyopathy with previous EF of 15 to 20%


Hypertension


Hyperlipidemia


Persistent atrial fibrillation, currently in sinus rhythm


Diabetes mellitus type 2





PLAN:


Continue supportive treatment


Poor prognosis due to his multiple comorbid conditions


No further recommendations from the cardiac standpoint


 





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 

assessment/plan.








Objective





- Vital Signs


Vital signs: 


                                   Vital Signs











Temp  98.9 F   03/18/25 08:00


 


Pulse  74   03/18/25 13:00


 


Resp  23   03/18/25 13:00


 


BP  113/77   03/18/25 13:00


 


Pulse Ox  87 L  03/18/25 13:00


 


FiO2  50   03/18/25 08:00








                                 Intake & Output











 03/17/25 03/18/25 03/18/25





 18:59 06:59 18:59


 


Intake Total 375.349 4227.574 93.530


 


Output Total 1100 750 500


 


Balance -665.717 592.574 -406.470


 


Weight  101.8 kg 


 


Intake:   


 


   210 60


 


    .9 90 110 60


 


    Cefepime 2 gm In Sodium 100 100 





    Chloride 0.9% 100 ml @ 25   





    mls/hr IVPB Q12H [facility] Rx#   





    :[id number]   


 


  Intake, IV Titration 244.283 72.574 33.530





  Amount   


 


    Insulin Regular 100 unit 44.283 72.574 33.530





    In Sodium Chloride 0.9%   





    100 ml @ Titrate IV .Q0M   





    [facility] Rx#:[id number]   


 


    Sodium Chloride 0.45% 1, 200  





    000 ml @ 50 mls/hr IV .   





    Q20H [facility] Rx#:[id number]   


 


  Oral  1060 


 


Output:   


 


  Urine 1100 750 500


 


Other:   


 


  Voiding Method External Catheter External Catheter External Catheter














- Labs


CBC & Chem 7: 


                                 03/18/25 05:19





                                 03/18/25 05:19


Labs: 


                  Abnormal Lab Results - Last 24 Hours (Table)











  03/17/25 03/17/25 03/17/25 Range/Units





  14:28 15:10 16:10 


 


WBC     (3.8-10.6)  k/uL


 


RBC     (4.30-5.90)  m/uL


 


Hgb     (13.0-17.5)  gm/dL


 


Hct     (39.0-53.0)  %


 


Neutrophils #     (1.3-7.7)  k/uL


 


Lymphocytes #     (1.0-4.8)  k/uL


 


Sodium     (137-145)  mmol/L


 


BUN     (9-20)  mg/dL


 


Creatinine     (0.66-1.25)  mg/dL


 


Glucose     (74-99)  mg/dL


 


POC Glucose (mg/dL)  182 H  157 H  149 H  ()  mg/dL














  03/17/25 03/17/25 03/17/25 Range/Units





  17:06 18:08 19:00 


 


WBC     (3.8-10.6)  k/uL


 


RBC     (4.30-5.90)  m/uL


 


Hgb     (13.0-17.5)  gm/dL


 


Hct     (39.0-53.0)  %


 


Neutrophils #     (1.3-7.7)  k/uL


 


Lymphocytes #     (1.0-4.8)  k/uL


 


Sodium     (137-145)  mmol/L


 


BUN     (9-20)  mg/dL


 


Creatinine     (0.66-1.25)  mg/dL


 


Glucose     (74-99)  mg/dL


 


POC Glucose (mg/dL)  121 H  135 H  185 H  ()  mg/dL














  03/17/25 03/17/25 03/17/25 Range/Units





  20:11 21:52 22:58 


 


WBC     (3.8-10.6)  k/uL


 


RBC     (4.30-5.90)  m/uL


 


Hgb     (13.0-17.5)  gm/dL


 


Hct     (39.0-53.0)  %


 


Neutrophils #     (1.3-7.7)  k/uL


 


Lymphocytes #     (1.0-4.8)  k/uL


 


Sodium     (137-145)  mmol/L


 


BUN     (9-20)  mg/dL


 


Creatinine     (0.66-1.25)  mg/dL


 


Glucose     (74-99)  mg/dL


 


POC Glucose (mg/dL)  230 H  285 H  298 H  ()  mg/dL














  03/18/25 03/18/25 03/18/25 Range/Units





  00:19 01:46 03:06 


 


WBC     (3.8-10.6)  k/uL


 


RBC     (4.30-5.90)  m/uL


 


Hgb     (13.0-17.5)  gm/dL


 


Hct     (39.0-53.0)  %


 


Neutrophils #     (1.3-7.7)  k/uL


 


Lymphocytes #     (1.0-4.8)  k/uL


 


Sodium     (137-145)  mmol/L


 


BUN     (9-20)  mg/dL


 


Creatinine     (0.66-1.25)  mg/dL


 


Glucose     (74-99)  mg/dL


 


POC Glucose (mg/dL)  283 H  216 H  197 H  ()  mg/dL














  03/18/25 03/18/25 03/18/25 Range/Units





  04:18 05:19 05:19 


 


WBC   22.8 H   (3.8-10.6)  k/uL


 


RBC   2.44 L   (4.30-5.90)  m/uL


 


Hgb   7.4 L   (13.0-17.5)  gm/dL


 


Hct   23.3 L   (39.0-53.0)  %


 


Neutrophils #   21.7 H   (1.3-7.7)  k/uL


 


Lymphocytes #   0.4 L   (1.0-4.8)  k/uL


 


Sodium    130 L  (137-145)  mmol/L


 


BUN    117 H*  (9-20)  mg/dL


 


Creatinine    2.11 H  (0.66-1.25)  mg/dL


 


Glucose    106 H  (74-99)  mg/dL


 


POC Glucose (mg/dL)  119 H    ()  mg/dL














  03/18/25 03/18/25 03/18/25 Range/Units





  05:24 06:49 10:20 


 


WBC     (3.8-10.6)  k/uL


 


RBC     (4.30-5.90)  m/uL


 


Hgb     (13.0-17.5)  gm/dL


 


Hct     (39.0-53.0)  %


 


Neutrophils #     (1.3-7.7)  k/uL


 


Lymphocytes #     (1.0-4.8)  k/uL


 


Sodium     (137-145)  mmol/L


 


BUN     (9-20)  mg/dL


 


Creatinine     (0.66-1.25)  mg/dL


 


Glucose     (74-99)  mg/dL


 


POC Glucose (mg/dL)  133 H  196 H  181 H  ()  mg/dL














  03/18/25 Range/Units





  12:48 


 


WBC   (3.8-10.6)  k/uL


 


RBC   (4.30-5.90)  m/uL


 


Hgb   (13.0-17.5)  gm/dL


 


Hct   (39.0-53.0)  %


 


Neutrophils #   (1.3-7.7)  k/uL


 


Lymphocytes #   (1.0-4.8)  k/uL


 


Sodium   (137-145)  mmol/L


 


BUN   (9-20)  mg/dL


 


Creatinine   (0.66-1.25)  mg/dL


 


Glucose   (74-99)  mg/dL


 


POC Glucose (mg/dL)  133 H  ()  mg/dL

## 2025-03-18 NOTE — P.PN
Progress Note - Text


Progress Note Date: 03/18/25





Patient is a 64-year-old male with a PMH of atrial fibrillation on Eliquis, COPD

on 2 L home oxygen, non-insulin-dependent diabetes mellitus, hyperlipidemia, 

hypertension presenting with shortness of breath.  Patient was previously 

admitted here for acute hypoxic respiratory failure secondary to influenza 

pneumonia and was discharged on 2 L of home oxygen.  Patient states he has been 

feeling short of breath while at rest.  He reports that since being discharged 

the home oxygen has not been sufficient. He states that he had to keep 

increasing his oxygen.    Patient says shortness of breath is slightly relieved 

when he is laying down.  Denies any orthopnea or PND.  Patient endorses a nonpr

oductive cough that is chronic in nature, but says it has been getting worse.  

Patient admits to having fever, chills.  Patient also admits to having non-

radiating, pressure-like chest pain over the last few days.  Chest pain is not 

related to exertion and he had no alleviating or aggravating factors.  Patient 

denies any headache, vision changes, nausea, vomiting, diarrhea, abdominal pain,

urinary symptoms.





EKG independently interpreted displaying sinus rhythm with left bundle branch 

block that has been seen on prior EKG, rate 65 bpm, QTc 451 ms


CXR independently interpreted displaying bilateral multifocal airspace opacities


Chest CTA displaying acute segmental and subsegmental pulmonary emboli within 

the right and middle lower lobes, no saddle embolus, no RV strain, chronic 

interstitial loading disease


Venous Doppler B/L LE displaying no evidence of acute DVT


Troponin 0.049, 0.055, proBNP 8810, D-dimer 4.87, WBC 22.6, Hgb 11.0, HCT 35.2, 

MCV 93.7, platelet 322, PT 11.8, INR 1.1, APTT 28, sodium 133, potassium 4.2, 

CO2 24, BUN 30, creatinine 1.27, glucose 135


VBG pH 7.52, pCO2 32


T98.2 F, AK 80, RR 26, /77, O2 sat 90% on BiPAP with FiO2 of 100%





March 12: Overflow the ER.  Up in the bed.  Short of breath.  Patient was on 

BiPAP overnight.  This morning on 15 L high flow nasal cannula.  Decreased 

appetite.  Has got a cough.  Some wheezing.  Some sputum production.  Decreased 

appetite.  Patient is on IV heparin.  Also on IV cefepime.  Pulmonary following


March 13: Patient did confirm that because Eliquis is very expensive patient was

not taking it properly did and take it sparingly at home.  Explains patient's 

PE.  Started on Eliquis today by cardiology.  IV heparin discontinued.  Getting 

easily short of breath.  Requiring BiPAP..  Some cough.  Oral intake fair.  On 

IV cefepime and vancomycin.  ID following.  Also on IV Lasix.  Due to worsening 

renal function will DC vancomycin


March 14: Saw this afternoon.  On BiPAP.  60%.  Ensure ordered.  All blood work 

ordered by pulmonary including fungal.  Patient currently on IV cefepime and 

Levaquin.  IV Solu-Medrol.  Remain short of breath.  Tired.  Being followed by 

pulmonary and ID.  Chest x-ray film personally reviewed by me-showing pulm edema

bilateral infiltrates especially at the bases.  Renal ultrasound nonspecific.  

UA showing protein 1+.


March 15: Remain short of breath.  Sitting up in bed.  Not able to come off the 

BiPAP.  Remains on IV cefepime.  Accu-Cheks running high.  Put on insulin drip. 

On IV Solu-Medrol.  Oral intake fair.


March 16: Patient remains short of breath.  Unable to get off BiPAP.  FiO2 

increased to 70%.  12/5.  Per Dr. Joshua: Patient be moved to the ICU.  Poor 

oral intake.  Remains on insulin drip.  IV Solu-Medrol.  Eliquis.  IV cefepime 

and Levaquin.  Tired.  Sitting up in bed leaning forward short of breath.  Chest

x-ray film personally reviewed by me: Scattered bilateral infiltrates


March 17: ICU.  Remains on BiPAP.  Decreased oral intake.  Sitting up.  Short of

breath.  On IV cefepime.  Insulin drip.  IV Solu-Medrol.  DuoNeb Q4.  Rather 

tired.  Also yesterday evening at epistaxis.  Nasal packing.


March 18: ICU.  Mostly been on BiPAP.  High flow nasal cannula.  Decreased oral 

intake.  Sitting up leaning forward.  Remains on IV cefepime or Levaquin insulin

drip.  A bit tired.  Patient has very poor oral intake.  Per intensivist patient

will IV Lasix.  Note worsening renal function-cut back to Lasix once a day.  

Given blood pressure running lower side.  DC Aldactone and DC amlodipine.





Active Medications





Albuterol/Ipratropium (Ipratropium-Albuterol 3 Ml Neb)  3 ml INHALATION RT-QID 

PRN


   PRN Reason: Shortness Of Breath Or Wheezing


Albuterol/Ipratropium (Ipratropium-Albuterol 3 Ml Neb)  3 ml INHALATION RT-Q4H 

[facility]


   Last Admin: 03/18/25 20:23 Dose:  3 ml


   


Alprazolam (Alprazolam 0.5 Mg Tab)  0.5 mg PO TID PRN


   PRN Reason: Anxiety


   Last Admin: 03/17/25 21:06 Dose:  0.5 mg


   


Amlodipine Besylate (Amlodipine 5 Mg Tab)  5 mg PO DAILY [facility]


   Last Admin: 03/18/25 10:44 Dose:  5 mg


   


Aspirin (Aspirin 81 Mg)  81 mg PO DAILY [facility]


   Last Admin: 03/18/25 10:44 Dose:  81 mg


   


Budesonide (Budesonide 1 Mg/2 Ml Nebu)  1 mg INHALATION RT-BID [facility]


   Last Admin: 03/18/25 20:23 Dose:  1 mg


   


Dextrose/Water (Dextrose 50% Syringe 50 Ml)  25 ml IVP PER PROTOCOL PRN; 

Protocol


   PRN Reason: Hypoglycemia


Dextrose/Water (Dextrose 50% Syringe 50 Ml)  50 ml IVP PER PROTOCOL PRN; 

Protocol


   PRN Reason: Hypoglycemia


Formoterol Fumarate (Formoterol Fumarate 20 Mcg/2 Ml Nebu)  20 mcg INHALATION 

RT-BID [facility]


   Last Admin: 03/18/25 20:23 Dose:  20 mcg


   


Furosemide (Furosemide 10 Mg/Ml 4 Ml Vial)  40 mg IV Q12HR [facility]


   Last Admin: 03/18/25 10:45 Dose:  40 mg


   


Cefepime HCl 2 gm/ Sodium (Chloride)  100 mls @ 25 mls/hr IVPB Q12H [facility]; 

Protocol


   Last Admin: 03/18/25 10:45 Dose:  25 mls/hr


   


Insulin Human Regular 100 unit (/ Sodium Chloride)  100 mls @ 0 mls/hr IV .Q0M 

[facility]; Protocol


   Last Titration: 03/18/25 20:25 Dose:  3.13 units/hr, 3.13 mls/hr


   


Itraconazole (Itraconazole 100 Mg Cap)  200 mg PO BID [facility]; Protocol


   Last Admin: 03/18/25 11:11 Dose:  200 mg


   


Levofloxacin (Levofloxacin 750 Mg Tab)  750 mg PO Q48H [facility]; Protocol


   Last Admin: 03/18/25 17:04 Dose:  750 mg


   


Lorazepam (Lorazepam 1 Mg Tab)  1 mg PO HS PRN


   PRN Reason: Anxiety


   Last Admin: 03/18/25 11:36 Dose:  1 mg


   


Methylprednisolone Sodium Succinate (Methylprednisolone Sod Succi 125 Mg/2 Ml 

Vial)  60 mg IV Q8H [facility]


   Last Admin: 03/18/25 17:31 Dose:  60 mg


   


Metoprolol Succinate (Metoprolol Succinate (Er) 25 Mg Tab.Er.24h)  25 mg PO 

DAILY [facility]


   Last Admin: 03/18/25 10:44 Dose:  25 mg


   


Morphine Sulfate (Morphine Sulfate 4 Mg/Ml Syringe)  4 mg IV Q4HR PRN


   PRN Reason: Severe Pain (Scale 7 to 10)


Naloxone HCl (Naloxone 0.4 Mg/Ml 1 Ml Vial)  0.2 mg IV Q2M PRN


   PRN Reason: Opioid Reversal


Ondansetron HCl (Ondansetron 4 Mg/2 Ml Vial)  4 mg IVP Q8HR PRN


   PRN Reason: Nausea And Vomiting


   Last Admin: 03/14/25 14:52 Dose:  4 mg


   


Oxymetazoline HCl (Oxymetazoline 0.05% Nasl Spray 1 Spray Bottle)  2 spray NASAL

BID [facility]


   Last Admin: 03/18/25 10:47 Dose:  2 spray


   


Pantoprazole Sodium (Pantoprazole 40 Mg/10 Ml Vial)  40 mg IV DAILY [facility]


   Last Admin: 03/18/25 10:45 Dose:  40 mg


   


Potassium Chloride (Potassium Chloride Er 20 Meq Tab.Er)  20 meq PO DAILY [facility]


   Last Admin: 03/18/25 10:44 Dose:  20 meq


   


Sodium Chloride (Sodium Chloride 0.65% Nasal Spray 44 Ml Btl)  2 spray NASAL QID

PRN


   PRN Reason: Dry Nasal Passages


Spironolactone (Spironolactone 25 Mg Tab)  25 mg PO DAILY [facility]


   Last Admin: 03/18/25 10:44 Dose:  25 mg


   














Social history:


Tobacco: Current 50-pack-year smoker


Alcohol: Occasional alcohol use


Recreational drugs: Marijuana


Travel: No recent travel





On examination:


VITAL SIGNS: Afebrile, 78, 25, 1.4 x 49, 92% on high flow nasal cannula


GENERAL APPEARANCE: Sitting up in bed, leaning forward, short of breath


HEENT: Normal external appearance of nose and ear.  Oral cavity normal


EYES: Pupils equal. Conjunctiva normal. 


NECK: JVD unable to assess. Mass not palpable. 


RESPIRATORY: Respiratory effort increased, not able to speak in full sentences. 

Accessory muscles working. Lungs diminished breath sounds some scattered 

crackles prolonged expiration. 


CARDIOVASCULAR: First and second sounds normal. No edema. 


ABDOMEN: Soft. Liver and spleen not palpable. No tenderness. No mass palpable. 


PSYCHIATRY: Tired. Mood and affect tired





INVESTIGATIONS, reviewed in the clinical context:


March 18: White count 22.8 hemoglobin 7.4 platelets 200 sodium 130 potassium 4.2

 creatinine 2.11


March 17: White count 21.7 hemoglobin 7.9 platelets 201 sodium 128 potassium 4.5

BUN 95 creatinine 1.97


Mild 16: White count 21.1 hemoglobin 9.1 platelets 245 potassium 4.2 BUN 68 

creatinine 1.73


March 15: White count 8.2 hemoglobin 9.2 platelets 232 ABG: pH 7.3 pCO2 42 pO2 

80 sodium 133 potassium 3.6 creatinine 1.69.  Accu-Cheks high


March 14: White count 20.8 hemoglobin 9.2 platelets 265 sodium 134 BUN 44 

creatinine 1.54.  Chest x-ray: Pulm edema/bilateral infiltrates


March 13: Sodium 132 potassium 3.4 BUN 35 creatinine 1.51


March 12: White count 7.2 hemoglobin 10.3 platelets 242 sodium 136 potassium 3.8

BUN 28 creatinine 1.37


Troponin I: 0.049, 0.055, 0.051


Chest x-ray film personally reviewed by me-bilateral infiltrates.  Effusion/pulm

edema cardiomegaly.


Venous Doppler: No DVT


Chest CTA: Acute segmental and subsegmental pulm embolism within the right 

middle and right lower lobes.  No RV strain.  Chronic interstitial lung disease.





Previous labs:


Creatinine 1.63 in June 2023





Assessment/Plan:








#.  Acute pulmonary embolism, non-massive [segmental and subsegmental within the

 right middle and lower lobes.  No RV strain


IV heparin, switched over to Eliquis 








#.  Sepsis likely secondary to -viral pneumonia.  Secondary bacterial component 

probable


IV cefepime.  And Levaquin-also has been on itraconazole started on the March 14


ID and pulmonary following








#.  Acute hypoxic respiratory failure, secondary to above: Not improving


Remains on BiPAP, and symptoms high flow nasal cannula








#.  Acute COPD exacerbation, in a prior smoker: Not improving


DuoNeb Q4.  IV Solu-Medrol 60 mg q8.  Nebulized Pulmicort








#.  Acute on chronic heart failure with reduced ejection fraction (EF 45 to 

50%): Slow to respond


IV Lasix 40 mg every 12.  Aldactone.





#.  Chronic hypoxic respiratory failure from underlying COPD, 2 L home O2





-Acute kidney injury, likely ATN multifactorial


Admission creatinine 1.27.  Currently 2.11











#.  Non-insulin-dependent type 2 diabetes, uncontrolled with hyperglycemia 

secondary steroids: Not improving


Insulin subcu sliding scale.  Stop Levemir.  Currently on insulin drip y


Accu-Cheks ACHS





- chronic kidney disease stage III from nephrosclerosis and diabetic nephropathy

 [creatinine 1.63 in June 2023]


Renal ultrasound.:  Suboptimal study.  No corticomedullary comment.


UA protein 1+





-Recent influenza type A








#.  Essential hypertension-


Amlodipine.  Zestril.





#.  Hyperlipidemia


Lipitor





-Full code





Remains critically ill.  ICU.  Given worsening renal function will DC Aldactone,

cut back Lasix to once a day and as blood pressure running lower side also DC 

amlodipine





Past Medical History


Past Medical History: Atrial Fibrillation, Diabetes Mellitus, Hyperlipidemia, 

Hypertension, Pneumonia, Supraventricular Tachycardia (SVT)


Additional Past Medical History / Comment(s): a fib, causes shortness of breath,

can feel irregular heartbeat


History of Any Multi-Drug Resistant Organisms: None Reported


Past Surgical History: Cholecystectomy, Heart Catheterization


Additional Past Surgical History / Comment(s): Colonoscopy


Past Anesthesia/Blood Transfusion Reactions: No Reported Reaction


Past Psychological History: No Psychological Hx Reported


Smoking Status: Current every day smoker


Past Alcohol Use History: Occasional


Past Drug Use History: Marijuana

## 2025-03-19 LAB
ANION GAP SERPL CALC-SCNC: 9 MMOL/L
BASOPHILS # BLD AUTO: 0 K/UL (ref 0–0.2)
BASOPHILS NFR BLD AUTO: 0 %
BUN SERPL-SCNC: 125 MG/DL (ref 9–20)
CALCIUM SPEC-MCNC: 9.3 MG/DL (ref 8.4–10.2)
CHLORIDE SERPL-SCNC: 101 MMOL/L (ref 98–107)
CO2 SERPL-SCNC: 22 MMOL/L (ref 22–30)
EOSINOPHIL # BLD AUTO: 0 K/UL (ref 0–0.7)
EOSINOPHIL NFR BLD AUTO: 0 %
ERYTHROCYTE [DISTWIDTH] IN BLOOD BY AUTOMATED COUNT: 2.36 M/UL (ref 4.3–5.9)
ERYTHROCYTE [DISTWIDTH] IN BLOOD: 13.2 % (ref 11.5–15.5)
GLUCOSE BLD-MCNC: 101 MG/DL (ref 70–110)
GLUCOSE BLD-MCNC: 118 MG/DL (ref 70–110)
GLUCOSE BLD-MCNC: 156 MG/DL (ref 70–110)
GLUCOSE BLD-MCNC: 167 MG/DL (ref 70–110)
GLUCOSE BLD-MCNC: 185 MG/DL (ref 70–110)
GLUCOSE BLD-MCNC: 188 MG/DL (ref 70–110)
GLUCOSE BLD-MCNC: 207 MG/DL (ref 70–110)
GLUCOSE BLD-MCNC: 212 MG/DL (ref 70–110)
GLUCOSE BLD-MCNC: 213 MG/DL (ref 70–110)
GLUCOSE BLD-MCNC: 258 MG/DL (ref 70–110)
GLUCOSE BLD-MCNC: 333 MG/DL (ref 70–110)
GLUCOSE BLD-MCNC: 343 MG/DL (ref 70–110)
GLUCOSE BLD-MCNC: 366 MG/DL (ref 70–110)
GLUCOSE BLD-MCNC: 383 MG/DL (ref 70–110)
GLUCOSE SERPL-MCNC: 154 MG/DL (ref 74–99)
HCT VFR BLD AUTO: 22.3 % (ref 39–53)
HGB BLD-MCNC: 7 GM/DL (ref 13–17.5)
LYMPHOCYTES # SPEC AUTO: 0.2 K/UL (ref 1–4.8)
LYMPHOCYTES NFR SPEC AUTO: 1 %
MCH RBC QN AUTO: 29.7 PG (ref 25–35)
MCHC RBC AUTO-ENTMCNC: 31.4 G/DL (ref 31–37)
MCV RBC AUTO: 94.6 FL (ref 80–100)
MONOCYTES # BLD AUTO: 0.4 K/UL (ref 0–1)
MONOCYTES NFR BLD AUTO: 2 %
NEUTROPHILS # BLD AUTO: 17.5 K/UL (ref 1.3–7.7)
NEUTROPHILS NFR BLD AUTO: 96 %
PLATELET # BLD AUTO: 182 K/UL (ref 150–450)
POTASSIUM SERPL-SCNC: 4.6 MMOL/L (ref 3.5–5.1)
SODIUM SERPL-SCNC: 132 MMOL/L (ref 137–145)
WBC # BLD AUTO: 18.1 K/UL (ref 3.8–10.6)

## 2025-03-19 RX ADMIN — FUROSEMIDE SCH MG: 10 INJECTION, SOLUTION INTRAMUSCULAR; INTRAVENOUS at 20:27

## 2025-03-19 RX ADMIN — PANTOPRAZOLE SODIUM SCH MG: 40 TABLET, DELAYED RELEASE ORAL at 06:37

## 2025-03-19 NOTE — P.PN
Progress Note - Text


Progress Note Date: 03/19/25





Patient is a 64-year-old male with a PMH of atrial fibrillation on Eliquis, COPD

on 2 L home oxygen, non-insulin-dependent diabetes mellitus, hyperlipidemia, 

hypertension presenting with shortness of breath.  Patient was previously 

admitted here for acute hypoxic respiratory failure secondary to influenza 

pneumonia and was discharged on 2 L of home oxygen.  Patient states he has been 

feeling short of breath while at rest.  He reports that since being discharged 

the home oxygen has not been sufficient. He states that he had to keep 

increasing his oxygen.    Patient says shortness of breath is slightly relieved 

when he is laying down.  Denies any orthopnea or PND.  Patient endorses a nonpr

oductive cough that is chronic in nature, but says it has been getting worse.  

Patient admits to having fever, chills.  Patient also admits to having non-

radiating, pressure-like chest pain over the last few days.  Chest pain is not 

related to exertion and he had no alleviating or aggravating factors.  Patient 

denies any headache, vision changes, nausea, vomiting, diarrhea, abdominal pain,

urinary symptoms.





EKG independently interpreted displaying sinus rhythm with left bundle branch 

block that has been seen on prior EKG, rate 65 bpm, QTc 451 ms


CXR independently interpreted displaying bilateral multifocal airspace opacities


Chest CTA displaying acute segmental and subsegmental pulmonary emboli within 

the right and middle lower lobes, no saddle embolus, no RV strain, chronic 

interstitial loading disease


Venous Doppler B/L LE displaying no evidence of acute DVT


Troponin 0.049, 0.055, proBNP 8810, D-dimer 4.87, WBC 22.6, Hgb 11.0, HCT 35.2, 

MCV 93.7, platelet 322, PT 11.8, INR 1.1, APTT 28, sodium 133, potassium 4.2, 

CO2 24, BUN 30, creatinine 1.27, glucose 135


VBG pH 7.52, pCO2 32


T98.2 F, WV 80, RR 26, /77, O2 sat 90% on BiPAP with FiO2 of 100%





March 12: Overflow the ER.  Up in the bed.  Short of breath.  Patient was on 

BiPAP overnight.  This morning on 15 L high flow nasal cannula.  Decreased 

appetite.  Has got a cough.  Some wheezing.  Some sputum production.  Decreased 

appetite.  Patient is on IV heparin.  Also on IV cefepime.  Pulmonary following


March 13: Patient did confirm that because Eliquis is very expensive patient was

not taking it properly did and take it sparingly at home.  Explains patient's 

PE.  Started on Eliquis today by cardiology.  IV heparin discontinued.  Getting 

easily short of breath.  Requiring BiPAP..  Some cough.  Oral intake fair.  On 

IV cefepime and vancomycin.  ID following.  Also on IV Lasix.  Due to worsening 

renal function will DC vancomycin


March 14: Saw this afternoon.  On BiPAP.  60%.  Ensure ordered.  All blood work 

ordered by pulmonary including fungal.  Patient currently on IV cefepime and 

Levaquin.  IV Solu-Medrol.  Remain short of breath.  Tired.  Being followed by 

pulmonary and ID.  Chest x-ray film personally reviewed by me-showing pulm edema

bilateral infiltrates especially at the bases.  Renal ultrasound nonspecific.  

UA showing protein 1+.


March 15: Remain short of breath.  Sitting up in bed.  Not able to come off the 

BiPAP.  Remains on IV cefepime.  Accu-Cheks running high.  Put on insulin drip. 

On IV Solu-Medrol.  Oral intake fair.


March 16: Patient remains short of breath.  Unable to get off BiPAP.  FiO2 

increased to 70%.  12/5.  Per Dr. Joshua: Patient be moved to the ICU.  Poor 

oral intake.  Remains on insulin drip.  IV Solu-Medrol.  Eliquis.  IV cefepime 

and Levaquin.  Tired.  Sitting up in bed leaning forward short of breath.  Chest

x-ray film personally reviewed by me: Scattered bilateral infiltrates


March 17: ICU.  Remains on BiPAP.  Decreased oral intake.  Sitting up.  Short of

breath.  On IV cefepime.  Insulin drip.  IV Solu-Medrol.  DuoNeb Q4.  Rather 

tired.  Also yesterday evening at epistaxis.  Nasal packing.


March 18: ICU.  Mostly been on BiPAP.  High flow nasal cannula.  Decreased oral 

intake.  Sitting up leaning forward.  Remains on IV cefepime or Levaquin insulin

drip.  A bit tired.  Patient has very poor oral intake.  Per intensivist patient

will IV Lasix.  Note worsening renal function-cut back to Lasix once a day.  

Given blood pressure running lower side.  DC Aldactone and DC amlodipine.


March 19: ICU.  Patient was taken off BiPAP this morning.  Decreased to high 

flow nasal cannula 10 L.  Wife at the bedside.  Encourage oral intake.  Chopped 

diet.  Per intensivist nephrology patient to continue on IV Lasix.  Blood 

pressure better after stopping amlodipine and Aldactone yesterday.  Encourage 

oral intake.  Incentive spirometry.  IV cefepime, Levaquin, I terconazole per 

ID.





Active Medications





Albuterol/Ipratropium (Ipratropium-Albuterol 3 Ml Neb)  3 ml INHALATION RT-QID 

PRN


   PRN Reason: Shortness Of Breath Or Wheezing


Albuterol/Ipratropium (Ipratropium-Albuterol 3 Ml Neb)  3 ml INHALATION RT-Q4H 

[facility]


   Last Admin: 03/19/25 16:21 Dose:  3 ml


   


Alprazolam (Alprazolam 0.5 Mg Tab)  0.5 mg PO TID PRN


   PRN Reason: Anxiety


   Last Admin: 03/18/25 21:55 Dose:  0.5 mg


   


Aspirin (Aspirin 81 Mg)  81 mg PO DAILY [facility]


   Last Admin: 03/19/25 10:43 Dose:  81 mg


   


Budesonide (Budesonide 1 Mg/2 Ml Nebu)  1 mg INHALATION RT-BID [facility]


   Last Admin: 03/19/25 07:58 Dose:  1 mg


   


Dextrose/Water (Dextrose 50% Syringe 50 Ml)  25 ml IVP PER PROTOCOL PRN; 

Protocol


   PRN Reason: Hypoglycemia


Dextrose/Water (Dextrose 50% Syringe 50 Ml)  50 ml IVP PER PROTOCOL PRN; 

Protocol


   PRN Reason: Hypoglycemia


Formoterol Fumarate (Formoterol Fumarate 20 Mcg/2 Ml Nebu)  20 mcg INHALATION 

RT-BID [facility]


   Last Admin: 03/19/25 07:57 Dose:  20 mcg


   


Furosemide (Furosemide 10 Mg/Ml 4 Ml Vial)  40 mg IV Q12HR [facility]


Cefepime HCl 2 gm/ Sodium (Chloride)  100 mls @ 25 mls/hr IVPB Q12H [facility]; 

Protocol


   Last Admin: 03/19/25 10:44 Dose:  25 mls/hr


   


Insulin Human Regular 100 unit (/ Sodium Chloride)  100 mls @ 0 mls/hr IV .Q0M 

[facility]; Protocol


   Last Titration: 03/19/25 12:35 Dose:  0 units/hr, 0 mls/hr


   


Itraconazole (Itraconazole 100 Mg Cap)  200 mg PO BID [facility]; Protocol


   Last Admin: 03/19/25 10:44 Dose:  200 mg


   


Levofloxacin (Levofloxacin 750 Mg Tab)  750 mg PO Q48H [facility]; Protocol


   Last Admin: 03/18/25 17:04 Dose:  750 mg


   


Lorazepam (Lorazepam 1 Mg Tab)  1 mg PO HS PRN


   PRN Reason: Anxiety


   Last Admin: 03/18/25 21:55 Dose:  1 mg


   


Methylprednisolone Sodium Succinate (Methylprednisolone Sod Succi 125 Mg/2 Ml 

Vial)  60 mg IV Q8H [facility]


   Last Admin: 03/19/25 10:43 Dose:  60 mg


   


Metoprolol Succinate (Metoprolol Succinate (Er) 25 Mg Tab.Er.24h)  25 mg PO 

DAILY [facility]


   Last Admin: 03/19/25 10:44 Dose:  25 mg


   


Morphine Sulfate (Morphine Sulfate 4 Mg/Ml Syringe)  4 mg IV Q4HR PRN


   PRN Reason: Severe Pain (Scale 7 to 10)


Naloxone HCl (Naloxone 0.4 Mg/Ml 1 Ml Vial)  0.2 mg IV Q2M PRN


   PRN Reason: Opioid Reversal


Ondansetron HCl (Ondansetron 4 Mg/2 Ml Vial)  4 mg IVP Q8HR PRN


   PRN Reason: Nausea And Vomiting


   Last Admin: 03/14/25 14:52 Dose:  4 mg


   


Oxymetazoline HCl (Oxymetazoline 0.05% Nasl Spray 1 Spray Bottle)  2 spray NASAL

BID [facility]


   Last Admin: 03/19/25 10:44 Dose:  2 spray


   


Pantoprazole Sodium (Pantoprazole 40 Mg Tablet)  40 mg PO AC-[id number] [facility]


   Last Admin: 03/19/25 06:37 Dose:  40 mg


   


Potassium Chloride (Potassium Chloride Er 20 Meq Tab.Er)  20 meq PO DAILY [facility]


   Last Admin: 03/19/25 10:44 Dose:  20 meq


   


Sodium Chloride (Sodium Chloride 0.65% Nasal Spray 44 Ml Btl)  2 spray NASAL QID

PRN


   PRN Reason: Dry Nasal Passages








Social history:


Tobacco: Current 50-pack-year smoker


Alcohol: Occasional alcohol use


Recreational drugs: Marijuana


Travel: No recent travel





On examination:


VITAL SIGNS: Afebrile, 72, 21, 110 x 90, 92% on 10 L high flow


GENERAL APPEARANCE: Sitting up in bed, leaning forward, a bit less short of 

breath


HEENT: Normal external appearance of nose and ear.  Oral cavity normal


EYES: Pupils equal. Conjunctiva normal. 


NECK: JVD unable to assess. Mass not palpable. 


RESPIRATORY: Respiratory effort increased, not able to speak in full sentences. 

Accessory muscles working. Lungs diminished breath sounds some scattered 

crackles prolonged expiration. 


CARDIOVASCULAR: First and second sounds normal. No edema. 


ABDOMEN: Soft. Liver and spleen not palpable. No tenderness. No mass palpable. 


PSYCHIATRY: Answering questions appropriately.  A bit less tired





INVESTIGATIONS, reviewed in the clinical context:


March 19: White count 18.1 hemoglobin 7 platelets 182 potassium 4.6  

creatinine 2.28


March 18: White count 22.8 hemoglobin 7.4 platelets 200 sodium 130 potassium 4.2

 creatinine 2.11


March 17: White count 21.7 hemoglobin 7.9 platelets 201 sodium 128 potassium 4.5

BUN 95 creatinine 1.97


Mild 16: White count 21.1 hemoglobin 9.1 platelets 245 potassium 4.2 BUN 68 

creatinine 1.73


March 15: White count 8.2 hemoglobin 9.2 platelets 232 ABG: pH 7.3 pCO2 42 pO2 

80 sodium 133 potassium 3.6 creatinine 1.69.  Accu-Cheks high


March 14: White count 20.8 hemoglobin 9.2 platelets 265 sodium 134 BUN 44 

creatinine 1.54.  Chest x-ray: Pulm edema/bilateral infiltrates


March 13: Sodium 132 potassium 3.4 BUN 35 creatinine 1.51


March 12: White count 7.2 hemoglobin 10.3 platelets 242 sodium 136 potassium 3.8

BUN 28 creatinine 1.37


Troponin I: 0.049, 0.055, 0.051


Chest x-ray film personally reviewed by me-bilateral infiltrates.  Effusion/pulm

edema cardiomegaly.


Venous Doppler: No DVT


Chest CTA: Acute segmental and subsegmental pulm embolism within the right 

middle and right lower lobes.  No RV strain.  Chronic interstitial lung disease.





Previous labs:


Creatinine 1.63 in June 2023





Assessment/Plan:








#.  Acute pulmonary embolism, non-massive [segmental and subsegmental within the

 right middle and lower lobes.  No RV strain


IV heparin, currently on o Eliquis 








#.  Sepsis likely secondary to -viral pneumonia.  Secondary bacterial component 

probable


IV cefepime.  And Levaquin-also has been on itraconazole started on the March 14


ID and pulmonary following








#.  Acute hypoxic respiratory failure, secondary to above: Improving


Was on BiPAP, and today on 10 L nasal cannula








#.  Acute COPD exacerbation, in a prior smoker: Some improvement


DuoNeb Q4.  IV Solu-Medrol 60 mg q8.  Nebulized Pulmicort








#.  Acute on chronic heart failure with reduced ejection fraction (EF 45 to 

50%): Some improvement


IV Lasix 40 mg every 12.  Aldactone-discontinued.





#.  Chronic hypoxic respiratory failure from underlying COPD, 2 L home O2





-Acute kidney injury, likely ATN multifactorial


Admission creatinine 1.27.  Currently 2.11











#.  Non-insulin-dependent type 2 diabetes, uncontrolled with hyperglycemia 

secondary steroids: Not improving


Insulin subcu sliding scale.  Stop Levemir.  Currently on insulin drip y


Accu-Cheks ACHS





- chronic kidney disease stage III from nephrosclerosis and diabetic nephropathy

 [creatinine 1.63 in June 2023]


Renal ultrasound.:  Suboptimal study.  No corticomedullary comment.


UA protein 1+





-Recent influenza type A








#.  Essential hypertension-


Toprol-XL.  Other antihypertensives have been held





#.  Hyperlipidemia


Lipitor





-Full code





Some improvement.  Continue IV Lasix.  Antibiotics per ID including cefepime, I 

terconazole, Levaquin.  IV steroids.  Bronchodilators.  Encourage oral intake.





Past Medical History


Past Medical History: Atrial Fibrillation, Diabetes Mellitus, Hyperlipidemia, 

Hypertension, Pneumonia, Supraventricular Tachycardia (SVT)


Additional Past Medical History / Comment(s): a fib, causes shortness of breath,

can feel irregular heartbeat


History of Any Multi-Drug Resistant Organisms: None Reported


Past Surgical History: Cholecystectomy, Heart Catheterization


Additional Past Surgical History / Comment(s): Colonoscopy


Past Anesthesia/Blood Transfusion Reactions: No Reported Reaction


Past Psychological History: No Psychological Hx Reported


Smoking Status: Current every day smoker


Past Alcohol Use History: Occasional


Past Drug Use History: Marijuana

## 2025-03-19 NOTE — P.PN
Subjective


Progress Note Date: 03/19/25


Principal diagnosis: 





Reason for follow-up is pneumonia





Patient is a 64-year-old  male with a past medical history significant 

for diabetes mellitus hypertension hyperlipidemia pneumonia atrial fibrillation 

currently everyday smoker presenting to the hospital for evaluation of 

increasing shortness of breath patient has been diagnosed with multifocal 

pneumonia prompting this consultation.


On today's evaluation that is 03/19/2025,the patient denies any fever or any chi

lls, patient is breathing slightly comfortably however still requiring high flow

nasal oxygen at 35% FiO2 patient denies any chest pain no worsening cough no 

abdominal pain no diarrhea.


Patient white count is down to 18.1 creatinine is 2.28 blood culture negative 

sputum cultures pending





Objective





- Vital Signs


Vital signs: 


                                   Vital Signs











Temp  98.9 F   03/19/25 08:00


 


Pulse  81   03/19/25 12:10


 


Resp  20   03/19/25 11:00


 


BP  111/59   03/19/25 11:00


 


Pulse Ox  97   03/19/25 11:00


 


FiO2  35   03/19/25 08:07








                                 Intake & Output











 03/18/25 03/19/25 03/19/25





 18:59 06:59 18:59


 


Intake Total 479.991 5850.856 81.625


 


Output Total 970 600 0


 


Balance -826.470 539.856 81.625


 


Weight 101.8 kg 103.7 kg 


 


Intake:   


 


   180 50


 


    .9 110 80 50


 


    Cefepime 2 gm In Sodium  100 





    Chloride 0.9% 100 ml @ 25   





    mls/hr IVPB Q12H [facility] Rx#   





    :[id number]   


 


  Intake, IV Titration 33.530 49.856 31.625





  Amount   


 


    Insulin Regular 100 unit 33.530 49.856 31.625





    In Sodium Chloride 0.9%   





    100 ml @ Titrate IV .Q0M   





    [facility] Rx#:[id number]   


 


  Oral  910 


 


Output:   


 


  Urine 970 600 0


 


Other:   


 


  Voiding Method External Catheter External Catheter External Catheter


 


  # Voids  0 














- Exam





GENERAL DESCRIPTION: An elderly male lying in bed in no distress





RESPIRATORY SYSTEM: Unlabored breathing , coarse breath sounds bilaterally





HEART: S1 S2 regular rate and rhythm ,





ABDOMEN: Soft , no tenderness





EXTREMITIES: No edema feet





- Labs


CBC & Chem 7: 


                                 03/19/25 05:15





                                 03/19/25 05:15


Labs: 


                  Abnormal Lab Results - Last 24 Hours (Table)











  03/18/25 03/18/25 03/18/25 Range/Units





  15:26 16:37 17:28 


 


WBC     (3.8-10.6)  k/uL


 


RBC     (4.30-5.90)  m/uL


 


Hgb     (13.0-17.5)  gm/dL


 


Hct     (39.0-53.0)  %


 


Neutrophils #     (1.3-7.7)  k/uL


 


Lymphocytes #     (1.0-4.8)  k/uL


 


Sodium     (137-145)  mmol/L


 


BUN     (9-20)  mg/dL


 


Creatinine     (0.66-1.25)  mg/dL


 


Glucose     (74-99)  mg/dL


 


POC Glucose (mg/dL)  124 H  128 H  142 H  ()  mg/dL














  03/18/25 03/18/25 03/18/25 Range/Units





  18:46 20:16 21:58 


 


WBC     (3.8-10.6)  k/uL


 


RBC     (4.30-5.90)  m/uL


 


Hgb     (13.0-17.5)  gm/dL


 


Hct     (39.0-53.0)  %


 


Neutrophils #     (1.3-7.7)  k/uL


 


Lymphocytes #     (1.0-4.8)  k/uL


 


Sodium     (137-145)  mmol/L


 


BUN     (9-20)  mg/dL


 


Creatinine     (0.66-1.25)  mg/dL


 


Glucose     (74-99)  mg/dL


 


POC Glucose (mg/dL)  157 H  227 H  303 H  ()  mg/dL














  03/18/25 03/19/25 03/19/25 Range/Units





  23:16 01:12 05:02 


 


WBC     (3.8-10.6)  k/uL


 


RBC     (4.30-5.90)  m/uL


 


Hgb     (13.0-17.5)  gm/dL


 


Hct     (39.0-53.0)  %


 


Neutrophils #     (1.3-7.7)  k/uL


 


Lymphocytes #     (1.0-4.8)  k/uL


 


Sodium     (137-145)  mmol/L


 


BUN     (9-20)  mg/dL


 


Creatinine     (0.66-1.25)  mg/dL


 


Glucose     (74-99)  mg/dL


 


POC Glucose (mg/dL)  269 H  213 H  185 H  ()  mg/dL














  03/19/25 03/19/25 03/19/25 Range/Units





  05:15 05:15 06:41 


 


WBC  18.1 H    (3.8-10.6)  k/uL


 


RBC  2.36 L    (4.30-5.90)  m/uL


 


Hgb  7.0 L    (13.0-17.5)  gm/dL


 


Hct  22.3 L    (39.0-53.0)  %


 


Neutrophils #  17.5 H    (1.3-7.7)  k/uL


 


Lymphocytes #  0.2 L    (1.0-4.8)  k/uL


 


Sodium   132 L   (137-145)  mmol/L


 


BUN   125 H*   (9-20)  mg/dL


 


Creatinine   2.28 H   (0.66-1.25)  mg/dL


 


Glucose   154 H   (74-99)  mg/dL


 


POC Glucose (mg/dL)    207 H  ()  mg/dL














  03/19/25 03/19/25 Range/Units





  08:20 09:48 


 


WBC    (3.8-10.6)  k/uL


 


RBC    (4.30-5.90)  m/uL


 


Hgb    (13.0-17.5)  gm/dL


 


Hct    (39.0-53.0)  %


 


Neutrophils #    (1.3-7.7)  k/uL


 


Lymphocytes #    (1.0-4.8)  k/uL


 


Sodium    (137-145)  mmol/L


 


BUN    (9-20)  mg/dL


 


Creatinine    (0.66-1.25)  mg/dL


 


Glucose    (74-99)  mg/dL


 


POC Glucose (mg/dL)  188 H  156 H  ()  mg/dL














Assessment and Plan


(1) Leukocytosis


Current Visit: Yes   Status: Acute   Code(s): D72.829 - ELEVATED WHITE BLOOD 

CELL COUNT, UNSPECIFIED   SNOMED Code(s): 359666970


   





(2) Bilateral pneumonia


Current Visit: Yes   Status: Acute   Code(s): J18.9 - PNEUMONIA, UNSPECIFIED 

ORGANISM   SNOMED Code(s): 038102572


   


Plan: 





1patient presented hospital with increasing shortness of breath and chest pain 

which is likely multifactorial in this patient who did have evidence of PE on 

the CT there is also evidence of multifocal pneumonia with elevated white count 

and recently acute influenza A will need to cover for resistant gram-positive as

well as gram-negative pathogen for post influenza pneumonia


2-patient sputum culture as well as fungal serologies currently pending


3-patient to continue cefepime and  itraconazole, and monitor clinical course 

closely


Dictation was produced using dragon dictation software. please excuse any 

grammatical, word or spelling errors. 








Time with Patient: Less than 30

## 2025-03-19 NOTE — P.PN
Subjective





Patient is seen for follow-up for acute kidney injury.


Currently maintained on Lasix


Respiratory status seems to have improved although patient continues to require 

BiPAP on and off.





Serum creatinine at 2.28 today.  BUN disproportionately elevated at 125.  

Patient is maintained on steroids.





24-hour urine output at 1570 mL





Objective





- Vital Signs


Vital signs: 


                                   Vital Signs











Temp  96.8 F L  03/19/25 04:00


 


Pulse  70   03/19/25 08:25


 


Resp  16   03/19/25 07:58


 


BP  105/55   03/19/25 07:00


 


Pulse Ox  97   03/19/25 07:00


 


FiO2  35   03/19/25 08:07








                                 Intake & Output











 03/18/25 03/19/25 03/19/25





 18:59 06:59 18:59


 


Intake Total 434.026 8112.856 10


 


Output Total 970 600 0


 


Balance -826.470 539.856 10


 


Weight 101.8 kg 103.7 kg 


 


Intake:   


 


   180 10


 


    .9 110 80 10


 


    Cefepime 2 gm In Sodium  100 





    Chloride 0.9% 100 ml @ 25   





    mls/hr IVPB Q12H [facility] Rx#   





    :[id number]   


 


  Intake, IV Titration 33.530 49.856 





  Amount   


 


    Insulin Regular 100 unit 33.530 49.856 





    In Sodium Chloride 0.9%   





    100 ml @ Titrate IV .Q0M   





    [facility] Rx#:[id number]   


 


  Oral  910 


 


Output:   


 


  Urine 970 600 0


 


Other:   


 


  Voiding Method External Catheter External Catheter 


 


  # Voids  0 














- Exam





Patient is awake, comfortable


Maintained on BiPAP


Examination of the heart S1 and S2


Examination of the lungs decreased breath sounds at the bases occasional 

wheezing heard bilaterally


Abdomen is soft nontender


Examination of lower extremities shows edema 1+ bilaterally


CNS exam grossly intact





- Labs


CBC & Chem 7: 


                                 03/19/25 05:15





                                 03/19/25 05:15


Labs: 


                  Abnormal Lab Results - Last 24 Hours (Table)











  03/18/25 03/18/25 03/18/25 Range/Units





  12:48 15:26 16:37 


 


WBC     (3.8-10.6)  k/uL


 


RBC     (4.30-5.90)  m/uL


 


Hgb     (13.0-17.5)  gm/dL


 


Hct     (39.0-53.0)  %


 


Neutrophils #     (1.3-7.7)  k/uL


 


Lymphocytes #     (1.0-4.8)  k/uL


 


Sodium     (137-145)  mmol/L


 


BUN     (9-20)  mg/dL


 


Creatinine     (0.66-1.25)  mg/dL


 


Glucose     (74-99)  mg/dL


 


POC Glucose (mg/dL)  133 H  124 H  128 H  ()  mg/dL














  03/18/25 03/18/25 03/18/25 Range/Units





  17:28 18:46 20:16 


 


WBC     (3.8-10.6)  k/uL


 


RBC     (4.30-5.90)  m/uL


 


Hgb     (13.0-17.5)  gm/dL


 


Hct     (39.0-53.0)  %


 


Neutrophils #     (1.3-7.7)  k/uL


 


Lymphocytes #     (1.0-4.8)  k/uL


 


Sodium     (137-145)  mmol/L


 


BUN     (9-20)  mg/dL


 


Creatinine     (0.66-1.25)  mg/dL


 


Glucose     (74-99)  mg/dL


 


POC Glucose (mg/dL)  142 H  157 H  227 H  ()  mg/dL














  03/18/25 03/18/25 03/19/25 Range/Units





  21:58 23:16 01:12 


 


WBC     (3.8-10.6)  k/uL


 


RBC     (4.30-5.90)  m/uL


 


Hgb     (13.0-17.5)  gm/dL


 


Hct     (39.0-53.0)  %


 


Neutrophils #     (1.3-7.7)  k/uL


 


Lymphocytes #     (1.0-4.8)  k/uL


 


Sodium     (137-145)  mmol/L


 


BUN     (9-20)  mg/dL


 


Creatinine     (0.66-1.25)  mg/dL


 


Glucose     (74-99)  mg/dL


 


POC Glucose (mg/dL)  303 H  269 H  213 H  ()  mg/dL














  03/19/25 03/19/25 03/19/25 Range/Units





  05:02 05:15 05:15 


 


WBC   18.1 H   (3.8-10.6)  k/uL


 


RBC   2.36 L   (4.30-5.90)  m/uL


 


Hgb   7.0 L   (13.0-17.5)  gm/dL


 


Hct   22.3 L   (39.0-53.0)  %


 


Neutrophils #   17.5 H   (1.3-7.7)  k/uL


 


Lymphocytes #   0.2 L   (1.0-4.8)  k/uL


 


Sodium    132 L  (137-145)  mmol/L


 


BUN    125 H*  (9-20)  mg/dL


 


Creatinine    2.28 H  (0.66-1.25)  mg/dL


 


Glucose    154 H  (74-99)  mg/dL


 


POC Glucose (mg/dL)  185 H    ()  mg/dL














  03/19/25 03/19/25 03/19/25 Range/Units





  06:41 08:20 09:48 


 


WBC     (3.8-10.6)  k/uL


 


RBC     (4.30-5.90)  m/uL


 


Hgb     (13.0-17.5)  gm/dL


 


Hct     (39.0-53.0)  %


 


Neutrophils #     (1.3-7.7)  k/uL


 


Lymphocytes #     (1.0-4.8)  k/uL


 


Sodium     (137-145)  mmol/L


 


BUN     (9-20)  mg/dL


 


Creatinine     (0.66-1.25)  mg/dL


 


Glucose     (74-99)  mg/dL


 


POC Glucose (mg/dL)  207 H  188 H  156 H  ()  mg/dL














Assessment and Plan


Assessment: 





1.  Acute renal injury, ATN associated with underlying infection and borderline 

low blood pressures in the setting of use of ACE inhibitors.  May continue with 

IV diuretics.  Ultrasound shows no evidence of obstruction.  UA is benign.  BUN 

disproportionately elevated secondary to steroids.


2.  Acute hypoxic respiratory failure secondary to pneumonia and volume overload


3.  Volume overload


4.  CHF with preserved ejection fraction of 55 to 60% noted this admission


5.  Hypervolemic hyponatremia.  Patient was also maintained on hypotonic IV 

fluids which contributed to the hyponatremia, now improved


6.  Influenza A infection status post Tamiflu during previous hospitalization 

about 4 weeks ago   


Plan: 








Continue with Lasix.  BUN is disproportionately elevated from steroids


Repeat labs in a.m.


Continue off of ACE inhibitors


Continue with Aldactone


Monitor potassium


Continue to avoid nephrotoxic agents

## 2025-03-19 NOTE — P.PN
Subjective


Progress Note Date: 03/19/25


Principal diagnosis: 





Acute on chronic hypoxic respiratory failure, multifactorial











Patient is a 64-year-old male with past medical history significant for atrial 

fibrillation, diabetes mellitus, CKD stage III, hypertension, hyperlipidemia, 

tobacco smoker.  Of note, recently had a prolonged hospitalization 02/23/2025 

through 03/07/2025.  Treated for combination of influenza A, pneumonia, CHF.  

Completed course of antibiotics and Tamiflu.  At this time, did require 

noninvasive BiPAP, eventually discharged on home O2.  Returns with a chief 

complaint of shortness of breath progressing over the last 2 to 3 days.  Also, 

reports sudden onset substernal chest pain that developed the night prior and 

has been persistent.  On arrival to the ED, found to be in some respiratory 

distress, and placed on BiPAP.  Workup in the emergency department including a 

chest x-ray showing multifocal infiltrates concerning for pneumonia or pulmonary

edema.  D-dimer was elevated in setting CT angio protocol which was positive for

segmental and subsegmental right middle lobe and right lower lobe pulmonary 

emboli.  No saddle pulmonary embolus. Pulmonary artery mildly enlarged.  

Preserved RV to LV ratio.  There were diffuse coarse reticular infiltrates, as 

well as, groundglass lung attenuation, cystic changes, with concerns for 

interstitial lung disease. Patient does take Eliquis for his history of atrial 

fibrillation.  Does state he has missed some doses lately.  CBC: WBC count 22.6,

hemoglobin 11, platelets 322.  CMP: Sodium 133, potassium 4.2, chloride 101, 

serum bicarb 24, BUN 30, creatinine 1.27, glucose 135.  Lactic 1.5.  VBG with a 

pH of 7.5 and pCO2 of 32.  EKG: Sinus rhythm, rate 65 bpm, left bundle branch 

block, not acute.  Elevated serial troponins 0.049 and 0.055 respectively.  NT 

proBNP elevated 8807.  Patient is currently being evaluated in the emergency 

department.  He is alert and some moderate respiratory distress.  On BiPAP with 

settings 12/5 and FiO2 60%.  Tachypneic, breathing around 40 breaths/min. 

Endorses progressive worsening difficulty in breathing over last couple days.  

He has tried to increase his supplemental oxygen at home without any relief. 

Denies previous history of DVT or PE.  Associated nonproductive cough.  Denies 

sputum production or hemoptysis.  Substernal nonradiating chest pain persist.  

Denies any fevers or chills.  Denies heart palpitations or syncopal events.  

Denies swelling in the lower extremities.  Heart rhythm is normal sinus on 

bedside monitor.  Blood pressure has been normotensive, did receive 2 L of 

crystalloid fluid bolus earlier.  Not requiring any vasopressors.  Normal saline

is infusing at 150 mL/h.  IV heparin infusing per protocol








3/13/2025, the patient is being seen for a follow-up.  The patient is 

alternating between BiPAP at a pressure of 12/5 with an FiO2 of 60% and 15 L of 

oxygen by nasal cannula.  He is getting slightly anxious and the patient is 

being provided Xanax accordingly.  Fluid balance is -1.1 L over the past 24 

hours.  Limited echocardiogram was also done and it showed preserved LV function

with an EF of around 55 to 60%.  Valvular functions could not be accurately 

assessed as the patient's study was technically difficult study.  There was 

however RV dilatation.  Unable to estimate RV pressures.  The electrolytes from 

today showed a creatinine of 1.5 with a BUN of 35.  Bicarb is at 21.  Sodium is 

at 132.  The patient remains on DuoNeb updrafts.  The patient remains on a 

combination of cefepime and vancomycin.  The patient was taken off the IV hep

claus and the patient was started on anticoagulation with Eliquis.  Remains on IV

Solu-Medrol 60 mg every 6 hours.  Remains on Aldactone.  Remains on IV Lasix 40 

mg every 12 hours.





On today's evaluation of 3/14/2025, the patient is overall condition is 

essentially unchanged.  Continues to be hypoxic, continues to be on BiPAP and 

the patient remains at a pressure of 12/5 with an FiO2 of 60%.  Continues to 

have crackles in lung base bilaterally.  White cell count remains elevated at 20

with a hemoglobin 9.2.  Very much BiPAP dependent as the patient desaturates 

once taken off the BiPAP.  BUN is 44 with a platelet of 1.5.  Sodium levels at 

134 and a potassium level is at 3.4.  Remains on DuoNeb updrafts.  Remains on IV

Lasix 40 mg every 12 hours.  Remains on Aldactone.  Remains on bronchodilators. 

Remains on steroids.  Empiric antibiotic coverage with IV cefepime.  Reviewed 

the CAT scan again and there is some chronic interstitial changes and the 

patient has diffuse infiltrates with areas of groundglass.  Consider acute 

interstitial pneumonias.  Consider fungal pneumonias.





On today's evaluation of 3/15/2025, the patient is feeling slightly better 

compared to yesterday.  The patient is off the BiPAP and the patient is 

currently on 15 L of oxygen by nasal cannula.  Remains on bronchodilators.  

Remains on IV Solu-Medrol.  Remains on IV Lasix.  Antibiotic coverage including 

combination of cefepime, Levaquin orally and itraconazole orally.  Fungal 

antibodies are still pending.  Meanwhile, rheumatologic profile showed a 

negative rheumatoid factor and negative CARLA.  Legionella urine antigen is still 

pending for now.  Clinically however, the patient is feeling slightly improved 

compared to yesterday.  His chest x-ray continues to show multifocal airspace 

disease more so in the right lung.  Fluid balance has been negative.  The 

patient's creatinine is currently at 1.6 with a BUN of 54 and a sodium levels at

133.  I am going to taper the steroids.  The white cell count is 18.2 with a 

hemoglobin of 9.2.





On 3/16/2025, the patient is transferred to the intensive care unit for closer 

monitoring.  As mentioned, the patient developed acute hypoxic respiratory 

failure with diffuse bilateral pulmonary filtrates discussed earlier.  The 

patient was able to tolerate Airvo, however, overnight, the patient developed 

epistaxis.  Therefore was discontinued the patient was placed back on BiPAP and 

his current BiPAP is running at a pressure of 12 over 5 cm of water with an FiO2

of 80%.  The patient seems to be quite dependent on the BiPAP.  Unable to drop 

the FiO2 any further.  Repeat chest x-ray was done and shows stable diffuse 

bilateral pulm infiltrates which remains essentially unchanged compared to 

yesterday.  Clinically, the patient is awake and alert.  He is reported to have 

some congested cough.  No significant sputum production.  No chest pain.  No 

hemoptysis.  Noted, over the past 48 hours, the patient was diuresed with IV 

Lasix.  Nevertheless, we have not seen any improvement in the patient's 

oxygenation.  The patient was negative fluid balance.  His BNP today is at 68 

with a creatinine of 1.7.  Based on that, I stopped diuretics.  Rest of the 

electrolytes are all within normal limits.  White cell count of 21 with a 

hemoglobin 9.1 and a platelet count of 245.  The patient remains on broad-

spectrum antibiotics.  The patient remains on IV cefepime, itraconazole and 

Levaquin.  Fungal antibodies are still pending for now.  CARLA and rheumatoid 

factor were both negative.  Legionella urine antigen was also negative.  The 

patient remains on anticoagulation and I am going to drop the Eliquis dose to 5 

mg p.o. twice a day.  I am not absolutely convinced that the patient has a 

pulmonary embolism.  I reviewed the CAT scan of the chest and there could be 

potentially some subsegmental filling defects in the right, however, the overall

contrast administration was essentially poor.  The patient will be monitored 

very closely in the intensive care unit.  On a separate note, the patient was 

started on insulin drip for blood sugar control.





Patient was seen today on 3/17/2025, remains in the ICU, remains on fluid 

restrictions for low sodium of 128, he is on BiPAP 12/5/60%, patient is 

presenting this time very much similar to his last presentation few weeks ago.  

I am familiar with this patient, and on his last admission patient responded to 

treatment with mostly diuretics, and steroids.  This time came back with a 

similar presentation, he is receiving diuretics, but has been on hold for the 

last 24 hours, and I recommended we go back on Lasix 40 mg IV push every 12 

hours, patient is receiving Solu-Medrol at 60 mg IV push every 8 hours, he is 

also on Levaquin's, cefepime, and he is marginal at best.  In addition to this 

the patient had non-ST elevation myocardial infarction and flu/influenza A back 

on 3/11.  Blood cultures have been negative, patient has leukocytosis with WBC 

count of 21.7 hemoglobin is 7.9.  Creatinine has been slightly rising 1.51 on 

admission which is about his baseline, today his creatinine is 1.97 with a BUN 

of 95.  Legionella antigen has been negative CARLA screen has been negative 

rheumatoid factor is negative patient continues to complain of shortness of 

breath, intermittent cough, and wheezing.





Patient was seen today on 3/18/2025, remains in the ICU, remains on BiPAP, 

patient is down on FiO2 to 50%, so he is on BiPAP 12/5/50%.  Clinically the 

patient is feeling better breathing easier, remains on empiric antibiotics 

including cefepime and Levaquin remains on Lasix 40 mg every 12 hours remains on

steroids, and today I have noticed probably minimal improvement in his chest x-

ray findings.  Hence we will continue the same and will make Lasix 40 mg twice 

daily instead of 40 mg daily.  WBC count is 22.8 hemoglobin 7.4 electrolytes are

normal BUN is 117 creatinine 2.11





Patient was seen today on 3/19/2025, patient is feeling better today, remains on

BiPAP, however he is down to 40%, chest x-ray is showing definite improvement in

comparison to previous x-rays of the chest.  Patient remains on diuretics r

emains on antibiotics and remains on steroids, hence I have no plans to cut down

on his diuretics at this point specially with his improvement clinically and 

improvement noted on the chest x-ray.  As a matter fact I was able to 

discontinue BiPAP, and I placed the patient on a high flow nasal cannula 10 L 

and he is saturating anywhere between 97 to 99%.  Creatinine is noted to be a 

bit higher today 2.28, his BUN is 125, his WBC count remains elevated at 18.1, 

hemoglobin is 7.  Patient remains quite edematous, and he remains on diuretics 

in the form of Lasix and Aldactone.








Objective





- Vital Signs


Vital signs: 


                                   Vital Signs











Temp  98.9 F   03/19/25 08:00


 


Pulse  81   03/19/25 12:10


 


Resp  20   03/19/25 11:00


 


BP  111/59   03/19/25 11:00


 


Pulse Ox  97   03/19/25 11:00


 


FiO2  35   03/19/25 08:07








                                 Intake & Output











 03/18/25 03/19/25 03/19/25





 18:59 06:59 18:59


 


Intake Total 160.486 1702.856 81.625


 


Output Total 970 600 0


 


Balance -826.470 539.856 81.625


 


Weight 101.8 kg 103.7 kg 


 


Intake:   


 


   180 50


 


    .9 110 80 50


 


    Cefepime 2 gm In Sodium  100 





    Chloride 0.9% 100 ml @ 25   





    mls/hr IVPB Q12H [facility] Rx#   





    :[id number]   


 


  Intake, IV Titration 33.530 49.856 31.625





  Amount   


 


    Insulin Regular 100 unit 33.530 49.856 31.625





    In Sodium Chloride 0.9%   





    100 ml @ Titrate IV .Q0M   





    [facility] Rx#:[id number]   


 


  Oral  910 


 


Output:   


 


  Urine 970 600 0


 


Other:   


 


  Voiding Method External Catheter External Catheter External Catheter


 


  # Voids  0 














- Exam








GENERAL EXAM: 64-year-old white male obese , on BiPAP 12/5/40%, changed to high 

flow nasal cannula


HEAD: Normocephalic and atraumatic


EYES: Normal reaction of pupils, equal size.


NOSE: Removed nasal packing from his right nostril today.


THROAT: No erythema or exudates.


NECK: No masses, no JVD.


CHEST: No chest wall deformity.


LUNGS: Crackles persist bilaterally.


CVS: S1 and S2 normal with soft systolic murmur, regular rhythm. No other extra 

heart sounds


ABDOMEN: No hepatosplenomegaly, active bowel sounds, no guarding or rigidity. 


SKIN: No rashes


CENTRAL NERVOUS SYSTEM: Alert oriented x 3 no gross focal deficit


EXTREMITIES: 2+ bipedal edema, good pulses bilaterally.





- Labs


CBC & Chem 7: 


                                 03/19/25 05:15





                                 03/19/25 05:15


Labs: 


                  Abnormal Lab Results - Last 24 Hours (Table)











  03/18/25 03/18/25 03/18/25 Range/Units





  15:26 16:37 17:28 


 


WBC     (3.8-10.6)  k/uL


 


RBC     (4.30-5.90)  m/uL


 


Hgb     (13.0-17.5)  gm/dL


 


Hct     (39.0-53.0)  %


 


Neutrophils #     (1.3-7.7)  k/uL


 


Lymphocytes #     (1.0-4.8)  k/uL


 


Sodium     (137-145)  mmol/L


 


BUN     (9-20)  mg/dL


 


Creatinine     (0.66-1.25)  mg/dL


 


Glucose     (74-99)  mg/dL


 


POC Glucose (mg/dL)  124 H  128 H  142 H  ()  mg/dL














  03/18/25 03/18/25 03/18/25 Range/Units





  18:46 20:16 21:58 


 


WBC     (3.8-10.6)  k/uL


 


RBC     (4.30-5.90)  m/uL


 


Hgb     (13.0-17.5)  gm/dL


 


Hct     (39.0-53.0)  %


 


Neutrophils #     (1.3-7.7)  k/uL


 


Lymphocytes #     (1.0-4.8)  k/uL


 


Sodium     (137-145)  mmol/L


 


BUN     (9-20)  mg/dL


 


Creatinine     (0.66-1.25)  mg/dL


 


Glucose     (74-99)  mg/dL


 


POC Glucose (mg/dL)  157 H  227 H  303 H  ()  mg/dL














  03/18/25 03/19/25 03/19/25 Range/Units





  23:16 01:12 05:02 


 


WBC     (3.8-10.6)  k/uL


 


RBC     (4.30-5.90)  m/uL


 


Hgb     (13.0-17.5)  gm/dL


 


Hct     (39.0-53.0)  %


 


Neutrophils #     (1.3-7.7)  k/uL


 


Lymphocytes #     (1.0-4.8)  k/uL


 


Sodium     (137-145)  mmol/L


 


BUN     (9-20)  mg/dL


 


Creatinine     (0.66-1.25)  mg/dL


 


Glucose     (74-99)  mg/dL


 


POC Glucose (mg/dL)  269 H  213 H  185 H  ()  mg/dL














  03/19/25 03/19/25 03/19/25 Range/Units





  05:15 05:15 06:41 


 


WBC  18.1 H    (3.8-10.6)  k/uL


 


RBC  2.36 L    (4.30-5.90)  m/uL


 


Hgb  7.0 L    (13.0-17.5)  gm/dL


 


Hct  22.3 L    (39.0-53.0)  %


 


Neutrophils #  17.5 H    (1.3-7.7)  k/uL


 


Lymphocytes #  0.2 L    (1.0-4.8)  k/uL


 


Sodium   132 L   (137-145)  mmol/L


 


BUN   125 H*   (9-20)  mg/dL


 


Creatinine   2.28 H   (0.66-1.25)  mg/dL


 


Glucose   154 H   (74-99)  mg/dL


 


POC Glucose (mg/dL)    207 H  ()  mg/dL














  03/19/25 03/19/25 Range/Units





  08:20 09:48 


 


WBC    (3.8-10.6)  k/uL


 


RBC    (4.30-5.90)  m/uL


 


Hgb    (13.0-17.5)  gm/dL


 


Hct    (39.0-53.0)  %


 


Neutrophils #    (1.3-7.7)  k/uL


 


Lymphocytes #    (1.0-4.8)  k/uL


 


Sodium    (137-145)  mmol/L


 


BUN    (9-20)  mg/dL


 


Creatinine    (0.66-1.25)  mg/dL


 


Glucose    (74-99)  mg/dL


 


POC Glucose (mg/dL)  188 H  156 H  ()  mg/dL














Assessment and Plan


Assessment: 


Impression:


Acute on chronic hypoxic respiratory failure, multifactorial, I suspect this is 

mostly a picture of pulmonary edema, underlying pneumonia is not entirely ruled 

out.  Hence the patient will remain on antibiotics and diuretics.


Acute exacerbation of chronic systolic congestive heart failure, recent 

echocardiogram from 02/24/2025 estimating a left ventricular ejection fraction 

of 45 to 50%, moderate pulmonary hypertension, and moderate tricuspid 

regurgitation.  Repeat echocardiogram showed improvement in LV function with 

ejection fraction 55%


Acute leukocytosis


Acute on chronic kidney disease


Recent influenza A infection


History of atrial fibrillation with previous cardioversion 


History of underlying COPD and chronic tobacco dependence


Benign essential hypertension


Type 2 diabetes





Recommendation:


Change BiPAP to high flow nasal cannula and titrate accordingly


Continue bronchodilators


Continue Lasix, and continue Aldactone


Continue Solu-Medrol


Continue empiric antibiotics and antifungal patient is on cefepime and Levaquin 

and itraconazole


Procalcitonin level is not elevated and never was elevated even on his last 

admission


Continues to have elevated BNP level


Continue to monitor in the ICU, at least for the next 24 hours


Will continue to follow


C


Time with Patient: Less than 30

## 2025-03-19 NOTE — XR
EXAMINATION TYPE: XR chest 1V portable

 

DATE OF EXAM: 3/19/2025 5:27 AM

 

COMPARISON: Chest radiographs from 3/18/2025

 

CLINICAL INDICATION: Male, 64 years old with history of CHF;

 

TECHNIQUE: XR chest 1V portable Frontal view of the chest.

 

FINDINGS: 

Lungs/Pleura: Low lung volumes are present. There is no evidence of pleural effusion, focal consolida
tion, or pneumothorax.  

Pulmonary vascularity: Pulmonary vascular congestion.

Heart/mediastinum: Cardiomediastinal silhouette is enlarged.

Musculoskeletal: No acute osseous pathology.

 

IMPRESSION: 

Cardiomegaly and mild pulmonary vascular congestion. Correlate with BNP for congestive heart failure.


 

[facility], Workstation: [id number], 3/19/2025 7:39 AM Azithromycin Counseling:  I discussed with the patient the risks of azithromycin including but not limited to GI upset, allergic reaction, drug rash, diarrhea, and yeast infections.

## 2025-03-20 LAB
ANION GAP SERPL CALC-SCNC: 12 MMOL/L
BASOPHILS # BLD AUTO: 0.1 K/UL (ref 0–0.2)
BASOPHILS NFR BLD AUTO: 0 %
BUN SERPL-SCNC: 139 MG/DL (ref 9–20)
CALCIUM SPEC-MCNC: 8.5 MG/DL (ref 8.4–10.2)
CHLORIDE SERPL-SCNC: 109 MMOL/L (ref 98–107)
CO2 SERPL-SCNC: 17 MMOL/L (ref 22–30)
EOSINOPHIL # BLD AUTO: 0 K/UL (ref 0–0.7)
EOSINOPHIL NFR BLD AUTO: 0 %
ERYTHROCYTE [DISTWIDTH] IN BLOOD BY AUTOMATED COUNT: 2.08 M/UL (ref 4.3–5.9)
ERYTHROCYTE [DISTWIDTH] IN BLOOD BY AUTOMATED COUNT: 2.39 M/UL (ref 4.3–5.9)
ERYTHROCYTE [DISTWIDTH] IN BLOOD: 13.5 % (ref 11.5–15.5)
ERYTHROCYTE [DISTWIDTH] IN BLOOD: 13.9 % (ref 11.5–15.5)
GLUCOSE BLD-MCNC: 104 MG/DL (ref 70–110)
GLUCOSE BLD-MCNC: 119 MG/DL (ref 70–110)
GLUCOSE BLD-MCNC: 139 MG/DL (ref 70–110)
GLUCOSE BLD-MCNC: 141 MG/DL (ref 70–110)
GLUCOSE BLD-MCNC: 145 MG/DL (ref 70–110)
GLUCOSE BLD-MCNC: 148 MG/DL (ref 70–110)
GLUCOSE BLD-MCNC: 151 MG/DL (ref 70–110)
GLUCOSE BLD-MCNC: 157 MG/DL (ref 70–110)
GLUCOSE BLD-MCNC: 158 MG/DL (ref 70–110)
GLUCOSE BLD-MCNC: 160 MG/DL (ref 70–110)
GLUCOSE BLD-MCNC: 170 MG/DL (ref 70–110)
GLUCOSE BLD-MCNC: 196 MG/DL (ref 70–110)
GLUCOSE BLD-MCNC: 205 MG/DL (ref 70–110)
GLUCOSE BLD-MCNC: 232 MG/DL (ref 70–110)
GLUCOSE BLD-MCNC: 239 MG/DL (ref 70–110)
GLUCOSE BLD-MCNC: 248 MG/DL (ref 70–110)
GLUCOSE BLD-MCNC: 257 MG/DL (ref 70–110)
GLUCOSE BLD-MCNC: 260 MG/DL (ref 70–110)
GLUCOSE BLD-MCNC: 66 MG/DL (ref 70–110)
GLUCOSE BLD-MCNC: 75 MG/DL (ref 70–110)
GLUCOSE BLD-MCNC: 87 MG/DL (ref 70–110)
GLUCOSE SERPL-MCNC: 201 MG/DL (ref 74–99)
H CAPSUL MYC AB TITR SER CF: (no result) {TITER}
HCT VFR BLD AUTO: 19 % (ref 39–53)
HCT VFR BLD AUTO: 21.9 % (ref 39–53)
HGB BLD-MCNC: 6.2 GM/DL (ref 13–17.5)
HGB BLD-MCNC: 7.1 GM/DL (ref 13–17.5)
LYMPHOCYTES # SPEC AUTO: 0.2 K/UL (ref 1–4.8)
LYMPHOCYTES NFR SPEC AUTO: 1 %
MCH RBC QN AUTO: 29.6 PG (ref 25–35)
MCH RBC QN AUTO: 29.9 PG (ref 25–35)
MCHC RBC AUTO-ENTMCNC: 32.3 G/DL (ref 31–37)
MCHC RBC AUTO-ENTMCNC: 32.8 G/DL (ref 31–37)
MCV RBC AUTO: 91.2 FL (ref 80–100)
MCV RBC AUTO: 91.6 FL (ref 80–100)
MONOCYTES # BLD AUTO: 0.6 K/UL (ref 0–1)
MONOCYTES NFR BLD AUTO: 3 %
NEUTROPHILS # BLD AUTO: 16.4 K/UL (ref 1.3–7.7)
NEUTROPHILS NFR BLD AUTO: 94 %
PLATELET # BLD AUTO: 163 K/UL (ref 150–450)
PLATELET # BLD AUTO: 171 K/UL (ref 150–450)
POTASSIUM SERPL-SCNC: 5.2 MMOL/L (ref 3.5–5.1)
SODIUM SERPL-SCNC: 138 MMOL/L (ref 137–145)
WBC # BLD AUTO: 17.4 K/UL (ref 3.8–10.6)
WBC # BLD AUTO: 17.6 K/UL (ref 3.8–10.6)

## 2025-03-20 PROCEDURE — 30233N1 TRANSFUSION OF NONAUTOLOGOUS RED BLOOD CELLS INTO PERIPHERAL VEIN, PERCUTANEOUS APPROACH: ICD-10-PCS

## 2025-03-20 RX ADMIN — LACTULOSE ONE GM: 20 SOLUTION ORAL at 11:04

## 2025-03-20 RX ADMIN — MORPHINE SULFATE PRN MG: 4 INJECTION, SOLUTION INTRAMUSCULAR; INTRAVENOUS at 09:22

## 2025-03-20 NOTE — P.PN
Subjective





Patient is seen for follow-up for acute kidney injury.


Currently maintained on Lasix





Patient remains short of breath requiring BiPAP on and off.


Serum creatinine decreased to 2.0.  BUN disproportionately elevated at 139.  

Patient is maintained on steroids and significant drop in hemoglobin noted today

with hemoglobin at 6.2.  No active bleeding noted





24-hour urine output at 2.6 L





Objective





- Vital Signs


Vital signs: 


                                   Vital Signs











Temp  97.3 F L  03/20/25 08:54


 


Pulse  65   03/20/25 11:37


 


Resp  14   03/20/25 11:00


 


BP  120/57   03/20/25 11:00


 


Pulse Ox  97   03/20/25 11:22


 


FiO2  50   03/20/25 08:43








                                 Intake & Output











 03/19/25 03/20/25 03/20/25





 18:59 06:59 18:59


 


Intake Total 161.208 930.517 669.313


 


Output Total 1200 1400 800


 


Balance -1038.792 -469.483 -130.687


 


Weight  100.8 kg 


 


Intake:   


 


   220 140


 


    .9 120 120 40


 


    Cefepime 2 gm In Sodium  100 100





    Chloride 0.9% 100 ml @ 25   





    mls/hr IVPB Q12H [facility] Rx#   





    :[id number]   


 


  Intake, IV Titration 41.208 88.517 49.313





  Amount   


 


    Insulin Regular 100 unit 41.208 88.517 49.313





    In Sodium Chloride 0.9%   





    100 ml @ Titrate IV .Q0M   





    [facility] Rx#:[id number]   


 


  Oral  622 480


 


  Blood Product   0


 


    Rc As-1  Unit   0





    [id number]   


 


Output:   


 


  Urine 1200 1400 800


 


Other:   


 


  Voiding Method External Catheter External Catheter 














- Exam





Patient is awake, comfortable


Maintained on BiPAP


Examination of the heart S1 and S2


Examination of the lungs decreased breath sounds at the bases occasional whee

zing heard bilaterally


Abdomen is soft nontender


Examination of lower extremities shows edema 1+ bilaterally


CNS exam grossly intact





- Labs


CBC & Chem 7: 


                                 03/20/25 03:23





                                 03/20/25 03:23


Labs: 


                  Abnormal Lab Results - Last 24 Hours (Table)











  03/19/25 03/19/25 03/19/25 Range/Units





  14:11 16:25 18:04 


 


WBC     (3.8-10.6)  k/uL


 


RBC     (4.30-5.90)  m/uL


 


Hgb     (13.0-17.5)  gm/dL


 


Hct     (39.0-53.0)  %


 


Neutrophils #     (1.3-7.7)  k/uL


 


Lymphocytes #     (1.0-4.8)  k/uL


 


Potassium     (3.5-5.1)  mmol/L


 


Chloride     ()  mmol/L


 


Carbon Dioxide     (22-30)  mmol/L


 


BUN     (9-20)  mg/dL


 


Creatinine     (0.66-1.25)  mg/dL


 


Glucose     (74-99)  mg/dL


 


POC Glucose (mg/dL)  167 H  333 H  343 H  ()  mg/dL


 


Crossmatch     














  03/19/25 03/19/25 03/19/25 Range/Units





  19:01 20:03 21:04 


 


WBC     (3.8-10.6)  k/uL


 


RBC     (4.30-5.90)  m/uL


 


Hgb     (13.0-17.5)  gm/dL


 


Hct     (39.0-53.0)  %


 


Neutrophils #     (1.3-7.7)  k/uL


 


Lymphocytes #     (1.0-4.8)  k/uL


 


Potassium     (3.5-5.1)  mmol/L


 


Chloride     ()  mmol/L


 


Carbon Dioxide     (22-30)  mmol/L


 


BUN     (9-20)  mg/dL


 


Creatinine     (0.66-1.25)  mg/dL


 


Glucose     (74-99)  mg/dL


 


POC Glucose (mg/dL)  383 H  366 H  258 H  ()  mg/dL


 


Crossmatch     














  03/19/25 03/19/25 03/20/25 Range/Units





  22:03 23:04 00:37 


 


WBC     (3.8-10.6)  k/uL


 


RBC     (4.30-5.90)  m/uL


 


Hgb     (13.0-17.5)  gm/dL


 


Hct     (39.0-53.0)  %


 


Neutrophils #     (1.3-7.7)  k/uL


 


Lymphocytes #     (1.0-4.8)  k/uL


 


Potassium     (3.5-5.1)  mmol/L


 


Chloride     ()  mmol/L


 


Carbon Dioxide     (22-30)  mmol/L


 


BUN     (9-20)  mg/dL


 


Creatinine     (0.66-1.25)  mg/dL


 


Glucose     (74-99)  mg/dL


 


POC Glucose (mg/dL)  212 H  118 H  66 L  ()  mg/dL


 


Crossmatch     














  03/20/25 03/20/25 03/20/25 Range/Units





  02:09 03:15 03:23 


 


WBC    17.4 H  (3.8-10.6)  k/uL


 


RBC    2.08 L  (4.30-5.90)  m/uL


 


Hgb    6.2 L*  (13.0-17.5)  gm/dL


 


Hct    19.0 L*  (39.0-53.0)  %


 


Neutrophils #    16.4 H  (1.3-7.7)  k/uL


 


Lymphocytes #    0.2 L  (1.0-4.8)  k/uL


 


Potassium     (3.5-5.1)  mmol/L


 


Chloride     ()  mmol/L


 


Carbon Dioxide     (22-30)  mmol/L


 


BUN     (9-20)  mg/dL


 


Creatinine     (0.66-1.25)  mg/dL


 


Glucose     (74-99)  mg/dL


 


POC Glucose (mg/dL)  158 H  232 H   ()  mg/dL


 


Crossmatch     














  03/20/25 03/20/25 03/20/25 Range/Units





  03:23 03:58 05:15 


 


WBC     (3.8-10.6)  k/uL


 


RBC     (4.30-5.90)  m/uL


 


Hgb     (13.0-17.5)  gm/dL


 


Hct     (39.0-53.0)  %


 


Neutrophils #     (1.3-7.7)  k/uL


 


Lymphocytes #     (1.0-4.8)  k/uL


 


Potassium  5.2 H    (3.5-5.1)  mmol/L


 


Chloride  109 H    ()  mmol/L


 


Carbon Dioxide  17 L    (22-30)  mmol/L


 


BUN  139 H*    (9-20)  mg/dL


 


Creatinine  2.04 H    (0.66-1.25)  mg/dL


 


Glucose  201 H    (74-99)  mg/dL


 


POC Glucose (mg/dL)   239 H   ()  mg/dL


 


Crossmatch    See Detail  














  03/20/25 03/20/25 03/20/25 Range/Units





  05:56 06:58 08:10 


 


WBC     (3.8-10.6)  k/uL


 


RBC     (4.30-5.90)  m/uL


 


Hgb     (13.0-17.5)  gm/dL


 


Hct     (39.0-53.0)  %


 


Neutrophils #     (1.3-7.7)  k/uL


 


Lymphocytes #     (1.0-4.8)  k/uL


 


Potassium     (3.5-5.1)  mmol/L


 


Chloride     ()  mmol/L


 


Carbon Dioxide     (22-30)  mmol/L


 


BUN     (9-20)  mg/dL


 


Creatinine     (0.66-1.25)  mg/dL


 


Glucose     (74-99)  mg/dL


 


POC Glucose (mg/dL)  196 H  160 H  119 H  ()  mg/dL


 


Crossmatch     














  03/20/25 03/20/25 Range/Units





  10:47 11:54 


 


WBC    (3.8-10.6)  k/uL


 


RBC    (4.30-5.90)  m/uL


 


Hgb    (13.0-17.5)  gm/dL


 


Hct    (39.0-53.0)  %


 


Neutrophils #    (1.3-7.7)  k/uL


 


Lymphocytes #    (1.0-4.8)  k/uL


 


Potassium    (3.5-5.1)  mmol/L


 


Chloride    ()  mmol/L


 


Carbon Dioxide    (22-30)  mmol/L


 


BUN    (9-20)  mg/dL


 


Creatinine    (0.66-1.25)  mg/dL


 


Glucose    (74-99)  mg/dL


 


POC Glucose (mg/dL)  145 H  205 H  ()  mg/dL


 


Crossmatch    














Assessment and Plan


Assessment: 





1.  Acute renal injury, ATN associated with underlying infection and borderline 

low blood pressures in the setting of use of ACE inhibitors.  May continue with 

IV diuretics.  Ultrasound shows no evidence of obstruction.  UA is benign.  BUN 

disproportionately elevated secondary to steroids and possible GI bleed


2.  Acute hypoxic respiratory failure secondary to pneumonia and volume overload


3.  Volume overload


4.  CHF with preserved ejection fraction of 55 to 60% noted this admission


5.  Hypervolemic hyponatremia.  Patient was also maintained on hypotonic IV 

fluids which contributed to the hyponatremia, now improved


6.  Influenza A infection status post Tamiflu during previous hospitalization 

about 4 weeks ago


7.  Anemia rule out GI bleed, being transfused packed RBCs for hemoglobin 6.2 

today.  No active bleeding noted   


Plan: 





DC potassium


Continue with Lasix.  BUN is disproportionately elevated from steroids and possi

ble underlying GI bleed.


Patient may need to start renal replacement therapy


Repeat labs in a.m.


Continue off of ACE inhibitors


Continue with Aldactone


DC potassium


Continue to avoid nephrotoxic agents

## 2025-03-20 NOTE — P.PN
Subjective


Progress Note Date: 03/20/25


Principal diagnosis: 





Reason for follow-up is pneumonia





Patient is a 64-year-old  male with a past medical history significant 

for diabetes mellitus hypertension hyperlipidemia pneumonia atrial fibrillation 

currently everyday smoker presenting to the hospital for evaluation of 

increasing shortness of breath patient has been diagnosed with multifocal 

pneumonia prompting this consultation.


On today's evaluation that is 03/20/2025,the patient remains to be afebrile, pat

ient is on 12 L high flow nasal cannula supplemental oxygen patient said to be 

slightly lethargic today not a very good historian hemodynamically stable.


Patient white count slightly down to 17.4, creatinine 2.04 blood culture 

negative





Objective





- Vital Signs


Vital signs: 


                                   Vital Signs











Temp  96.3 F L  03/20/25 12:41


 


Pulse  81   03/20/25 14:00


 


Resp  27 H  03/20/25 14:00


 


BP  130/56   03/20/25 14:00


 


Pulse Ox  93 L  03/20/25 14:00


 


FiO2  50   03/20/25 08:43








                                 Intake & Output











 03/19/25 03/20/25 03/20/25





 18:59 06:59 18:59


 


Intake Total 161.208 443.989 7305.379


 


Output Total 1200 1400 1650


 


Balance -1038.792 -469.483 -72.621


 


Weight  100.8 kg 


 


Intake:   


 


   220 240


 


    .9 120 120 40


 


    Cefepime 2 gm In Sodium  100 200





    Chloride 0.9% 100 ml @ 25   





    mls/hr IVPB Q12H [facility] Rx#   





    :[id number]   


 


  Intake, IV Titration 41.208 88.517 57.379





  Amount   


 


    Insulin Regular 100 unit 41.208 88.517 57.379





    In Sodium Chloride 0.9%   





    100 ml @ Titrate IV .Q0M   





    [facility] Rx#:[id number]   


 


  Oral  622 970


 


  Blood Product   310


 


    Rc As-1  Unit   310





    [id number]   


 


Output:   


 


  Urine 1200 1400 1650


 


Other:   


 


  Voiding Method External Catheter External Catheter 














- Exam





GENERAL DESCRIPTION: An elderly male lying in bed in no distress





RESPIRATORY SYSTEM: Unlabored breathing , coarse breath sounds bilaterally





HEART: S1 S2 regular rate and rhythm ,





ABDOMEN: Soft , no tenderness





EXTREMITIES: No edema feet





- Labs


CBC & Chem 7: 


                                 03/20/25 03:23





                                 03/20/25 03:23


Labs: 


                  Abnormal Lab Results - Last 24 Hours (Table)











  03/19/25 03/19/25 03/19/25 Range/Units





  16:25 18:04 19:01 


 


WBC     (3.8-10.6)  k/uL


 


RBC     (4.30-5.90)  m/uL


 


Hgb     (13.0-17.5)  gm/dL


 


Hct     (39.0-53.0)  %


 


Neutrophils #     (1.3-7.7)  k/uL


 


Lymphocytes #     (1.0-4.8)  k/uL


 


Potassium     (3.5-5.1)  mmol/L


 


Chloride     ()  mmol/L


 


Carbon Dioxide     (22-30)  mmol/L


 


BUN     (9-20)  mg/dL


 


Creatinine     (0.66-1.25)  mg/dL


 


Glucose     (74-99)  mg/dL


 


POC Glucose (mg/dL)  333 H  343 H  383 H  ()  mg/dL


 


Crossmatch     














  03/19/25 03/19/25 03/19/25 Range/Units





  20:03 21:04 22:03 


 


WBC     (3.8-10.6)  k/uL


 


RBC     (4.30-5.90)  m/uL


 


Hgb     (13.0-17.5)  gm/dL


 


Hct     (39.0-53.0)  %


 


Neutrophils #     (1.3-7.7)  k/uL


 


Lymphocytes #     (1.0-4.8)  k/uL


 


Potassium     (3.5-5.1)  mmol/L


 


Chloride     ()  mmol/L


 


Carbon Dioxide     (22-30)  mmol/L


 


BUN     (9-20)  mg/dL


 


Creatinine     (0.66-1.25)  mg/dL


 


Glucose     (74-99)  mg/dL


 


POC Glucose (mg/dL)  366 H  258 H  212 H  ()  mg/dL


 


Crossmatch     














  03/19/25 03/20/25 03/20/25 Range/Units





  23:04 00:37 02:09 


 


WBC     (3.8-10.6)  k/uL


 


RBC     (4.30-5.90)  m/uL


 


Hgb     (13.0-17.5)  gm/dL


 


Hct     (39.0-53.0)  %


 


Neutrophils #     (1.3-7.7)  k/uL


 


Lymphocytes #     (1.0-4.8)  k/uL


 


Potassium     (3.5-5.1)  mmol/L


 


Chloride     ()  mmol/L


 


Carbon Dioxide     (22-30)  mmol/L


 


BUN     (9-20)  mg/dL


 


Creatinine     (0.66-1.25)  mg/dL


 


Glucose     (74-99)  mg/dL


 


POC Glucose (mg/dL)  118 H  66 L  158 H  ()  mg/dL


 


Crossmatch     














  03/20/25 03/20/25 03/20/25 Range/Units





  03:15 03:23 03:23 


 


WBC   17.4 H   (3.8-10.6)  k/uL


 


RBC   2.08 L   (4.30-5.90)  m/uL


 


Hgb   6.2 L*   (13.0-17.5)  gm/dL


 


Hct   19.0 L*   (39.0-53.0)  %


 


Neutrophils #   16.4 H   (1.3-7.7)  k/uL


 


Lymphocytes #   0.2 L   (1.0-4.8)  k/uL


 


Potassium    5.2 H  (3.5-5.1)  mmol/L


 


Chloride    109 H  ()  mmol/L


 


Carbon Dioxide    17 L  (22-30)  mmol/L


 


BUN    139 H*  (9-20)  mg/dL


 


Creatinine    2.04 H  (0.66-1.25)  mg/dL


 


Glucose    201 H  (74-99)  mg/dL


 


POC Glucose (mg/dL)  232 H    ()  mg/dL


 


Crossmatch     














  03/20/25 03/20/25 03/20/25 Range/Units





  03:58 05:15 05:56 


 


WBC     (3.8-10.6)  k/uL


 


RBC     (4.30-5.90)  m/uL


 


Hgb     (13.0-17.5)  gm/dL


 


Hct     (39.0-53.0)  %


 


Neutrophils #     (1.3-7.7)  k/uL


 


Lymphocytes #     (1.0-4.8)  k/uL


 


Potassium     (3.5-5.1)  mmol/L


 


Chloride     ()  mmol/L


 


Carbon Dioxide     (22-30)  mmol/L


 


BUN     (9-20)  mg/dL


 


Creatinine     (0.66-1.25)  mg/dL


 


Glucose     (74-99)  mg/dL


 


POC Glucose (mg/dL)  239 H   196 H  ()  mg/dL


 


Crossmatch   See Detail   














  03/20/25 03/20/25 03/20/25 Range/Units





  06:58 08:10 10:47 


 


WBC     (3.8-10.6)  k/uL


 


RBC     (4.30-5.90)  m/uL


 


Hgb     (13.0-17.5)  gm/dL


 


Hct     (39.0-53.0)  %


 


Neutrophils #     (1.3-7.7)  k/uL


 


Lymphocytes #     (1.0-4.8)  k/uL


 


Potassium     (3.5-5.1)  mmol/L


 


Chloride     ()  mmol/L


 


Carbon Dioxide     (22-30)  mmol/L


 


BUN     (9-20)  mg/dL


 


Creatinine     (0.66-1.25)  mg/dL


 


Glucose     (74-99)  mg/dL


 


POC Glucose (mg/dL)  160 H  119 H  145 H  ()  mg/dL


 


Crossmatch     














  03/20/25 03/20/25 Range/Units





  11:54 14:24 


 


WBC    (3.8-10.6)  k/uL


 


RBC    (4.30-5.90)  m/uL


 


Hgb    (13.0-17.5)  gm/dL


 


Hct    (39.0-53.0)  %


 


Neutrophils #    (1.3-7.7)  k/uL


 


Lymphocytes #    (1.0-4.8)  k/uL


 


Potassium    (3.5-5.1)  mmol/L


 


Chloride    ()  mmol/L


 


Carbon Dioxide    (22-30)  mmol/L


 


BUN    (9-20)  mg/dL


 


Creatinine    (0.66-1.25)  mg/dL


 


Glucose    (74-99)  mg/dL


 


POC Glucose (mg/dL)  205 H  257 H  ()  mg/dL


 


Crossmatch    














Assessment and Plan


(1) Leukocytosis


Current Visit: Yes   Status: Acute   Code(s): D72.829 - ELEVATED WHITE BLOOD 

CELL COUNT, UNSPECIFIED   SNOMED Code(s): 457286483


   





(2) Bilateral pneumonia


Current Visit: Yes   Status: Acute   Code(s): J18.9 - PNEUMONIA, UNSPECIFIED 

ORGANISM   SNOMED Code(s): 838665983


   


Plan: 





1patient presented hospital with increasing shortness of breath and chest pain 

which is likely multifactorial in this patient who did have evidence of PE on 

the CT there is also evidence of multifocal pneumonia with elevated white count 

and recently acute influenza A will need to cover for resistant gram-positive as

well as gram-negative pathogen for post influenza pneumonia


2-blood culture has been negative was told few days ago the sputum was collected

but is no results available in the micro section will be ordered again


3-patient to continue cefepime and  itraconazole, progress remains to be guarded


Dictation was produced using dragon dictation software. please excuse any gramma

tical, word or spelling errors. 








Time with Patient: Less than 30

## 2025-03-20 NOTE — P.PN
Progress Note - Text


Progress Note Date: 03/20/25





Patient is a 64-year-old male with a PMH of atrial fibrillation on Eliquis, COPD

on 2 L home oxygen, non-insulin-dependent diabetes mellitus, hyperlipidemia, 

hypertension presenting with shortness of breath.  Patient was previously 

admitted here for acute hypoxic respiratory failure secondary to influenza 

pneumonia and was discharged on 2 L of home oxygen.  Patient states he has been 

feeling short of breath while at rest.  He reports that since being discharged 

the home oxygen has not been sufficient. He states that he had to keep 

increasing his oxygen.    Patient says shortness of breath is slightly relieved 

when he is laying down.  Denies any orthopnea or PND.  Patient endorses a nonpr

oductive cough that is chronic in nature, but says it has been getting worse.  

Patient admits to having fever, chills.  Patient also admits to having non-

radiating, pressure-like chest pain over the last few days.  Chest pain is not 

related to exertion and he had no alleviating or aggravating factors.  Patient 

denies any headache, vision changes, nausea, vomiting, diarrhea, abdominal pain,

urinary symptoms.





EKG independently interpreted displaying sinus rhythm with left bundle branch 

block that has been seen on prior EKG, rate 65 bpm, QTc 451 ms


CXR independently interpreted displaying bilateral multifocal airspace opacities


Chest CTA displaying acute segmental and subsegmental pulmonary emboli within 

the right and middle lower lobes, no saddle embolus, no RV strain, chronic 

interstitial loading disease


Venous Doppler B/L LE displaying no evidence of acute DVT


Troponin 0.049, 0.055, proBNP 8810, D-dimer 4.87, WBC 22.6, Hgb 11.0, HCT 35.2, 

MCV 93.7, platelet 322, PT 11.8, INR 1.1, APTT 28, sodium 133, potassium 4.2, 

CO2 24, BUN 30, creatinine 1.27, glucose 135


VBG pH 7.52, pCO2 32


T98.2 F, SD 80, RR 26, /77, O2 sat 90% on BiPAP with FiO2 of 100%





March 12: Overflow the ER.  Up in the bed.  Short of breath.  Patient was on 

BiPAP overnight.  This morning on 15 L high flow nasal cannula.  Decreased 

appetite.  Has got a cough.  Some wheezing.  Some sputum production.  Decreased 

appetite.  Patient is on IV heparin.  Also on IV cefepime.  Pulmonary following


March 13: Patient did confirm that because Eliquis is very expensive patient was

not taking it properly did and take it sparingly at home.  Explains patient's 

PE.  Started on Eliquis today by cardiology.  IV heparin discontinued.  Getting 

easily short of breath.  Requiring BiPAP..  Some cough.  Oral intake fair.  On 

IV cefepime and vancomycin.  ID following.  Also on IV Lasix.  Due to worsening 

renal function will DC vancomycin


March 14: Saw this afternoon.  On BiPAP.  60%.  Ensure ordered.  All blood work 

ordered by pulmonary including fungal.  Patient currently on IV cefepime and 

Levaquin.  IV Solu-Medrol.  Remain short of breath.  Tired.  Being followed by 

pulmonary and ID.  Chest x-ray film personally reviewed by me-showing pulm edema

bilateral infiltrates especially at the bases.  Renal ultrasound nonspecific.  

UA showing protein 1+.


March 15: Remain short of breath.  Sitting up in bed.  Not able to come off the 

BiPAP.  Remains on IV cefepime.  Accu-Cheks running high.  Put on insulin drip. 

On IV Solu-Medrol.  Oral intake fair.


March 16: Patient remains short of breath.  Unable to get off BiPAP.  FiO2 

increased to 70%.  12/5.  Per Dr. Joshua: Patient be moved to the ICU.  Poor 

oral intake.  Remains on insulin drip.  IV Solu-Medrol.  Eliquis.  IV cefepime 

and Levaquin.  Tired.  Sitting up in bed leaning forward short of breath.  Chest

x-ray film personally reviewed by me: Scattered bilateral infiltrates


March 17: ICU.  Remains on BiPAP.  Decreased oral intake.  Sitting up.  Short of

breath.  On IV cefepime.  Insulin drip.  IV Solu-Medrol.  DuoNeb Q4.  Rather 

tired.  Also yesterday evening at epistaxis.  Nasal packing.


March 18: ICU.  Mostly been on BiPAP.  High flow nasal cannula.  Decreased oral 

intake.  Sitting up leaning forward.  Remains on IV cefepime or Levaquin insulin

drip.  A bit tired.  Patient has very poor oral intake.  Per intensivist patient

will IV Lasix.  Note worsening renal function-cut back to Lasix once a day.  

Given blood pressure running lower side.  DC Aldactone and DC amlodipine.


March 19: ICU.  Patient was taken off BiPAP this morning.  Decreased to high 

flow nasal cannula 10 L.  Wife at the bedside.  Encourage oral intake.  Chopped 

diet.  Per intensivist nephrology patient to continue on IV Lasix.  Blood 

pressure better after stopping amlodipine and Aldactone yesterday.  Encourage 

oral intake.  Incentive spirometry.  IV cefepime, Levaquin, I terconazole per 

ID.


Large 28: ICU.  Yesterday patient was nasal cannula.  Overnight repeat put back 

on BiPAP.  Patient did drop his hemoglobin further.  Down to 6.2.  Felt to be 

from epistaxis.  Given a unit of blood.  Further increase in BUN/creatinine.  

Remains on IV Lasix.  Ordered Kerlix to both the lower extremities.  Had a good 

portion of his lunch today.





Active Medications





Albuterol/Ipratropium (Ipratropium-Albuterol 3 Ml Neb)  3 ml INHALATION RT-QID 

PRN


   PRN Reason: Shortness Of Breath Or Wheezing


Albuterol/Ipratropium (Ipratropium-Albuterol 3 Ml Neb)  3 ml INHALATION RT-Q4H 

[facility]


   Last Admin: 03/20/25 20:02 Dose:  3 ml


   


Alprazolam (Alprazolam 0.5 Mg Tab)  0.5 mg PO TID PRN


   PRN Reason: Anxiety


   Last Admin: 03/20/25 20:02 Dose:  0.5 mg


   


Aspirin (Aspirin 81 Mg)  81 mg PO DAILY [facility]


   Last Admin: 03/20/25 09:26 Dose:  81 mg


   


Budesonide (Budesonide 1 Mg/2 Ml Nebu)  1 mg INHALATION RT-BID [facility]


   Last Admin: 03/20/25 20:02 Dose:  1 mg


   


Dextrose/Water (Dextrose 50% Syringe 50 Ml)  25 ml IVP PER PROTOCOL PRN; 

Protocol


   PRN Reason: Hypoglycemia


Dextrose/Water (Dextrose 50% Syringe 50 Ml)  50 ml IVP PER PROTOCOL PRN; 

Protocol


   PRN Reason: Hypoglycemia


Formoterol Fumarate (Formoterol Fumarate 20 Mcg/2 Ml Nebu)  20 mcg INHALATION 

RT-BID [facility]


   Last Admin: 03/20/25 20:02 Dose:  20 mcg


   


Furosemide (Furosemide 10 Mg/Ml 4 Ml Vial)  40 mg IV Q12HR [facility]


   Last Admin: 03/20/25 20:01 Dose:  40 mg


   


Cefepime HCl 2 gm/ Sodium (Chloride)  100 mls @ 25 mls/hr IVPB Q12H [facility]; 

Protocol


   Last Admin: 03/20/25 09:41 Dose:  25 mls/hr


   


Insulin Human Regular 100 unit (/ Sodium Chloride)  100 mls @ 0 mls/hr IV .Q0M 

[facility]; Protocol


   Last Titration: 03/20/25 19:10 Dose:  4.6 units/hr, 4.6 mls/hr


   


Itraconazole (Itraconazole 100 Mg Cap)  200 mg PO BID [facility]; Protocol


   Last Admin: 03/20/25 20:01 Dose:  200 mg


   


Lorazepam (Lorazepam 1 Mg Tab)  1 mg PO HS PRN


   PRN Reason: Anxiety


   Last Admin: 03/19/25 20:45 Dose:  1 mg


   


Methylprednisolone Sodium Succinate (Methylprednisolone Sod Succi 125 Mg/2 Ml 

Vial)  60 mg IV Q8H [facility]


   Last Admin: 03/20/25 19:10 Dose:  60 mg


   


Metoprolol Succinate (Metoprolol Succinate (Er) 25 Mg Tab.Er.24h)  25 mg PO 

DAILY [facility]


   Last Admin: 03/20/25 09:26 Dose:  25 mg


   


Morphine Sulfate (Morphine Sulfate 4 Mg/Ml Syringe)  4 mg IV Q4HR PRN


   PRN Reason: Severe Pain (Scale 7 to 10)


   Last Admin: 03/20/25 17:35 Dose:  4 mg


   


Naloxone HCl (Naloxone 0.4 Mg/Ml 1 Ml Vial)  0.2 mg IV Q2M PRN


   PRN Reason: Opioid Reversal


Ondansetron HCl (Ondansetron 4 Mg/2 Ml Vial)  4 mg IVP Q8HR PRN


   PRN Reason: Nausea And Vomiting


   Last Admin: 03/14/25 14:52 Dose:  4 mg


   


Oxymetazoline HCl (Oxymetazoline 0.05% Nasl Spray 1 Spray Bottle)  2 spray NASAL

BID [facility]


   Last Admin: 03/20/25 20:02 Dose:  2 spray


   


Pantoprazole Sodium (Pantoprazole 40 Mg Tablet)  40 mg PO [id number] [facility]


   Last Admin: 03/20/25 09:26 Dose:  40 mg


   


Sodium Chloride (Sodium Chloride 0.65% Nasal Spray 44 Ml Btl)  2 spray NASAL QID

PRN


   PRN Reason: Dry Nasal Passages








Social history:


Tobacco: Current 50-pack-year smoker


Alcohol: Occasional alcohol use


Recreational drugs: Marijuana


Travel: No recent travel





On examination:


VITAL SIGNS: 96.7, 67, 21, 118 x 49, 98% on 12 L high flow


GENERAL APPEARANCE: Reclining in bed, short of breath


HEENT: Normal external appearance of nose and ear.  Oral cavity normal


EYES: Pupils equal. Conjunctiva normal. 


NECK: JVD unable to assess. Mass not palpable. 


RESPIRATORY: Respiratory effort increased,  Lungs diminished breath sounds some 

scattered crackles prolonged expiration. 


CARDIOVASCULAR: First and second sounds normal. No edema. 


ABDOMEN: Soft. Liver and spleen not palpable. No tenderness. No mass palpable. 


PSYCHIATRY: Answering questions appropriately.  A bit less tired





INVESTIGATIONS, reviewed in the clinical context:


March 20: Hemoglobin today down to 6.2.After unit of blood.  Up to 7.1.  

creatinine 2.04


March 19: White count 18.1 hemoglobin 7 platelets 182 potassium 4.6  

creatinine 2.28


March 18: White count 22.8 hemoglobin 7.4 platelets 200 sodium 130 potassium 4.2

 creatinine 2.11


March 17: White count 21.7 hemoglobin 7.9 platelets 201 sodium 128 potassium 4.5

BUN 95 creatinine 1.97


Mild 16: White count 21.1 hemoglobin 9.1 platelets 245 potassium 4.2 BUN 68 

creatinine 1.73


March 15: White count 8.2 hemoglobin 9.2 platelets 232 ABG: pH 7.3 pCO2 42 pO2 

80 sodium 133 potassium 3.6 creatinine 1.69.  Accu-Cheks high


March 14: White count 20.8 hemoglobin 9.2 platelets 265 sodium 134 BUN 44 

creatinine 1.54.  Chest x-ray: Pulm edema/bilateral infiltrates


March 13: Sodium 132 potassium 3.4 BUN 35 creatinine 1.51


March 12: White count 7.2 hemoglobin 10.3 platelets 242 sodium 136 potassium 3.8

BUN 28 creatinine 1.37


Troponin I: 0.049, 0.055, 0.051


Chest x-ray film personally reviewed by me-bilateral infiltrates.  Effusion/pulm

edema cardiomegaly.


Venous Doppler: No DVT


Chest CTA: Acute segmental and subsegmental pulm embolism within the right 

middle and right lower lobes.  No RV strain.  Chronic interstitial lung disease.





Previous labs:


Creatinine 1.63 in June 2023





Assessment/Plan:








#.  Acute pulmonary embolism, non-massive [segmental and subsegmental within the

 right middle and lower lobes.  No RV strain


IV heparin, currently on o Eliquis 








#.  Sepsis likely secondary to -viral pneumonia.  Secondary bacterial component 

probable


IV cefepime.  Levaquin discontinued on March 19..-also has been on itraconazole 

started on the March 14


ID and pulmonary following








#.  Acute hypoxic respiratory failure, secondary to above: Slow to respond


Was on BiPAP, back up to 15 L nasal cannula








#.  Acute COPD exacerbation, in a prior smoker: Some improvement


DuoNeb Q4.  IV Solu-Medrol 60 mg q8.  Nebulized Pulmicort








#.  Acute on chronic heart failure with reduced ejection fraction (EF 45 to 

50%): Some improvement


IV Lasix 40 mg every 12.  Aldactone-discontinued.





#.  Chronic hypoxic respiratory failure from underlying COPD, 2 L home O2





-Acute kidney injury, likely ATN multifactorial, worsening


Admission creatinine 1.27.











#.  Non-insulin-dependent type 2 diabetes, uncontrolled with hyperglycemia 

secondary steroids: Not improving


Insulin subcu sliding scale.  Stop Levemir.  Currently on insulin drip y


Accu-Cheks ACHS





- chronic kidney disease stage III from nephrosclerosis and diabetic nephropathy

 [creatinine 1.63 in June 2023]


Renal ultrasound.:  Suboptimal study.  No corticomedullary comment.


UA protein 1+





-Recent influenza type A








#.  Essential hypertension-


Toprol-XL.  Other antihypertensives have been held





#.  Hyperlipidemia


Lipitor





-Full code





Had a good lunch continue IV Lasix.  Antibiotics per ID including cefepime, I 

terconazole,  IV steroids.  Bronchodilators.  





Past Medical History


Past Medical History: Atrial Fibrillation, Diabetes Mellitus, Hyperlipidemia, 

Hypertension, Pneumonia, Supraventricular Tachycardia (SVT)


Additional Past Medical History / Comment(s): a fib, causes shortness of breath,

can feel irregular heartbeat


History of Any Multi-Drug Resistant Organisms: None Reported


Past Surgical History: Cholecystectomy, Heart Catheterization


Additional Past Surgical History / Comment(s): Colonoscopy


Past Anesthesia/Blood Transfusion Reactions: No Reported Reaction


Past Psychological History: No Psychological Hx Reported


Smoking Status: Current every day smoker


Past Alcohol Use History: Occasional


Past Drug Use History: Marijuana

## 2025-03-20 NOTE — XR
EXAMINATION TYPE: XR chest 1V portable

 

DATE OF EXAM: 3/20/2025 5:41 AM

 

COMPARISON: Chest radiograph from one day prior.

 

CLINICAL INDICATION: Male, 64 years old with history of CHF; 

 

TECHNIQUE: XR chest 1V portable Frontal view of the chest.

 

FINDINGS: 

Lungs/Pleura: There is no evidence of pleural effusion, focal consolidation, or pneumothorax.  

Pulmonary vascularity: Pulmonary vascular congestion.

Heart/mediastinum: Cardiomediastinal silhouette is enlarged.

Musculoskeletal: No acute osseous pathology.

 

IMPRESSION: 

Cardiomegaly and mild pulmonary vascular congestion. Correlate with BNP for congestive heart failure.


 

[facility], Workstation: [id number], 3/20/2025 7:19 AM

## 2025-03-20 NOTE — P.PN
Subjective


Progress Note Date: 03/20/25


Principal diagnosis: 





Acute on chronic hypoxic respiratory failure, multifactorial











Patient is a 64-year-old male with past medical history significant for atrial 

fibrillation, diabetes mellitus, CKD stage III, hypertension, hyperlipidemia, 

tobacco smoker.  Of note, recently had a prolonged hospitalization 02/23/2025 

through 03/07/2025.  Treated for combination of influenza A, pneumonia, CHF.  

Completed course of antibiotics and Tamiflu.  At this time, did require 

noninvasive BiPAP, eventually discharged on home O2.  Returns with a chief 

complaint of shortness of breath progressing over the last 2 to 3 days.  Also, 

reports sudden onset substernal chest pain that developed the night prior and 

has been persistent.  On arrival to the ED, found to be in some respiratory 

distress, and placed on BiPAP.  Workup in the emergency department including a 

chest x-ray showing multifocal infiltrates concerning for pneumonia or pulmonary

edema.  D-dimer was elevated in setting CT angio protocol which was positive for

segmental and subsegmental right middle lobe and right lower lobe pulmonary 

emboli.  No saddle pulmonary embolus. Pulmonary artery mildly enlarged.  

Preserved RV to LV ratio.  There were diffuse coarse reticular infiltrates, as 

well as, groundglass lung attenuation, cystic changes, with concerns for 

interstitial lung disease. Patient does take Eliquis for his history of atrial 

fibrillation.  Does state he has missed some doses lately.  CBC: WBC count 22.6,

hemoglobin 11, platelets 322.  CMP: Sodium 133, potassium 4.2, chloride 101, 

serum bicarb 24, BUN 30, creatinine 1.27, glucose 135.  Lactic 1.5.  VBG with a 

pH of 7.5 and pCO2 of 32.  EKG: Sinus rhythm, rate 65 bpm, left bundle branch 

block, not acute.  Elevated serial troponins 0.049 and 0.055 respectively.  NT 

proBNP elevated 8807.  Patient is currently being evaluated in the emergency 

department.  He is alert and some moderate respiratory distress.  On BiPAP with 

settings 12/5 and FiO2 60%.  Tachypneic, breathing around 40 breaths/min. 

Endorses progressive worsening difficulty in breathing over last couple days.  

He has tried to increase his supplemental oxygen at home without any relief. 

Denies previous history of DVT or PE.  Associated nonproductive cough.  Denies 

sputum production or hemoptysis.  Substernal nonradiating chest pain persist.  

Denies any fevers or chills.  Denies heart palpitations or syncopal events.  

Denies swelling in the lower extremities.  Heart rhythm is normal sinus on 

bedside monitor.  Blood pressure has been normotensive, did receive 2 L of 

crystalloid fluid bolus earlier.  Not requiring any vasopressors.  Normal saline

is infusing at 150 mL/h.  IV heparin infusing per protocol








3/13/2025, the patient is being seen for a follow-up.  The patient is 

alternating between BiPAP at a pressure of 12/5 with an FiO2 of 60% and 15 L of 

oxygen by nasal cannula.  He is getting slightly anxious and the patient is 

being provided Xanax accordingly.  Fluid balance is -1.1 L over the past 24 

hours.  Limited echocardiogram was also done and it showed preserved LV function

with an EF of around 55 to 60%.  Valvular functions could not be accurately 

assessed as the patient's study was technically difficult study.  There was 

however RV dilatation.  Unable to estimate RV pressures.  The electrolytes from 

today showed a creatinine of 1.5 with a BUN of 35.  Bicarb is at 21.  Sodium is 

at 132.  The patient remains on DuoNeb updrafts.  The patient remains on a 

combination of cefepime and vancomycin.  The patient was taken off the IV hep

claus and the patient was started on anticoagulation with Eliquis.  Remains on IV

Solu-Medrol 60 mg every 6 hours.  Remains on Aldactone.  Remains on IV Lasix 40 

mg every 12 hours.





On today's evaluation of 3/14/2025, the patient is overall condition is 

essentially unchanged.  Continues to be hypoxic, continues to be on BiPAP and 

the patient remains at a pressure of 12/5 with an FiO2 of 60%.  Continues to 

have crackles in lung base bilaterally.  White cell count remains elevated at 20

with a hemoglobin 9.2.  Very much BiPAP dependent as the patient desaturates 

once taken off the BiPAP.  BUN is 44 with a platelet of 1.5.  Sodium levels at 

134 and a potassium level is at 3.4.  Remains on DuoNeb updrafts.  Remains on IV

Lasix 40 mg every 12 hours.  Remains on Aldactone.  Remains on bronchodilators. 

Remains on steroids.  Empiric antibiotic coverage with IV cefepime.  Reviewed 

the CAT scan again and there is some chronic interstitial changes and the 

patient has diffuse infiltrates with areas of groundglass.  Consider acute 

interstitial pneumonias.  Consider fungal pneumonias.





On today's evaluation of 3/15/2025, the patient is feeling slightly better 

compared to yesterday.  The patient is off the BiPAP and the patient is 

currently on 15 L of oxygen by nasal cannula.  Remains on bronchodilators.  

Remains on IV Solu-Medrol.  Remains on IV Lasix.  Antibiotic coverage including 

combination of cefepime, Levaquin orally and itraconazole orally.  Fungal 

antibodies are still pending.  Meanwhile, rheumatologic profile showed a 

negative rheumatoid factor and negative CARLA.  Legionella urine antigen is still 

pending for now.  Clinically however, the patient is feeling slightly improved 

compared to yesterday.  His chest x-ray continues to show multifocal airspace 

disease more so in the right lung.  Fluid balance has been negative.  The 

patient's creatinine is currently at 1.6 with a BUN of 54 and a sodium levels at

133.  I am going to taper the steroids.  The white cell count is 18.2 with a 

hemoglobin of 9.2.





On 3/16/2025, the patient is transferred to the intensive care unit for closer 

monitoring.  As mentioned, the patient developed acute hypoxic respiratory 

failure with diffuse bilateral pulmonary filtrates discussed earlier.  The 

patient was able to tolerate Airvo, however, overnight, the patient developed 

epistaxis.  Therefore was discontinued the patient was placed back on BiPAP and 

his current BiPAP is running at a pressure of 12 over 5 cm of water with an FiO2

of 80%.  The patient seems to be quite dependent on the BiPAP.  Unable to drop 

the FiO2 any further.  Repeat chest x-ray was done and shows stable diffuse 

bilateral pulm infiltrates which remains essentially unchanged compared to 

yesterday.  Clinically, the patient is awake and alert.  He is reported to have 

some congested cough.  No significant sputum production.  No chest pain.  No 

hemoptysis.  Noted, over the past 48 hours, the patient was diuresed with IV 

Lasix.  Nevertheless, we have not seen any improvement in the patient's 

oxygenation.  The patient was negative fluid balance.  His BNP today is at 68 

with a creatinine of 1.7.  Based on that, I stopped diuretics.  Rest of the 

electrolytes are all within normal limits.  White cell count of 21 with a 

hemoglobin 9.1 and a platelet count of 245.  The patient remains on broad-

spectrum antibiotics.  The patient remains on IV cefepime, itraconazole and 

Levaquin.  Fungal antibodies are still pending for now.  CARLA and rheumatoid 

factor were both negative.  Legionella urine antigen was also negative.  The 

patient remains on anticoagulation and I am going to drop the Eliquis dose to 5 

mg p.o. twice a day.  I am not absolutely convinced that the patient has a 

pulmonary embolism.  I reviewed the CAT scan of the chest and there could be 

potentially some subsegmental filling defects in the right, however, the overall

contrast administration was essentially poor.  The patient will be monitored 

very closely in the intensive care unit.  On a separate note, the patient was 

started on insulin drip for blood sugar control.





Patient was seen today on 3/17/2025, remains in the ICU, remains on fluid 

restrictions for low sodium of 128, he is on BiPAP 12/5/60%, patient is 

presenting this time very much similar to his last presentation few weeks ago.  

I am familiar with this patient, and on his last admission patient responded to 

treatment with mostly diuretics, and steroids.  This time came back with a 

similar presentation, he is receiving diuretics, but has been on hold for the 

last 24 hours, and I recommended we go back on Lasix 40 mg IV push every 12 

hours, patient is receiving Solu-Medrol at 60 mg IV push every 8 hours, he is 

also on Levaquin's, cefepime, and he is marginal at best.  In addition to this 

the patient had non-ST elevation myocardial infarction and flu/influenza A back 

on 3/11.  Blood cultures have been negative, patient has leukocytosis with WBC 

count of 21.7 hemoglobin is 7.9.  Creatinine has been slightly rising 1.51 on 

admission which is about his baseline, today his creatinine is 1.97 with a BUN 

of 95.  Legionella antigen has been negative CARLA screen has been negative 

rheumatoid factor is negative patient continues to complain of shortness of 

breath, intermittent cough, and wheezing.





Patient was seen today on 3/18/2025, remains in the ICU, remains on BiPAP, 

patient is down on FiO2 to 50%, so he is on BiPAP 12/5/50%.  Clinically the 

patient is feeling better breathing easier, remains on empiric antibiotics 

including cefepime and Levaquin remains on Lasix 40 mg every 12 hours remains on

steroids, and today I have noticed probably minimal improvement in his chest x-

ray findings.  Hence we will continue the same and will make Lasix 40 mg twice 

daily instead of 40 mg daily.  WBC count is 22.8 hemoglobin 7.4 electrolytes are

normal BUN is 117 creatinine 2.11





Patient was seen today on 3/19/2025, patient is feeling better today, remains on

BiPAP, however he is down to 40%, chest x-ray is showing definite improvement in

comparison to previous x-rays of the chest.  Patient remains on diuretics r

emains on antibiotics and remains on steroids, hence I have no plans to cut down

on his diuretics at this point specially with his improvement clinically and 

improvement noted on the chest x-ray.  As a matter fact I was able to 

discontinue BiPAP, and I placed the patient on a high flow nasal cannula 10 L 

and he is saturating anywhere between 97 to 99%.  Creatinine is noted to be a 

bit higher today 2.28, his BUN is 125, his WBC count remains elevated at 18.1, 

hemoglobin is 7.  Patient remains quite edematous, and he remains on diuretics 

in the form of Lasix and Aldactone.  





Patient was seen today on 3/20/2025, basically about the same, hemoglobin is low

and the patient will receive 1 unit of packed RBCs today.  Last night he had to 

go back on BiPAP, although he was tried on high flow nasal cannula throughout 

the day yesterday, Desaturating and kept getting anxious and agitated, then he w

as placed on BiPAP overnight, and he remains on BiPAP 12/5/50%.  Hemoglobin 

today is 6.2, had some recommending at least a unit of packed RBCs.  Chest x-ray

continues to show bilateral interstitial edema/infiltrates.  Patient remains on 

cefepime, IV fluids at KVO, remains on diuretics.  Renal functioning is holding 

and not showing any significant change.





Objective





- Vital Signs


Vital signs: 


                                   Vital Signs











Temp  97.3 F L  03/20/25 08:54


 


Pulse  65   03/20/25 11:37


 


Resp  14   03/20/25 11:00


 


BP  120/57   03/20/25 11:00


 


Pulse Ox  97   03/20/25 11:22


 


FiO2  50   03/20/25 08:43








                                 Intake & Output











 03/19/25 03/20/25 03/20/25





 18:59 06:59 18:59


 


Intake Total 161.208 930.517 669.313


 


Output Total 1200 1400 800


 


Balance -1038.792 -469.483 -130.687


 


Weight  100.8 kg 


 


Intake:   


 


   220 140


 


    .9 120 120 40


 


    Cefepime 2 gm In Sodium  100 100





    Chloride 0.9% 100 ml @ 25   





    mls/hr IVPB Q12H [facility] Rx#   





    [id number]   


 


  Intake, IV Titration 41.208 88.517 49.313





  Amount   


 


    Insulin Regular 100 unit 41.208 88.517 49.313





    In Sodium Chloride 0.9%   





    100 ml @ Titrate IV .Q0M   





    [facility] Rx#:[id number]   


 


  Oral  622 480


 


  Blood Product   0


 


    Rc As-1  Unit   0





    [id number]   


 


Output:   


 


  Urine 1200 1400 800


 


Other:   


 


  Voiding Method External Catheter External Catheter 














- Exam








GENERAL EXAM: 64-year-old white male obese , on BiPAP 12/5/50%


HEAD: Normocephalic and atraumatic


EYES: Normal reaction of pupils, equal size.


NOSE: No evidence of epistaxis.


THROAT: No erythema or exudates.


NECK: No masses, no JVD.


CHEST: No chest wall deformity.


LUNGS: Symmetrical chest expansion, crackles at the bases persist.


CVS: S1 and S2 normal with soft systolic murmur, regular rhythm. No other extra 

heart sounds


ABDOMEN: No hepatosplenomegaly, active bowel sounds, no guarding or rigidity. 


SKIN: No rashes


CENTRAL NERVOUS SYSTEM: Alert oriented x 3 no gross focal deficit


EXTREMITIES: 2+ bipedal edema, good pulses bilaterally.





- Labs


CBC & Chem 7: 


                                 03/20/25 03:23





                                 03/20/25 03:23


Labs: 


                  Abnormal Lab Results - Last 24 Hours (Table)











  03/19/25 03/19/25 03/19/25 Range/Units





  14:11 16:25 18:04 


 


WBC     (3.8-10.6)  k/uL


 


RBC     (4.30-5.90)  m/uL


 


Hgb     (13.0-17.5)  gm/dL


 


Hct     (39.0-53.0)  %


 


Neutrophils #     (1.3-7.7)  k/uL


 


Lymphocytes #     (1.0-4.8)  k/uL


 


Potassium     (3.5-5.1)  mmol/L


 


Chloride     ()  mmol/L


 


Carbon Dioxide     (22-30)  mmol/L


 


BUN     (9-20)  mg/dL


 


Creatinine     (0.66-1.25)  mg/dL


 


Glucose     (74-99)  mg/dL


 


POC Glucose (mg/dL)  167 H  333 H  343 H  ()  mg/dL


 


Crossmatch     














  03/19/25 03/19/25 03/19/25 Range/Units





  19:01 20:03 21:04 


 


WBC     (3.8-10.6)  k/uL


 


RBC     (4.30-5.90)  m/uL


 


Hgb     (13.0-17.5)  gm/dL


 


Hct     (39.0-53.0)  %


 


Neutrophils #     (1.3-7.7)  k/uL


 


Lymphocytes #     (1.0-4.8)  k/uL


 


Potassium     (3.5-5.1)  mmol/L


 


Chloride     ()  mmol/L


 


Carbon Dioxide     (22-30)  mmol/L


 


BUN     (9-20)  mg/dL


 


Creatinine     (0.66-1.25)  mg/dL


 


Glucose     (74-99)  mg/dL


 


POC Glucose (mg/dL)  383 H  366 H  258 H  ()  mg/dL


 


Crossmatch     














  03/19/25 03/19/25 03/20/25 Range/Units





  22:03 23:04 00:37 


 


WBC     (3.8-10.6)  k/uL


 


RBC     (4.30-5.90)  m/uL


 


Hgb     (13.0-17.5)  gm/dL


 


Hct     (39.0-53.0)  %


 


Neutrophils #     (1.3-7.7)  k/uL


 


Lymphocytes #     (1.0-4.8)  k/uL


 


Potassium     (3.5-5.1)  mmol/L


 


Chloride     ()  mmol/L


 


Carbon Dioxide     (22-30)  mmol/L


 


BUN     (9-20)  mg/dL


 


Creatinine     (0.66-1.25)  mg/dL


 


Glucose     (74-99)  mg/dL


 


POC Glucose (mg/dL)  212 H  118 H  66 L  ()  mg/dL


 


Crossmatch     














  03/20/25 03/20/25 03/20/25 Range/Units





  02:09 03:15 03:23 


 


WBC    17.4 H  (3.8-10.6)  k/uL


 


RBC    2.08 L  (4.30-5.90)  m/uL


 


Hgb    6.2 L*  (13.0-17.5)  gm/dL


 


Hct    19.0 L*  (39.0-53.0)  %


 


Neutrophils #    16.4 H  (1.3-7.7)  k/uL


 


Lymphocytes #    0.2 L  (1.0-4.8)  k/uL


 


Potassium     (3.5-5.1)  mmol/L


 


Chloride     ()  mmol/L


 


Carbon Dioxide     (22-30)  mmol/L


 


BUN     (9-20)  mg/dL


 


Creatinine     (0.66-1.25)  mg/dL


 


Glucose     (74-99)  mg/dL


 


POC Glucose (mg/dL)  158 H  232 H   ()  mg/dL


 


Crossmatch     














  03/20/25 03/20/25 03/20/25 Range/Units





  03:23 03:58 05:15 


 


WBC     (3.8-10.6)  k/uL


 


RBC     (4.30-5.90)  m/uL


 


Hgb     (13.0-17.5)  gm/dL


 


Hct     (39.0-53.0)  %


 


Neutrophils #     (1.3-7.7)  k/uL


 


Lymphocytes #     (1.0-4.8)  k/uL


 


Potassium  5.2 H    (3.5-5.1)  mmol/L


 


Chloride  109 H    ()  mmol/L


 


Carbon Dioxide  17 L    (22-30)  mmol/L


 


BUN  139 H*    (9-20)  mg/dL


 


Creatinine  2.04 H    (0.66-1.25)  mg/dL


 


Glucose  201 H    (74-99)  mg/dL


 


POC Glucose (mg/dL)   239 H   ()  mg/dL


 


Crossmatch    See Detail  














  03/20/25 03/20/25 03/20/25 Range/Units





  05:56 06:58 08:10 


 


WBC     (3.8-10.6)  k/uL


 


RBC     (4.30-5.90)  m/uL


 


Hgb     (13.0-17.5)  gm/dL


 


Hct     (39.0-53.0)  %


 


Neutrophils #     (1.3-7.7)  k/uL


 


Lymphocytes #     (1.0-4.8)  k/uL


 


Potassium     (3.5-5.1)  mmol/L


 


Chloride     ()  mmol/L


 


Carbon Dioxide     (22-30)  mmol/L


 


BUN     (9-20)  mg/dL


 


Creatinine     (0.66-1.25)  mg/dL


 


Glucose     (74-99)  mg/dL


 


POC Glucose (mg/dL)  196 H  160 H  119 H  ()  mg/dL


 


Crossmatch     














  03/20/25 Range/Units





  10:47 


 


WBC   (3.8-10.6)  k/uL


 


RBC   (4.30-5.90)  m/uL


 


Hgb   (13.0-17.5)  gm/dL


 


Hct   (39.0-53.0)  %


 


Neutrophils #   (1.3-7.7)  k/uL


 


Lymphocytes #   (1.0-4.8)  k/uL


 


Potassium   (3.5-5.1)  mmol/L


 


Chloride   ()  mmol/L


 


Carbon Dioxide   (22-30)  mmol/L


 


BUN   (9-20)  mg/dL


 


Creatinine   (0.66-1.25)  mg/dL


 


Glucose   (74-99)  mg/dL


 


POC Glucose (mg/dL)  145 H  ()  mg/dL


 


Crossmatch   














Assessment and Plan


Assessment: 


Impression:


Acute on chronic hypoxic respiratory failure, multifactorial, I suspect this is 

mostly a picture of pulmonary edema, underlying pneumonia is not entirely ruled 

out.  Hence the patient will remain on antibiotics and diuretics.


Acute exacerbation of chronic systolic congestive heart failure, recent 

echocardiogram from 02/24/2025 estimating a left ventricular ejection fraction 

of 45 to 50%, moderate pulmonary hypertension, and moderate tricuspid 

regurgitation.  Repeat echocardiogram showed improvement in LV function with 

ejection fraction 55%


Acute leukocytosis


Acute on chronic kidney disease


Recent influenza A infection


History of atrial fibrillation with previous cardioversion 


History of underlying COPD and chronic tobacco dependence


Benign essential hypertension


Type 2 diabetes


Acute on chronic anemia patient will require a unit of packed RBCs to be 

transfused today.  And will continue to monitor for any blood loss specially GI 

blood losses.


Recommendation:


Change BiPAP 


Transfused with 1 unit of packed RBCs


Continue bronchodilators


Continue Lasix, and Aldactone


Continue Solu-Medrol


Continue empiric antibiotics and antifungal patient is on cefepime and Levaquin 

and itraconazole


Continues to have elevated BNP level


Continue to monitor in the ICU, not ready for transfer at this point yet


Remains relatively ill.


Will continue to follow


C


Time with Patient: Less than 30

## 2025-03-21 LAB
ANION GAP SERPL CALC-SCNC: 6 MMOL/L
BASOPHILS # BLD AUTO: 0 K/UL (ref 0–0.2)
BASOPHILS NFR BLD AUTO: 0 %
BUN SERPL-SCNC: 123 MG/DL (ref 9–20)
CALCIUM SPEC-MCNC: 8.7 MG/DL (ref 8.4–10.2)
CHLORIDE SERPL-SCNC: 101 MMOL/L (ref 98–107)
CO2 SERPL-SCNC: 25 MMOL/L (ref 22–30)
EOSINOPHIL # BLD AUTO: 0 K/UL (ref 0–0.7)
EOSINOPHIL NFR BLD AUTO: 0 %
ERYTHROCYTE [DISTWIDTH] IN BLOOD BY AUTOMATED COUNT: 2.34 M/UL (ref 4.3–5.9)
ERYTHROCYTE [DISTWIDTH] IN BLOOD: 13.8 % (ref 11.5–15.5)
GLUCOSE BLD-MCNC: 104 MG/DL (ref 70–110)
GLUCOSE BLD-MCNC: 109 MG/DL (ref 70–110)
GLUCOSE BLD-MCNC: 125 MG/DL (ref 70–110)
GLUCOSE BLD-MCNC: 179 MG/DL (ref 70–110)
GLUCOSE BLD-MCNC: 192 MG/DL (ref 70–110)
GLUCOSE BLD-MCNC: 193 MG/DL (ref 70–110)
GLUCOSE BLD-MCNC: 197 MG/DL (ref 70–110)
GLUCOSE BLD-MCNC: 217 MG/DL (ref 70–110)
GLUCOSE BLD-MCNC: 226 MG/DL (ref 70–110)
GLUCOSE BLD-MCNC: 236 MG/DL (ref 70–110)
GLUCOSE BLD-MCNC: 237 MG/DL (ref 70–110)
GLUCOSE BLD-MCNC: 243 MG/DL (ref 70–110)
GLUCOSE BLD-MCNC: 247 MG/DL (ref 70–110)
GLUCOSE BLD-MCNC: 250 MG/DL (ref 70–110)
GLUCOSE BLD-MCNC: 302 MG/DL (ref 70–110)
GLUCOSE BLD-MCNC: 305 MG/DL (ref 70–110)
GLUCOSE BLD-MCNC: 317 MG/DL (ref 70–110)
GLUCOSE BLD-MCNC: 327 MG/DL (ref 70–110)
GLUCOSE SERPL-MCNC: 188 MG/DL (ref 74–99)
HBV SURFACE AB SERPL IA-ACNC: 3.5 MIU/ML
HCT VFR BLD AUTO: 21.5 % (ref 39–53)
HGB BLD-MCNC: 7.1 GM/DL (ref 13–17.5)
LYMPHOCYTES # SPEC AUTO: 0.2 K/UL (ref 1–4.8)
LYMPHOCYTES NFR SPEC AUTO: 1 %
MCH RBC QN AUTO: 30.5 PG (ref 25–35)
MCHC RBC AUTO-ENTMCNC: 33.1 G/DL (ref 31–37)
MCV RBC AUTO: 92.1 FL (ref 80–100)
MONOCYTES # BLD AUTO: 0.4 K/UL (ref 0–1)
MONOCYTES NFR BLD AUTO: 2 %
NEUTROPHILS # BLD AUTO: 15.8 K/UL (ref 1.3–7.7)
NEUTROPHILS NFR BLD AUTO: 96 %
PLATELET # BLD AUTO: 160 K/UL (ref 150–450)
POTASSIUM SERPL-SCNC: 4.4 MMOL/L (ref 3.5–5.1)
SODIUM SERPL-SCNC: 132 MMOL/L (ref 137–145)
WBC # BLD AUTO: 16.5 K/UL (ref 3.8–10.6)

## 2025-03-21 RX ADMIN — METHYLPREDNISOLONE SODIUM SUCCINATE SCH MG: 125 INJECTION, POWDER, FOR SOLUTION INTRAMUSCULAR; INTRAVENOUS at 14:40

## 2025-03-21 RX ADMIN — FUROSEMIDE SCH: 10 INJECTION, SOLUTION INTRAMUSCULAR; INTRAVENOUS at 23:09

## 2025-03-21 NOTE — P.PN
Subjective


Progress Note Date: 03/21/25


Principal diagnosis: 





Reason for follow-up is pneumonia





Patient is a 64-year-old  male with a past medical history significant 

for diabetes mellitus hypertension hyperlipidemia pneumonia atrial fibrillation 

currently everyday smoker presenting to the hospital for evaluation of 

increasing shortness of breath patient has been diagnosed with multifocal 

pneumonia prompting this consultation.


On today's evaluation that is 03/21/2025, the patient continues to be afebrile, 

the patient is on 15 L high flow nasal oxygen and mention breathing slightly 

comfortably denies any chest pain worsening complaints preparation abdominal 

pain or diarrhea.


Patient white count is 16.5, creatinine is 2.33





Objective





- Vital Signs


Vital signs: 


                                   Vital Signs











Temp  97.0 F L  03/21/25 09:00


 


Pulse  80   03/21/25 11:52


 


Resp  11 L  03/21/25 11:00


 


BP  133/59   03/21/25 11:00


 


Pulse Ox  94 L  03/21/25 11:00


 


FiO2  50   03/20/25 20:00








                                 Intake & Output











 03/20/25 03/21/25 03/21/25





 18:59 06:59 18:59


 


Intake Total 1640.460 801.830 8892.424


 


Output Total 1850 1250 550


 


Balance -209.540 -1029.079 876.424


 


Weight  100.3 kg 100.3 kg


 


Intake:   


 


   170 70


 


    .9 80 170 70


 


    Cefepime 2 gm In Sodium 200  





    Chloride 0.9% 100 ml @ 25   





    mls/hr IVPB Q12H [facility] Rx#   





    :[id number]   


 


  Intake, IV Titration 80.460 50.921 136.424





  Amount   


 


    Cefepime 2 gm In Sodium   100





    Chloride 0.9% 100 ml @ 25   





    mls/hr IVPB Q12H [facility] Rx#   





    :[id number]   


 


    Insulin Regular 100 unit 80.460 50.921 36.424





    In Sodium Chloride 0.9%   





    100 ml @ Titrate IV .Q0M   





    [facility] Rx#:[id number]   


 


  Oral 970  1220


 


  Blood Product 310  


 


    Rc As-1  Unit 310  





    [id number]   


 


Output:   


 


  Urine 1850 1250 550


 


Other:   


 


  Voiding Method External Catheter External Catheter 














- Exam





GENERAL DESCRIPTION: An elderly male lying in bed in no distress





RESPIRATORY SYSTEM: Unlabored breathing , coarse breath sounds bilaterally





HEART: S1 S2 regular rate and rhythm ,





ABDOMEN: Soft , no tenderness





EXTREMITIES: No edema feet





- Labs


CBC & Chem 7: 


                                 03/21/25 03:26





                                 03/21/25 03:26


Labs: 


                  Abnormal Lab Results - Last 24 Hours (Table)











  03/19/25 03/20/25 03/20/25 Range/Units





  02:34 14:24 16:38 


 


WBC     (3.8-10.6)  k/uL


 


RBC     (4.30-5.90)  m/uL


 


Hgb     (13.0-17.5)  gm/dL


 


Hct     (39.0-53.0)  %


 


Neutrophils #     (1.3-7.7)  k/uL


 


Lymphocytes #     (1.0-4.8)  k/uL


 


Sodium     (137-145)  mmol/L


 


BUN     (9-20)  mg/dL


 


Creatinine     (0.66-1.25)  mg/dL


 


Glucose     (74-99)  mg/dL


 


POC Glucose (mg/dL)  125 H  257 H  260 H  ()  mg/dL














  03/20/25 03/20/25 03/20/25 Range/Units





  17:38 18:07 19:09 


 


WBC   17.6 H   (3.8-10.6)  k/uL


 


RBC   2.39 L   (4.30-5.90)  m/uL


 


Hgb   7.1 L   (13.0-17.5)  gm/dL


 


Hct   21.9 L   (39.0-53.0)  %


 


Neutrophils #     (1.3-7.7)  k/uL


 


Lymphocytes #     (1.0-4.8)  k/uL


 


Sodium     (137-145)  mmol/L


 


BUN     (9-20)  mg/dL


 


Creatinine     (0.66-1.25)  mg/dL


 


Glucose     (74-99)  mg/dL


 


POC Glucose (mg/dL)  248 H   170 H  ()  mg/dL














  03/20/25 03/20/25 03/20/25 Range/Units





  20:14 21:22 22:12 


 


WBC     (3.8-10.6)  k/uL


 


RBC     (4.30-5.90)  m/uL


 


Hgb     (13.0-17.5)  gm/dL


 


Hct     (39.0-53.0)  %


 


Neutrophils #     (1.3-7.7)  k/uL


 


Lymphocytes #     (1.0-4.8)  k/uL


 


Sodium     (137-145)  mmol/L


 


BUN     (9-20)  mg/dL


 


Creatinine     (0.66-1.25)  mg/dL


 


Glucose     (74-99)  mg/dL


 


POC Glucose (mg/dL)  148 H  151 H  157 H  ()  mg/dL














  03/20/25 03/20/25 03/21/25 Range/Units





  23:32 23:58 01:09 


 


WBC     (3.8-10.6)  k/uL


 


RBC     (4.30-5.90)  m/uL


 


Hgb     (13.0-17.5)  gm/dL


 


Hct     (39.0-53.0)  %


 


Neutrophils #     (1.3-7.7)  k/uL


 


Lymphocytes #     (1.0-4.8)  k/uL


 


Sodium     (137-145)  mmol/L


 


BUN     (9-20)  mg/dL


 


Creatinine     (0.66-1.25)  mg/dL


 


Glucose     (74-99)  mg/dL


 


POC Glucose (mg/dL)  139 H  141 H  197 H  ()  mg/dL














  03/21/25 03/21/25 03/21/25 Range/Units





  02:03 03:09 03:26 


 


WBC    16.5 H  (3.8-10.6)  k/uL


 


RBC    2.34 L  (4.30-5.90)  m/uL


 


Hgb    7.1 L  (13.0-17.5)  gm/dL


 


Hct    21.5 L  (39.0-53.0)  %


 


Neutrophils #    15.8 H  (1.3-7.7)  k/uL


 


Lymphocytes #    0.2 L  (1.0-4.8)  k/uL


 


Sodium     (137-145)  mmol/L


 


BUN     (9-20)  mg/dL


 


Creatinine     (0.66-1.25)  mg/dL


 


Glucose     (74-99)  mg/dL


 


POC Glucose (mg/dL)  226 H  236 H   ()  mg/dL














  03/21/25 03/21/25 03/21/25 Range/Units





  03:26 03:53 05:27 


 


WBC     (3.8-10.6)  k/uL


 


RBC     (4.30-5.90)  m/uL


 


Hgb     (13.0-17.5)  gm/dL


 


Hct     (39.0-53.0)  %


 


Neutrophils #     (1.3-7.7)  k/uL


 


Lymphocytes #     (1.0-4.8)  k/uL


 


Sodium  132 L    (137-145)  mmol/L


 


BUN  123 H*    (9-20)  mg/dL


 


Creatinine  2.33 H    (0.66-1.25)  mg/dL


 


Glucose  188 H    (74-99)  mg/dL


 


POC Glucose (mg/dL)   237 H  217 H  ()  mg/dL














  03/21/25 03/21/25 03/21/25 Range/Units





  06:14 07:01 09:28 


 


WBC     (3.8-10.6)  k/uL


 


RBC     (4.30-5.90)  m/uL


 


Hgb     (13.0-17.5)  gm/dL


 


Hct     (39.0-53.0)  %


 


Neutrophils #     (1.3-7.7)  k/uL


 


Lymphocytes #     (1.0-4.8)  k/uL


 


Sodium     (137-145)  mmol/L


 


BUN     (9-20)  mg/dL


 


Creatinine     (0.66-1.25)  mg/dL


 


Glucose     (74-99)  mg/dL


 


POC Glucose (mg/dL)  193 H  192 H  243 H  ()  mg/dL














  03/21/25 03/21/25 Range/Units





  10:24 12:22 


 


WBC    (3.8-10.6)  k/uL


 


RBC    (4.30-5.90)  m/uL


 


Hgb    (13.0-17.5)  gm/dL


 


Hct    (39.0-53.0)  %


 


Neutrophils #    (1.3-7.7)  k/uL


 


Lymphocytes #    (1.0-4.8)  k/uL


 


Sodium    (137-145)  mmol/L


 


BUN    (9-20)  mg/dL


 


Creatinine    (0.66-1.25)  mg/dL


 


Glucose    (74-99)  mg/dL


 


POC Glucose (mg/dL)  247 H  305 H  ()  mg/dL














Assessment and Plan


(1) Leukocytosis


Current Visit: Yes   Status: Acute   Code(s): D72.829 - ELEVATED WHITE BLOOD 

CELL COUNT, UNSPECIFIED   SNOMED Code(s): 286853055


   





(2) Bilateral pneumonia


Current Visit: Yes   Status: Acute   Code(s): J18.9 - PNEUMONIA, UNSPECIFIED 

ORGANISM   SNOMED Code(s): 061003435


   


Plan: 





1patient presented hospital with increasing shortness of breath and chest pain 

which is likely multifactorial in this patient who did have evidence of PE on 

the CT there is also evidence of multifocal pneumonia with elevated white count 

and recently acute influenza A will need to cover for resistant gram-positive as

well as gram-negative pathogen for post influenza pneumonia


2-blood culture has been negative no sputum has been collected


3-patient current being treated with cefepime and  itraconazole along with 

diuretics and monitor clinical course closely


Dictation was produced using dragon dictation software. please excuse any 

grammatical, word or spelling errors. 








Time with Patient: Less than 30

## 2025-03-21 NOTE — P.PN
Subjective


Progress Note Date: 03/21/25


Principal diagnosis: 





Acute on chronic hypoxic respiratory failure, multifactorial











Patient is a 64-year-old male with past medical history significant for atrial 

fibrillation, diabetes mellitus, CKD stage III, hypertension, hyperlipidemia, 

tobacco smoker.  Of note, recently had a prolonged hospitalization 02/23/2025 

through 03/07/2025.  Treated for combination of influenza A, pneumonia, CHF.  

Completed course of antibiotics and Tamiflu.  At this time, did require 

noninvasive BiPAP, eventually discharged on home O2.  Returns with a chief 

complaint of shortness of breath progressing over the last 2 to 3 days.  Also, 

reports sudden onset substernal chest pain that developed the night prior and 

has been persistent.  On arrival to the ED, found to be in some respiratory 

distress, and placed on BiPAP.  Workup in the emergency department including a 

chest x-ray showing multifocal infiltrates concerning for pneumonia or pulmonary

edema.  D-dimer was elevated in setting CT angio protocol which was positive for

segmental and subsegmental right middle lobe and right lower lobe pulmonary 

emboli.  No saddle pulmonary embolus. Pulmonary artery mildly enlarged.  

Preserved RV to LV ratio.  There were diffuse coarse reticular infiltrates, as 

well as, groundglass lung attenuation, cystic changes, with concerns for 

interstitial lung disease. Patient does take Eliquis for his history of atrial 

fibrillation.  Does state he has missed some doses lately.  CBC: WBC count 22.6,

hemoglobin 11, platelets 322.  CMP: Sodium 133, potassium 4.2, chloride 101, 

serum bicarb 24, BUN 30, creatinine 1.27, glucose 135.  Lactic 1.5.  VBG with a 

pH of 7.5 and pCO2 of 32.  EKG: Sinus rhythm, rate 65 bpm, left bundle branch 

block, not acute.  Elevated serial troponins 0.049 and 0.055 respectively.  NT 

proBNP elevated 8807.  Patient is currently being evaluated in the emergency 

department.  He is alert and some moderate respiratory distress.  On BiPAP with 

settings 12/5 and FiO2 60%.  Tachypneic, breathing around 40 breaths/min. 

Endorses progressive worsening difficulty in breathing over last couple days.  

He has tried to increase his supplemental oxygen at home without any relief. 

Denies previous history of DVT or PE.  Associated nonproductive cough.  Denies 

sputum production or hemoptysis.  Substernal nonradiating chest pain persist.  

Denies any fevers or chills.  Denies heart palpitations or syncopal events.  

Denies swelling in the lower extremities.  Heart rhythm is normal sinus on 

bedside monitor.  Blood pressure has been normotensive, did receive 2 L of 

crystalloid fluid bolus earlier.  Not requiring any vasopressors.  Normal saline

is infusing at 150 mL/h.  IV heparin infusing per protocol








3/13/2025, the patient is being seen for a follow-up.  The patient is 

alternating between BiPAP at a pressure of 12/5 with an FiO2 of 60% and 15 L of 

oxygen by nasal cannula.  He is getting slightly anxious and the patient is 

being provided Xanax accordingly.  Fluid balance is -1.1 L over the past 24 

hours.  Limited echocardiogram was also done and it showed preserved LV function

with an EF of around 55 to 60%.  Valvular functions could not be accurately 

assessed as the patient's study was technically difficult study.  There was 

however RV dilatation.  Unable to estimate RV pressures.  The electrolytes from 

today showed a creatinine of 1.5 with a BUN of 35.  Bicarb is at 21.  Sodium is 

at 132.  The patient remains on DuoNeb updrafts.  The patient remains on a 

combination of cefepime and vancomycin.  The patient was taken off the IV hep

claus and the patient was started on anticoagulation with Eliquis.  Remains on IV

Solu-Medrol 60 mg every 6 hours.  Remains on Aldactone.  Remains on IV Lasix 40 

mg every 12 hours.





On today's evaluation of 3/14/2025, the patient is overall condition is 

essentially unchanged.  Continues to be hypoxic, continues to be on BiPAP and 

the patient remains at a pressure of 12/5 with an FiO2 of 60%.  Continues to 

have crackles in lung base bilaterally.  White cell count remains elevated at 20

with a hemoglobin 9.2.  Very much BiPAP dependent as the patient desaturates 

once taken off the BiPAP.  BUN is 44 with a platelet of 1.5.  Sodium levels at 

134 and a potassium level is at 3.4.  Remains on DuoNeb updrafts.  Remains on IV

Lasix 40 mg every 12 hours.  Remains on Aldactone.  Remains on bronchodilators. 

Remains on steroids.  Empiric antibiotic coverage with IV cefepime.  Reviewed 

the CAT scan again and there is some chronic interstitial changes and the 

patient has diffuse infiltrates with areas of groundglass.  Consider acute 

interstitial pneumonias.  Consider fungal pneumonias.





On today's evaluation of 3/15/2025, the patient is feeling slightly better 

compared to yesterday.  The patient is off the BiPAP and the patient is 

currently on 15 L of oxygen by nasal cannula.  Remains on bronchodilators.  

Remains on IV Solu-Medrol.  Remains on IV Lasix.  Antibiotic coverage including 

combination of cefepime, Levaquin orally and itraconazole orally.  Fungal 

antibodies are still pending.  Meanwhile, rheumatologic profile showed a 

negative rheumatoid factor and negative CARLA.  Legionella urine antigen is still 

pending for now.  Clinically however, the patient is feeling slightly improved 

compared to yesterday.  His chest x-ray continues to show multifocal airspace 

disease more so in the right lung.  Fluid balance has been negative.  The 

patient's creatinine is currently at 1.6 with a BUN of 54 and a sodium levels at

133.  I am going to taper the steroids.  The white cell count is 18.2 with a 

hemoglobin of 9.2.





On 3/16/2025, the patient is transferred to the intensive care unit for closer 

monitoring.  As mentioned, the patient developed acute hypoxic respiratory 

failure with diffuse bilateral pulmonary filtrates discussed earlier.  The 

patient was able to tolerate Airvo, however, overnight, the patient developed 

epistaxis.  Therefore was discontinued the patient was placed back on BiPAP and 

his current BiPAP is running at a pressure of 12 over 5 cm of water with an FiO2

of 80%.  The patient seems to be quite dependent on the BiPAP.  Unable to drop 

the FiO2 any further.  Repeat chest x-ray was done and shows stable diffuse 

bilateral pulm infiltrates which remains essentially unchanged compared to 

yesterday.  Clinically, the patient is awake and alert.  He is reported to have 

some congested cough.  No significant sputum production.  No chest pain.  No 

hemoptysis.  Noted, over the past 48 hours, the patient was diuresed with IV 

Lasix.  Nevertheless, we have not seen any improvement in the patient's 

oxygenation.  The patient was negative fluid balance.  His BNP today is at 68 

with a creatinine of 1.7.  Based on that, I stopped diuretics.  Rest of the 

electrolytes are all within normal limits.  White cell count of 21 with a 

hemoglobin 9.1 and a platelet count of 245.  The patient remains on broad-

spectrum antibiotics.  The patient remains on IV cefepime, itraconazole and 

Levaquin.  Fungal antibodies are still pending for now.  CARLA and rheumatoid 

factor were both negative.  Legionella urine antigen was also negative.  The 

patient remains on anticoagulation and I am going to drop the Eliquis dose to 5 

mg p.o. twice a day.  I am not absolutely convinced that the patient has a 

pulmonary embolism.  I reviewed the CAT scan of the chest and there could be 

potentially some subsegmental filling defects in the right, however, the overall

contrast administration was essentially poor.  The patient will be monitored 

very closely in the intensive care unit.  On a separate note, the patient was 

started on insulin drip for blood sugar control.





Patient was seen today on 3/17/2025, remains in the ICU, remains on fluid 

restrictions for low sodium of 128, he is on BiPAP 12/5/60%, patient is 

presenting this time very much similar to his last presentation few weeks ago.  

I am familiar with this patient, and on his last admission patient responded to 

treatment with mostly diuretics, and steroids.  This time came back with a 

similar presentation, he is receiving diuretics, but has been on hold for the 

last 24 hours, and I recommended we go back on Lasix 40 mg IV push every 12 

hours, patient is receiving Solu-Medrol at 60 mg IV push every 8 hours, he is 

also on Levaquin's, cefepime, and he is marginal at best.  In addition to this 

the patient had non-ST elevation myocardial infarction and flu/influenza A back 

on 3/11.  Blood cultures have been negative, patient has leukocytosis with WBC 

count of 21.7 hemoglobin is 7.9.  Creatinine has been slightly rising 1.51 on 

admission which is about his baseline, today his creatinine is 1.97 with a BUN 

of 95.  Legionella antigen has been negative CARLA screen has been negative 

rheumatoid factor is negative patient continues to complain of shortness of 

breath, intermittent cough, and wheezing.





Patient was seen today on 3/18/2025, remains in the ICU, remains on BiPAP, 

patient is down on FiO2 to 50%, so he is on BiPAP 12/5/50%.  Clinically the 

patient is feeling better breathing easier, remains on empiric antibiotics 

including cefepime and Levaquin remains on Lasix 40 mg every 12 hours remains on

steroids, and today I have noticed probably minimal improvement in his chest x-

ray findings.  Hence we will continue the same and will make Lasix 40 mg twice 

daily instead of 40 mg daily.  WBC count is 22.8 hemoglobin 7.4 electrolytes are

normal BUN is 117 creatinine 2.11





Patient was seen today on 3/19/2025, patient is feeling better today, remains on

BiPAP, however he is down to 40%, chest x-ray is showing definite improvement in

comparison to previous x-rays of the chest.  Patient remains on diuretics r

emains on antibiotics and remains on steroids, hence I have no plans to cut down

on his diuretics at this point specially with his improvement clinically and 

improvement noted on the chest x-ray.  As a matter fact I was able to 

discontinue BiPAP, and I placed the patient on a high flow nasal cannula 10 L 

and he is saturating anywhere between 97 to 99%.  Creatinine is noted to be a 

bit higher today 2.28, his BUN is 125, his WBC count remains elevated at 18.1, 

hemoglobin is 7.  Patient remains quite edematous, and he remains on diuretics 

in the form of Lasix and Aldactone.  





Patient was seen today on 3/20/2025, basically about the same, hemoglobin is low

and the patient will receive 1 unit of packed RBCs today.  Last night he had to 

go back on BiPAP, although he was tried on high flow nasal cannula throughout 

the day yesterday, Desaturating and kept getting anxious and agitated, then he w

as placed on BiPAP overnight, and he remains on BiPAP 12/5/50%.  Hemoglobin 

today is 6.2, had some recommending at least a unit of packed RBCs.  Chest x-ray

continues to show bilateral interstitial edema/infiltrates.  Patient remains on 

cefepime, IV fluids at KVO, remains on diuretics.  Renal functioning is holding 

and not showing any significant change.





Seen today on 3/21/2025, patient was on BiPAP at night, however earlier this 

morning he was placed down to 8 L high flow nasal cannula.  Saturating at 93 to 

95%.  He was on BiPAP 12/5/50% last time.  Remains on Lasix 40 mg IV push twice 

daily remains on Solu-Medrol 60 every 6 remains on cefepime.  Patient has been 

on Sporanox.  Some improvement today compared to the last few days, no chest x-

ray was done, but the patient seems to be more comfortable on high flow nasal 

cannula compared to BiPAP.  Continues to have leukocytosis but improving with 

WBC of 16.5 electrolytes are normal bicarb is normal BUN is 123 creatinine 2.33 

blood sugar is 305.





Objective





- Vital Signs


Vital signs: 


                                   Vital Signs











Temp  97.0 F L  03/21/25 09:00


 


Pulse  80   03/21/25 11:52


 


Resp  11 L  03/21/25 11:00


 


BP  133/59   03/21/25 11:00


 


Pulse Ox  94 L  03/21/25 11:00


 


FiO2  50   03/20/25 20:00








                                 Intake & Output











 03/20/25 03/21/25 03/21/25





 18:59 06:59 18:59


 


Intake Total 1640.460 099.086 9824.424


 


Output Total 1850 1250 550


 


Balance -209.540 -1029.079 876.424


 


Weight  100.3 kg 100.3 kg


 


Intake:   


 


   170 70


 


    .9 80 170 70


 


    Cefepime 2 gm In Sodium 200  





    Chloride 0.9% 100 ml @ 25   





    mls/hr IVPB Q12H [facility] Rx#   





    :[id number]   


 


  Intake, IV Titration 80.460 50.921 136.424





  Amount   


 


    Cefepime 2 gm In Sodium   100





    Chloride 0.9% 100 ml @ 25   





    mls/hr IVPB Q12H [facility] Rx#   





    [id number]   


 


    Insulin Regular 100 unit 80.460 50.921 36.424





    In Sodium Chloride 0.9%   





    100 ml @ Titrate IV .Q0M   





    [facility] Rx#:[id number]   


 


  Oral 970  1220


 


  Blood Product 310  


 


    Rc As-1  Unit 310  





    [id number]   


 


Output:   


 


  Urine 1850 1250 550


 


Other:   


 


  Voiding Method External Catheter External Catheter 














- Exam








GENERAL EXAM: 64-year-old white male obese , on 8 L high flow nasal cannula


HEAD: Normocephalic and atraumatic


EYES: Normal reaction of pupils, equal size.


NOSE: No evidence of epistaxis.


THROAT: No erythema or exudates.


NECK: No masses, no JVD.


CHEST: No chest wall deformity.


LUNGS: Symmetrical chest expansion, crackles at the bases persist.


CVS: S1 and S2 normal with soft systolic murmur, regular rhythm. No other extra 

heart sounds


ABDOMEN: No hepatosplenomegaly, active bowel sounds, no guarding or rigidity. 


SKIN: No rashes


CENTRAL NERVOUS SYSTEM: Alert oriented x 3 no gross focal deficit


EXTREMITIES: 2+ bipedal edema, good pulses bilaterally.





- Labs


CBC & Chem 7: 


                                 03/21/25 03:26





                                 03/21/25 03:26


Labs: 


                  Abnormal Lab Results - Last 24 Hours (Table)











  03/19/25 03/20/25 03/20/25 Range/Units





  02:34 14:24 16:38 


 


WBC     (3.8-10.6)  k/uL


 


RBC     (4.30-5.90)  m/uL


 


Hgb     (13.0-17.5)  gm/dL


 


Hct     (39.0-53.0)  %


 


Neutrophils #     (1.3-7.7)  k/uL


 


Lymphocytes #     (1.0-4.8)  k/uL


 


Sodium     (137-145)  mmol/L


 


BUN     (9-20)  mg/dL


 


Creatinine     (0.66-1.25)  mg/dL


 


Glucose     (74-99)  mg/dL


 


POC Glucose (mg/dL)  125 H  257 H  260 H  ()  mg/dL














  03/20/25 03/20/25 03/20/25 Range/Units





  17:38 18:07 19:09 


 


WBC   17.6 H   (3.8-10.6)  k/uL


 


RBC   2.39 L   (4.30-5.90)  m/uL


 


Hgb   7.1 L   (13.0-17.5)  gm/dL


 


Hct   21.9 L   (39.0-53.0)  %


 


Neutrophils #     (1.3-7.7)  k/uL


 


Lymphocytes #     (1.0-4.8)  k/uL


 


Sodium     (137-145)  mmol/L


 


BUN     (9-20)  mg/dL


 


Creatinine     (0.66-1.25)  mg/dL


 


Glucose     (74-99)  mg/dL


 


POC Glucose (mg/dL)  248 H   170 H  ()  mg/dL














  03/20/25 03/20/25 03/20/25 Range/Units





  20:14 21:22 22:12 


 


WBC     (3.8-10.6)  k/uL


 


RBC     (4.30-5.90)  m/uL


 


Hgb     (13.0-17.5)  gm/dL


 


Hct     (39.0-53.0)  %


 


Neutrophils #     (1.3-7.7)  k/uL


 


Lymphocytes #     (1.0-4.8)  k/uL


 


Sodium     (137-145)  mmol/L


 


BUN     (9-20)  mg/dL


 


Creatinine     (0.66-1.25)  mg/dL


 


Glucose     (74-99)  mg/dL


 


POC Glucose (mg/dL)  148 H  151 H  157 H  ()  mg/dL














  03/20/25 03/20/25 03/21/25 Range/Units





  23:32 23:58 01:09 


 


WBC     (3.8-10.6)  k/uL


 


RBC     (4.30-5.90)  m/uL


 


Hgb     (13.0-17.5)  gm/dL


 


Hct     (39.0-53.0)  %


 


Neutrophils #     (1.3-7.7)  k/uL


 


Lymphocytes #     (1.0-4.8)  k/uL


 


Sodium     (137-145)  mmol/L


 


BUN     (9-20)  mg/dL


 


Creatinine     (0.66-1.25)  mg/dL


 


Glucose     (74-99)  mg/dL


 


POC Glucose (mg/dL)  139 H  141 H  197 H  ()  mg/dL














  03/21/25 03/21/25 03/21/25 Range/Units





  02:03 03:09 03:26 


 


WBC    16.5 H  (3.8-10.6)  k/uL


 


RBC    2.34 L  (4.30-5.90)  m/uL


 


Hgb    7.1 L  (13.0-17.5)  gm/dL


 


Hct    21.5 L  (39.0-53.0)  %


 


Neutrophils #    15.8 H  (1.3-7.7)  k/uL


 


Lymphocytes #    0.2 L  (1.0-4.8)  k/uL


 


Sodium     (137-145)  mmol/L


 


BUN     (9-20)  mg/dL


 


Creatinine     (0.66-1.25)  mg/dL


 


Glucose     (74-99)  mg/dL


 


POC Glucose (mg/dL)  226 H  236 H   ()  mg/dL














  03/21/25 03/21/25 03/21/25 Range/Units





  03:26 03:53 05:27 


 


WBC     (3.8-10.6)  k/uL


 


RBC     (4.30-5.90)  m/uL


 


Hgb     (13.0-17.5)  gm/dL


 


Hct     (39.0-53.0)  %


 


Neutrophils #     (1.3-7.7)  k/uL


 


Lymphocytes #     (1.0-4.8)  k/uL


 


Sodium  132 L    (137-145)  mmol/L


 


BUN  123 H*    (9-20)  mg/dL


 


Creatinine  2.33 H    (0.66-1.25)  mg/dL


 


Glucose  188 H    (74-99)  mg/dL


 


POC Glucose (mg/dL)   237 H  217 H  ()  mg/dL














  03/21/25 03/21/25 03/21/25 Range/Units





  06:14 07:01 09:28 


 


WBC     (3.8-10.6)  k/uL


 


RBC     (4.30-5.90)  m/uL


 


Hgb     (13.0-17.5)  gm/dL


 


Hct     (39.0-53.0)  %


 


Neutrophils #     (1.3-7.7)  k/uL


 


Lymphocytes #     (1.0-4.8)  k/uL


 


Sodium     (137-145)  mmol/L


 


BUN     (9-20)  mg/dL


 


Creatinine     (0.66-1.25)  mg/dL


 


Glucose     (74-99)  mg/dL


 


POC Glucose (mg/dL)  193 H  192 H  243 H  ()  mg/dL














  03/21/25 03/21/25 Range/Units





  10:24 12:22 


 


WBC    (3.8-10.6)  k/uL


 


RBC    (4.30-5.90)  m/uL


 


Hgb    (13.0-17.5)  gm/dL


 


Hct    (39.0-53.0)  %


 


Neutrophils #    (1.3-7.7)  k/uL


 


Lymphocytes #    (1.0-4.8)  k/uL


 


Sodium    (137-145)  mmol/L


 


BUN    (9-20)  mg/dL


 


Creatinine    (0.66-1.25)  mg/dL


 


Glucose    (74-99)  mg/dL


 


POC Glucose (mg/dL)  247 H  305 H  ()  mg/dL














Assessment and Plan


Assessment: 


Impression:


Acute on chronic hypoxic respiratory failure, multifactorial, I suspect this is 

mostly a picture of pulmonary edema, underlying pneumonia is not entirely ruled 

out.  Hence the patient will remain on antibiotics and diuretics.


Acute exacerbation of chronic systolic congestive heart failure, recent 

echocardiogram from 02/24/2025 estimating a left ventricular ejection fraction 

of 45 to 50%, moderate pulmonary hypertension, and moderate tricuspid 

regurgitation.  Repeat echocardiogram showed improvement in LV function with 

ejection fraction 55%


Acute leukocytosis


Acute on chronic kidney disease


Recent influenza A infection


History of atrial fibrillation with previous cardioversion 


History of underlying COPD and chronic tobacco dependence


Benign essential hypertension


Type 2 diabetes


Acute on chronic anemia patient will require a unit of packed RBCs to be 

transfused today.  And will continue to monitor for any blood loss specially GI 

blood losses.





Recommendation:





Continue high flow nasal cannula


Continue to monitor hemoglobin patient received a unit of packed RBCs yesterday,

hemoglobin today is marginal if drops down below 7 we will give him another unit

of packed RBCs tomorrow


Continue bronchodilators


Continue diuretics


Continue Solu-Medrol, however I will cut down the dose to 40 mg IV push every 8 

hours since the patient is having significant hyperglycemia episodes


Continue empiric antibiotics and antifungal patient is on cefepime and Levaquin 

and itraconazole


Continue to monitor in the ICU for now.


Remains quite ill, prognosis remains guarded.


Eventually will plan to transfer the patient to LTAC.  However clinically he is 

not ready for this yet


Will continue to follow


C


Time with Patient: Less than 30

## 2025-03-21 NOTE — P.PN
Progress Note - Text


Progress Note Date: 03/21/25





Patient is a 64-year-old male with a PMH of atrial fibrillation on Eliquis, COPD

on 2 L home oxygen, non-insulin-dependent diabetes mellitus, hyperlipidemia, 

hypertension presenting with shortness of breath.  Patient was previously 

admitted here for acute hypoxic respiratory failure secondary to influenza 

pneumonia and was discharged on 2 L of home oxygen.  Patient states he has been 

feeling short of breath while at rest.  He reports that since being discharged 

the home oxygen has not been sufficient. He states that he had to keep 

increasing his oxygen.    Patient says shortness of breath is slightly relieved 

when he is laying down.  Denies any orthopnea or PND.  Patient endorses a nonpr

oductive cough that is chronic in nature, but says it has been getting worse.  

Patient admits to having fever, chills.  Patient also admits to having non-

radiating, pressure-like chest pain over the last few days.  Chest pain is not 

related to exertion and he had no alleviating or aggravating factors.  Patient 

denies any headache, vision changes, nausea, vomiting, diarrhea, abdominal pain,

urinary symptoms.





EKG independently interpreted displaying sinus rhythm with left bundle branch 

block that has been seen on prior EKG, rate 65 bpm, QTc 451 ms


CXR independently interpreted displaying bilateral multifocal airspace opacities


Chest CTA displaying acute segmental and subsegmental pulmonary emboli within 

the right and middle lower lobes, no saddle embolus, no RV strain, chronic 

interstitial loading disease


Venous Doppler B/L LE displaying no evidence of acute DVT


Troponin 0.049, 0.055, proBNP 8810, D-dimer 4.87, WBC 22.6, Hgb 11.0, HCT 35.2, 

MCV 93.7, platelet 322, PT 11.8, INR 1.1, APTT 28, sodium 133, potassium 4.2, 

CO2 24, BUN 30, creatinine 1.27, glucose 135


VBG pH 7.52, pCO2 32


T98.2 F, NH 80, RR 26, /77, O2 sat 90% on BiPAP with FiO2 of 100%





March 12: Overflow the ER.  Up in the bed.  Short of breath.  Patient was on 

BiPAP overnight.  This morning on 15 L high flow nasal cannula.  Decreased 

appetite.  Has got a cough.  Some wheezing.  Some sputum production.  Decreased 

appetite.  Patient is on IV heparin.  Also on IV cefepime.  Pulmonary following


March 13: Patient did confirm that because Eliquis is very expensive patient was

not taking it properly did and take it sparingly at home.  Explains patient's 

PE.  Started on Eliquis today by cardiology.  IV heparin discontinued.  Getting 

easily short of breath.  Requiring BiPAP..  Some cough.  Oral intake fair.  On 

IV cefepime and vancomycin.  ID following.  Also on IV Lasix.  Due to worsening 

renal function will DC vancomycin


March 14: Saw this afternoon.  On BiPAP.  60%.  Ensure ordered.  All blood work 

ordered by pulmonary including fungal.  Patient currently on IV cefepime and 

Levaquin.  IV Solu-Medrol.  Remain short of breath.  Tired.  Being followed by 

pulmonary and ID.  Chest x-ray film personally reviewed by me-showing pulm edema

bilateral infiltrates especially at the bases.  Renal ultrasound nonspecific.  

UA showing protein 1+.


March 15: Remain short of breath.  Sitting up in bed.  Not able to come off the 

BiPAP.  Remains on IV cefepime.  Accu-Cheks running high.  Put on insulin drip. 

On IV Solu-Medrol.  Oral intake fair.


March 16: Patient remains short of breath.  Unable to get off BiPAP.  FiO2 

increased to 70%.  12/5.  Per Dr. Joshua: Patient be moved to the ICU.  Poor 

oral intake.  Remains on insulin drip.  IV Solu-Medrol.  Eliquis.  IV cefepime 

and Levaquin.  Tired.  Sitting up in bed leaning forward short of breath.  Chest

x-ray film personally reviewed by me: Scattered bilateral infiltrates


March 17: ICU.  Remains on BiPAP.  Decreased oral intake.  Sitting up.  Short of

breath.  On IV cefepime.  Insulin drip.  IV Solu-Medrol.  DuoNeb Q4.  Rather 

tired.  Also yesterday evening at epistaxis.  Nasal packing.


March 18: ICU.  Mostly been on BiPAP.  High flow nasal cannula.  Decreased oral 

intake.  Sitting up leaning forward.  Remains on IV cefepime or Levaquin insulin

drip.  A bit tired.  Patient has very poor oral intake.  Per intensivist patient

will IV Lasix.  Note worsening renal function-cut back to Lasix once a day.  

Given blood pressure running lower side.  DC Aldactone and DC amlodipine.


March 19: ICU.  Patient was taken off BiPAP this morning.  Decreased to high 

flow nasal cannula 10 L.  Wife at the bedside.  Encourage oral intake.  Chopped 

diet.  Per intensivist nephrology patient to continue on IV Lasix.  Blood 

pressure better after stopping amlodipine and Aldactone yesterday.  Encourage 

oral intake.  Incentive spirometry.  IV cefepime, Levaquin, I terconazole per 

ID.


March 20: ICU.  Yesterday patient was nasal cannula.  Overnight repeat put back 

on BiPAP.  Patient did drop his hemoglobin further.  Down to 6.2.  Felt to be 

from epistaxis.  Given a unit of blood.  Further increase in BUN/creatinine.  

Remains on IV Lasix.  Ordered Kerlix to both the lower extremities.  Had a good 

portion of his lunch today.


March 21: ICU.  Patient seen this morning.  15 L nasal cannula.  Reclining in 

bed.  Short of breath.  Ace wrap lower extremity.  Patient remains on IV 

cefepime, IV Lasix 40 mg every 12, insulin drip, IV Solu-Medrol DuoNeb Q4.  Did 

not eat breakfast but had about 100% of his lunch.








Active Medications





Albuterol/Ipratropium (Ipratropium-Albuterol 3 Ml Neb)  3 ml INHALATION RT-QID 

PRN


   PRN Reason: Shortness Of Breath Or Wheezing


Albuterol/Ipratropium (Ipratropium-Albuterol 3 Ml Neb)  3 ml INHALATION RT-Q4H 

[facility]


   Last Admin: 03/21/25 15:56 Dose:  3 ml


   


Alprazolam (Alprazolam 0.5 Mg Tab)  0.5 mg PO TID PRN


   PRN Reason: Anxiety


   Last Admin: 03/21/25 09:50 Dose:  0.5 mg


   


Artificial Tears (Artificial Tears-Hypromellose Drops 15 Ml Btl)  1 drops BOTH 

EYES QID PRN


   PRN Reason: Dry Eye(s)


Aspirin (Aspirin 81 Mg)  81 mg PO DAILY [facility]


   Last Admin: 03/21/25 09:50 Dose:  81 mg


   


Budesonide (Budesonide 1 Mg/2 Ml Nebu)  1 mg INHALATION RT-BID [facility]


   Last Admin: 03/21/25 07:33 Dose:  1 mg


   


Dextrose/Water (Dextrose 50% Syringe 50 Ml)  25 ml IVP PER PROTOCOL PRN; 

Protocol


   PRN Reason: Hypoglycemia


Dextrose/Water (Dextrose 50% Syringe 50 Ml)  50 ml IVP PER PROTOCOL PRN; 

Protocol


   PRN Reason: Hypoglycemia


Formoterol Fumarate (Formoterol Fumarate 20 Mcg/2 Ml Nebu)  20 mcg INHALATION 

RT-BID [facility]


   Last Admin: 03/21/25 07:49 Dose:  20 mcg


   


Furosemide (Furosemide 10 Mg/Ml 4 Ml Vial)  40 mg IV Q12HR [facility]


   Last Admin: 03/21/25 09:50 Dose:  40 mg


   


Cefepime HCl 2 gm/ Sodium (Chloride)  100 mls @ 25 mls/hr IVPB Q12H [facility]; 

Protocol


   Last Admin: 03/21/25 10:03 Dose:  25 mls/hr


   


Insulin Human Regular 100 unit (/ Sodium Chloride)  100 mls @ 0 mls/hr IV .Q0M 

[facility]; Protocol


   Last Titration: 03/21/25 15:12 Dose:  16.4 units/hr, 16.4 mls/hr


   


Itraconazole (Itraconazole 100 Mg Cap)  200 mg PO BID [facility]; Protocol


   Last Admin: 03/21/25 09:51 Dose:  200 mg


   


Lactulose (Lactulose 20 Gm/30 Ml Cup)  20 gm PO TID PRN


   PRN Reason: Constipation


Lorazepam (Lorazepam 1 Mg Tab)  1 mg PO HS PRN


   PRN Reason: Anxiety


   Last Admin: 03/19/25 20:45 Dose:  1 mg


   


Methylprednisolone Sodium Succinate (Methylprednisolone Sod Succi 125 Mg/2 Ml 

Vial)  60 mg IV Q6HR [facility]


   Last Admin: 03/21/25 14:40 Dose:  60 mg


   


Metoprolol Succinate (Metoprolol Succinate (Er) 25 Mg Tab.Er.24h)  25 mg PO 

DAILY [facility]


   Last Admin: 03/21/25 09:50 Dose:  25 mg


   


Morphine Sulfate (Morphine Sulfate 4 Mg/Ml Syringe)  4 mg IV Q4HR PRN


   PRN Reason: Severe Pain (Scale 7 to 10)


   Last Admin: 03/21/25 14:41 Dose:  4 mg


   


Naloxone HCl (Naloxone 0.4 Mg/Ml 1 Ml Vial)  0.2 mg IV Q2M PRN


   PRN Reason: Opioid Reversal


Ondansetron HCl (Ondansetron 4 Mg/2 Ml Vial)  4 mg IVP Q8HR PRN


   PRN Reason: Nausea And Vomiting


   Last Admin: 03/14/25 14:52 Dose:  4 mg


   


Oxymetazoline HCl (Oxymetazoline 0.05% Nasl Spray 1 Spray Bottle)  2 spray NASAL

BID [facility]


   Last Admin: 03/21/25 10:03 Dose:  2 spray


   


Pantoprazole Sodium (Pantoprazole 40 Mg Tablet)  40 mg PO [id number] [facility]


   Last Admin: 03/21/25 09:50 Dose:  40 mg


   


Sodium Chloride (Sodium Chloride 0.65% Nasal Spray 44 Ml Btl)  2 spray NASAL QID

PRN


   PRN Reason: Dry Nasal Passages











Social history:


Tobacco: Current 50-pack-year smoker


Alcohol: Occasional alcohol use


Recreational drugs: Marijuana


Travel: No recent travel





On examination:


VITAL SIGNS: 97, 77, 25, 136 x 57, 94% on 15 L high flow nasal cannula


GENERAL APPEARANCE: Reclining in bed, short of breath


HEENT: Normal external appearance of nose and ear.  Oral cavity normal


EYES: Pupils equal. Conjunctiva normal. 


NECK: JVD unable to assess. Mass not palpable. 


RESPIRATORY: Respiratory effort increased,  Lungs diminished breath sounds some 

scattered crackles prolonged expiration. 


CARDIOVASCULAR: First and second sounds normal. No edema. 


ABDOMEN: Soft. Liver and spleen not palpable. No tenderness. No mass palpable. 


PSYCHIATRY: Answering questions appropriately.  A bit less tired





INVESTIGATIONS, reviewed in the clinical context:


March 21: White count 16.5 globin 7.1 potassium 4.4  creatinine 2.33


March 20: Hemoglobin today down to 6.2.After unit of blood.  Up to 7.1.  

creatinine 2.04


March 19: White count 18.1 hemoglobin 7 platelets 182 potassium 4.6  

creatinine 2.28


March 18: White count 22.8 hemoglobin 7.4 platelets 200 sodium 130 potassium 4.2

 creatinine 2.11


March 17: White count 21.7 hemoglobin 7.9 platelets 201 sodium 128 potassium 4.5

BUN 95 creatinine 1.97


Mild 16: White count 21.1 hemoglobin 9.1 platelets 245 potassium 4.2 BUN 68 

creatinine 1.73


March 15: White count 8.2 hemoglobin 9.2 platelets 232 ABG: pH 7.3 pCO2 42 pO2 

80 sodium 133 potassium 3.6 creatinine 1.69.  Accu-Cheks high


March 14: White count 20.8 hemoglobin 9.2 platelets 265 sodium 134 BUN 44 

creatinine 1.54.  Chest x-ray: Pulm edema/bilateral infiltrates


March 13: Sodium 132 potassium 3.4 BUN 35 creatinine 1.51


March 12: White count 7.2 hemoglobin 10.3 platelets 242 sodium 136 potassium 3.8

BUN 28 creatinine 1.37


Troponin I: 0.049, 0.055, 0.051


Chest x-ray film personally reviewed by me-bilateral infiltrates.  Effusion/pulm

edema cardiomegaly.


Venous Doppler: No DVT


Chest CTA: Acute segmental and subsegmental pulm embolism within the right 

middle and right lower lobes.  No RV strain.  Chronic interstitial lung disease.





Previous labs:


Creatinine 1.63 in June 2023





Assessment/Plan:








#.  Acute pulmonary embolism, non-massive [segmental and subsegmental within the

 right middle and lower lobes.  No RV strain


Initially IV heparin, currently on o Eliquis 








#.  Sepsis likely secondary to -viral pneumonia.  Secondary bacterial component 


IV cefepime.  Levaquin discontinued on March 19..-also has been on itraconazole 

started on the March 14


ID and pulmonary following








#.  Acute hypoxic respiratory failure, secondary to above: Slow to respond


Intermittently BiPAP, currently 15 L nasal cannula








#.  Acute COPD exacerbation, in a prior smoker: Some improvement


DuoNeb Q4.  IV Solu-Medrol 60 mg Q6.  Nebulized Pulmicort








#.  Acute on chronic heart failure with reduced ejection fraction (EF 45 to 

50%): Some improvement


IV Lasix 40 mg every 12.  Aldactone-discontinued.





#.  Chronic hypoxic respiratory failure from underlying COPD, 2 L home O2





-Acute kidney injury, likely ATN multifactorial, worsening


Admission creatinine 1.27.











#.  Non-insulin-dependent type 2 diabetes, uncontrolled with hyperglycemia 

secondary steroids: Not improving


Insulin subcu sliding scale.  Stop Levemir.  


Continues on insulin drip


Accu-Cheks ACHS





- chronic kidney disease stage III from nephrosclerosis and diabetic nephropathy

 [creatinine 1.63 in June 2023]


Renal ultrasound.:  Suboptimal study.  No corticomedullary comment.


UA protein 1+





-Recent influenza type A








#.  Essential hypertension-


Toprol-XL.  Other antihypertensives have been held





#.  Hyperlipidemia


Lipitor





-Full code





On IV Lasix.,  IV cefepime, Itroconazole,  IV steroids.  Bronchodilators.  Had a

good lunch.  IV insulin





Past Medical History


Past Medical History: Atrial Fibrillation, Diabetes Mellitus, Hyperlipidemia, 

Hypertension, Pneumonia, Supraventricular Tachycardia (SVT)


Additional Past Medical History / Comment(s): a fib, causes shortness of breath,

can feel irregular heartbeat


History of Any Multi-Drug Resistant Organisms: None Reported


Past Surgical History: Cholecystectomy, Heart Catheterization


Additional Past Surgical History / Comment(s): Colonoscopy


Past Anesthesia/Blood Transfusion Reactions: No Reported Reaction


Past Psychological History: No Psychological Hx Reported


Smoking Status: Current every day smoker


Past Alcohol Use History: Occasional


Past Drug Use History: Marijuana

## 2025-03-21 NOTE — P.PN
Subjective





Patient is seen for follow-up for acute kidney injury.


Currently maintained on Lasix





Patient remains short of breath requiring BiPAP on and off.  Oxygen requirement 

seems to have improved.


Serum creatinine at 2.3.  BUN disproportionately elevated but down to 123 today.

 Patient is maintained on steroids and may have underlying GI bleed.





Objective





- Vital Signs


Vital signs: 


                                   Vital Signs











Temp  98.8 F   03/21/25 16:00


 


Pulse  79   03/21/25 18:00


 


Resp  24   03/21/25 18:00


 


BP  119/49   03/21/25 18:00


 


Pulse Ox  92 L  03/21/25 18:00


 


FiO2  50   03/20/25 20:00








                                 Intake & Output











 03/20/25 03/21/25 03/21/25





 18:59 06:59 18:59


 


Intake Total 1640.460 325.019 7041.514


 


Output Total 1850 1250 1500


 


Balance -209.540 -5275.876 5422.514


 


Weight  100.3 kg 100.3 kg


 


Intake:   


 


   170 135


 


    .9 80 170 135


 


    Cefepime 2 gm In Sodium 200  





    Chloride 0.9% 100 ml @ 25   





    mls/hr IVPB Q12H [facility] Rx#   





    [id number]   


 


  Intake, IV Titration 80.460 50.921 213.514





  Amount   


 


    Cefepime 2 gm In Sodium   100





    Chloride 0.9% 100 ml @ 25   





    mls/hr IVPB Q12H [facility] Rx#   





    [id number]   


 


    Insulin Regular 100 unit 80.460 50.921 113.514





    In Sodium Chloride 0.9%   





    100 ml @ Titrate IV .Q0M   





    [facility] Rx#:[id number]   


 


  Oral 970  2430


 


  Blood Product 310  


 


    Rc As-1  Unit 310  





    [id number]   


 


Output:   


 


  Urine 1850 1250 1500


 


Other:   


 


  Voiding Method External Catheter External Catheter External Catheter














- Exam





Patient is awake, comfortable


Maintained on BiPAP


Examination of the heart S1 and S2


Examination of the lungs decreased breath sounds at the bases occasional 

wheezing heard bilaterally


Abdomen is soft nontender


Examination of lower extremities shows edema 1+ bilaterally


CNS exam grossly intact





- Labs


CBC & Chem 7: 


                                 03/21/25 03:26





                                 03/21/25 03:26


Labs: 


                  Abnormal Lab Results - Last 24 Hours (Table)











  03/19/25 03/20/25 03/20/25 Range/Units





  02:34 19:09 20:14 


 


WBC     (3.8-10.6)  k/uL


 


RBC     (4.30-5.90)  m/uL


 


Hgb     (13.0-17.5)  gm/dL


 


Hct     (39.0-53.0)  %


 


Neutrophils #     (1.3-7.7)  k/uL


 


Lymphocytes #     (1.0-4.8)  k/uL


 


Sodium     (137-145)  mmol/L


 


BUN     (9-20)  mg/dL


 


Creatinine     (0.66-1.25)  mg/dL


 


Glucose     (74-99)  mg/dL


 


POC Glucose (mg/dL)  125 H  170 H  148 H  ()  mg/dL














  03/20/25 03/20/25 03/20/25 Range/Units





  21:22 22:12 23:32 


 


WBC     (3.8-10.6)  k/uL


 


RBC     (4.30-5.90)  m/uL


 


Hgb     (13.0-17.5)  gm/dL


 


Hct     (39.0-53.0)  %


 


Neutrophils #     (1.3-7.7)  k/uL


 


Lymphocytes #     (1.0-4.8)  k/uL


 


Sodium     (137-145)  mmol/L


 


BUN     (9-20)  mg/dL


 


Creatinine     (0.66-1.25)  mg/dL


 


Glucose     (74-99)  mg/dL


 


POC Glucose (mg/dL)  151 H  157 H  139 H  ()  mg/dL














  03/20/25 03/21/25 03/21/25 Range/Units





  23:58 01:09 02:03 


 


WBC     (3.8-10.6)  k/uL


 


RBC     (4.30-5.90)  m/uL


 


Hgb     (13.0-17.5)  gm/dL


 


Hct     (39.0-53.0)  %


 


Neutrophils #     (1.3-7.7)  k/uL


 


Lymphocytes #     (1.0-4.8)  k/uL


 


Sodium     (137-145)  mmol/L


 


BUN     (9-20)  mg/dL


 


Creatinine     (0.66-1.25)  mg/dL


 


Glucose     (74-99)  mg/dL


 


POC Glucose (mg/dL)  141 H  197 H  226 H  ()  mg/dL














  03/21/25 03/21/25 03/21/25 Range/Units





  03:09 03:26 03:26 


 


WBC   16.5 H   (3.8-10.6)  k/uL


 


RBC   2.34 L   (4.30-5.90)  m/uL


 


Hgb   7.1 L   (13.0-17.5)  gm/dL


 


Hct   21.5 L   (39.0-53.0)  %


 


Neutrophils #   15.8 H   (1.3-7.7)  k/uL


 


Lymphocytes #   0.2 L   (1.0-4.8)  k/uL


 


Sodium    132 L  (137-145)  mmol/L


 


BUN    123 H*  (9-20)  mg/dL


 


Creatinine    2.33 H  (0.66-1.25)  mg/dL


 


Glucose    188 H  (74-99)  mg/dL


 


POC Glucose (mg/dL)  236 H    ()  mg/dL














  03/21/25 03/21/25 03/21/25 Range/Units





  03:53 05:27 06:14 


 


WBC     (3.8-10.6)  k/uL


 


RBC     (4.30-5.90)  m/uL


 


Hgb     (13.0-17.5)  gm/dL


 


Hct     (39.0-53.0)  %


 


Neutrophils #     (1.3-7.7)  k/uL


 


Lymphocytes #     (1.0-4.8)  k/uL


 


Sodium     (137-145)  mmol/L


 


BUN     (9-20)  mg/dL


 


Creatinine     (0.66-1.25)  mg/dL


 


Glucose     (74-99)  mg/dL


 


POC Glucose (mg/dL)  237 H  217 H  193 H  ()  mg/dL














  03/21/25 03/21/25 03/21/25 Range/Units





  07:01 09:28 10:24 


 


WBC     (3.8-10.6)  k/uL


 


RBC     (4.30-5.90)  m/uL


 


Hgb     (13.0-17.5)  gm/dL


 


Hct     (39.0-53.0)  %


 


Neutrophils #     (1.3-7.7)  k/uL


 


Lymphocytes #     (1.0-4.8)  k/uL


 


Sodium     (137-145)  mmol/L


 


BUN     (9-20)  mg/dL


 


Creatinine     (0.66-1.25)  mg/dL


 


Glucose     (74-99)  mg/dL


 


POC Glucose (mg/dL)  192 H  243 H  247 H  ()  mg/dL














  03/21/25 03/21/25 03/21/25 Range/Units





  12:22 13:33 15:09 


 


WBC     (3.8-10.6)  k/uL


 


RBC     (4.30-5.90)  m/uL


 


Hgb     (13.0-17.5)  gm/dL


 


Hct     (39.0-53.0)  %


 


Neutrophils #     (1.3-7.7)  k/uL


 


Lymphocytes #     (1.0-4.8)  k/uL


 


Sodium     (137-145)  mmol/L


 


BUN     (9-20)  mg/dL


 


Creatinine     (0.66-1.25)  mg/dL


 


Glucose     (74-99)  mg/dL


 


POC Glucose (mg/dL)  305 H  302 H  250 H  ()  mg/dL














Assessment and Plan


Assessment: 





1.  Acute renal injury, ATN associated with underlying infection and borderline 

low blood pressures in the setting of use of ACE inhibitors.  May continue with 

IV diuretics.  Ultrasound shows no evidence of obstruction.  UA is benign.  BUN 

disproportionately elevated secondary to steroids and possible GI bleed


2.  Acute hypoxic respiratory failure secondary to pneumonia and volume overload


3.  Volume overload


4.  CHF with preserved ejection fraction of 55 to 60% noted this admission


5.  Hypervolemic hyponatremia.  Patient was also maintained on hypotonic IV 

fluids which contributed to the hyponatremia, now improved


6.  Influenza A infection status post Tamiflu during previous hospitalization 

about 4 weeks ago


7.  Anemia rule out GI bleed, being transfused packed RBCs for hemoglobin 6.2.  

No active bleeding noted.   


Plan: 








Continue with Lasix.  BUN is disproportionately elevated from steroids and 

possible underlying GI bleed.


Patient may need to start renal replacement therapy


Repeat labs in a.m.


Continue off of ACE inhibitors


Continue with Aldactone


Continue to avoid nephrotoxic agents

## 2025-03-21 NOTE — XR
EXAM:

  XR Chest, 1 View

 

CLINICAL HISTORY:

  ITS.REASON XR Reason: BiPap/Flu A/Pneumonia/COPD

 

TECHNIQUE:

  Frontal view of the chest.

 

COMPARISON:

  X-ray dated 3/19/2025.

 

FINDINGS:

  Lungs:  Stable opacification of the bilateral mid to lower lungs.

  Pleural space:  Bilateral pleural effusions.  No pneumothorax.

  Heart:  Moderate enlargement of the cardiac silhouette.

  Mediastinum:  Unremarkable.  Normal mediastinal contour.

  Bones/joints:  Degenerative changes are seen in the spine and shoulders.

  No acute fracture.

  Vasculature:  Calcifications overlie the aorta.

 

IMPRESSION:     

  Stable chest.

## 2025-03-22 LAB
ANION GAP SERPL CALC-SCNC: 7 MMOL/L
BUN SERPL-SCNC: 118 MG/DL (ref 9–20)
CALCIUM SPEC-MCNC: 9 MG/DL (ref 8.4–10.2)
CHLORIDE SERPL-SCNC: 103 MMOL/L (ref 98–107)
CO2 SERPL-SCNC: 25 MMOL/L (ref 22–30)
ERYTHROCYTE [DISTWIDTH] IN BLOOD BY AUTOMATED COUNT: 2.35 M/UL (ref 4.3–5.9)
ERYTHROCYTE [DISTWIDTH] IN BLOOD: 13.6 % (ref 11.5–15.5)
GLUCOSE BLD-MCNC: 117 MG/DL (ref 70–110)
GLUCOSE BLD-MCNC: 122 MG/DL (ref 70–110)
GLUCOSE BLD-MCNC: 134 MG/DL (ref 70–110)
GLUCOSE BLD-MCNC: 148 MG/DL (ref 70–110)
GLUCOSE BLD-MCNC: 165 MG/DL (ref 70–110)
GLUCOSE BLD-MCNC: 173 MG/DL (ref 70–110)
GLUCOSE BLD-MCNC: 173 MG/DL (ref 70–110)
GLUCOSE BLD-MCNC: 176 MG/DL (ref 70–110)
GLUCOSE BLD-MCNC: 177 MG/DL (ref 70–110)
GLUCOSE BLD-MCNC: 183 MG/DL (ref 70–110)
GLUCOSE BLD-MCNC: 187 MG/DL (ref 70–110)
GLUCOSE BLD-MCNC: 195 MG/DL (ref 70–110)
GLUCOSE BLD-MCNC: 195 MG/DL (ref 70–110)
GLUCOSE BLD-MCNC: 213 MG/DL (ref 70–110)
GLUCOSE BLD-MCNC: 220 MG/DL (ref 70–110)
GLUCOSE BLD-MCNC: 251 MG/DL (ref 70–110)
GLUCOSE BLD-MCNC: 264 MG/DL (ref 70–110)
GLUCOSE BLD-MCNC: 268 MG/DL (ref 70–110)
GLUCOSE BLD-MCNC: 275 MG/DL (ref 70–110)
GLUCOSE BLD-MCNC: 290 MG/DL (ref 70–110)
GLUCOSE BLD-MCNC: 291 MG/DL (ref 70–110)
GLUCOSE BLD-MCNC: 87 MG/DL (ref 70–110)
GLUCOSE SERPL-MCNC: 74 MG/DL (ref 74–99)
HCT VFR BLD AUTO: 21.7 % (ref 39–53)
HGB BLD-MCNC: 7.2 GM/DL (ref 13–17.5)
MAGNESIUM SPEC-SCNC: 3 MG/DL (ref 1.6–2.3)
MCH RBC QN AUTO: 30.7 PG (ref 25–35)
MCHC RBC AUTO-ENTMCNC: 33.2 G/DL (ref 31–37)
MCV RBC AUTO: 92.5 FL (ref 80–100)
PLATELET # BLD AUTO: 158 K/UL (ref 150–450)
POTASSIUM SERPL-SCNC: 3.9 MMOL/L (ref 3.5–5.1)
SODIUM SERPL-SCNC: 135 MMOL/L (ref 137–145)
WBC # BLD AUTO: 19.9 K/UL (ref 3.8–10.6)

## 2025-03-22 RX ADMIN — LACTULOSE PRN GM: 20 SOLUTION ORAL at 08:49

## 2025-03-22 RX ADMIN — METHYLPREDNISOLONE SODIUM SUCCINATE SCH MG: 40 INJECTION, POWDER, FOR SOLUTION INTRAMUSCULAR; INTRAVENOUS at 20:16

## 2025-03-22 NOTE — P.PN
Subjective


Progress Note Date: 03/22/25


Principal diagnosis: 





Acute on chronic hypoxic respiratory failure, multifactorial











Patient is a 64-year-old male with past medical history significant for atrial 

fibrillation, diabetes mellitus, CKD stage III, hypertension, hyperlipidemia, 

tobacco smoker.  Of note, recently had a prolonged hospitalization 02/23/2025 

through 03/07/2025.  Treated for combination of influenza A, pneumonia, CHF.  

Completed course of antibiotics and Tamiflu.  At this time, did require 

noninvasive BiPAP, eventually discharged on home O2.  Returns with a chief 

complaint of shortness of breath progressing over the last 2 to 3 days.  Also, 

reports sudden onset substernal chest pain that developed the night prior and 

has been persistent.  On arrival to the ED, found to be in some respiratory 

distress, and placed on BiPAP.  Workup in the emergency department including a 

chest x-ray showing multifocal infiltrates concerning for pneumonia or pulmonary

edema.  D-dimer was elevated in setting CT angio protocol which was positive for

segmental and subsegmental right middle lobe and right lower lobe pulmonary 

emboli.  No saddle pulmonary embolus. Pulmonary artery mildly enlarged.  

Preserved RV to LV ratio.  There were diffuse coarse reticular infiltrates, as 

well as, groundglass lung attenuation, cystic changes, with concerns for 

interstitial lung disease. Patient does take Eliquis for his history of atrial 

fibrillation.  Does state he has missed some doses lately.  CBC: WBC count 22.6,

hemoglobin 11, platelets 322.  CMP: Sodium 133, potassium 4.2, chloride 101, 

serum bicarb 24, BUN 30, creatinine 1.27, glucose 135.  Lactic 1.5.  VBG with a 

pH of 7.5 and pCO2 of 32.  EKG: Sinus rhythm, rate 65 bpm, left bundle branch 

block, not acute.  Elevated serial troponins 0.049 and 0.055 respectively.  NT 

proBNP elevated 8807.  Patient is currently being evaluated in the emergency 

department.  He is alert and some moderate respiratory distress.  On BiPAP with 

settings 12/5 and FiO2 60%.  Tachypneic, breathing around 40 breaths/min. 

Endorses progressive worsening difficulty in breathing over last couple days.  

He has tried to increase his supplemental oxygen at home without any relief. 

Denies previous history of DVT or PE.  Associated nonproductive cough.  Denies 

sputum production or hemoptysis.  Substernal nonradiating chest pain persist.  

Denies any fevers or chills.  Denies heart palpitations or syncopal events.  

Denies swelling in the lower extremities.  Heart rhythm is normal sinus on 

bedside monitor.  Blood pressure has been normotensive, did receive 2 L of 

crystalloid fluid bolus earlier.  Not requiring any vasopressors.  Normal saline

is infusing at 150 mL/h.  IV heparin infusing per protocol








3/13/2025, the patient is being seen for a follow-up.  The patient is 

alternating between BiPAP at a pressure of 12/5 with an FiO2 of 60% and 15 L of 

oxygen by nasal cannula.  He is getting slightly anxious and the patient is 

being provided Xanax accordingly.  Fluid balance is -1.1 L over the past 24 

hours.  Limited echocardiogram was also done and it showed preserved LV function

with an EF of around 55 to 60%.  Valvular functions could not be accurately 

assessed as the patient's study was technically difficult study.  There was 

however RV dilatation.  Unable to estimate RV pressures.  The electrolytes from 

today showed a creatinine of 1.5 with a BUN of 35.  Bicarb is at 21.  Sodium is 

at 132.  The patient remains on DuoNeb updrafts.  The patient remains on a 

combination of cefepime and vancomycin.  The patient was taken off the IV hep

claus and the patient was started on anticoagulation with Eliquis.  Remains on IV

Solu-Medrol 60 mg every 6 hours.  Remains on Aldactone.  Remains on IV Lasix 40 

mg every 12 hours.





On today's evaluation of 3/14/2025, the patient is overall condition is 

essentially unchanged.  Continues to be hypoxic, continues to be on BiPAP and 

the patient remains at a pressure of 12/5 with an FiO2 of 60%.  Continues to 

have crackles in lung base bilaterally.  White cell count remains elevated at 20

with a hemoglobin 9.2.  Very much BiPAP dependent as the patient desaturates 

once taken off the BiPAP.  BUN is 44 with a platelet of 1.5.  Sodium levels at 

134 and a potassium level is at 3.4.  Remains on DuoNeb updrafts.  Remains on IV

Lasix 40 mg every 12 hours.  Remains on Aldactone.  Remains on bronchodilators. 

Remains on steroids.  Empiric antibiotic coverage with IV cefepime.  Reviewed 

the CAT scan again and there is some chronic interstitial changes and the 

patient has diffuse infiltrates with areas of groundglass.  Consider acute 

interstitial pneumonias.  Consider fungal pneumonias.





On today's evaluation of 3/15/2025, the patient is feeling slightly better 

compared to yesterday.  The patient is off the BiPAP and the patient is 

currently on 15 L of oxygen by nasal cannula.  Remains on bronchodilators.  

Remains on IV Solu-Medrol.  Remains on IV Lasix.  Antibiotic coverage including 

combination of cefepime, Levaquin orally and itraconazole orally.  Fungal 

antibodies are still pending.  Meanwhile, rheumatologic profile showed a 

negative rheumatoid factor and negative CALRA.  Legionella urine antigen is still 

pending for now.  Clinically however, the patient is feeling slightly improved 

compared to yesterday.  His chest x-ray continues to show multifocal airspace 

disease more so in the right lung.  Fluid balance has been negative.  The 

patient's creatinine is currently at 1.6 with a BUN of 54 and a sodium levels at

133.  I am going to taper the steroids.  The white cell count is 18.2 with a 

hemoglobin of 9.2.





On 3/16/2025, the patient is transferred to the intensive care unit for closer 

monitoring.  As mentioned, the patient developed acute hypoxic respiratory 

failure with diffuse bilateral pulmonary filtrates discussed earlier.  The 

patient was able to tolerate Airvo, however, overnight, the patient developed 

epistaxis.  Therefore was discontinued the patient was placed back on BiPAP and 

his current BiPAP is running at a pressure of 12 over 5 cm of water with an FiO2

of 80%.  The patient seems to be quite dependent on the BiPAP.  Unable to drop 

the FiO2 any further.  Repeat chest x-ray was done and shows stable diffuse 

bilateral pulm infiltrates which remains essentially unchanged compared to 

yesterday.  Clinically, the patient is awake and alert.  He is reported to have 

some congested cough.  No significant sputum production.  No chest pain.  No 

hemoptysis.  Noted, over the past 48 hours, the patient was diuresed with IV 

Lasix.  Nevertheless, we have not seen any improvement in the patient's 

oxygenation.  The patient was negative fluid balance.  His BNP today is at 68 

with a creatinine of 1.7.  Based on that, I stopped diuretics.  Rest of the 

electrolytes are all within normal limits.  White cell count of 21 with a 

hemoglobin 9.1 and a platelet count of 245.  The patient remains on broad-

spectrum antibiotics.  The patient remains on IV cefepime, itraconazole and 

Levaquin.  Fungal antibodies are still pending for now.  CARLA and rheumatoid 

factor were both negative.  Legionella urine antigen was also negative.  The 

patient remains on anticoagulation and I am going to drop the Eliquis dose to 5 

mg p.o. twice a day.  I am not absolutely convinced that the patient has a 

pulmonary embolism.  I reviewed the CAT scan of the chest and there could be 

potentially some subsegmental filling defects in the right, however, the overall

contrast administration was essentially poor.  The patient will be monitored 

very closely in the intensive care unit.  On a separate note, the patient was 

started on insulin drip for blood sugar control.





Patient was seen today on 3/17/2025, remains in the ICU, remains on fluid 

restrictions for low sodium of 128, he is on BiPAP 12/5/60%, patient is 

presenting this time very much similar to his last presentation few weeks ago.  

I am familiar with this patient, and on his last admission patient responded to 

treatment with mostly diuretics, and steroids.  This time came back with a 

similar presentation, he is receiving diuretics, but has been on hold for the 

last 24 hours, and I recommended we go back on Lasix 40 mg IV push every 12 

hours, patient is receiving Solu-Medrol at 60 mg IV push every 8 hours, he is 

also on Levaquin's, cefepime, and he is marginal at best.  In addition to this 

the patient had non-ST elevation myocardial infarction and flu/influenza A back 

on 3/11.  Blood cultures have been negative, patient has leukocytosis with WBC 

count of 21.7 hemoglobin is 7.9.  Creatinine has been slightly rising 1.51 on 

admission which is about his baseline, today his creatinine is 1.97 with a BUN 

of 95.  Legionella antigen has been negative CARLA screen has been negative 

rheumatoid factor is negative patient continues to complain of shortness of 

breath, intermittent cough, and wheezing.





Patient was seen today on 3/18/2025, remains in the ICU, remains on BiPAP, 

patient is down on FiO2 to 50%, so he is on BiPAP 12/5/50%.  Clinically the 

patient is feeling better breathing easier, remains on empiric antibiotics 

including cefepime and Levaquin remains on Lasix 40 mg every 12 hours remains on

steroids, and today I have noticed probably minimal improvement in his chest x-

ray findings.  Hence we will continue the same and will make Lasix 40 mg twice 

daily instead of 40 mg daily.  WBC count is 22.8 hemoglobin 7.4 electrolytes are

normal BUN is 117 creatinine 2.11





Patient was seen today on 3/19/2025, patient is feeling better today, remains on

BiPAP, however he is down to 40%, chest x-ray is showing definite improvement in

comparison to previous x-rays of the chest.  Patient remains on diuretics r

emains on antibiotics and remains on steroids, hence I have no plans to cut down

on his diuretics at this point specially with his improvement clinically and 

improvement noted on the chest x-ray.  As a matter fact I was able to 

discontinue BiPAP, and I placed the patient on a high flow nasal cannula 10 L 

and he is saturating anywhere between 97 to 99%.  Creatinine is noted to be a 

bit higher today 2.28, his BUN is 125, his WBC count remains elevated at 18.1, 

hemoglobin is 7.  Patient remains quite edematous, and he remains on diuretics 

in the form of Lasix and Aldactone.  





Patient was seen today on 3/20/2025, basically about the same, hemoglobin is low

and the patient will receive 1 unit of packed RBCs today.  Last night he had to 

go back on BiPAP, although he was tried on high flow nasal cannula throughout 

the day yesterday, Desaturating and kept getting anxious and agitated, then he w

as placed on BiPAP overnight, and he remains on BiPAP 12/5/50%.  Hemoglobin 

today is 6.2, had some recommending at least a unit of packed RBCs.  Chest x-ray

continues to show bilateral interstitial edema/infiltrates.  Patient remains on 

cefepime, IV fluids at KVO, remains on diuretics.  Renal functioning is holding 

and not showing any significant change.





Seen today on 3/21/2025, patient was on BiPAP at night, however earlier this 

morning he was placed down to 8 L high flow nasal cannula.  Saturating at 93 to 

95%.  He was on BiPAP 12/5/50% last time.  Remains on Lasix 40 mg IV push twice 

daily remains on Solu-Medrol 60 every 6 remains on cefepime.  Patient has been 

on Sporanox.  Some improvement today compared to the last few days, no chest x-

ray was done, but the patient seems to be more comfortable on high flow nasal 

cannula compared to BiPAP.  Continues to have leukocytosis but improving with 

WBC of 16.5 electrolytes are normal bicarb is normal BUN is 123 creatinine 2.33 

blood sugar is 305.





Patient was seen today on 3/22/2025, remains in the ICU, presently on 15 L high 

flow nasal cannula, he was on BiPAP 12/5/50% last night.  Patient is also on 

insulin drip 7 units/h for elevated blood sugar, cefepime Lasix 40 mg twice 

daily and today I cut down his Solu-Medrol to 40 mg IV push every 12 hours.  

Chest x-ray continues to show edema/infiltrates at the base of his lungs, howeve

r overall his chest x-ray is much better now compared to his initial chest x-ray

on admission.  Clinical improvement but not quite ready to be transferred out of

the ICU today, patient remains marginal in spite of the improvement noted.  And 

he has very complex multiple medical issues being addressed.  WBC count today is

a bit higher at 19.9 hemoglobin is holding at 7.2 electrolytes are normal BUN is

118 creatinine 2.37 slightly higher.  Patient remains in negative fluid balance.





Objective





- Vital Signs


Vital signs: 


                                   Vital Signs











Temp  96.8 F L  03/22/25 08:00


 


Pulse  70   03/22/25 10:00


 


Resp  13   03/22/25 10:00


 


BP  132/71   03/22/25 10:00


 


Pulse Ox  94 L  03/22/25 10:00


 


FiO2  50   03/22/25 04:40








                                 Intake & Output











 03/21/25 03/22/25 03/22/25





 18:59 06:59 18:59


 


Intake Total 2778.514 915.189 260


 


Output Total 1500 1325 250


 


Balance 1278.514 -409.811 10


 


Weight 100.3 kg  


 


Intake:   


 


   120 20


 


    .9 135 120 20


 


  Intake, IV Titration 213.514 55.189 





  Amount   


 


    Cefepime 2 gm In Sodium 100  





    Chloride 0.9% 100 ml @ 25   





    mls/hr IVPB Q12H [facility] Rx#   





    :[id number]   


 


    Insulin Regular 100 unit 113.514 55.189 





    In Sodium Chloride 0.9%   





    100 ml @ Titrate IV .Q0M   





    [facility] Rx#:[id number]   


 


  Oral 2430 740 240


 


Output:   


 


  Urine 1500 1325 250


 


Other:   


 


  Voiding Method External Catheter External Catheter 














- Exam








GENERAL EXAM: 64-year-old white male obese , 15 L high flow nasal cannula


HEAD: Normocephalic and atraumatic


EYES: Normal reaction of pupils, equal size.


NOSE: No evidence of epistaxis.


THROAT: No erythema or exudates.


NECK: No masses, no JVD.


CHEST: No chest wall deformity.  Lungs: Bibasilar crackles crackles at the bases

persist.


CVS: S1 and S2 normal with soft systolic murmur, regular rhythm. No other extra 

heart sounds


ABDOMEN: No hepatosplenomegaly, active bowel sounds, no guarding or rigidity. 


SKIN: No rashes


CENTRAL NERVOUS SYSTEM: Alert oriented x 3 no gross focal deficit


EXTREMITIES: Trace of bipedal edema





- Labs


CBC & Chem 7: 


                                 03/22/25 03:54





                                 03/22/25 03:54


Labs: 


                  Abnormal Lab Results - Last 24 Hours (Table)











  03/21/25 03/21/25 03/21/25 Range/Units





  12:22 13:33 15:09 


 


WBC     (3.8-10.6)  k/uL


 


RBC     (4.30-5.90)  m/uL


 


Hgb     (13.0-17.5)  gm/dL


 


Hct     (39.0-53.0)  %


 


Sodium     (137-145)  mmol/L


 


BUN     (9-20)  mg/dL


 


Creatinine     (0.66-1.25)  mg/dL


 


POC Glucose (mg/dL)  305 H  302 H  250 H  ()  mg/dL


 


Magnesium     (1.6-2.3)  mg/dL














  03/21/25 03/21/25 03/21/25 Range/Units





  20:07 21:54 23:16 


 


WBC     (3.8-10.6)  k/uL


 


RBC     (4.30-5.90)  m/uL


 


Hgb     (13.0-17.5)  gm/dL


 


Hct     (39.0-53.0)  %


 


Sodium     (137-145)  mmol/L


 


BUN     (9-20)  mg/dL


 


Creatinine     (0.66-1.25)  mg/dL


 


POC Glucose (mg/dL)  179 H  317 H  327 H  ()  mg/dL


 


Magnesium     (1.6-2.3)  mg/dL














  03/22/25 03/22/25 03/22/25 Range/Units





  00:17 01:12 02:12 


 


WBC     (3.8-10.6)  k/uL


 


RBC     (4.30-5.90)  m/uL


 


Hgb     (13.0-17.5)  gm/dL


 


Hct     (39.0-53.0)  %


 


Sodium     (137-145)  mmol/L


 


BUN     (9-20)  mg/dL


 


Creatinine     (0.66-1.25)  mg/dL


 


POC Glucose (mg/dL)  291 H  251 H  195 H  ()  mg/dL


 


Magnesium     (1.6-2.3)  mg/dL














  03/22/25 03/22/25 03/22/25 Range/Units





  03:12 03:54 03:54 


 


WBC   19.9 H   (3.8-10.6)  k/uL


 


RBC   2.35 L   (4.30-5.90)  m/uL


 


Hgb   7.2 L   (13.0-17.5)  gm/dL


 


Hct   21.7 L   (39.0-53.0)  %


 


Sodium    135 L  (137-145)  mmol/L


 


BUN    118 H*  (9-20)  mg/dL


 


Creatinine    2.37 H  (0.66-1.25)  mg/dL


 


POC Glucose (mg/dL)  134 H    ()  mg/dL


 


Magnesium    3.0 H  (1.6-2.3)  mg/dL














  03/22/25 03/22/25 03/22/25 Range/Units





  04:59 05:56 06:58 


 


WBC     (3.8-10.6)  k/uL


 


RBC     (4.30-5.90)  m/uL


 


Hgb     (13.0-17.5)  gm/dL


 


Hct     (39.0-53.0)  %


 


Sodium     (137-145)  mmol/L


 


BUN     (9-20)  mg/dL


 


Creatinine     (0.66-1.25)  mg/dL


 


POC Glucose (mg/dL)  117 H  187 H  165 H  ()  mg/dL


 


Magnesium     (1.6-2.3)  mg/dL














  03/22/25 03/22/25 03/22/25 Range/Units





  08:20 09:06 10:02 


 


WBC     (3.8-10.6)  k/uL


 


RBC     (4.30-5.90)  m/uL


 


Hgb     (13.0-17.5)  gm/dL


 


Hct     (39.0-53.0)  %


 


Sodium     (137-145)  mmol/L


 


BUN     (9-20)  mg/dL


 


Creatinine     (0.66-1.25)  mg/dL


 


POC Glucose (mg/dL)  173 H  183 H  177 H  ()  mg/dL


 


Magnesium     (1.6-2.3)  mg/dL














Assessment and Plan


Assessment: 


Impression:


Acute on chronic hypoxic respiratory failure, multifactorial, I suspect this is 

mostly a picture of pulmonary edema, underlying pneumonia is not entirely ruled 

out.  Hence the patient will remain on antibiotics and diuretics.


Acute exacerbation of chronic systolic congestive heart failure, recent 

echocardiogram from 02/24/2025 estimating a left ventricular ejection fraction 

of 45 to 50%, moderate pulmonary hypertension, and moderate tricuspid 

regurgitation.  Repeat echocardiogram showed improvement in LV function with 

ejection fraction 55%


Acute leukocytosis


Acute on chronic kidney disease


Recent influenza A infection


History of atrial fibrillation with previous cardioversion 


History of underlying COPD and chronic tobacco dependence


Benign essential hypertension


Type 2 diabetes


Acute on chronic anemia patient will require a unit of packed RBCs to be 

transfused today.  And will continue to monitor for any blood loss specially GI 

blood losses.





Recommendation:





Continue high flow nasal cannula, titrate FiO2 accordingly


Continue Lasix


Continue bronchodilators


Cut down Solu-Medrol to 40 mg IV push every 12 hours as his sugars are running 

quite high


Continue empiric antibiotics and antifungal patient is on cefepime, and 

itraconazole


Continue to monitor in the ICU for now.  Patient remains marginal


Remains quite ill, prognosis remains guarded.





Will continue to follow


C


Time with Patient: Less than 30

## 2025-03-22 NOTE — XR
EXAMINATION TYPE: XR chest 1V portable

 

DATE OF EXAM: 3/22/2025

 

CLINICAL INDICATION: Male, 64 years old with history of distress, progress study.  

 

TECHNIQUE: Single AP portable upright view of the chest is obtained.

 

COMPARISON: Chest x-ray from one day earlier and older studies

 

FINDINGS:  Persistent cardiomegaly with bilateral increased opacities. Osseous structures are intact.


 

IMPRESSION: Findings favor continued CHF exacerbation/fluid overload state. No significant change fro
m one day earlier.

 

[facility], Workstation: [id number], 3/22/2025 6:08 AM

## 2025-03-22 NOTE — P.PN
Subjective





Patient is seen for follow-up for acute kidney injury.


Currently maintained on Lasix





Patient remains short of breath requiring BiPAP on and off.  Oxygen requirement 

seems to have decreased.


Serum creatinine at 2.3.  BUN disproportionately elevated but down to 118 today.

 Patient is maintained on steroids.





Objective





- Vital Signs


Vital signs: 


                                   Vital Signs











Temp  96.8 F L  03/22/25 08:00


 


Pulse  70   03/22/25 10:00


 


Resp  13   03/22/25 10:00


 


BP  132/71   03/22/25 10:00


 


Pulse Ox  94 L  03/22/25 10:00


 


FiO2  50   03/22/25 04:40








                                 Intake & Output











 03/21/25 03/22/25 03/22/25





 18:59 06:59 18:59


 


Intake Total 2778.514 915.189 530


 


Output Total 1500 1325 725


 


Balance 1278.514 -409.811 -195


 


Weight 100.3 kg  


 


Intake:   


 


   120 50


 


    .9 135 120 50


 


  Intake, IV Titration 213.514 55.189 





  Amount   


 


    Cefepime 2 gm In Sodium 100  





    Chloride 0.9% 100 ml @ 25   





    mls/hr IVPB Q12H [facility] Rx#   





    :[id number]   


 


    Insulin Regular 100 unit 113.514 55.189 





    In Sodium Chloride 0.9%   





    100 ml @ Titrate IV .Q0M   





    [facility] Rx#:[id number]   


 


  Oral 2430 740 480


 


Output:   


 


  Urine 1500 1325 725


 


Other:   


 


  Voiding Method External Catheter External Catheter 














- Exam





Patient is awake, comfortable


Maintained on BiPAP


Examination of the heart S1 and S2


Examination of the lungs decreased breath sounds at the bases occasional 

wheezing heard bilaterally


Abdomen is soft nontender


Examination of lower extremities shows edema 1+ bilaterally, decreasing


CNS exam grossly intact





- Labs


CBC & Chem 7: 


                                 03/22/25 03:54





                                 03/22/25 03:54


Labs: 


                  Abnormal Lab Results - Last 24 Hours (Table)











  03/21/25 03/21/25 03/21/25 Range/Units





  12:22 13:33 15:09 


 


WBC     (3.8-10.6)  k/uL


 


RBC     (4.30-5.90)  m/uL


 


Hgb     (13.0-17.5)  gm/dL


 


Hct     (39.0-53.0)  %


 


Sodium     (137-145)  mmol/L


 


BUN     (9-20)  mg/dL


 


Creatinine     (0.66-1.25)  mg/dL


 


POC Glucose (mg/dL)  305 H  302 H  250 H  ()  mg/dL


 


Magnesium     (1.6-2.3)  mg/dL














  03/21/25 03/21/25 03/21/25 Range/Units





  20:07 21:54 23:16 


 


WBC     (3.8-10.6)  k/uL


 


RBC     (4.30-5.90)  m/uL


 


Hgb     (13.0-17.5)  gm/dL


 


Hct     (39.0-53.0)  %


 


Sodium     (137-145)  mmol/L


 


BUN     (9-20)  mg/dL


 


Creatinine     (0.66-1.25)  mg/dL


 


POC Glucose (mg/dL)  179 H  317 H  327 H  ()  mg/dL


 


Magnesium     (1.6-2.3)  mg/dL














  03/22/25 03/22/25 03/22/25 Range/Units





  00:17 01:12 02:12 


 


WBC     (3.8-10.6)  k/uL


 


RBC     (4.30-5.90)  m/uL


 


Hgb     (13.0-17.5)  gm/dL


 


Hct     (39.0-53.0)  %


 


Sodium     (137-145)  mmol/L


 


BUN     (9-20)  mg/dL


 


Creatinine     (0.66-1.25)  mg/dL


 


POC Glucose (mg/dL)  291 H  251 H  195 H  ()  mg/dL


 


Magnesium     (1.6-2.3)  mg/dL














  03/22/25 03/22/25 03/22/25 Range/Units





  03:12 03:54 03:54 


 


WBC   19.9 H   (3.8-10.6)  k/uL


 


RBC   2.35 L   (4.30-5.90)  m/uL


 


Hgb   7.2 L   (13.0-17.5)  gm/dL


 


Hct   21.7 L   (39.0-53.0)  %


 


Sodium    135 L  (137-145)  mmol/L


 


BUN    118 H*  (9-20)  mg/dL


 


Creatinine    2.37 H  (0.66-1.25)  mg/dL


 


POC Glucose (mg/dL)  134 H    ()  mg/dL


 


Magnesium    3.0 H  (1.6-2.3)  mg/dL














  03/22/25 03/22/25 03/22/25 Range/Units





  04:59 05:56 06:58 


 


WBC     (3.8-10.6)  k/uL


 


RBC     (4.30-5.90)  m/uL


 


Hgb     (13.0-17.5)  gm/dL


 


Hct     (39.0-53.0)  %


 


Sodium     (137-145)  mmol/L


 


BUN     (9-20)  mg/dL


 


Creatinine     (0.66-1.25)  mg/dL


 


POC Glucose (mg/dL)  117 H  187 H  165 H  ()  mg/dL


 


Magnesium     (1.6-2.3)  mg/dL














  03/22/25 03/22/25 03/22/25 Range/Units





  08:20 09:06 10:02 


 


WBC     (3.8-10.6)  k/uL


 


RBC     (4.30-5.90)  m/uL


 


Hgb     (13.0-17.5)  gm/dL


 


Hct     (39.0-53.0)  %


 


Sodium     (137-145)  mmol/L


 


BUN     (9-20)  mg/dL


 


Creatinine     (0.66-1.25)  mg/dL


 


POC Glucose (mg/dL)  173 H  183 H  177 H  ()  mg/dL


 


Magnesium     (1.6-2.3)  mg/dL














  03/22/25 Range/Units





  11:01 


 


WBC   (3.8-10.6)  k/uL


 


RBC   (4.30-5.90)  m/uL


 


Hgb   (13.0-17.5)  gm/dL


 


Hct   (39.0-53.0)  %


 


Sodium   (137-145)  mmol/L


 


BUN   (9-20)  mg/dL


 


Creatinine   (0.66-1.25)  mg/dL


 


POC Glucose (mg/dL)  195 H  ()  mg/dL


 


Magnesium   (1.6-2.3)  mg/dL














Assessment and Plan


Assessment: 





1.  Acute renal injury, ATN associated with underlying infection and borderline 

low blood pressures in the setting of use of ACE inhibitors.  May continue with 

IV diuretics.  Ultrasound shows no evidence of obstruction.  UA is benign.  BUN 

disproportionately elevated secondary to steroids and possible GI bleed


2.  Acute hypoxic respiratory failure secondary to pneumonia and volume overload


3.  Volume overload


4.  CHF with preserved ejection fraction of 55 to 60% noted this admission


5.  Hypervolemic hyponatremia.  Patient was also maintained on hypotonic IV flui

ds which contributed to the hyponatremia, now improved


6.  Influenza A infection status post Tamiflu during previous hospitalization 

about 4 weeks ago


7.  Anemia rule out GI bleed, status post transfusion of packed RBCs for 

hemoglobin 6.2.  No active bleeding noted.   


Plan: 








Continue with Lasix.  BUN is disproportionately elevated from steroids and 

possible underlying GI bleed.


Try Biotene gum/toothpaste for dry mouth and frozen grapes


Repeat labs in a.m.


Continue off of ACE inhibitors


Continue with Aldactone


Continue to avoid nephrotoxic agents

## 2025-03-22 NOTE — P.PN
Subjective





Patient is a 64-year-old male with a PMH of atrial fibrillation on Eliquis, COPD

on 2 L home oxygen, non-insulin-dependent diabetes mellitus, hyperlipidemia, 

hypertension presenting with shortness of breath.  Patient was previously 

admitted here for acute hypoxic respiratory failure secondary to influenza 

pneumonia and was discharged on 2 L of home oxygen.  Patient states he has been 

feeling short of breath while at rest.  He reports that since being discharged 

the home oxygen has not been sufficient. He states that he had to keep 

increasing his oxygen.    Patient says shortness of breath is slightly relieved 

when he is laying down.  Denies any orthopnea or PND.  Patient endorses a 

nonproductive cough that is chronic in nature, but says it has been getting 

worse.  Patient admits to having fever, chills.  Patient also admits to having 

non-radiating, pressure-like chest pain over the last few days.  Chest pain is 

not related to exertion and he had no alleviating or aggravating factors.  

Patient denies any headache, vision changes, nausea, vomiting, diarrhea, 

abdominal pain, urinary symptoms.





EKG independently interpreted displaying sinus rhythm with left bundle branch 

block that has been seen on prior EKG, rate 65 bpm, QTc 451 ms


CXR independently interpreted displaying bilateral multifocal airspace opacities


Chest CTA displaying acute segmental and subsegmental pulmonary emboli within 

the right and middle lower lobes, no saddle embolus, no RV strain, chronic 

interstitial loading disease


Venous Doppler B/L LE displaying no evidence of acute DVT


Troponin 0.049, 0.055, proBNP 8810, D-dimer 4.87, WBC 22.6, Hgb 11.0, HCT 35.2, 

MCV 93.7, platelet 322, PT 11.8, INR 1.1, APTT 28, sodium 133, potassium 4.2, 

CO2 24, BUN 30, creatinine 1.27, glucose 135


VBG pH 7.52, pCO2 32


T98.2 F, PA 80, RR 26, /77, O2 sat 90% on BiPAP with FiO2 of 100%





March 12: Overflow the ER.  Up in the bed.  Short of breath.  Patient was on BiP

AP overnight.  This morning on 15 L high flow nasal cannula.  Decreased 

appetite.  Has got a cough.  Some wheezing.  Some sputum production.  Decreased 

appetite.  Patient is on IV heparin.  Also on IV cefepime.  Pulmonary following


March 13: Patient did confirm that because Eliquis is very expensive patient was

not taking it properly did and take it sparingly at home.  Explains patient's 

PE.  Started on Eliquis today by cardiology.  IV heparin discontinued.  Getting 

easily short of breath.  Requiring BiPAP..  Some cough.  Oral intake fair.  On 

IV cefepime and vancomycin.  ID following.  Also on IV Lasix.  Due to worsening 

renal function will DC vancomycin


March 14: Saw this afternoon.  On BiPAP.  60%.  Ensure ordered.  All blood work 

ordered by pulmonary including fungal.  Patient currently on IV cefepime and 

Levaquin.  IV Solu-Medrol.  Remain short of breath.  Tired.  Being followed by 

pulmonary and ID.  Chest x-ray film personally reviewed by me-showing pulm edema

bilateral infiltrates especially at the bases.  Renal ultrasound nonspecific.  

UA showing protein 1+.


March 15: Remain short of breath.  Sitting up in bed.  Not able to come off the 

BiPAP.  Remains on IV cefepime.  Accu-Cheks running high.  Put on insulin drip. 

On IV Solu-Medrol.  Oral intake fair.


March 16: Patient remains short of breath.  Unable to get off BiPAP.  FiO2 

increased to 70%.  12/5.  Per Dr. Joshua: Patient be moved to the ICU.  Poor 

oral intake.  Remains on insulin drip.  IV Solu-Medrol.  Eliquis.  IV cefepime 

and Levaquin.  Tired.  Sitting up in bed leaning forward short of breath.  Chest

x-ray film personally reviewed by me: Scattered bilateral infiltrates


March 17: ICU.  Remains on BiPAP.  Decreased oral intake.  Sitting up.  Short of

breath.  On IV cefepime.  Insulin drip.  IV Solu-Medrol.  DuoNeb Q4.  Rather 

tired.  Also yesterday evening at epistaxis.  Nasal packing.


March 18: ICU.  Mostly been on BiPAP.  High flow nasal cannula.  Decreased oral 

intake.  Sitting up leaning forward.  Remains on IV cefepime or Levaquin insulin

drip.  A bit tired.  Patient has very poor oral intake.  Per intensivist patient

will IV Lasix.  Note worsening renal function-cut back to Lasix once a day.  

Given blood pressure running lower side.  DC Aldactone and DC amlodipine.


March 19: ICU.  Patient was taken off BiPAP this morning.  Decreased to high 

flow nasal cannula 10 L.  Wife at the bedside.  Encourage oral intake.  Chopped 

diet.  Per intensivist nephrology patient to continue on IV Lasix.  Blood 

pressure better after stopping amlodipine and Aldactone yesterday.  Encourage 

oral intake.  Incentive spirometry.  IV cefepime, Levaquin, I terconazole per 

ID.


March 20: ICU.  Yesterday patient was nasal cannula.  Overnight repeat put back 

on BiPAP.  Patient did drop his hemoglobin further.  Down to 6.2.  Felt to be 

from epistaxis.  Given a unit of blood.  Further increase in BUN/creatinine.  

Remains on IV Lasix.  Ordered Kerlix to both the lower extremities.  Had a good 

portion of his lunch today.


March 21: ICU.  Patient seen this morning.  15 L nasal cannula.  Reclining in 

bed.  Short of breath.  Ace wrap lower extremity.  Patient remains on IV 

cefepime, IV Lasix 40 mg every 12, insulin drip, IV Solu-Medrol DuoNeb Q4.  Did 

not eat breakfast but had about 100% of his lunch.





3/22/2025, resume the care of the patient today.  Patient seen and examined in 

the ICU.  His not tolerating his BiPAP mask more than 1 hour, currently his 

mildly/moderately tachypneic on 15 L oxygen via nonrebreather.  He has apparent 

swelling of both arms and legs, sitting up in chair feels tired denies chest 

pain.


Patient also on insulin drip at 7 units/h.





Objective





- Vital Signs


Vital signs: 


                                   Vital Signs











Temp  98.5 F   03/22/25 04:00


 


Pulse  72   03/22/25 08:26


 


Resp  20   03/22/25 07:00


 


BP  120/54   03/22/25 07:00


 


Pulse Ox  95   03/22/25 08:04


 


FiO2  50   03/22/25 04:40








                                 Intake & Output











 03/21/25 03/22/25 03/22/25





 18:59 06:59 18:59


 


Intake Total 2778.514 915.189 10


 


Output Total 1500 1325 


 


Balance 1278.514 -409.811 10


 


Weight 100.3 kg  


 


Intake:   


 


   120 10


 


    .9 135 120 10


 


  Intake, IV Titration 213.514 55.189 





  Amount   


 


    Cefepime 2 gm In Sodium 100  





    Chloride 0.9% 100 ml @ 25   





    mls/hr IVPB Q12H [facility] Rx#   





    :[id number]   


 


    Insulin Regular 100 unit 113.514 55.189 





    In Sodium Chloride 0.9%   





    100 ml @ Titrate IV .Q0M   





    [facility] Rx#:[id number]   


 


  Oral 2430 740 


 


Output:   


 


  Urine 1500 1325 


 


Other:   


 


  Voiding Method External Catheter External Catheter 














- Exam





-GENERAL: The patient is alert and oriented x3, not in any acute distress. Well 

developed, well nourished.  Obese


HEENT: Pupils are round and equally reacting to light. EOMI. No scleral icterus.

No conjunctival pallor. Normocephalic, atraumatic. No pharyngeal erythema. No 

thyromegaly. 


CARDIOVASCULAR: S1 and S2 present. No murmurs, rubs, or gallops. 


-PULMONARY: Chest is clear to auscultation, no wheezing , n bilateral basal 

crackles.  Tachypneic,


ABDOMEN: Soft, nontender, nondistended, normoactive bowel sounds. No palpable 

organomegaly. 


MUSCULOSKELETAL: No joint swelling or deformity. 


-EXTREMITIES: No cyanosis, clubbing bilateral arm and leg pitting edema. 


NEUROLOGICAL: Gross neurological examination did not reveal any focal deficits. 


SKIN: No rashes. no petechiae.








- Labs


CBC & Chem 7: 


                                 03/22/25 03:54





                                 03/22/25 03:54


Labs: 


                  Abnormal Lab Results - Last 24 Hours (Table)











  03/19/25 03/21/25 03/21/25 Range/Units





  02:34 09:28 10:24 


 


WBC     (3.8-10.6)  k/uL


 


RBC     (4.30-5.90)  m/uL


 


Hgb     (13.0-17.5)  gm/dL


 


Hct     (39.0-53.0)  %


 


Sodium     (137-145)  mmol/L


 


BUN     (9-20)  mg/dL


 


Creatinine     (0.66-1.25)  mg/dL


 


POC Glucose (mg/dL)  125 H  243 H  247 H  ()  mg/dL


 


Magnesium     (1.6-2.3)  mg/dL














  03/21/25 03/21/25 03/21/25 Range/Units





  12:22 13:33 15:09 


 


WBC     (3.8-10.6)  k/uL


 


RBC     (4.30-5.90)  m/uL


 


Hgb     (13.0-17.5)  gm/dL


 


Hct     (39.0-53.0)  %


 


Sodium     (137-145)  mmol/L


 


BUN     (9-20)  mg/dL


 


Creatinine     (0.66-1.25)  mg/dL


 


POC Glucose (mg/dL)  305 H  302 H  250 H  ()  mg/dL


 


Magnesium     (1.6-2.3)  mg/dL














  03/21/25 03/21/25 03/21/25 Range/Units





  20:07 21:54 23:16 


 


WBC     (3.8-10.6)  k/uL


 


RBC     (4.30-5.90)  m/uL


 


Hgb     (13.0-17.5)  gm/dL


 


Hct     (39.0-53.0)  %


 


Sodium     (137-145)  mmol/L


 


BUN     (9-20)  mg/dL


 


Creatinine     (0.66-1.25)  mg/dL


 


POC Glucose (mg/dL)  179 H  317 H  327 H  ()  mg/dL


 


Magnesium     (1.6-2.3)  mg/dL














  03/22/25 03/22/25 03/22/25 Range/Units





  00:17 01:12 02:12 


 


WBC     (3.8-10.6)  k/uL


 


RBC     (4.30-5.90)  m/uL


 


Hgb     (13.0-17.5)  gm/dL


 


Hct     (39.0-53.0)  %


 


Sodium     (137-145)  mmol/L


 


BUN     (9-20)  mg/dL


 


Creatinine     (0.66-1.25)  mg/dL


 


POC Glucose (mg/dL)  291 H  251 H  195 H  ()  mg/dL


 


Magnesium     (1.6-2.3)  mg/dL














  03/22/25 03/22/25 03/22/25 Range/Units





  03:12 03:54 03:54 


 


WBC   19.9 H   (3.8-10.6)  k/uL


 


RBC   2.35 L   (4.30-5.90)  m/uL


 


Hgb   7.2 L   (13.0-17.5)  gm/dL


 


Hct   21.7 L   (39.0-53.0)  %


 


Sodium    135 L  (137-145)  mmol/L


 


BUN    118 H*  (9-20)  mg/dL


 


Creatinine    2.37 H  (0.66-1.25)  mg/dL


 


POC Glucose (mg/dL)  134 H    ()  mg/dL


 


Magnesium    3.0 H  (1.6-2.3)  mg/dL














  03/22/25 03/22/25 03/22/25 Range/Units





  04:59 05:56 06:58 


 


WBC     (3.8-10.6)  k/uL


 


RBC     (4.30-5.90)  m/uL


 


Hgb     (13.0-17.5)  gm/dL


 


Hct     (39.0-53.0)  %


 


Sodium     (137-145)  mmol/L


 


BUN     (9-20)  mg/dL


 


Creatinine     (0.66-1.25)  mg/dL


 


POC Glucose (mg/dL)  117 H  187 H  165 H  ()  mg/dL


 


Magnesium     (1.6-2.3)  mg/dL














  03/22/25 Range/Units





  08:20 


 


WBC   (3.8-10.6)  k/uL


 


RBC   (4.30-5.90)  m/uL


 


Hgb   (13.0-17.5)  gm/dL


 


Hct   (39.0-53.0)  %


 


Sodium   (137-145)  mmol/L


 


BUN   (9-20)  mg/dL


 


Creatinine   (0.66-1.25)  mg/dL


 


POC Glucose (mg/dL)  173 H  ()  mg/dL


 


Magnesium   (1.6-2.3)  mg/dL














Assessment and Plan


Assessment: 





Assessment/Plan:








#.  Acute pulmonary embolism, non-massive [segmental and subsegmental within the

 right middle and lower lobes.  No RV strain


Initially IV heparin, currently on o Eliquis 








#.  Sepsis likely secondary to -viral pneumonia.  Secondary bacterial component 


IV cefepime.  Levaquin discontinued on March 19..-also has been on itraconazole 

started on the March 14


ID and pulmonary following








#.  Acute hypoxic respiratory failure, secondary to above: Slow to respond


Intermittently BiPAP, currently 15 L nasal cannula








#.  Acute COPD exacerbation, in a prior smoker: Some improvement


DuoNeb Q4.  IV Solu-Medrol 60 mg Q6.  Nebulized Pulmicort








#.  Acute on chronic heart failure with reduced ejection fraction (EF 45 to 

50%): Some improvement


IV Lasix 40 mg every 12.  Aldactone-discontinued.





#.  Chronic hypoxic respiratory failure from underlying COPD, 2 L home O2





-Acute kidney injury, likely ATN multifactorial, worsening


Admission creatinine 1.27.











#.  Non-insulin-dependent type 2 diabetes, uncontrolled with hyperglycemia 

secondary steroids: Not improving


Insulin subcu sliding scale.  Stop Levemir.  


Continues on insulin drip


Accu-Cheks ACHS





- chronic kidney disease stage III from nephrosclerosis and diabetic nephropathy

 [creatinine 1.63 in June 2023]


Renal ultrasound.:  Suboptimal study.  No corticomedullary comment.


UA protein 1+





-Recent influenza type A








#.  Essential hypertension-


Toprol-XL.  Other antihypertensives have been held





#.  Hyperlipidemia


Lipitor





-Full code





On IV Lasix.,  IV cefepime, Itroconazole,  IV Solu-Medrol 60 mg.  Had a good 

lunch.  IV insulin

## 2025-03-23 LAB
ANION GAP SERPL CALC-SCNC: 6 MMOL/L
BUN SERPL-SCNC: 118 MG/DL (ref 9–20)
CALCIUM SPEC-MCNC: 8.6 MG/DL (ref 8.4–10.2)
CHLORIDE SERPL-SCNC: 102 MMOL/L (ref 98–107)
CO2 SERPL-SCNC: 25 MMOL/L (ref 22–30)
ERYTHROCYTE [DISTWIDTH] IN BLOOD BY AUTOMATED COUNT: 2.38 M/UL (ref 4.3–5.9)
ERYTHROCYTE [DISTWIDTH] IN BLOOD: 13.6 % (ref 11.5–15.5)
GLUCOSE BLD-MCNC: 135 MG/DL (ref 70–110)
GLUCOSE BLD-MCNC: 145 MG/DL (ref 70–110)
GLUCOSE BLD-MCNC: 145 MG/DL (ref 70–110)
GLUCOSE BLD-MCNC: 151 MG/DL (ref 70–110)
GLUCOSE BLD-MCNC: 175 MG/DL (ref 70–110)
GLUCOSE BLD-MCNC: 175 MG/DL (ref 70–110)
GLUCOSE BLD-MCNC: 187 MG/DL (ref 70–110)
GLUCOSE BLD-MCNC: 202 MG/DL (ref 70–110)
GLUCOSE BLD-MCNC: 207 MG/DL (ref 70–110)
GLUCOSE BLD-MCNC: 211 MG/DL (ref 70–110)
GLUCOSE BLD-MCNC: 216 MG/DL (ref 70–110)
GLUCOSE BLD-MCNC: 222 MG/DL (ref 70–110)
GLUCOSE BLD-MCNC: 250 MG/DL (ref 70–110)
GLUCOSE BLD-MCNC: 258 MG/DL (ref 70–110)
GLUCOSE BLD-MCNC: 278 MG/DL (ref 70–110)
GLUCOSE BLD-MCNC: 316 MG/DL (ref 70–110)
GLUCOSE BLD-MCNC: 97 MG/DL (ref 70–110)
GLUCOSE SERPL-MCNC: 179 MG/DL (ref 74–99)
HCT VFR BLD AUTO: 22 % (ref 39–53)
HGB BLD-MCNC: 7.3 GM/DL (ref 13–17.5)
MAGNESIUM SPEC-SCNC: 2.9 MG/DL (ref 1.6–2.3)
MCH RBC QN AUTO: 30.8 PG (ref 25–35)
MCHC RBC AUTO-ENTMCNC: 33.2 G/DL (ref 31–37)
MCV RBC AUTO: 92.7 FL (ref 80–100)
PLATELET # BLD AUTO: 166 K/UL (ref 150–450)
POTASSIUM SERPL-SCNC: 4.1 MMOL/L (ref 3.5–5.1)
SODIUM SERPL-SCNC: 133 MMOL/L (ref 137–145)
WBC # BLD AUTO: 20.6 K/UL (ref 3.8–10.6)

## 2025-03-23 NOTE — P.PN
Subjective





Patient is a 64-year-old male with a PMH of atrial fibrillation on Eliquis, COPD

on 2 L home oxygen, non-insulin-dependent diabetes mellitus, hyperlipidemia, 

hypertension presenting with shortness of breath.  Patient was previously 

admitted here for acute hypoxic respiratory failure secondary to influenza 

pneumonia and was discharged on 2 L of home oxygen.  Patient states he has been 

feeling short of breath while at rest.  He reports that since being discharged 

the home oxygen has not been sufficient. He states that he had to keep 

increasing his oxygen.    Patient says shortness of breath is slightly relieved 

when he is laying down.  Denies any orthopnea or PND.  Patient endorses a 

nonproductive cough that is chronic in nature, but says it has been getting 

worse.  Patient admits to having fever, chills.  Patient also admits to having 

non-radiating, pressure-like chest pain over the last few days.  Chest pain is 

not related to exertion and he had no alleviating or aggravating factors.  

Patient denies any headache, vision changes, nausea, vomiting, diarrhea, 

abdominal pain, urinary symptoms.





EKG independently interpreted displaying sinus rhythm with left bundle branch 

block that has been seen on prior EKG, rate 65 bpm, QTc 451 ms


CXR independently interpreted displaying bilateral multifocal airspace opacities


Chest CTA displaying acute segmental and subsegmental pulmonary emboli within 

the right and middle lower lobes, no saddle embolus, no RV strain, chronic 

interstitial loading disease


Venous Doppler B/L LE displaying no evidence of acute DVT


Troponin 0.049, 0.055, proBNP 8810, D-dimer 4.87, WBC 22.6, Hgb 11.0, HCT 35.2, 

MCV 93.7, platelet 322, PT 11.8, INR 1.1, APTT 28, sodium 133, potassium 4.2, 

CO2 24, BUN 30, creatinine 1.27, glucose 135


VBG pH 7.52, pCO2 32


T98.2 F, PA 80, RR 26, /77, O2 sat 90% on BiPAP with FiO2 of 100%





March 12: Overflow the ER.  Up in the bed.  Short of breath.  Patient was on BiP

AP overnight.  This morning on 15 L high flow nasal cannula.  Decreased 

appetite.  Has got a cough.  Some wheezing.  Some sputum production.  Decreased 

appetite.  Patient is on IV heparin.  Also on IV cefepime.  Pulmonary following


March 13: Patient did confirm that because Eliquis is very expensive patient was

not taking it properly did and take it sparingly at home.  Explains patient's 

PE.  Started on Eliquis today by cardiology.  IV heparin discontinued.  Getting 

easily short of breath.  Requiring BiPAP..  Some cough.  Oral intake fair.  On 

IV cefepime and vancomycin.  ID following.  Also on IV Lasix.  Due to worsening 

renal function will DC vancomycin


March 14: Saw this afternoon.  On BiPAP.  60%.  Ensure ordered.  All blood work 

ordered by pulmonary including fungal.  Patient currently on IV cefepime and 

Levaquin.  IV Solu-Medrol.  Remain short of breath.  Tired.  Being followed by 

pulmonary and ID.  Chest x-ray film personally reviewed by me-showing pulm edema

bilateral infiltrates especially at the bases.  Renal ultrasound nonspecific.  

UA showing protein 1+.


March 15: Remain short of breath.  Sitting up in bed.  Not able to come off the 

BiPAP.  Remains on IV cefepime.  Accu-Cheks running high.  Put on insulin drip. 

On IV Solu-Medrol.  Oral intake fair.


March 16: Patient remains short of breath.  Unable to get off BiPAP.  FiO2 

increased to 70%.  12/5.  Per Dr. Joshua: Patient be moved to the ICU.  Poor 

oral intake.  Remains on insulin drip.  IV Solu-Medrol.  Eliquis.  IV cefepime 

and Levaquin.  Tired.  Sitting up in bed leaning forward short of breath.  Chest

x-ray film personally reviewed by me: Scattered bilateral infiltrates


March 17: ICU.  Remains on BiPAP.  Decreased oral intake.  Sitting up.  Short of

breath.  On IV cefepime.  Insulin drip.  IV Solu-Medrol.  DuoNeb Q4.  Rather 

tired.  Also yesterday evening at epistaxis.  Nasal packing.


March 18: ICU.  Mostly been on BiPAP.  High flow nasal cannula.  Decreased oral 

intake.  Sitting up leaning forward.  Remains on IV cefepime or Levaquin insulin

drip.  A bit tired.  Patient has very poor oral intake.  Per intensivist patient

will IV Lasix.  Note worsening renal function-cut back to Lasix once a day.  

Given blood pressure running lower side.  DC Aldactone and DC amlodipine.


March 19: ICU.  Patient was taken off BiPAP this morning.  Decreased to high 

flow nasal cannula 10 L.  Wife at the bedside.  Encourage oral intake.  Chopped 

diet.  Per intensivist nephrology patient to continue on IV Lasix.  Blood 

pressure better after stopping amlodipine and Aldactone yesterday.  Encourage 

oral intake.  Incentive spirometry.  IV cefepime, Levaquin, I terconazole per 

ID.


March 20: ICU.  Yesterday patient was nasal cannula.  Overnight repeat put back 

on BiPAP.  Patient did drop his hemoglobin further.  Down to 6.2.  Felt to be 

from epistaxis.  Given a unit of blood.  Further increase in BUN/creatinine.  

Remains on IV Lasix.  Ordered Kerlix to both the lower extremities.  Had a good 

portion of his lunch today.


March 21: ICU.  Patient seen this morning.  15 L nasal cannula.  Reclining in 

bed.  Short of breath.  Ace wrap lower extremity.  Patient remains on IV 

cefepime, IV Lasix 40 mg every 12, insulin drip, IV Solu-Medrol DuoNeb Q4.  Did 

not eat breakfast but had about 100% of his lunch.





3/22/2025, resume the care of the patient today.  Patient seen and examined in 

the ICU.  His not tolerating his BiPAP mask more than 1 hour, currently his 

mildly/moderately tachypneic on 15 L oxygen via nonrebreather.  He has apparent 

swelling of both arms and legs, sitting up in chair feels tired denies chest 

pain.


Patient also on insulin drip at 7 units/h.





3/23


Patient looks similar to yesterday although reports some improvement in his 

breathing.  He used the BiPAP last night which might contributed to his feeling 

of improvement.


He still on IV Lasix 40 mg lowered the dose to twice daily and IV Solu-Medrol 40

mg lowered the dose to 40 mg twice daily.  He has more than 1.5 L out since 

yesterday.  He is still on fluid restriction and feels thirsty.


His WBC is 20,000 compared to 19.9 yesterday while he is on steroids.  

Hemoglobin stable at 7.3, creatinine stable at 2.2.


Also remains on IV cefepime and itraconazole for possible pneumonia





Review of systems


CONSTITUTIONAL: No fever, no malaise, no fatigue. 


HEENT: No recent visual problems or hearing problems. Denied any sore throat. 


CARDIOVASCULAR: No  orthopnea, PND, no palpitations, no syncope. 


GENITOURINARY: Denies any burning micturition, frequency, or urgency. 


MUSCULOSKELETAL/RHEUMATOLOGICAL: Denies any joint pain, swelling, or any muscle 

pain. 


ENDOCRINE: Denies any polyuria or polydipsia.


                               Active Medications











Generic Name Dose Route Start Last Admin





  Trade Name Freq  PRN Reason Stop Dose Admin


 


Albuterol/Ipratropium  3 ml  03/12/25 03:08 





  Ipratropium-Albuterol 3 Ml Neb  INHALATION  





  RT-QID PRN  





  Shortness Of Breath Or Wheezing  


 


Albuterol/Ipratropium  3 ml  03/12/25 04:00  03/23/25 07:48





  Ipratropium-Albuterol 3 Ml Neb  INHALATION   3 ml





  RT-Q4H BRADY   Administration


 


Alprazolam  0.5 mg  03/13/25 15:52  03/22/25 08:49





  Alprazolam 0.5 Mg Tab  PO   0.5 mg





  TID PRN   Administration





  Anxiety  


 


Artificial Tears  1 drops  03/21/25 13:35 





  Artificial Tears-Hypromellose Drops 15 Ml Btl  BOTH EYES  





  QID PRN  





  Dry Eye(s)  


 


Aspirin  81 mg  03/13/25 09:00  03/22/25 08:49





  Aspirin 81 Mg  PO   81 mg





  DAILY BRADY   Administration


 


Budesonide  1 mg  03/12/25 08:00  03/23/25 07:48





  Budesonide 1 Mg/2 Ml Nebu  INHALATION   1 mg





  RT-BID BRADY   Administration


 


Dextrose/Water  25 ml  03/15/25 17:27 





  Dextrose 50% Syringe 50 Ml  IVP  





  PER PROTOCOL PRN  





  Hypoglycemia  





  Protocol  


 


Dextrose/Water  50 ml  03/15/25 17:27 





  Dextrose 50% Syringe 50 Ml  IVP  





  PER PROTOCOL PRN  





  Hypoglycemia  





  Protocol  


 


Formoterol Fumarate  20 mcg  03/12/25 08:00  03/23/25 07:48





  Formoterol Fumarate 20 Mcg/2 Ml Nebu  INHALATION   20 mcg





  RT-BID BRADY   Administration


 


Furosemide  40 mg  03/19/25 21:00  03/22/25 20:16





  Furosemide 10 Mg/Ml 4 Ml Vial  IV   40 mg





  Q12HR BRADY   Administration


 


Cefepime HCl 2 gm/ Sodium  100 mls @ 25 mls/hr  03/12/25 22:00  03/22/25 20:14





  Chloride  IVPB   25 mls/hr





  Q12H BRADY   Administration





  Protocol  


 


Insulin Human Regular 100 unit  100 mls @ 0 mls/hr  03/15/25 17:30  03/23/25 

06:13





  / Sodium Chloride  IV   5 units/hr





  .Q0M BRADY   5 mls/hr





    Titration





  Protocol  





  Titrate  


 


Itraconazole  200 mg  03/14/25 13:00  03/22/25 20:16





  Itraconazole 100 Mg Cap  PO   200 mg





  BID BRADY   Administration





  Protocol  


 


Lactulose  20 gm  03/21/25 13:36  03/22/25 08:49





  Lactulose 20 Gm/30 Ml Cup  PO   20 gm





  TID PRN   Administration





  Constipation  


 


Lorazepam  1 mg  03/12/25 23:31  03/19/25 20:45





  Lorazepam 1 Mg Tab  PO   1 mg





  HS PRN   Administration





  Anxiety  


 


Methylprednisolone Sodium Succinate  40 mg  03/22/25 21:00  03/22/25 20:16





  Methylprednisolone Sod Succi 40 Mg/Ml 1 Ml Vial  IV   40 mg





  Q12HR BRADY   Administration


 


Metoprolol Succinate  25 mg  03/13/25 09:00  03/22/25 08:49





  Metoprolol Succinate (Er) 25 Mg Tab.Er.24h  PO   25 mg





  DAILY BRADY   Administration


 


Morphine Sulfate  4 mg  03/11/25 23:15  03/23/25 00:25





  Morphine Sulfate 4 Mg/Ml Syringe  IV   4 mg





  Q4HR PRN   Administration





  Severe Pain (Scale 7 to 10)  


 


Naloxone HCl  0.2 mg  03/11/25 23:15 





  Naloxone 0.4 Mg/Ml 1 Ml Vial  IV  





  Q2M PRN  





  Opioid Reversal  


 


Ondansetron HCl  4 mg  03/11/25 23:15  03/14/25 14:52





  Ondansetron 4 Mg/2 Ml Vial  IVP   4 mg





  Q8HR PRN   Administration





  Nausea And Vomiting  


 


Oxymetazoline HCl  2 spray  03/16/25 00:45  03/22/25 20:16





  Oxymetazoline 0.05% Nasl Spray 1 Spray Bottle  NASAL   2 spray





  BID BRADY   Administration


 


Pantoprazole Sodium  40 mg  03/19/25 07:30  03/23/25 06:14





  Pantoprazole 40 Mg Tablet  PO   40 mg





  AC-BRKFST BRADY   Administration


 


Sodium Chloride  2 spray  03/15/25 10:58 





  Sodium Chloride 0.65% Nasal [place] 44 Ml Btl  NASAL  





  QID PRN  





  Dry Nasal Passages  














Objective





- Vital Signs


Vital signs: 


                                   Vital Signs











Temp  97.4 F L  03/23/25 04:00


 


Pulse  72   03/23/25 08:14


 


Resp  13   03/23/25 07:00


 


BP  127/55   03/23/25 07:00


 


Pulse Ox  96   03/23/25 07:00


 


FiO2  50   03/22/25 04:40








                                 Intake & Output











 03/22/25 03/23/25 03/23/25





 18:59 06:59 18:59


 


Intake Total 6851.876 8437.741 10


 


Output Total 1425 1750 


 


Balance 65.376 -706.259 10


 


Weight  101.9 kg 


 


Intake:   


 


   80 10


 


    .9 120 80 10


 


  Intake, IV Titration 160.376 153.741 





  Amount   


 


    Cefepime 2 gm In Sodium 100 100 





    Chloride 0.9% 100 ml @ 25   





    mls/hr IVPB Q12H BRADY Rx#   





    :[id number]   


 


    Insulin Regular 100 unit 60.376 53.741 





    In Sodium Chloride 0.9%   





    100 ml @ Titrate IV .Q0M   





    BRADY Rx#:[id number]   


 


  Oral 1210 810 


 


Output:   


 


  Urine 1425 1750 


 


Other:   


 


  Voiding Method External Catheter External Catheter 














- Exam





GENERAL: The patient is alert and oriented x3, not in any acute distress. Well 

developed, well nourished. 


HEENT: Pupils are round and equally reacting to light. EOMI. No scleral icterus.

No conjunctival pallor. Normocephalic, atraumatic. No pharyngeal erythema. No 

thyromegaly. 


CARDIOVASCULAR: S1 and S2 present. No murmurs, rubs, or gallops. 


-PULMONARY: Chest is clear to auscultation, no wheezing , n bilateral basal 

crackles. 


ABDOMEN: Soft, nontender, nondistended, normoactive bowel sounds. No palpable 

organomegaly. 


MUSCULOSKELETAL: No joint swelling or deformity. 


-EXTREMITIES: No cyanosis, clubbing, bilateral arm and leg pitting leg edema


NEUROLOGICAL: Gross neurological examination did not reveal any focal deficits. 


SKIN: No rashes. no petechiae.








- Labs


CBC & Chem 7: 


                                 03/23/25 05:48





                                 03/23/25 05:48


Labs: 


                  Abnormal Lab Results - Last 24 Hours (Table)











  03/22/25 03/22/25 03/22/25 Range/Units





  09:06 10:02 11:01 


 


WBC     (3.8-10.6)  k/uL


 


RBC     (4.30-5.90)  m/uL


 


Hgb     (13.0-17.5)  gm/dL


 


Hct     (39.0-53.0)  %


 


Sodium     (137-145)  mmol/L


 


BUN     (9-20)  mg/dL


 


Creatinine     (0.66-1.25)  mg/dL


 


Glucose     (74-99)  mg/dL


 


POC Glucose (mg/dL)  183 H  177 H  195 H  ()  mg/dL


 


Magnesium     (1.6-2.3)  mg/dL














  03/22/25 03/22/25 03/22/25 Range/Units





  11:46 13:09 14:53 


 


WBC     (3.8-10.6)  k/uL


 


RBC     (4.30-5.90)  m/uL


 


Hgb     (13.0-17.5)  gm/dL


 


Hct     (39.0-53.0)  %


 


Sodium     (137-145)  mmol/L


 


BUN     (9-20)  mg/dL


 


Creatinine     (0.66-1.25)  mg/dL


 


Glucose     (74-99)  mg/dL


 


POC Glucose (mg/dL)  173 H  148 H  122 H  ()  mg/dL


 


Magnesium     (1.6-2.3)  mg/dL














  03/22/25 03/22/25 03/22/25 Range/Units





  16:55 18:02 19:02 


 


WBC     (3.8-10.6)  k/uL


 


RBC     (4.30-5.90)  m/uL


 


Hgb     (13.0-17.5)  gm/dL


 


Hct     (39.0-53.0)  %


 


Sodium     (137-145)  mmol/L


 


BUN     (9-20)  mg/dL


 


Creatinine     (0.66-1.25)  mg/dL


 


Glucose     (74-99)  mg/dL


 


POC Glucose (mg/dL)  213 H  264 H  275 H  ()  mg/dL


 


Magnesium     (1.6-2.3)  mg/dL














  03/22/25 03/22/25 03/22/25 Range/Units





  20:03 21:07 22:13 


 


WBC     (3.8-10.6)  k/uL


 


RBC     (4.30-5.90)  m/uL


 


Hgb     (13.0-17.5)  gm/dL


 


Hct     (39.0-53.0)  %


 


Sodium     (137-145)  mmol/L


 


BUN     (9-20)  mg/dL


 


Creatinine     (0.66-1.25)  mg/dL


 


Glucose     (74-99)  mg/dL


 


POC Glucose (mg/dL)  290 H  268 H  220 H  ()  mg/dL


 


Magnesium     (1.6-2.3)  mg/dL














  03/22/25 03/23/25 03/23/25 Range/Units





  23:11 00:07 01:57 


 


WBC     (3.8-10.6)  k/uL


 


RBC     (4.30-5.90)  m/uL


 


Hgb     (13.0-17.5)  gm/dL


 


Hct     (39.0-53.0)  %


 


Sodium     (137-145)  mmol/L


 


BUN     (9-20)  mg/dL


 


Creatinine     (0.66-1.25)  mg/dL


 


Glucose     (74-99)  mg/dL


 


POC Glucose (mg/dL)  176 H  145 H  135 H  ()  mg/dL


 


Magnesium     (1.6-2.3)  mg/dL














  03/23/25 03/23/25 03/23/25 Range/Units





  02:58 04:03 05:08 


 


WBC     (3.8-10.6)  k/uL


 


RBC     (4.30-5.90)  m/uL


 


Hgb     (13.0-17.5)  gm/dL


 


Hct     (39.0-53.0)  %


 


Sodium     (137-145)  mmol/L


 


BUN     (9-20)  mg/dL


 


Creatinine     (0.66-1.25)  mg/dL


 


Glucose     (74-99)  mg/dL


 


POC Glucose (mg/dL)  187 H  211 H  216 H  ()  mg/dL


 


Magnesium     (1.6-2.3)  mg/dL














  03/23/25 03/23/25 03/23/25 Range/Units





  05:48 05:48 06:08 


 


WBC  20.6 H    (3.8-10.6)  k/uL


 


RBC  2.38 L    (4.30-5.90)  m/uL


 


Hgb  7.3 L    (13.0-17.5)  gm/dL


 


Hct  22.0 L    (39.0-53.0)  %


 


Sodium   133 L   (137-145)  mmol/L


 


BUN   118 H*   (9-20)  mg/dL


 


Creatinine   2.22 H   (0.66-1.25)  mg/dL


 


Glucose   179 H   (74-99)  mg/dL


 


POC Glucose (mg/dL)    222 H  ()  mg/dL


 


Magnesium   2.9 H   (1.6-2.3)  mg/dL














  03/23/25 Range/Units





  07:02 


 


WBC   (3.8-10.6)  k/uL


 


RBC   (4.30-5.90)  m/uL


 


Hgb   (13.0-17.5)  gm/dL


 


Hct   (39.0-53.0)  %


 


Sodium   (137-145)  mmol/L


 


BUN   (9-20)  mg/dL


 


Creatinine   (0.66-1.25)  mg/dL


 


Glucose   (74-99)  mg/dL


 


POC Glucose (mg/dL)  202 H  ()  mg/dL


 


Magnesium   (1.6-2.3)  mg/dL














Assessment and Plan


Assessment: 





#.  Acute pulmonary embolism, non-massive [segmental and subsegmental within the

 right middle and lower lobes.  No RV strain


Initially IV heparin, currently on o Eliquis 








#.  Sepsis likely secondary to -viral pneumonia.  Secondary bacterial component 


IV cefepime.  Levaquin discontinued on March 19..-also has been on itraconazole 

started on the March 14


ID and pulmonary following








#.  Acute hypoxic respiratory failure, secondary to above: Slow to respond


Intermittently BiPAP, currently 15 L nasal cannula








#.  Acute COPD exacerbation, in a prior smoker: Some improvement


DuoNeb Q4.  IV Solu-Medrol 60 mg Q6.  Nebulized Pulmicort








#.  Acute on chronic heart failure with reduced ejection fraction (EF 45 to 50

%): Some improvement


IV Lasix 40 mg every 12.  Aldactone-discontinued.





#.  Chronic hypoxic respiratory failure from underlying COPD, 2 L home O2





-Acute kidney injury, likely ATN multifactorial, worsening


Admission creatinine 1.27.











#.  Non-insulin-dependent type 2 diabetes, uncontrolled with hyperglycemia 

secondary steroids: Not improving


Insulin subcu sliding scale.  Stop Levemir.  


Continues on insulin drip


Accu-Cheks ACHS





- chronic kidney disease stage III from nephrosclerosis and diabetic nephropathy

 [creatinine 1.63 in June 2023]


Renal ultrasound.:  Suboptimal study.  No corticomedullary comment.


UA protein 1+





-Recent influenza type A








#.  Essential hypertension-


Toprol-XL.  Other antihypertensives have been held





#.  Hyperlipidemia


Lipitor





-Full code





On IV Lasix.,  IV cefepime, Itroconazole,  IV Solu-Medrol 60 mg.  Had a good 

lunch.  IV insulin

## 2025-03-23 NOTE — P.PN
Subjective


Progress Note Date: 03/22/25


Principal diagnosis: 





Reason for follow-up is pneumonia





Patient is a 64-year-old  male with a past medical history significant 

for diabetes mellitus hypertension hyperlipidemia pneumonia atrial fibrillation 

currently everyday smoker presenting to the hospital for evaluation of 

increasing shortness of breath patient has been diagnosed with multifocal 

pneumonia prompting this consultation.


On today's evaluation that is 03/22/2025, patient did not have any fever and den

ies any chills, patient is breathing slightly comfortably however was requiring 

continued hyponasal cannula oxygen no chest pain or worsening cough no abdominal

pain or diarrhea.


Patient white count is 19.8, creatinine is 2.37.  Chest x-ray continue CHF 

exacerbation no significant change from 1 day earlier














Objective





- Vital Signs


Vital signs: 


                                   Vital Signs











Temp  96.8 F L  03/22/25 12:00


 


Pulse  63   03/22/25 14:00


 


Resp  21   03/22/25 14:00


 


BP  100/62   03/22/25 14:00


 


Pulse Ox  96   03/22/25 14:00


 


FiO2  50   03/22/25 04:40








                                 Intake & Output











 03/21/25 03/22/25 03/22/25





 18:59 06:59 18:59


 


Intake Total 2778.514 040.774 2788


 


Output Total 1500 1325 1025


 


Balance 1278.514 -409.811 125


 


Weight 100.3 kg  


 


Intake:   


 


   120 80


 


    .9 135 120 80


 


  Intake, IV Titration 213.514 55.189 100





  Amount   


 


    Cefepime 2 gm In Sodium 100  100





    Chloride 0.9% 100 ml @ 25   





    mls/hr IVPB Q12H BRADY Rx#   





    :[id number]   


 


    Insulin Regular 100 unit 113.514 55.189 





    In Sodium Chloride 0.9%   





    100 ml @ Titrate IV .Q0M   





    BRADY Rx#:[id number]   


 


  Oral 2430 740 970


 


Output:   


 


  Urine 1500 1325 1025


 


Other:   


 


  Voiding Method External Catheter External Catheter External Catheter














- Exam





GENERAL DESCRIPTION: An elderly male lying in bed in no distress





RESPIRATORY SYSTEM: Unlabored breathing , coarse breath sounds bilaterally





HEART: S1 S2 regular rate and rhythm ,





ABDOMEN: Soft , no tenderness





EXTREMITIES: 2+ edema feet





- Labs


CBC & Chem 7: 


                                 03/23/25 05:48





                                 03/23/25 05:48


Labs: 


                  Abnormal Lab Results - Last 24 Hours (Table)











  03/21/25 03/21/25 03/21/25 Range/Units





  15:09 20:07 21:54 


 


WBC     (3.8-10.6)  k/uL


 


RBC     (4.30-5.90)  m/uL


 


Hgb     (13.0-17.5)  gm/dL


 


Hct     (39.0-53.0)  %


 


Sodium     (137-145)  mmol/L


 


BUN     (9-20)  mg/dL


 


Creatinine     (0.66-1.25)  mg/dL


 


POC Glucose (mg/dL)  250 H  179 H  317 H  ()  mg/dL


 


Magnesium     (1.6-2.3)  mg/dL














  03/21/25 03/22/25 03/22/25 Range/Units





  23:16 00:17 01:12 


 


WBC     (3.8-10.6)  k/uL


 


RBC     (4.30-5.90)  m/uL


 


Hgb     (13.0-17.5)  gm/dL


 


Hct     (39.0-53.0)  %


 


Sodium     (137-145)  mmol/L


 


BUN     (9-20)  mg/dL


 


Creatinine     (0.66-1.25)  mg/dL


 


POC Glucose (mg/dL)  327 H  291 H  251 H  ()  mg/dL


 


Magnesium     (1.6-2.3)  mg/dL














  03/22/25 03/22/25 03/22/25 Range/Units





  02:12 03:12 03:54 


 


WBC    19.9 H  (3.8-10.6)  k/uL


 


RBC    2.35 L  (4.30-5.90)  m/uL


 


Hgb    7.2 L  (13.0-17.5)  gm/dL


 


Hct    21.7 L  (39.0-53.0)  %


 


Sodium     (137-145)  mmol/L


 


BUN     (9-20)  mg/dL


 


Creatinine     (0.66-1.25)  mg/dL


 


POC Glucose (mg/dL)  195 H  134 H   ()  mg/dL


 


Magnesium     (1.6-2.3)  mg/dL














  03/22/25 03/22/25 03/22/25 Range/Units





  03:54 04:59 05:56 


 


WBC     (3.8-10.6)  k/uL


 


RBC     (4.30-5.90)  m/uL


 


Hgb     (13.0-17.5)  gm/dL


 


Hct     (39.0-53.0)  %


 


Sodium  135 L    (137-145)  mmol/L


 


BUN  118 H*    (9-20)  mg/dL


 


Creatinine  2.37 H    (0.66-1.25)  mg/dL


 


POC Glucose (mg/dL)   117 H  187 H  ()  mg/dL


 


Magnesium  3.0 H    (1.6-2.3)  mg/dL














  03/22/25 03/22/25 03/22/25 Range/Units





  06:58 08:20 09:06 


 


WBC     (3.8-10.6)  k/uL


 


RBC     (4.30-5.90)  m/uL


 


Hgb     (13.0-17.5)  gm/dL


 


Hct     (39.0-53.0)  %


 


Sodium     (137-145)  mmol/L


 


BUN     (9-20)  mg/dL


 


Creatinine     (0.66-1.25)  mg/dL


 


POC Glucose (mg/dL)  165 H  173 H  183 H  ()  mg/dL


 


Magnesium     (1.6-2.3)  mg/dL














  03/22/25 03/22/25 03/22/25 Range/Units





  10:02 11:01 11:46 


 


WBC     (3.8-10.6)  k/uL


 


RBC     (4.30-5.90)  m/uL


 


Hgb     (13.0-17.5)  gm/dL


 


Hct     (39.0-53.0)  %


 


Sodium     (137-145)  mmol/L


 


BUN     (9-20)  mg/dL


 


Creatinine     (0.66-1.25)  mg/dL


 


POC Glucose (mg/dL)  177 H  195 H  173 H  ()  mg/dL


 


Magnesium     (1.6-2.3)  mg/dL














  03/22/25 Range/Units





  13:09 


 


WBC   (3.8-10.6)  k/uL


 


RBC   (4.30-5.90)  m/uL


 


Hgb   (13.0-17.5)  gm/dL


 


Hct   (39.0-53.0)  %


 


Sodium   (137-145)  mmol/L


 


BUN   (9-20)  mg/dL


 


Creatinine   (0.66-1.25)  mg/dL


 


POC Glucose (mg/dL)  148 H  ()  mg/dL


 


Magnesium   (1.6-2.3)  mg/dL














Assessment and Plan


(1) Leukocytosis


Current Visit: Yes   Status: Acute   Code(s): D72.829 - ELEVATED WHITE BLOOD 

CELL COUNT, UNSPECIFIED   SNOMED Code(s): 279156969


   





(2) Bilateral pneumonia


Current Visit: Yes   Status: Acute   Code(s): J18.9 - PNEUMONIA, UNSPECIFIED 

ORGANISM   SNOMED Code(s): 384311141


   


Plan: 





1patient presented hospital with increasing shortness of breath and chest pain 

which is likely multifactorial in this patient who did have evidence of PE on 

the CT there is also evidence of multifocal pneumonia with elevated white count 

and recently acute influenza A will need to cover for resistant gram-positive as

well as gram-negative pathogen for post influenza pneumonia


2-blood culture has been negative no sputum has been collected


3-patient respiratory status remains to be borderline currently on diuretics and

 combination cefepime and itraconazole


Dictation was produced using dragon dictation software. please excuse any 

grammatical, word or spelling errors. 








Time with Patient: Less than 30

## 2025-03-23 NOTE — P.PN
Subjective


Progress Note Date: 03/23/25


Principal diagnosis: 





Acute on chronic hypoxic respiratory failure, multifactorial











Patient is a 64-year-old male with past medical history significant for atrial 

fibrillation, diabetes mellitus, CKD stage III, hypertension, hyperlipidemia, 

tobacco smoker.  Of note, recently had a prolonged hospitalization 02/23/2025 

through 03/07/2025.  Treated for combination of influenza A, pneumonia, CHF.  

Completed course of antibiotics and Tamiflu.  At this time, did require 

noninvasive BiPAP, eventually discharged on home O2.  Returns with a chief 

complaint of shortness of breath progressing over the last 2 to 3 days.  Also, 

reports sudden onset substernal chest pain that developed the night prior and 

has been persistent.  On arrival to the ED, found to be in some respiratory 

distress, and placed on BiPAP.  Workup in the emergency department including a 

chest x-ray showing multifocal infiltrates concerning for pneumonia or pulmonary

edema.  D-dimer was elevated in setting CT angio protocol which was positive for

segmental and subsegmental right middle lobe and right lower lobe pulmonary 

emboli.  No saddle pulmonary embolus. Pulmonary artery mildly enlarged.  

Preserved RV to LV ratio.  There were diffuse coarse reticular infiltrates, as 

well as, groundglass lung attenuation, cystic changes, with concerns for 

interstitial lung disease. Patient does take Eliquis for his history of atrial 

fibrillation.  Does state he has missed some doses lately.  CBC: WBC count 22.6,

hemoglobin 11, platelets 322.  CMP: Sodium 133, potassium 4.2, chloride 101, 

serum bicarb 24, BUN 30, creatinine 1.27, glucose 135.  Lactic 1.5.  VBG with a 

pH of 7.5 and pCO2 of 32.  EKG: Sinus rhythm, rate 65 bpm, left bundle branch 

block, not acute.  Elevated serial troponins 0.049 and 0.055 respectively.  NT 

proBNP elevated 8807.  Patient is currently being evaluated in the emergency 

department.  He is alert and some moderate respiratory distress.  On BiPAP with 

settings 12/5 and FiO2 60%.  Tachypneic, breathing around 40 breaths/min. 

Endorses progressive worsening difficulty in breathing over last couple days.  

He has tried to increase his supplemental oxygen at home without any relief. 

Denies previous history of DVT or PE.  Associated nonproductive cough.  Denies 

sputum production or hemoptysis.  Substernal nonradiating chest pain persist.  

Denies any fevers or chills.  Denies heart palpitations or syncopal events.  

Denies swelling in the lower extremities.  Heart rhythm is normal sinus on 

bedside monitor.  Blood pressure has been normotensive, did receive 2 L of 

crystalloid fluid bolus earlier.  Not requiring any vasopressors.  Normal saline

is infusing at 150 mL/h.  IV heparin infusing per protocol








3/13/2025, the patient is being seen for a follow-up.  The patient is 

alternating between BiPAP at a pressure of 12/5 with an FiO2 of 60% and 15 L of 

oxygen by nasal cannula.  He is getting slightly anxious and the patient is 

being provided Xanax accordingly.  Fluid balance is -1.1 L over the past 24 

hours.  Limited echocardiogram was also done and it showed preserved LV function

with an EF of around 55 to 60%.  Valvular functions could not be accurately 

assessed as the patient's study was technically difficult study.  There was 

however RV dilatation.  Unable to estimate RV pressures.  The electrolytes from 

today showed a creatinine of 1.5 with a BUN of 35.  Bicarb is at 21.  Sodium is 

at 132.  The patient remains on DuoNeb updrafts.  The patient remains on a 

combination of cefepime and vancomycin.  The patient was taken off the IV hep

claus and the patient was started on anticoagulation with Eliquis.  Remains on IV

Solu-Medrol 60 mg every 6 hours.  Remains on Aldactone.  Remains on IV Lasix 40 

mg every 12 hours.





On today's evaluation of 3/14/2025, the patient is overall condition is 

essentially unchanged.  Continues to be hypoxic, continues to be on BiPAP and 

the patient remains at a pressure of 12/5 with an FiO2 of 60%.  Continues to 

have crackles in lung base bilaterally.  White cell count remains elevated at 20

with a hemoglobin 9.2.  Very much BiPAP dependent as the patient desaturates 

once taken off the BiPAP.  BUN is 44 with a platelet of 1.5.  Sodium levels at 

134 and a potassium level is at 3.4.  Remains on DuoNeb updrafts.  Remains on IV

Lasix 40 mg every 12 hours.  Remains on Aldactone.  Remains on bronchodilators. 

Remains on steroids.  Empiric antibiotic coverage with IV cefepime.  Reviewed 

the CAT scan again and there is some chronic interstitial changes and the 

patient has diffuse infiltrates with areas of groundglass.  Consider acute 

interstitial pneumonias.  Consider fungal pneumonias.





On today's evaluation of 3/15/2025, the patient is feeling slightly better 

compared to yesterday.  The patient is off the BiPAP and the patient is 

currently on 15 L of oxygen by nasal cannula.  Remains on bronchodilators.  

Remains on IV Solu-Medrol.  Remains on IV Lasix.  Antibiotic coverage including 

combination of cefepime, Levaquin orally and itraconazole orally.  Fungal 

antibodies are still pending.  Meanwhile, rheumatologic profile showed a 

negative rheumatoid factor and negative CARLA.  Legionella urine antigen is still 

pending for now.  Clinically however, the patient is feeling slightly improved 

compared to yesterday.  His chest x-ray continues to show multifocal airspace 

disease more so in the right lung.  Fluid balance has been negative.  The 

patient's creatinine is currently at 1.6 with a BUN of 54 and a sodium levels at

133.  I am going to taper the steroids.  The white cell count is 18.2 with a 

hemoglobin of 9.2.





On 3/16/2025, the patient is transferred to the intensive care unit for closer 

monitoring.  As mentioned, the patient developed acute hypoxic respiratory 

failure with diffuse bilateral pulmonary filtrates discussed earlier.  The 

patient was able to tolerate Airvo, however, overnight, the patient developed 

epistaxis.  Therefore was discontinued the patient was placed back on BiPAP and 

his current BiPAP is running at a pressure of 12 over 5 cm of water with an FiO2

of 80%.  The patient seems to be quite dependent on the BiPAP.  Unable to drop 

the FiO2 any further.  Repeat chest x-ray was done and shows stable diffuse 

bilateral pulm infiltrates which remains essentially unchanged compared to 

yesterday.  Clinically, the patient is awake and alert.  He is reported to have 

some congested cough.  No significant sputum production.  No chest pain.  No 

hemoptysis.  Noted, over the past 48 hours, the patient was diuresed with IV 

Lasix.  Nevertheless, we have not seen any improvement in the patient's 

oxygenation.  The patient was negative fluid balance.  His BNP today is at 68 

with a creatinine of 1.7.  Based on that, I stopped diuretics.  Rest of the 

electrolytes are all within normal limits.  White cell count of 21 with a 

hemoglobin 9.1 and a platelet count of 245.  The patient remains on broad-

spectrum antibiotics.  The patient remains on IV cefepime, itraconazole and 

Levaquin.  Fungal antibodies are still pending for now.  CARLA and rheumatoid 

factor were both negative.  Legionella urine antigen was also negative.  The 

patient remains on anticoagulation and I am going to drop the Eliquis dose to 5 

mg p.o. twice a day.  I am not absolutely convinced that the patient has a 

pulmonary embolism.  I reviewed the CAT scan of the chest and there could be 

potentially some subsegmental filling defects in the right, however, the overall

contrast administration was essentially poor.  The patient will be monitored 

very closely in the intensive care unit.  On a separate note, the patient was 

started on insulin drip for blood sugar control.





Patient was seen today on 3/17/2025, remains in the ICU, remains on fluid 

restrictions for low sodium of 128, he is on BiPAP 12/5/60%, patient is 

presenting this time very much similar to his last presentation few weeks ago.  

I am familiar with this patient, and on his last admission patient responded to 

treatment with mostly diuretics, and steroids.  This time came back with a 

similar presentation, he is receiving diuretics, but has been on hold for the 

last 24 hours, and I recommended we go back on Lasix 40 mg IV push every 12 

hours, patient is receiving Solu-Medrol at 60 mg IV push every 8 hours, he is 

also on Levaquin's, cefepime, and he is marginal at best.  In addition to this 

the patient had non-ST elevation myocardial infarction and flu/influenza A back 

on 3/11.  Blood cultures have been negative, patient has leukocytosis with WBC 

count of 21.7 hemoglobin is 7.9.  Creatinine has been slightly rising 1.51 on 

admission which is about his baseline, today his creatinine is 1.97 with a BUN 

of 95.  Legionella antigen has been negative CARLA screen has been negative 

rheumatoid factor is negative patient continues to complain of shortness of 

breath, intermittent cough, and wheezing.





Patient was seen today on 3/18/2025, remains in the ICU, remains on BiPAP, 

patient is down on FiO2 to 50%, so he is on BiPAP 12/5/50%.  Clinically the 

patient is feeling better breathing easier, remains on empiric antibiotics 

including cefepime and Levaquin remains on Lasix 40 mg every 12 hours remains on

steroids, and today I have noticed probably minimal improvement in his chest x-

ray findings.  Hence we will continue the same and will make Lasix 40 mg twice 

daily instead of 40 mg daily.  WBC count is 22.8 hemoglobin 7.4 electrolytes are

normal BUN is 117 creatinine 2.11





Patient was seen today on 3/19/2025, patient is feeling better today, remains on

BiPAP, however he is down to 40%, chest x-ray is showing definite improvement in

comparison to previous x-rays of the chest.  Patient remains on diuretics r

emains on antibiotics and remains on steroids, hence I have no plans to cut down

on his diuretics at this point specially with his improvement clinically and 

improvement noted on the chest x-ray.  As a matter fact I was able to 

discontinue BiPAP, and I placed the patient on a high flow nasal cannula 10 L 

and he is saturating anywhere between 97 to 99%.  Creatinine is noted to be a 

bit higher today 2.28, his BUN is 125, his WBC count remains elevated at 18.1, 

hemoglobin is 7.  Patient remains quite edematous, and he remains on diuretics 

in the form of Lasix and Aldactone.  





Patient was seen today on 3/20/2025, basically about the same, hemoglobin is low

and the patient will receive 1 unit of packed RBCs today.  Last night he had to 

go back on BiPAP, although he was tried on high flow nasal cannula throughout 

the day yesterday, Desaturating and kept getting anxious and agitated, then he w

as placed on BiPAP overnight, and he remains on BiPAP 12/5/50%.  Hemoglobin 

today is 6.2, had some recommending at least a unit of packed RBCs.  Chest x-ray

continues to show bilateral interstitial edema/infiltrates.  Patient remains on 

cefepime, IV fluids at KVO, remains on diuretics.  Renal functioning is holding 

and not showing any significant change.





Seen today on 3/21/2025, patient was on BiPAP at night, however earlier this 

morning he was placed down to 8 L high flow nasal cannula.  Saturating at 93 to 

95%.  He was on BiPAP 12/5/50% last time.  Remains on Lasix 40 mg IV push twice 

daily remains on Solu-Medrol 60 every 6 remains on cefepime.  Patient has been 

on Sporanox.  Some improvement today compared to the last few days, no chest x-

ray was done, but the patient seems to be more comfortable on high flow nasal 

cannula compared to BiPAP.  Continues to have leukocytosis but improving with 

WBC of 16.5 electrolytes are normal bicarb is normal BUN is 123 creatinine 2.33 

blood sugar is 305.





Patient was seen today on 3/22/2025, remains in the ICU, presently on 15 L high 

flow nasal cannula, he was on BiPAP 12/5/50% last night.  Patient is also on 

insulin drip 7 units/h for elevated blood sugar, cefepime Lasix 40 mg twice 

daily and today I cut down his Solu-Medrol to 40 mg IV push every 12 hours.  

Chest x-ray continues to show edema/infiltrates at the base of his lungs, howeve

r overall his chest x-ray is much better now compared to his initial chest x-ray

on admission.  Clinical improvement but not quite ready to be transferred out of

the ICU today, patient remains marginal in spite of the improvement noted.  And 

he has very complex multiple medical issues being addressed.  WBC count today is

a bit higher at 19.9 hemoglobin is holding at 7.2 electrolytes are normal BUN is

118 creatinine 2.37 slightly higher.  Patient remains in negative fluid balance.





Patient was seen today on 3/23/2025, remains in the ICU, on 15 L high flow nasal

cannula on insulin at 5 units/h remains on cefepime Lasix Sporanox and Solu-

Medrol patient is feeling better today compared to baseline.  He seems to be 

less edematous, less shortness of breath, chest x-ray continues show bilateral i

nterstitial infiltrates/edema.  His renal functioning is holding about the same,

hence I have made no changes in his diuresis regimen today.  And I have kept him

on the same medications, patient is improving but very slowly.  WBC count today 

is 20.6 hemoglobin 7.3 electrolytes are normal BUN is 118 creatinine 2.22





Objective





- Vital Signs


Vital signs: 


                                   Vital Signs











Temp  96.9 F L  03/23/25 08:00


 


Pulse  70   03/23/25 11:28


 


Resp  23   03/23/25 11:00


 


BP  143/111   03/23/25 11:00


 


Pulse Ox  93 L  03/23/25 11:00


 


FiO2  50   03/22/25 04:40








                                 Intake & Output











 03/22/25 03/23/25 03/23/25





 18:59 06:59 18:59


 


Intake Total 0494.083 4643.741 548.083


 


Output Total 1425 1750 475


 


Balance 65.376 -706.259 73.083


 


Weight  101.9 kg 


 


Intake:   


 


   80 40


 


    .9 120 80 40


 


  Intake, IV Titration 160.376 153.741 18.083





  Amount   


 


    Cefepime 2 gm In Sodium 100 100 





    Chloride 0.9% 100 ml @ 25   





    mls/hr IVPB Q12H [facility] Rx#   





    :[id number]   


 


    Insulin Regular 100 unit 60.376 53.741 18.083





    In Sodium Chloride 0.9%   





    100 ml @ Titrate IV .Q0M   





    [facility] Rx#:[id number]   


 


  Oral 1210 810 490


 


Output:   


 


  Urine 1425 1750 475


 


Other:   


 


  Voiding Method External Catheter External Catheter 














- Exam








GENERAL EXAM: 64-year-old white male obese , 15 L high flow nasal cannula


HEAD: Normocephalic and atraumatic


EYES: Normal reaction of pupils, equal size.


NOSE: Normal nasal mucosa no further epistaxis


THROAT: No erythema or exudates.


NECK: No masses, no JVD.


CHEST: No chest wall deformity.  Lungs: Bibasilar crackles crackles at the bases

persist.


CVS: S1 and S2 normal with soft systolic murmur, regular rhythm. No other extra 

heart sounds


ABDOMEN: No hepatosplenomegaly, active bowel sounds, no guarding or rigidity. 


SKIN: No rashes


CENTRAL NERVOUS SYSTEM: Alert oriented x 3 no gross focal deficit


EXTREMITIES: No clubbing, no edema, no cyanosis





- Labs


CBC & Chem 7: 


                                 03/23/25 05:48





                                 03/23/25 05:48


Labs: 


                  Abnormal Lab Results - Last 24 Hours (Table)











  03/22/25 03/22/25 03/22/25 Range/Units





  11:46 13:09 14:53 


 


WBC     (3.8-10.6)  k/uL


 


RBC     (4.30-5.90)  m/uL


 


Hgb     (13.0-17.5)  gm/dL


 


Hct     (39.0-53.0)  %


 


Sodium     (137-145)  mmol/L


 


BUN     (9-20)  mg/dL


 


Creatinine     (0.66-1.25)  mg/dL


 


Glucose     (74-99)  mg/dL


 


POC Glucose (mg/dL)  173 H  148 H  122 H  ()  mg/dL


 


Magnesium     (1.6-2.3)  mg/dL














  03/22/25 03/22/25 03/22/25 Range/Units





  16:55 18:02 19:02 


 


WBC     (3.8-10.6)  k/uL


 


RBC     (4.30-5.90)  m/uL


 


Hgb     (13.0-17.5)  gm/dL


 


Hct     (39.0-53.0)  %


 


Sodium     (137-145)  mmol/L


 


BUN     (9-20)  mg/dL


 


Creatinine     (0.66-1.25)  mg/dL


 


Glucose     (74-99)  mg/dL


 


POC Glucose (mg/dL)  213 H  264 H  275 H  ()  mg/dL


 


Magnesium     (1.6-2.3)  mg/dL














  03/22/25 03/22/25 03/22/25 Range/Units





  20:03 21:07 22:13 


 


WBC     (3.8-10.6)  k/uL


 


RBC     (4.30-5.90)  m/uL


 


Hgb     (13.0-17.5)  gm/dL


 


Hct     (39.0-53.0)  %


 


Sodium     (137-145)  mmol/L


 


BUN     (9-20)  mg/dL


 


Creatinine     (0.66-1.25)  mg/dL


 


Glucose     (74-99)  mg/dL


 


POC Glucose (mg/dL)  290 H  268 H  220 H  ()  mg/dL


 


Magnesium     (1.6-2.3)  mg/dL














  03/22/25 03/23/25 03/23/25 Range/Units





  23:11 00:07 01:57 


 


WBC     (3.8-10.6)  k/uL


 


RBC     (4.30-5.90)  m/uL


 


Hgb     (13.0-17.5)  gm/dL


 


Hct     (39.0-53.0)  %


 


Sodium     (137-145)  mmol/L


 


BUN     (9-20)  mg/dL


 


Creatinine     (0.66-1.25)  mg/dL


 


Glucose     (74-99)  mg/dL


 


POC Glucose (mg/dL)  176 H  145 H  135 H  ()  mg/dL


 


Magnesium     (1.6-2.3)  mg/dL














  03/23/25 03/23/25 03/23/25 Range/Units





  02:58 04:03 05:08 


 


WBC     (3.8-10.6)  k/uL


 


RBC     (4.30-5.90)  m/uL


 


Hgb     (13.0-17.5)  gm/dL


 


Hct     (39.0-53.0)  %


 


Sodium     (137-145)  mmol/L


 


BUN     (9-20)  mg/dL


 


Creatinine     (0.66-1.25)  mg/dL


 


Glucose     (74-99)  mg/dL


 


POC Glucose (mg/dL)  187 H  211 H  216 H  ()  mg/dL


 


Magnesium     (1.6-2.3)  mg/dL














  03/23/25 03/23/25 03/23/25 Range/Units





  05:48 05:48 06:08 


 


WBC  20.6 H    (3.8-10.6)  k/uL


 


RBC  2.38 L    (4.30-5.90)  m/uL


 


Hgb  7.3 L    (13.0-17.5)  gm/dL


 


Hct  22.0 L    (39.0-53.0)  %


 


Sodium   133 L   (137-145)  mmol/L


 


BUN   118 H*   (9-20)  mg/dL


 


Creatinine   2.22 H   (0.66-1.25)  mg/dL


 


Glucose   179 H   (74-99)  mg/dL


 


POC Glucose (mg/dL)    222 H  ()  mg/dL


 


Magnesium   2.9 H   (1.6-2.3)  mg/dL














  03/23/25 03/23/25 Range/Units





  07:02 09:46 


 


WBC    (3.8-10.6)  k/uL


 


RBC    (4.30-5.90)  m/uL


 


Hgb    (13.0-17.5)  gm/dL


 


Hct    (39.0-53.0)  %


 


Sodium    (137-145)  mmol/L


 


BUN    (9-20)  mg/dL


 


Creatinine    (0.66-1.25)  mg/dL


 


Glucose    (74-99)  mg/dL


 


POC Glucose (mg/dL)  202 H  258 H  ()  mg/dL


 


Magnesium    (1.6-2.3)  mg/dL














Assessment and Plan


Assessment: 


Impression:


Acute on chronic hypoxic respiratory failure, multifactorial, I suspect this is 

mostly a picture of pulmonary edema, underlying pneumonia is not entirely ruled 

out.  Hence the patient will remain on antibiotics and diuretics.


Acute exacerbation of chronic systolic congestive heart failure, recent e

chocardiogram from 02/24/2025 estimating a left ventricular ejection fraction of

45 to 50%, moderate pulmonary hypertension, and moderate tricuspid 

regurgitation.  Repeat echocardiogram showed improvement in LV function with 

ejection fraction 55%


Acute leukocytosis


Acute on chronic kidney disease


Recent influenza A infection


History of atrial fibrillation with previous cardioversion 


History of underlying COPD and chronic tobacco dependence


Benign essential hypertension


Type 2 diabetes


Acute on chronic anemia patient will require a unit of packed RBCs to be 

transfused today.  And will continue to monitor for any blood loss specially GI 

blood losses.





Recommendation:


Continue to monitor in the ICU


Continue high flow nasal cannula, titrate FiO2 accordingly


Continue Lasix, no change in dose.


Continue bronchodilators


Continue Solu-Medrol at 40 mg IV push every 12 hours


Continue empiric antibiotics and antifungal patient is on cefepime, and 

itraconazole


Remains marginal at best


Remains quite ill, prognosis remains guarded.





Will continue to follow


C


Time with Patient: Less than 30

## 2025-03-23 NOTE — XR
EXAMINATION TYPE: XR chest 1V portable

 

DATE OF EXAM: 3/23/2025

 

CLINICAL INDICATION: Male, 64 years old with history of distress, progress study.  

 

TECHNIQUE: Single AP portable upright view of the chest is obtained.

 

COMPARISON: Chest x-ray from one day earlier and older studies.

 

FINDINGS:  Persistent cardiomegaly with bilateral increased opacities greatest in the lower lungs. Os
seous structures are intact.

 

IMPRESSION: Cardiomegaly with bilateral acute infiltrates and/or edema redemonstrated. No significant
 change from one day earlier.

 

[facility], Workstation: [id number], 3/23/2025 7:12 AM

## 2025-03-23 NOTE — P.PN
Subjective


Progress Note Date: 03/23/25


Principal diagnosis: 





Reason for follow-up is pneumonia





Patient is a 64-year-old  male with a past medical history significant 

for diabetes mellitus hypertension hyperlipidemia pneumonia atrial fibrillation 

currently everyday smoker presenting to the hospital for evaluation of 

increasing shortness of breath patient has been diagnosed with multifocal 

pneumonia prompting this consultation.


On today's evaluation that is 3/23/2025 patient continues to be afebrile still r

equiring high flow 15 L nasal oxygen but denies any worsening shortness of 

breath or chest pain no worsening cough no abdominal pain or diarrhea.


Patient did have a white count of 20.6, creatinine is 2.22 chest x-ray 

cardiomegaly bilateral acute infiltrate or edema redemonstrated, he is to 

serology came back negative








Objective





- Vital Signs


Vital signs: 


                                   Vital Signs











Temp  96.9 F L  03/23/25 08:00


 


Pulse  70   03/23/25 11:28


 


Resp  23   03/23/25 11:00


 


BP  143/111   03/23/25 11:00


 


Pulse Ox  93 L  03/23/25 11:00


 


FiO2  50   03/22/25 04:40








                                 Intake & Output











 03/22/25 03/23/25 03/23/25





 18:59 06:59 18:59


 


Intake Total 2607.441 6585.741 915.117


 


Output Total 1425 1750 1125


 


Balance 65.376 -706.259 -209.883


 


Weight  101.9 kg 


 


Intake:   


 


   80 50


 


    .9 120 80 50


 


  Intake, IV Titration 160.376 153.741 135.117





  Amount   


 


    Cefepime 2 gm In Sodium 100 100 100





    Chloride 0.9% 100 ml @ 25   





    mls/hr IVPB Q12H [facility] Rx#   





    :[id number]   


 


    Insulin Regular 100 unit 60.376 53.741 35.117





    In Sodium Chloride 0.9%   





    100 ml @ Titrate IV .Q0M   





    [facility] Rx#:[id number]   


 


  Oral 1210 810 730


 


Output:   


 


  Urine 1425 1750 1125


 


Other:   


 


  Voiding Method External Catheter External Catheter 














- Exam





GENERAL DESCRIPTION: An elderly male lying in bed in no distress





RESPIRATORY SYSTEM: Unlabored breathing , coarse breath sounds bilaterally





HEART: S1 S2 regular rate and rhythm ,





ABDOMEN: Soft , no tenderness





EXTREMITIES: 2+ edema feet





- Labs


CBC & Chem 7: 


                                 03/23/25 05:48





                                 03/23/25 05:48


Labs: 


                  Abnormal Lab Results - Last 24 Hours (Table)











  03/22/25 03/22/25 03/22/25 Range/Units





  14:53 16:55 18:02 


 


WBC     (3.8-10.6)  k/uL


 


RBC     (4.30-5.90)  m/uL


 


Hgb     (13.0-17.5)  gm/dL


 


Hct     (39.0-53.0)  %


 


Sodium     (137-145)  mmol/L


 


BUN     (9-20)  mg/dL


 


Creatinine     (0.66-1.25)  mg/dL


 


Glucose     (74-99)  mg/dL


 


POC Glucose (mg/dL)  122 H  213 H  264 H  ()  mg/dL


 


Magnesium     (1.6-2.3)  mg/dL














  03/22/25 03/22/25 03/22/25 Range/Units





  19:02 20:03 21:07 


 


WBC     (3.8-10.6)  k/uL


 


RBC     (4.30-5.90)  m/uL


 


Hgb     (13.0-17.5)  gm/dL


 


Hct     (39.0-53.0)  %


 


Sodium     (137-145)  mmol/L


 


BUN     (9-20)  mg/dL


 


Creatinine     (0.66-1.25)  mg/dL


 


Glucose     (74-99)  mg/dL


 


POC Glucose (mg/dL)  275 H  290 H  268 H  ()  mg/dL


 


Magnesium     (1.6-2.3)  mg/dL














  03/22/25 03/22/25 03/23/25 Range/Units





  22:13 23:11 00:07 


 


WBC     (3.8-10.6)  k/uL


 


RBC     (4.30-5.90)  m/uL


 


Hgb     (13.0-17.5)  gm/dL


 


Hct     (39.0-53.0)  %


 


Sodium     (137-145)  mmol/L


 


BUN     (9-20)  mg/dL


 


Creatinine     (0.66-1.25)  mg/dL


 


Glucose     (74-99)  mg/dL


 


POC Glucose (mg/dL)  220 H  176 H  145 H  ()  mg/dL


 


Magnesium     (1.6-2.3)  mg/dL














  03/23/25 03/23/25 03/23/25 Range/Units





  01:57 02:58 04:03 


 


WBC     (3.8-10.6)  k/uL


 


RBC     (4.30-5.90)  m/uL


 


Hgb     (13.0-17.5)  gm/dL


 


Hct     (39.0-53.0)  %


 


Sodium     (137-145)  mmol/L


 


BUN     (9-20)  mg/dL


 


Creatinine     (0.66-1.25)  mg/dL


 


Glucose     (74-99)  mg/dL


 


POC Glucose (mg/dL)  135 H  187 H  211 H  ()  mg/dL


 


Magnesium     (1.6-2.3)  mg/dL














  03/23/25 03/23/25 03/23/25 Range/Units





  05:08 05:48 05:48 


 


WBC   20.6 H   (3.8-10.6)  k/uL


 


RBC   2.38 L   (4.30-5.90)  m/uL


 


Hgb   7.3 L   (13.0-17.5)  gm/dL


 


Hct   22.0 L   (39.0-53.0)  %


 


Sodium    133 L  (137-145)  mmol/L


 


BUN    118 H*  (9-20)  mg/dL


 


Creatinine    2.22 H  (0.66-1.25)  mg/dL


 


Glucose    179 H  (74-99)  mg/dL


 


POC Glucose (mg/dL)  216 H    ()  mg/dL


 


Magnesium    2.9 H  (1.6-2.3)  mg/dL














  03/23/25 03/23/25 03/23/25 Range/Units





  06:08 07:02 09:46 


 


WBC     (3.8-10.6)  k/uL


 


RBC     (4.30-5.90)  m/uL


 


Hgb     (13.0-17.5)  gm/dL


 


Hct     (39.0-53.0)  %


 


Sodium     (137-145)  mmol/L


 


BUN     (9-20)  mg/dL


 


Creatinine     (0.66-1.25)  mg/dL


 


Glucose     (74-99)  mg/dL


 


POC Glucose (mg/dL)  222 H  202 H  258 H  ()  mg/dL


 


Magnesium     (1.6-2.3)  mg/dL














  03/23/25 03/23/25 Range/Units





  11:51 14:03 


 


WBC    (3.8-10.6)  k/uL


 


RBC    (4.30-5.90)  m/uL


 


Hgb    (13.0-17.5)  gm/dL


 


Hct    (39.0-53.0)  %


 


Sodium    (137-145)  mmol/L


 


BUN    (9-20)  mg/dL


 


Creatinine    (0.66-1.25)  mg/dL


 


Glucose    (74-99)  mg/dL


 


POC Glucose (mg/dL)  250 H  316 H  ()  mg/dL


 


Magnesium    (1.6-2.3)  mg/dL














Assessment and Plan


(1) Leukocytosis


Current Visit: Yes   Status: Acute   Code(s): D72.829 - ELEVATED WHITE BLOOD 

CELL COUNT, UNSPECIFIED   SNOMED Code(s): 890560030


   





(2) Bilateral pneumonia


Current Visit: Yes   Status: Acute   Code(s): J18.9 - PNEUMONIA, UNSPECIFIED 

ORGANISM   SNOMED Code(s): 580426956


   


Plan: 





1patient presented hospital with increasing shortness of breath and chest pain 

which is likely multifactorial in this patient who did have evidence of PE on 

the CT there is also evidence of multifocal pneumonia with elevated white count 

and recently acute influenza A will need to cover for resistant gram-positive as

well as gram-negative pathogen for post influenza pneumonia


2-blood culture has been negative no sputum has been collected, histo serology 

came back negative


3-patient has received adequate cefepime keeping in mind his history serology 

negative recommend discontinuation of the itraconazole


Dictation was produced using dragon dictation software. please excuse any 

grammatical, word or spelling errors. 








Time with Patient: Less than 30

## 2025-03-23 NOTE — P.PN
Subjective





Patient is seen for follow-up for acute kidney injury.


Currently maintained on IV Lasix





Patient remains short of breath requiring BiPAP on and off.  Oxygen requirements

have decreased.


Serum creatinine at 2.2.  BUN disproportionately elevated but down to 118 today.

 Patient is maintained on steroids.





Objective





- Vital Signs


Vital signs: 


                                   Vital Signs











Temp  97.4 F L  03/23/25 04:00


 


Pulse  72   03/23/25 08:14


 


Resp  13   03/23/25 07:00


 


BP  127/55   03/23/25 07:00


 


Pulse Ox  96   03/23/25 07:00


 


FiO2  50   03/22/25 04:40








                                 Intake & Output











 03/22/25 03/23/25 03/23/25





 18:59 06:59 18:59


 


Intake Total 7415.188 8128.741 28.083


 


Output Total 1425 1750 


 


Balance 65.376 -706.259 28.083


 


Weight  101.9 kg 


 


Intake:   


 


   80 10


 


    .9 120 80 10


 


  Intake, IV Titration 160.376 153.741 18.083





  Amount   


 


    Cefepime 2 gm In Sodium 100 100 





    Chloride 0.9% 100 ml @ 25   





    mls/hr IVPB Q12H [facility] Rx#   





    :[id number]   


 


    Insulin Regular 100 unit 60.376 53.741 18.083





    In Sodium Chloride 0.9%   





    100 ml @ Titrate IV .Q0M   





    [facility] Rx#:[id number]   


 


  Oral 1210 810 


 


Output:   


 


  Urine 1425 1750 


 


Other:   


 


  Voiding Method External Catheter External Catheter 














- Exam





Patient is awake, comfortable


Examination of the heart S1 and S2


Examination of the lungs decreased breath sounds at the bases occasional 

wheezing heard bilaterally


Abdomen is soft nontender


Examination of lower extremities shows edema 1+ bilaterally, decreasing


CNS exam grossly intact





- Labs


CBC & Chem 7: 


                                 03/23/25 05:48





                                 03/23/25 05:48


Labs: 


                  Abnormal Lab Results - Last 24 Hours (Table)











  03/22/25 03/22/25 03/22/25 Range/Units





  11:46 13:09 14:53 


 


WBC     (3.8-10.6)  k/uL


 


RBC     (4.30-5.90)  m/uL


 


Hgb     (13.0-17.5)  gm/dL


 


Hct     (39.0-53.0)  %


 


Sodium     (137-145)  mmol/L


 


BUN     (9-20)  mg/dL


 


Creatinine     (0.66-1.25)  mg/dL


 


Glucose     (74-99)  mg/dL


 


POC Glucose (mg/dL)  173 H  148 H  122 H  ()  mg/dL


 


Magnesium     (1.6-2.3)  mg/dL














  03/22/25 03/22/25 03/22/25 Range/Units





  16:55 18:02 19:02 


 


WBC     (3.8-10.6)  k/uL


 


RBC     (4.30-5.90)  m/uL


 


Hgb     (13.0-17.5)  gm/dL


 


Hct     (39.0-53.0)  %


 


Sodium     (137-145)  mmol/L


 


BUN     (9-20)  mg/dL


 


Creatinine     (0.66-1.25)  mg/dL


 


Glucose     (74-99)  mg/dL


 


POC Glucose (mg/dL)  213 H  264 H  275 H  ()  mg/dL


 


Magnesium     (1.6-2.3)  mg/dL














  03/22/25 03/22/25 03/22/25 Range/Units





  20:03 21:07 22:13 


 


WBC     (3.8-10.6)  k/uL


 


RBC     (4.30-5.90)  m/uL


 


Hgb     (13.0-17.5)  gm/dL


 


Hct     (39.0-53.0)  %


 


Sodium     (137-145)  mmol/L


 


BUN     (9-20)  mg/dL


 


Creatinine     (0.66-1.25)  mg/dL


 


Glucose     (74-99)  mg/dL


 


POC Glucose (mg/dL)  290 H  268 H  220 H  ()  mg/dL


 


Magnesium     (1.6-2.3)  mg/dL














  03/22/25 03/23/25 03/23/25 Range/Units





  23:11 00:07 01:57 


 


WBC     (3.8-10.6)  k/uL


 


RBC     (4.30-5.90)  m/uL


 


Hgb     (13.0-17.5)  gm/dL


 


Hct     (39.0-53.0)  %


 


Sodium     (137-145)  mmol/L


 


BUN     (9-20)  mg/dL


 


Creatinine     (0.66-1.25)  mg/dL


 


Glucose     (74-99)  mg/dL


 


POC Glucose (mg/dL)  176 H  145 H  135 H  ()  mg/dL


 


Magnesium     (1.6-2.3)  mg/dL














  03/23/25 03/23/25 03/23/25 Range/Units





  02:58 04:03 05:08 


 


WBC     (3.8-10.6)  k/uL


 


RBC     (4.30-5.90)  m/uL


 


Hgb     (13.0-17.5)  gm/dL


 


Hct     (39.0-53.0)  %


 


Sodium     (137-145)  mmol/L


 


BUN     (9-20)  mg/dL


 


Creatinine     (0.66-1.25)  mg/dL


 


Glucose     (74-99)  mg/dL


 


POC Glucose (mg/dL)  187 H  211 H  216 H  ()  mg/dL


 


Magnesium     (1.6-2.3)  mg/dL














  03/23/25 03/23/25 03/23/25 Range/Units





  05:48 05:48 06:08 


 


WBC  20.6 H    (3.8-10.6)  k/uL


 


RBC  2.38 L    (4.30-5.90)  m/uL


 


Hgb  7.3 L    (13.0-17.5)  gm/dL


 


Hct  22.0 L    (39.0-53.0)  %


 


Sodium   133 L   (137-145)  mmol/L


 


BUN   118 H*   (9-20)  mg/dL


 


Creatinine   2.22 H   (0.66-1.25)  mg/dL


 


Glucose   179 H   (74-99)  mg/dL


 


POC Glucose (mg/dL)    222 H  ()  mg/dL


 


Magnesium   2.9 H   (1.6-2.3)  mg/dL














  03/23/25 03/23/25 Range/Units





  07:02 09:46 


 


WBC    (3.8-10.6)  k/uL


 


RBC    (4.30-5.90)  m/uL


 


Hgb    (13.0-17.5)  gm/dL


 


Hct    (39.0-53.0)  %


 


Sodium    (137-145)  mmol/L


 


BUN    (9-20)  mg/dL


 


Creatinine    (0.66-1.25)  mg/dL


 


Glucose    (74-99)  mg/dL


 


POC Glucose (mg/dL)  202 H  258 H  ()  mg/dL


 


Magnesium    (1.6-2.3)  mg/dL














Assessment and Plan


Assessment: 





1.  Acute renal injury, ATN associated with underlying infection and borderline 

low blood pressures in the setting of use of ACE inhibitors.  May continue with 

IV diuretics.  Ultrasound shows no evidence of obstruction.  UA is benign.  BUN 

disproportionately elevated secondary to steroids and possible GI bleed


2.  Acute hypoxic respiratory failure secondary to pneumonia and volume overload


3.  Volume overload


4.  CHF with preserved ejection fraction of 55 to 60% noted this admission


5.  Hypervolemic hyponatremia.  Patient was also maintained on hypotonic IV 

fluids which contributed to the hyponatremia, now improved


6.  Influenza A infection status post Tamiflu during previous hospitalization 

about 4 weeks ago


7.  Anemia rule out GI bleed, status post transfusion of packed RBCs for 

hemoglobin 6.2.  No active bleeding noted.   


Plan: 








Continue with Lasix.  BUN is disproportionately elevated from steroids and pos

sible underlying GI bleed.


Try Biotene gum/toothpaste for dry mouth and frozen grapes


Repeat labs in a.m.


Continue off of ACE inhibitors


Continue with Aldactone


Continue to avoid nephrotoxic agents

## 2025-03-24 LAB
ANION GAP SERPL CALC-SCNC: 10 MMOL/L
BASOPHILS # BLD AUTO: 0 K/UL (ref 0–0.2)
BASOPHILS NFR BLD AUTO: 0 %
BUN SERPL-SCNC: 132 MG/DL (ref 9–20)
CALCIUM SPEC-MCNC: 8.9 MG/DL (ref 8.4–10.2)
CHLORIDE SERPL-SCNC: 106 MMOL/L (ref 98–107)
CO2 SERPL-SCNC: 18 MMOL/L (ref 22–30)
EOSINOPHIL # BLD AUTO: 0 K/UL (ref 0–0.7)
EOSINOPHIL NFR BLD AUTO: 0 %
ERYTHROCYTE [DISTWIDTH] IN BLOOD BY AUTOMATED COUNT: 2.41 M/UL (ref 4.3–5.9)
ERYTHROCYTE [DISTWIDTH] IN BLOOD: 13.6 % (ref 11.5–15.5)
FERRITIN SERPL-MCNC: 1164 NG/ML (ref 22–322)
GLUCOSE BLD-MCNC: 117 MG/DL (ref 70–110)
GLUCOSE BLD-MCNC: 139 MG/DL (ref 70–110)
GLUCOSE BLD-MCNC: 154 MG/DL (ref 70–110)
GLUCOSE BLD-MCNC: 157 MG/DL (ref 70–110)
GLUCOSE BLD-MCNC: 172 MG/DL (ref 70–110)
GLUCOSE BLD-MCNC: 206 MG/DL (ref 70–110)
GLUCOSE BLD-MCNC: 213 MG/DL (ref 70–110)
GLUCOSE BLD-MCNC: 217 MG/DL (ref 70–110)
GLUCOSE BLD-MCNC: 220 MG/DL (ref 70–110)
GLUCOSE BLD-MCNC: 225 MG/DL (ref 70–110)
GLUCOSE BLD-MCNC: 227 MG/DL (ref 70–110)
GLUCOSE BLD-MCNC: 238 MG/DL (ref 70–110)
GLUCOSE BLD-MCNC: 243 MG/DL (ref 70–110)
GLUCOSE BLD-MCNC: 257 MG/DL (ref 70–110)
GLUCOSE BLD-MCNC: 293 MG/DL (ref 70–110)
GLUCOSE BLD-MCNC: 298 MG/DL (ref 70–110)
GLUCOSE BLD-MCNC: 301 MG/DL (ref 70–110)
GLUCOSE BLD-MCNC: 328 MG/DL (ref 70–110)
GLUCOSE BLD-MCNC: 376 MG/DL (ref 70–110)
GLUCOSE SERPL-MCNC: 180 MG/DL (ref 74–99)
HCT VFR BLD AUTO: 21.9 % (ref 39–53)
HGB BLD-MCNC: 7.5 GM/DL (ref 13–17.5)
IRON SERPL-MCNC: 34 UG/DL (ref 65–175)
LYMPHOCYTES # SPEC AUTO: 0.2 K/UL (ref 1–4.8)
LYMPHOCYTES NFR SPEC AUTO: 1 %
MCH RBC QN AUTO: 31 PG (ref 25–35)
MCHC RBC AUTO-ENTMCNC: 34.1 G/DL (ref 31–37)
MCV RBC AUTO: 90.9 FL (ref 80–100)
MONOCYTES # BLD AUTO: 0.6 K/UL (ref 0–1)
MONOCYTES NFR BLD AUTO: 3 %
NEUTROPHILS # BLD AUTO: 20.3 K/UL (ref 1.3–7.7)
NEUTROPHILS NFR BLD AUTO: 96 %
PLATELET # BLD AUTO: 185 K/UL (ref 150–450)
POTASSIUM SERPL-SCNC: 4.8 MMOL/L (ref 3.5–5.1)
SODIUM SERPL-SCNC: 134 MMOL/L (ref 137–145)
TIBC SERPL-MCNC: 239 UG/DL (ref 228–460)
WBC # BLD AUTO: 21.2 K/UL (ref 3.8–10.6)

## 2025-03-24 RX ADMIN — Medication SCH MG: at 10:23

## 2025-03-24 NOTE — P.PN
Subjective





Patient is seen in follow-up for acute kidney injury on chronic kidney disease. 

Renal function better.  Nonoliguric.  On high flow nasal cannula.





Vital signs are stable.


General: No acute distress.


HEENT: Head exam is unremarkable.  On high flow nasal cannula.


LUNGS: Scattered rhonchi.


HEART: Rate and Rhythm are regular. 


ABDOMEN: Nontender.


EXTREMITITES: 2+ edema.





Objective





- Vital Signs


Vital signs: 


                                   Vital Signs











Temp  98.2 F   03/24/25 08:00


 


Pulse  73   03/24/25 09:00


 


Resp  22   03/24/25 09:00


 


BP  144/62   03/24/25 09:00


 


Pulse Ox  92 L  03/24/25 09:00


 


FiO2  15   03/23/25 18:00








                                 Intake & Output











 03/23/25 03/24/25 03/24/25





 18:59 06:59 18:59


 


Intake Total 1494.467 169.563 236.087


 


Output Total 1675 2100 450


 


Balance -180.533 -1930.437 -213.913


 


Weight   100.5 kg


 


Intake:   


 


   120 30


 


    .9 105 120 30


 


  Intake, IV Titration 179.467 49.563 6.087





  Amount   


 


    Cefepime 2 gm In Sodium 100  





    Chloride 0.9% 100 ml @ 25   





    mls/hr IVPB Q12H [facility] Rx#   





    :[id number]   


 


    Insulin Regular 100 unit 79.467 49.563 6.087





    In Sodium Chloride 0.9%   





    100 ml @ Titrate IV .Q0M   





    [facility] Rx#:[id number]   


 


  Oral 1210  200


 


Output:   


 


  Urine 1675 2100 450


 


Other:   


 


  Voiding Method External Catheter External Catheter External Catheter


 


  # Bowel Movements 0  














- Labs


CBC & Chem 7: 


                                 03/24/25 05:53





                                 03/24/25 05:53


Labs: 


                  Abnormal Lab Results - Last 24 Hours (Table)











  03/23/25 03/23/25 03/23/25 Range/Units





  09:46 11:51 14:03 


 


WBC     (3.8-10.6)  k/uL


 


RBC     (4.30-5.90)  m/uL


 


Hgb     (13.0-17.5)  gm/dL


 


Hct     (39.0-53.0)  %


 


Neutrophils #     (1.3-7.7)  k/uL


 


Lymphocytes #     (1.0-4.8)  k/uL


 


Sodium     (137-145)  mmol/L


 


Carbon Dioxide     (22-30)  mmol/L


 


BUN     (9-20)  mg/dL


 


Creatinine     (0.66-1.25)  mg/dL


 


Glucose     (74-99)  mg/dL


 


POC Glucose (mg/dL)  258 H  250 H  316 H  ()  mg/dL














  03/23/25 03/23/25 03/23/25 Range/Units





  15:56 17:04 17:58 


 


WBC     (3.8-10.6)  k/uL


 


RBC     (4.30-5.90)  m/uL


 


Hgb     (13.0-17.5)  gm/dL


 


Hct     (39.0-53.0)  %


 


Neutrophils #     (1.3-7.7)  k/uL


 


Lymphocytes #     (1.0-4.8)  k/uL


 


Sodium     (137-145)  mmol/L


 


Carbon Dioxide     (22-30)  mmol/L


 


BUN     (9-20)  mg/dL


 


Creatinine     (0.66-1.25)  mg/dL


 


Glucose     (74-99)  mg/dL


 


POC Glucose (mg/dL)  278 H  207 H  175 H  ()  mg/dL














  03/23/25 03/23/25 03/23/25 Range/Units





  18:58 20:54 22:29 


 


WBC     (3.8-10.6)  k/uL


 


RBC     (4.30-5.90)  m/uL


 


Hgb     (13.0-17.5)  gm/dL


 


Hct     (39.0-53.0)  %


 


Neutrophils #     (1.3-7.7)  k/uL


 


Lymphocytes #     (1.0-4.8)  k/uL


 


Sodium     (137-145)  mmol/L


 


Carbon Dioxide     (22-30)  mmol/L


 


BUN     (9-20)  mg/dL


 


Creatinine     (0.66-1.25)  mg/dL


 


Glucose     (74-99)  mg/dL


 


POC Glucose (mg/dL)  175 H  151 H  145 H  ()  mg/dL














  03/24/25 03/24/25 03/24/25 Range/Units





  00:01 01:15 02:44 


 


WBC     (3.8-10.6)  k/uL


 


RBC     (4.30-5.90)  m/uL


 


Hgb     (13.0-17.5)  gm/dL


 


Hct     (39.0-53.0)  %


 


Neutrophils #     (1.3-7.7)  k/uL


 


Lymphocytes #     (1.0-4.8)  k/uL


 


Sodium     (137-145)  mmol/L


 


Carbon Dioxide     (22-30)  mmol/L


 


BUN     (9-20)  mg/dL


 


Creatinine     (0.66-1.25)  mg/dL


 


Glucose     (74-99)  mg/dL


 


POC Glucose (mg/dL)  117 H  154 H  225 H  ()  mg/dL














  03/24/25 03/24/25 03/24/25 Range/Units





  04:39 05:53 05:53 


 


WBC   21.2 H   (3.8-10.6)  k/uL


 


RBC   2.41 L   (4.30-5.90)  m/uL


 


Hgb   7.5 L   (13.0-17.5)  gm/dL


 


Hct   21.9 L   (39.0-53.0)  %


 


Neutrophils #   20.3 H   (1.3-7.7)  k/uL


 


Lymphocytes #   0.2 L   (1.0-4.8)  k/uL


 


Sodium    134 L  (137-145)  mmol/L


 


Carbon Dioxide    18 L  (22-30)  mmol/L


 


BUN    132 H*  (9-20)  mg/dL


 


Creatinine    1.89 H  (0.66-1.25)  mg/dL


 


Glucose    180 H  (74-99)  mg/dL


 


POC Glucose (mg/dL)  227 H    ()  mg/dL














  03/24/25 03/24/25 03/24/25 Range/Units





  06:15 07:07 08:38 


 


WBC     (3.8-10.6)  k/uL


 


RBC     (4.30-5.90)  m/uL


 


Hgb     (13.0-17.5)  gm/dL


 


Hct     (39.0-53.0)  %


 


Neutrophils #     (1.3-7.7)  k/uL


 


Lymphocytes #     (1.0-4.8)  k/uL


 


Sodium     (137-145)  mmol/L


 


Carbon Dioxide     (22-30)  mmol/L


 


BUN     (9-20)  mg/dL


 


Creatinine     (0.66-1.25)  mg/dL


 


Glucose     (74-99)  mg/dL


 


POC Glucose (mg/dL)  238 H  206 H  172 H  ()  mg/dL














Assessment and Plan


Plan: 





Assessment:


1.  Acute kidney injury secondary to ATN secondary to severe sepsis.  Creatinine

peaked at 2.37 this admission and is 1.89 today.  No hydronephrosis noted on 

imaging.  UA benign.  BUN disproportionately elevated partially due to steroids.

 Hemoglobin stable at 7.5.  Questionable GI bleed.


2.  Chronic kidney disease stage IIIa with baseline creatinine 1.2-1.4 secondary

to nephrosclerosis.


3.  Acute on chronic diastolic CHF.


4.  Volume overload.


5.  Metabolic acidosis secondary to acute kidney injury.


6.  Recent Influenza A pneumonia status post Tamiflu.


7.  Hypervolemic hyponatremia.  Better.


8.  Acute blood loss anemia with concern for GI bleed status post blood 

transfusions this admission.





Plan:


Maintain IV Lasix.


Check iron studies.


Avoid nephrotoxins.


Wean FiO2.


Add oral bicarb.

## 2025-03-24 NOTE — P.PN
Subjective


Progress Note Date: 03/24/25


Principal diagnosis: 





Reason for follow-up is pneumonia





Patient is a 64-year-old  male with a past medical history significant 

for diabetes mellitus hypertension hyperlipidemia pneumonia atrial fibrillation 

currently everyday smoker presenting to the hospital for evaluation of 

increasing shortness of breath patient has been diagnosed with multifocal 

pneumonia prompting this consultation.


On today's evaluation that is 03/24/2025, patient has been afebrile, patient is 

breathing slightly comfortably and still requiring 11 L high flow nasal cannula 

oxygen patient denies having any significant cough no chest pain, patient denies

nausea vomiting or diarrhea and no abdominal pain.


White count is 21.2, creatinine is 1.89 chest x-ray mild cardiomegaly with 

negative left bilateral lower lung acute infiltrates or edema





Objective





- Vital Signs


Vital signs: 


                                   Vital Signs











Temp  98.0 F   03/24/25 12:00


 


Pulse  74   03/24/25 13:00


 


Resp  15   03/24/25 13:00


 


BP  134/92   03/24/25 13:00


 


Pulse Ox  99   03/24/25 13:00


 


FiO2  15   03/23/25 18:00








                                 Intake & Output











 03/23/25 03/24/25 03/24/25





 18:59 06:59 18:59


 


Intake Total 1494.467 169.563 702.262


 


Output Total 1675 2100 1200


 


Balance -180.533 -1930.437 -497.738


 


Weight   100.5 kg


 


Intake:   


 


   120 70


 


    .9 105 120 70


 


  Intake, IV Titration 179.467 49.563 32.262





  Amount   


 


    Cefepime 2 gm In Sodium 100  





    Chloride 0.9% 100 ml @ 25   





    mls/hr IVPB Q12H [facility] Rx#   





    :[id number]   


 


    Insulin Regular 100 unit 79.467 49.563 32.262





    In Sodium Chloride 0.9%   





    100 ml @ Titrate IV .Q0M   





    [facility] Rx#:[id number]   


 


  Oral 1210  600


 


Output:   


 


  Urine 1675 2100 1200


 


Other:   


 


  Voiding Method External Catheter External Catheter External Catheter


 


  # Bowel Movements 0  














- Exam





GENERAL DESCRIPTION: An elderly male lying in bed in no distress





RESPIRATORY SYSTEM: Unlabored breathing , coarse breath sounds bilaterally





HEART: S1 S2 regular rate and rhythm ,





ABDOMEN: Soft , no tenderness





EXTREMITIES: 2+ edema feet





- Labs


CBC & Chem 7: 


                                 03/24/25 05:53





                                 03/24/25 05:53


Labs: 


                  Abnormal Lab Results - Last 24 Hours (Table)











  03/23/25 03/23/25 03/23/25 Range/Units





  14:03 15:56 17:04 


 


WBC     (3.8-10.6)  k/uL


 


RBC     (4.30-5.90)  m/uL


 


Hgb     (13.0-17.5)  gm/dL


 


Hct     (39.0-53.0)  %


 


Neutrophils #     (1.3-7.7)  k/uL


 


Lymphocytes #     (1.0-4.8)  k/uL


 


Sodium     (137-145)  mmol/L


 


Carbon Dioxide     (22-30)  mmol/L


 


BUN     (9-20)  mg/dL


 


Creatinine     (0.66-1.25)  mg/dL


 


Glucose     (74-99)  mg/dL


 


POC Glucose (mg/dL)  316 H  278 H  207 H  ()  mg/dL


 


Albumin     (3.5-5.0)  g/dL














  03/23/25 03/23/25 03/23/25 Range/Units





  17:58 18:58 20:54 


 


WBC     (3.8-10.6)  k/uL


 


RBC     (4.30-5.90)  m/uL


 


Hgb     (13.0-17.5)  gm/dL


 


Hct     (39.0-53.0)  %


 


Neutrophils #     (1.3-7.7)  k/uL


 


Lymphocytes #     (1.0-4.8)  k/uL


 


Sodium     (137-145)  mmol/L


 


Carbon Dioxide     (22-30)  mmol/L


 


BUN     (9-20)  mg/dL


 


Creatinine     (0.66-1.25)  mg/dL


 


Glucose     (74-99)  mg/dL


 


POC Glucose (mg/dL)  175 H  175 H  151 H  ()  mg/dL


 


Albumin     (3.5-5.0)  g/dL














  03/23/25 03/24/25 03/24/25 Range/Units





  22:29 00:01 01:15 


 


WBC     (3.8-10.6)  k/uL


 


RBC     (4.30-5.90)  m/uL


 


Hgb     (13.0-17.5)  gm/dL


 


Hct     (39.0-53.0)  %


 


Neutrophils #     (1.3-7.7)  k/uL


 


Lymphocytes #     (1.0-4.8)  k/uL


 


Sodium     (137-145)  mmol/L


 


Carbon Dioxide     (22-30)  mmol/L


 


BUN     (9-20)  mg/dL


 


Creatinine     (0.66-1.25)  mg/dL


 


Glucose     (74-99)  mg/dL


 


POC Glucose (mg/dL)  145 H  117 H  154 H  ()  mg/dL


 


Albumin     (3.5-5.0)  g/dL














  03/24/25 03/24/25 03/24/25 Range/Units





  02:44 04:39 05:53 


 


WBC    21.2 H  (3.8-10.6)  k/uL


 


RBC    2.41 L  (4.30-5.90)  m/uL


 


Hgb    7.5 L  (13.0-17.5)  gm/dL


 


Hct    21.9 L  (39.0-53.0)  %


 


Neutrophils #    20.3 H  (1.3-7.7)  k/uL


 


Lymphocytes #    0.2 L  (1.0-4.8)  k/uL


 


Sodium     (137-145)  mmol/L


 


Carbon Dioxide     (22-30)  mmol/L


 


BUN     (9-20)  mg/dL


 


Creatinine     (0.66-1.25)  mg/dL


 


Glucose     (74-99)  mg/dL


 


POC Glucose (mg/dL)  225 H  227 H   ()  mg/dL


 


Albumin     (3.5-5.0)  g/dL














  03/24/25 03/24/25 03/24/25 Range/Units





  05:53 05:53 06:15 


 


WBC     (3.8-10.6)  k/uL


 


RBC     (4.30-5.90)  m/uL


 


Hgb     (13.0-17.5)  gm/dL


 


Hct     (39.0-53.0)  %


 


Neutrophils #     (1.3-7.7)  k/uL


 


Lymphocytes #     (1.0-4.8)  k/uL


 


Sodium  134 L    (137-145)  mmol/L


 


Carbon Dioxide  18 L    (22-30)  mmol/L


 


BUN  132 H*    (9-20)  mg/dL


 


Creatinine  1.89 H    (0.66-1.25)  mg/dL


 


Glucose  180 H    (74-99)  mg/dL


 


POC Glucose (mg/dL)    238 H  ()  mg/dL


 


Albumin   3.2 L   (3.5-5.0)  g/dL














  03/24/25 03/24/25 03/24/25 Range/Units





  07:07 08:38 09:42 


 


WBC     (3.8-10.6)  k/uL


 


RBC     (4.30-5.90)  m/uL


 


Hgb     (13.0-17.5)  gm/dL


 


Hct     (39.0-53.0)  %


 


Neutrophils #     (1.3-7.7)  k/uL


 


Lymphocytes #     (1.0-4.8)  k/uL


 


Sodium     (137-145)  mmol/L


 


Carbon Dioxide     (22-30)  mmol/L


 


BUN     (9-20)  mg/dL


 


Creatinine     (0.66-1.25)  mg/dL


 


Glucose     (74-99)  mg/dL


 


POC Glucose (mg/dL)  206 H  172 H  257 H  ()  mg/dL


 


Albumin     (3.5-5.0)  g/dL














  03/24/25 03/24/25 03/24/25 Range/Units





  11:05 11:59 13:23 


 


WBC     (3.8-10.6)  k/uL


 


RBC     (4.30-5.90)  m/uL


 


Hgb     (13.0-17.5)  gm/dL


 


Hct     (39.0-53.0)  %


 


Neutrophils #     (1.3-7.7)  k/uL


 


Lymphocytes #     (1.0-4.8)  k/uL


 


Sodium     (137-145)  mmol/L


 


Carbon Dioxide     (22-30)  mmol/L


 


BUN     (9-20)  mg/dL


 


Creatinine     (0.66-1.25)  mg/dL


 


Glucose     (74-99)  mg/dL


 


POC Glucose (mg/dL)  293 H  328 H  301 H  ()  mg/dL


 


Albumin     (3.5-5.0)  g/dL














Assessment and Plan


(1) Leukocytosis


Current Visit: Yes   Status: Acute   Code(s): D72.829 - ELEVATED WHITE BLOOD 

CELL COUNT, UNSPECIFIED   SNOMED Code(s): 932732032


   





(2) Bilateral pneumonia


Current Visit: Yes   Status: Acute   Code(s): J18.9 - PNEUMONIA, UNSPECIFIED 

ORGANISM   SNOMED Code(s): 014194377


   


Plan: 





1patient presented hospital with increasing shortness of breath and chest pain 

which is likely multifactorial in this patient who did have evidence of PE on 

the CT there is also evidence of multifocal pneumonia with elevated white count 

and recently acute influenza A will need to cover for resistant gram-positive as

well as gram-negative pathogen for post influenza pneumonia


2-blood culture has been negative no sputum has been collected, histo serology 

came back negative


3-patient histoplasma serology negative will discontinue itraconazole, white 

count still elevated could be steroid related and monitor closely


Dictation was produced using dragon dictation software. please excuse any 

grammatical, word or spelling errors. 








Time with Patient: Less than 30

## 2025-03-24 NOTE — XR
EXAMINATION TYPE: XR chest 1V

 

DATE OF EXAM: 3/24/2025

 

CLINICAL INDICATION: Male, 64 years old with history of pneumonia, progress study.  

 

TECHNIQUE: Single AP portable upright view of the chest is obtained.

 

COMPARISON: Chest x-ray from one day earlier and older studies.

 

FINDINGS:  Persistent mild cardiomegaly with right greater than left bilateral increased lower lung o
pacities. Osseous structures are intact.

 

IMPRESSION: Mild cardiomegaly with right greater than left bilateral lower lung acute infiltrates and
/or edema redemonstrated. Some improved aeration in the left lower lung noted from one day earlier.

 

[facility], Workstation: [id number], 3/24/2025 6:18 AM

## 2025-03-24 NOTE — P.PN
Subjective





Patient is a 64-year-old male with a PMH of atrial fibrillation on Eliquis, COPD

on 2 L home oxygen, non-insulin-dependent diabetes mellitus, hyperlipidemia, 

hypertension presenting with shortness of breath.  Patient was previously 

admitted here for acute hypoxic respiratory failure secondary to influenza 

pneumonia and was discharged on 2 L of home oxygen.  Patient states he has been 

feeling short of breath while at rest.  He reports that since being discharged 

the home oxygen has not been sufficient. He states that he had to keep 

increasing his oxygen.    Patient says shortness of breath is slightly relieved 

when he is laying down.  Denies any orthopnea or PND.  Patient endorses a 

nonproductive cough that is chronic in nature, but says it has been getting 

worse.  Patient admits to having fever, chills.  Patient also admits to having 

non-radiating, pressure-like chest pain over the last few days.  Chest pain is 

not related to exertion and he had no alleviating or aggravating factors.  

Patient denies any headache, vision changes, nausea, vomiting, diarrhea, 

abdominal pain, urinary symptoms.





EKG independently interpreted displaying sinus rhythm with left bundle branch 

block that has been seen on prior EKG, rate 65 bpm, QTc 451 ms


CXR independently interpreted displaying bilateral multifocal airspace opacities


Chest CTA displaying acute segmental and subsegmental pulmonary emboli within 

the right and middle lower lobes, no saddle embolus, no RV strain, chronic 

interstitial loading disease


Venous Doppler B/L LE displaying no evidence of acute DVT


Troponin 0.049, 0.055, proBNP 8810, D-dimer 4.87, WBC 22.6, Hgb 11.0, HCT 35.2, 

MCV 93.7, platelet 322, PT 11.8, INR 1.1, APTT 28, sodium 133, potassium 4.2, 

CO2 24, BUN 30, creatinine 1.27, glucose 135


VBG pH 7.52, pCO2 32


T98.2 F, GA 80, RR 26, /77, O2 sat 90% on BiPAP with FiO2 of 100%





March 12: Overflow the ER.  Up in the bed.  Short of breath.  Patient was on BiP

AP overnight.  This morning on 15 L high flow nasal cannula.  Decreased 

appetite.  Has got a cough.  Some wheezing.  Some sputum production.  Decreased 

appetite.  Patient is on IV heparin.  Also on IV cefepime.  Pulmonary following


March 13: Patient did confirm that because Eliquis is very expensive patient was

not taking it properly did and take it sparingly at home.  Explains patient's 

PE.  Started on Eliquis today by cardiology.  IV heparin discontinued.  Getting 

easily short of breath.  Requiring BiPAP..  Some cough.  Oral intake fair.  On 

IV cefepime and vancomycin.  ID following.  Also on IV Lasix.  Due to worsening 

renal function will DC vancomycin


March 14: Saw this afternoon.  On BiPAP.  60%.  Ensure ordered.  All blood work 

ordered by pulmonary including fungal.  Patient currently on IV cefepime and 

Levaquin.  IV Solu-Medrol.  Remain short of breath.  Tired.  Being followed by 

pulmonary and ID.  Chest x-ray film personally reviewed by me-showing pulm edema

bilateral infiltrates especially at the bases.  Renal ultrasound nonspecific.  

UA showing protein 1+.


March 15: Remain short of breath.  Sitting up in bed.  Not able to come off the 

BiPAP.  Remains on IV cefepime.  Accu-Cheks running high.  Put on insulin drip. 

On IV Solu-Medrol.  Oral intake fair.


March 16: Patient remains short of breath.  Unable to get off BiPAP.  FiO2 

increased to 70%.  12/5.  Per Dr. Joshua: Patient be moved to the ICU.  Poor 

oral intake.  Remains on insulin drip.  IV Solu-Medrol.  Eliquis.  IV cefepime 

and Levaquin.  Tired.  Sitting up in bed leaning forward short of breath.  Chest

x-ray film personally reviewed by me: Scattered bilateral infiltrates


March 17: ICU.  Remains on BiPAP.  Decreased oral intake.  Sitting up.  Short of

breath.  On IV cefepime.  Insulin drip.  IV Solu-Medrol.  DuoNeb Q4.  Rather 

tired.  Also yesterday evening at epistaxis.  Nasal packing.


March 18: ICU.  Mostly been on BiPAP.  High flow nasal cannula.  Decreased oral 

intake.  Sitting up leaning forward.  Remains on IV cefepime or Levaquin insulin

drip.  A bit tired.  Patient has very poor oral intake.  Per intensivist patient

will IV Lasix.  Note worsening renal function-cut back to Lasix once a day.  

Given blood pressure running lower side.  DC Aldactone and DC amlodipine.


March 19: ICU.  Patient was taken off BiPAP this morning.  Decreased to high 

flow nasal cannula 10 L.  Wife at the bedside.  Encourage oral intake.  Chopped 

diet.  Per intensivist nephrology patient to continue on IV Lasix.  Blood 

pressure better after stopping amlodipine and Aldactone yesterday.  Encourage 

oral intake.  Incentive spirometry.  IV cefepime, Levaquin, I terconazole per 

ID.


March 20: ICU.  Yesterday patient was nasal cannula.  Overnight repeat put back 

on BiPAP.  Patient did drop his hemoglobin further.  Down to 6.2.  Felt to be 

from epistaxis.  Given a unit of blood.  Further increase in BUN/creatinine.  

Remains on IV Lasix.  Ordered Kerlix to both the lower extremities.  Had a good 

portion of his lunch today.


March 21: ICU.  Patient seen this morning.  15 L nasal cannula.  Reclining in 

bed.  Short of breath.  Ace wrap lower extremity.  Patient remains on IV 

cefepime, IV Lasix 40 mg every 12, insulin drip, IV Solu-Medrol DuoNeb Q4.  Did 

not eat breakfast but had about 100% of his lunch.





3/22/2025, resume the care of the patient today.  Patient seen and examined in 

the ICU.  His not tolerating his BiPAP mask more than 1 hour, currently his 

mildly/moderately tachypneic on 15 L oxygen via nonrebreather.  He has apparent 

swelling of both arms and legs, sitting up in chair feels tired denies chest 

pain.


Patient also on insulin drip at 7 units/h.





3/23


Patient looks similar to yesterday although reports some improvement in his 

breathing.  He used the BiPAP last night which might contributed to his feeling 

of improvement.


He still on IV Lasix 40 mg lowered the dose to twice daily and IV Solu-Medrol 40

mg lowered the dose to 40 mg twice daily.  He has more than 1.5 L out since 

yesterday.  He is still on fluid restriction and feels thirsty.


His WBC is 20,000 compared to 19.9 yesterday while he is on steroids.  

Hemoglobin stable at 7.3, creatinine stable at 2.2.


Also remains on IV cefepime and itraconazole for possible pneumonia





2/24


Patient improving slowly and gradually


Still sitting in chair with mild tachypnea and fluid overload


He remains on IV Lasix 40 mg twice daily and IV Solu-Medrol 40 mg twice daily


Also he is on IV cefepime and itraconazole


WBC is 21,000 today, hemoglobin up to 7.5 and creatinine trended down to 1.8.








Objective





- Vital Signs


Vital signs: 


                                   Vital Signs











Temp  98.2 F   03/24/25 08:00


 


Pulse  73   03/24/25 09:00


 


Resp  22   03/24/25 09:00


 


BP  144/62   03/24/25 09:00


 


Pulse Ox  92 L  03/24/25 09:00


 


FiO2  15   03/23/25 18:00








                                 Intake & Output











 03/23/25 03/24/25 03/24/25





 18:59 06:59 18:59


 


Intake Total 1494.467 169.563 236.087


 


Output Total 1675 2100 450


 


Balance -180.533 -1930.437 -213.913


 


Weight   100.5 kg


 


Intake:   


 


   120 30


 


    .9 105 120 30


 


  Intake, IV Titration 179.467 49.563 6.087





  Amount   


 


    Cefepime 2 gm In Sodium 100  





    Chloride 0.9% 100 ml @ 25   





    mls/hr IVPB Q12H [facility] Rx#   





    :[id number]   


 


    Insulin Regular 100 unit 79.467 49.563 6.087





    In Sodium Chloride 0.9%   





    100 ml @ Titrate IV .Q0M   





    [facility] Rx#:[id number]   


 


  Oral 1210  200


 


Output:   


 


  Urine 1675 2100 450


 


Other:   


 


  Voiding Method External Catheter External Catheter External Catheter


 


  # Bowel Movements 0  














- Exam





GENERAL: The patient is alert and oriented x3, not in any acute distress. Well 

developed, well nourished. 


HEENT: Pupils are round and equally reacting to light. EOMI. No scleral icterus.

No conjunctival pallor. Normocephalic, atraumatic. No pharyngeal erythema. No 

thyromegaly. 


CARDIOVASCULAR: S1 and S2 present. No murmurs, rubs, or gallops. 


-PULMONARY: Chest is clear to auscultation, no wheezing , n bilateral basal 

crackles. 


ABDOMEN: Soft, nontender, nondistended, normoactive bowel sounds. No palpable 

organomegaly. 


MUSCULOSKELETAL: No joint swelling or deformity. 


-EXTREMITIES: No cyanosis, clubbing, bilateral arm and leg pitting leg edema


NEUROLOGICAL: Gross neurological examination did not reveal any focal deficits. 


SKIN: No rashes. no petechiae.








- Labs


CBC & Chem 7: 


                                 03/24/25 05:53





                                 03/24/25 05:53


Labs: 


                  Abnormal Lab Results - Last 24 Hours (Table)











  03/23/25 03/23/25 03/23/25 Range/Units





  09:46 11:51 14:03 


 


WBC     (3.8-10.6)  k/uL


 


RBC     (4.30-5.90)  m/uL


 


Hgb     (13.0-17.5)  gm/dL


 


Hct     (39.0-53.0)  %


 


Neutrophils #     (1.3-7.7)  k/uL


 


Lymphocytes #     (1.0-4.8)  k/uL


 


Sodium     (137-145)  mmol/L


 


Carbon Dioxide     (22-30)  mmol/L


 


BUN     (9-20)  mg/dL


 


Creatinine     (0.66-1.25)  mg/dL


 


Glucose     (74-99)  mg/dL


 


POC Glucose (mg/dL)  258 H  250 H  316 H  ()  mg/dL














  03/23/25 03/23/25 03/23/25 Range/Units





  15:56 17:04 17:58 


 


WBC     (3.8-10.6)  k/uL


 


RBC     (4.30-5.90)  m/uL


 


Hgb     (13.0-17.5)  gm/dL


 


Hct     (39.0-53.0)  %


 


Neutrophils #     (1.3-7.7)  k/uL


 


Lymphocytes #     (1.0-4.8)  k/uL


 


Sodium     (137-145)  mmol/L


 


Carbon Dioxide     (22-30)  mmol/L


 


BUN     (9-20)  mg/dL


 


Creatinine     (0.66-1.25)  mg/dL


 


Glucose     (74-99)  mg/dL


 


POC Glucose (mg/dL)  278 H  207 H  175 H  ()  mg/dL














  03/23/25 03/23/25 03/23/25 Range/Units





  18:58 20:54 22:29 


 


WBC     (3.8-10.6)  k/uL


 


RBC     (4.30-5.90)  m/uL


 


Hgb     (13.0-17.5)  gm/dL


 


Hct     (39.0-53.0)  %


 


Neutrophils #     (1.3-7.7)  k/uL


 


Lymphocytes #     (1.0-4.8)  k/uL


 


Sodium     (137-145)  mmol/L


 


Carbon Dioxide     (22-30)  mmol/L


 


BUN     (9-20)  mg/dL


 


Creatinine     (0.66-1.25)  mg/dL


 


Glucose     (74-99)  mg/dL


 


POC Glucose (mg/dL)  175 H  151 H  145 H  ()  mg/dL














  03/24/25 03/24/25 03/24/25 Range/Units





  00:01 01:15 02:44 


 


WBC     (3.8-10.6)  k/uL


 


RBC     (4.30-5.90)  m/uL


 


Hgb     (13.0-17.5)  gm/dL


 


Hct     (39.0-53.0)  %


 


Neutrophils #     (1.3-7.7)  k/uL


 


Lymphocytes #     (1.0-4.8)  k/uL


 


Sodium     (137-145)  mmol/L


 


Carbon Dioxide     (22-30)  mmol/L


 


BUN     (9-20)  mg/dL


 


Creatinine     (0.66-1.25)  mg/dL


 


Glucose     (74-99)  mg/dL


 


POC Glucose (mg/dL)  117 H  154 H  225 H  ()  mg/dL














  03/24/25 03/24/25 03/24/25 Range/Units





  04:39 05:53 05:53 


 


WBC   21.2 H   (3.8-10.6)  k/uL


 


RBC   2.41 L   (4.30-5.90)  m/uL


 


Hgb   7.5 L   (13.0-17.5)  gm/dL


 


Hct   21.9 L   (39.0-53.0)  %


 


Neutrophils #   20.3 H   (1.3-7.7)  k/uL


 


Lymphocytes #   0.2 L   (1.0-4.8)  k/uL


 


Sodium    134 L  (137-145)  mmol/L


 


Carbon Dioxide    18 L  (22-30)  mmol/L


 


BUN    132 H*  (9-20)  mg/dL


 


Creatinine    1.89 H  (0.66-1.25)  mg/dL


 


Glucose    180 H  (74-99)  mg/dL


 


POC Glucose (mg/dL)  227 H    ()  mg/dL














  03/24/25 03/24/25 03/24/25 Range/Units





  06:15 07:07 08:38 


 


WBC     (3.8-10.6)  k/uL


 


RBC     (4.30-5.90)  m/uL


 


Hgb     (13.0-17.5)  gm/dL


 


Hct     (39.0-53.0)  %


 


Neutrophils #     (1.3-7.7)  k/uL


 


Lymphocytes #     (1.0-4.8)  k/uL


 


Sodium     (137-145)  mmol/L


 


Carbon Dioxide     (22-30)  mmol/L


 


BUN     (9-20)  mg/dL


 


Creatinine     (0.66-1.25)  mg/dL


 


Glucose     (74-99)  mg/dL


 


POC Glucose (mg/dL)  238 H  206 H  172 H  ()  mg/dL














Assessment and Plan


Assessment: 





#.  Acute pulmonary embolism, non-massive [segmental and subsegmental within the

 right middle and lower lobes.  No RV strain


Initially IV heparin, currently on o Eliquis 








#.  Sepsis likely secondary to -viral pneumonia.  Secondary bacterial component 


IV cefepime.  Levaquin discontinued on March 19..-also has been on itraconazole 

started on the March 14


ID and pulmonary following








#.  Acute hypoxic respiratory failure, secondary to above: Slow to respond


Intermittently BiPAP, currently 15 L nasal cannula








#.  Acute COPD exacerbation, in a prior smoker: Some improvement


DuoNeb Q4.  IV Solu-Medrol 60 mg Q6.  Nebulized Pulmicort








#.  Acute on chronic heart failure with reduced ejection fraction (EF 45 to 

50%): Some improvement


IV Lasix 40 mg every 12.  Aldactone-discontinued.





#.  Chronic hypoxic respiratory failure from underlying COPD, 2 L home O2





-Acute kidney injury, likely ATN multifactorial, worsening


Admission creatinine 1.27.











#.  Non-insulin-dependent type 2 diabetes, uncontrolled with hyperglycemia 

secondary steroids: Not improving


Insulin subcu sliding scale.  Stop Levemir.  


Continues on insulin drip


Accu-Cheks ACHS





- chronic kidney disease stage III from nephrosclerosis and diabetic nephropathy

 [creatinine 1.63 in June 2023]


Renal ultrasound.:  Suboptimal study.  No corticomedullary comment.


UA protein 1+





-Recent influenza type A








#.  Essential hypertension-


Toprol-XL.  Other antihypertensives have been held





#.  Hyperlipidemia


Lipitor





-Full code





On IV Lasix.,  IV cefepime, Itroconazole,  IV Solu-Medrol 60 mg.  Had a good 

lunch.  IV insulin

## 2025-03-25 LAB
ANION GAP SERPL CALC-SCNC: 5 MMOL/L
BUN SERPL-SCNC: 116 MG/DL (ref 9–20)
CALCIUM SPEC-MCNC: 8.8 MG/DL (ref 8.4–10.2)
CHLORIDE SERPL-SCNC: 101 MMOL/L (ref 98–107)
CO2 SERPL-SCNC: 27 MMOL/L (ref 22–30)
ERYTHROCYTE [DISTWIDTH] IN BLOOD BY AUTOMATED COUNT: 2.38 M/UL (ref 4.3–5.9)
ERYTHROCYTE [DISTWIDTH] IN BLOOD: 13.5 % (ref 11.5–15.5)
GLUCOSE BLD-MCNC: 110 MG/DL (ref 70–110)
GLUCOSE BLD-MCNC: 143 MG/DL (ref 70–110)
GLUCOSE BLD-MCNC: 175 MG/DL (ref 70–110)
GLUCOSE BLD-MCNC: 177 MG/DL (ref 70–110)
GLUCOSE BLD-MCNC: 209 MG/DL (ref 70–110)
GLUCOSE BLD-MCNC: 214 MG/DL (ref 70–110)
GLUCOSE BLD-MCNC: 236 MG/DL (ref 70–110)
GLUCOSE BLD-MCNC: 270 MG/DL (ref 70–110)
GLUCOSE BLD-MCNC: 279 MG/DL (ref 70–110)
GLUCOSE BLD-MCNC: 391 MG/DL (ref 70–110)
GLUCOSE BLD-MCNC: 405 MG/DL (ref 70–110)
GLUCOSE BLD-MCNC: 99 MG/DL (ref 70–110)
GLUCOSE SERPL-MCNC: 169 MG/DL (ref 74–99)
HCT VFR BLD AUTO: 21.8 % (ref 39–53)
HGB BLD-MCNC: 7.3 GM/DL (ref 13–17.5)
MCH RBC QN AUTO: 30.9 PG (ref 25–35)
MCHC RBC AUTO-ENTMCNC: 33.7 G/DL (ref 31–37)
MCV RBC AUTO: 91.9 FL (ref 80–100)
PLATELET # BLD AUTO: 203 K/UL (ref 150–450)
POTASSIUM SERPL-SCNC: 4.6 MMOL/L (ref 3.5–5.1)
SODIUM SERPL-SCNC: 133 MMOL/L (ref 137–145)
WBC # BLD AUTO: 22.4 K/UL (ref 3.8–10.6)

## 2025-03-25 RX ADMIN — SODIUM FERRIC GLUCONATE COMPLEX ONE MLS/HR: 12.5 INJECTION INTRAVENOUS at 11:20

## 2025-03-25 RX ADMIN — FLUCONAZOLE ONE MG: 100 TABLET ORAL at 15:38

## 2025-03-25 RX ADMIN — FUROSEMIDE SCH MG: 10 INJECTION, SOLUTION INTRAMUSCULAR; INTRAVENOUS at 20:40

## 2025-03-25 NOTE — XR
EXAMINATION TYPE: XR chest 1V portable

 

DATE OF EXAM: 3/25/2025 5:26 AM

 

COMPARISON: Multiple radiographs, with the most recent on 3/24/2025

 

TECHNIQUE: XR chest 1V portable Portable AP radiograph of the chest.

 

CLINICAL INDICATION:Male, 64 years old with history of SOB; 

 

FINDINGS: 

Lungs/Pleura: Blunting of the bilateral costophrenic angles. Similar bilateral increased lower lung o
pacities. No pneumothorax. 

Heart/mediastinum: Cardiomediastinal silhouette is enlarged and stable.  Atherosclerotic calcificatio
ns are seen in the aorta.

Musculoskeletal: No acute osseous pathology.

 

IMPRESSION: 

Overall similar examination with cardiomegaly and bilateral lower lung infiltrates and/or edema.

 

 

[facility], Workstation: [id number], 3/25/2025 6:43 AM

## 2025-03-25 NOTE — P.PN
Subjective


Progress Note Date: 03/25/25


Principal diagnosis: 





Reason for follow-up is pneumonia





Patient is a 64-year-old  male with a past medical history significant 

for diabetes mellitus hypertension hyperlipidemia pneumonia atrial fibrillation 

currently everyday smoker presenting to the hospital for evaluation of 

increasing shortness of breath patient has been diagnosed with multifocal 

pneumonia prompting this consultation.


On today's evaluation that is 03/25/2025, Patient is afebrile this morning patie

nt denies having any chest pain breathing slightly comfortably and did have 

occasional cough, the patient is currently on 10 L high flow nasal cannula 

oxygen, patient denies any abdominal pain no diarrhea no nausea no vomiting.


Patient white count is 22.4 creatinine is 2.40 CRP less than 0.5 Pro-Huey 0.43 

blood culture negative





Objective





- Vital Signs


Vital signs: 


                                   Vital Signs











Temp  96.4 F L  03/25/25 12:00


 


Pulse  78   03/25/25 12:00


 


Resp  29 H  03/25/25 12:00


 


BP  123/68   03/25/25 12:00


 


Pulse Ox  90 L  03/25/25 12:00


 


FiO2  50   03/25/25 05:00








                                 Intake & Output











 03/24/25 03/25/25 03/25/25





 18:59 06:59 18:59


 


Intake Total 941.162 703.603 437.498


 


Output Total 1600 1250 700


 


Balance -658.838 -546.397 -262.502


 


Weight 100.5 kg 100.9 kg 


 


Intake:   


 


   110 60


 


    .9 140 110 60


 


    Cefepime 2 gm In Sodium 100  





    Chloride 0.9% 100 ml @ 25   





    mls/hr IVPB Q12H [facility] Rx#   





    :[id number]   


 


  Intake, IV Titration 101.162 53.603 127.498





  Amount   


 


    Insulin Regular 100 unit 101.162 53.603 27.498





    In Sodium Chloride 0.9%   





    100 ml @ Titrate IV .Q0M   





    [facility] Rx#:[id number]   


 


    Sodium Ferric Gluconat-   100





    Sucrose 125 mg In Sodium   





    Chloride 0.9% 100 ml @   





    100 mls/hr IVPB ONCE ONE   





    Rx#:[id number]   


 


  Oral 600 540 250


 


Output:   


 


  Urine 1600 1250 700


 


Other:   


 


  Voiding Method External Catheter External Catheter External Catheter














- Exam





GENERAL DESCRIPTION: An elderly male lying in bed in no distress





RESPIRATORY SYSTEM: Unlabored breathing , coarse breath sounds bilaterally





HEART: S1 S2 regular rate and rhythm ,





ABDOMEN: Soft , no tenderness





EXTREMITIES: 2+ edema feet





- Labs


CBC & Chem 7: 


                                 03/25/25 05:26





                                 03/25/25 05:26


Labs: 


                  Abnormal Lab Results - Last 24 Hours (Table)











  03/23/25 03/24/25 03/24/25 Range/Units





  05:48 14:35 16:22 


 


WBC     (3.8-10.6)  k/uL


 


RBC     (4.30-5.90)  m/uL


 


Hgb     (13.0-17.5)  gm/dL


 


Hct     (39.0-53.0)  %


 


Sodium     (137-145)  mmol/L


 


BUN     (9-20)  mg/dL


 


Creatinine     (0.66-1.25)  mg/dL


 


Glucose     (74-99)  mg/dL


 


POC Glucose (mg/dL)   298 H  217 H  ()  mg/dL


 


Iron  34 L    ()  UG/DL


 


% Saturation  14.23 L    (15.00-50.00)   


 


Transferrin  171.0 L    (204.0-354.0)  mg/dL


 


Ferritin  1164.0 H    (22.0-322.0)  ng/mL














  03/24/25 03/24/25 03/24/25 Range/Units





  17:34 18:29 20:32 


 


WBC     (3.8-10.6)  k/uL


 


RBC     (4.30-5.90)  m/uL


 


Hgb     (13.0-17.5)  gm/dL


 


Hct     (39.0-53.0)  %


 


Sodium     (137-145)  mmol/L


 


BUN     (9-20)  mg/dL


 


Creatinine     (0.66-1.25)  mg/dL


 


Glucose     (74-99)  mg/dL


 


POC Glucose (mg/dL)  157 H  139 H  220 H  ()  mg/dL


 


Iron     ()  UG/DL


 


% Saturation     (15.00-50.00)   


 


Transferrin     (204.0-354.0)  mg/dL


 


Ferritin     (22.0-322.0)  ng/mL














  03/24/25 03/24/25 03/24/25 Range/Units





  21:03 22:04 23:10 


 


WBC     (3.8-10.6)  k/uL


 


RBC     (4.30-5.90)  m/uL


 


Hgb     (13.0-17.5)  gm/dL


 


Hct     (39.0-53.0)  %


 


Sodium     (137-145)  mmol/L


 


BUN     (9-20)  mg/dL


 


Creatinine     (0.66-1.25)  mg/dL


 


Glucose     (74-99)  mg/dL


 


POC Glucose (mg/dL)  376 H  243 H  213 H  ()  mg/dL


 


Iron     ()  UG/DL


 


% Saturation     (15.00-50.00)   


 


Transferrin     (204.0-354.0)  mg/dL


 


Ferritin     (22.0-322.0)  ng/mL














  03/25/25 03/25/25 03/25/25 Range/Units





  00:29 01:09 04:11 


 


WBC     (3.8-10.6)  k/uL


 


RBC     (4.30-5.90)  m/uL


 


Hgb     (13.0-17.5)  gm/dL


 


Hct     (39.0-53.0)  %


 


Sodium     (137-145)  mmol/L


 


BUN     (9-20)  mg/dL


 


Creatinine     (0.66-1.25)  mg/dL


 


Glucose     (74-99)  mg/dL


 


POC Glucose (mg/dL)  156 H  143 H  177 H  ()  mg/dL


 


Iron     ()  UG/DL


 


% Saturation     (15.00-50.00)   


 


Transferrin     (204.0-354.0)  mg/dL


 


Ferritin     (22.0-322.0)  ng/mL














  03/25/25 03/25/25 03/25/25 Range/Units





  05:05 05:26 05:26 


 


WBC   22.4 H   (3.8-10.6)  k/uL


 


RBC   2.38 L   (4.30-5.90)  m/uL


 


Hgb   7.3 L   (13.0-17.5)  gm/dL


 


Hct   21.8 L   (39.0-53.0)  %


 


Sodium    133 L  (137-145)  mmol/L


 


BUN    116 H*  (9-20)  mg/dL


 


Creatinine    2.40 H  (0.66-1.25)  mg/dL


 


Glucose    169 H  (74-99)  mg/dL


 


POC Glucose (mg/dL)  236 H    ()  mg/dL


 


Iron     ()  UG/DL


 


% Saturation     (15.00-50.00)   


 


Transferrin     (204.0-354.0)  mg/dL


 


Ferritin     (22.0-322.0)  ng/mL














  03/25/25 03/25/25 03/25/25 Range/Units





  06:11 07:54 08:57 


 


WBC     (3.8-10.6)  k/uL


 


RBC     (4.30-5.90)  m/uL


 


Hgb     (13.0-17.5)  gm/dL


 


Hct     (39.0-53.0)  %


 


Sodium     (137-145)  mmol/L


 


BUN     (9-20)  mg/dL


 


Creatinine     (0.66-1.25)  mg/dL


 


Glucose     (74-99)  mg/dL


 


POC Glucose (mg/dL)  209 H  175 H  214 H  ()  mg/dL


 


Iron     ()  UG/DL


 


% Saturation     (15.00-50.00)   


 


Transferrin     (204.0-354.0)  mg/dL


 


Ferritin     (22.0-322.0)  ng/mL














  03/25/25 03/25/25 Range/Units





  10:00 11:53 


 


WBC    (3.8-10.6)  k/uL


 


RBC    (4.30-5.90)  m/uL


 


Hgb    (13.0-17.5)  gm/dL


 


Hct    (39.0-53.0)  %


 


Sodium    (137-145)  mmol/L


 


BUN    (9-20)  mg/dL


 


Creatinine    (0.66-1.25)  mg/dL


 


Glucose    (74-99)  mg/dL


 


POC Glucose (mg/dL)  279 H  391 H  ()  mg/dL


 


Iron    ()  UG/DL


 


% Saturation    (15.00-50.00)   


 


Transferrin    (204.0-354.0)  mg/dL


 


Ferritin    (22.0-322.0)  ng/mL














Assessment and Plan


(1) Leukocytosis


Current Visit: Yes   Status: Acute   Code(s): D72.829 - ELEVATED WHITE BLOOD 

CELL COUNT, UNSPECIFIED   SNOMED Code(s): 165275351


   





(2) Bilateral pneumonia


Current Visit: Yes   Status: Acute   Code(s): J18.9 - PNEUMONIA, UNSPECIFIED 

ORGANISM   SNOMED Code(s): 803831802


   


Plan: 





1patient presented hospital with increasing shortness of breath and chest pain 

which is likely multifactorial in this patient who did have evidence of PE on 

the CT there is also evidence of multifocal pneumonia with elevated white count 

and recently acute influenza A will need to cover for resistant gram-positive as

well as gram-negative pathogen for post influenza pneumonia


2-blood culture has been negative no sputum has been collected, histo serology 

came back negative


3-patient has received adequate by therapy for pneumonia and will monitor 

closely off antibiotic therapy


4leukocytosis more likely steroid related plus minus oral pharyngeal 

candidiasis, will add Diflucan and see clinical response repeat CBC with a.m. 

lab


Dictation was produced using dragon dictation software. please excuse any 

grammatical, word or spelling errors. 








Time with Patient: Less than 30

## 2025-03-25 NOTE — P.PN
Subjective


Progress Note Date: 03/25/25


Principal diagnosis: 





Hospital course:


Acute on chronic hypoxic respiratory failure, multifactorial





Patient is a 64-year-old male with past medical history significant for atrial 

fibrillation, diabetes mellitus, CKD stage III, hypertension, hyperlipidemia, 

tobacco smoker.  Of note, recently had a prolonged hospitalization 02/23/2025 

through 03/07/2025.  Treated for combination of influenza A, pneumonia, CHF.  

Completed course of antibiotics and Tamiflu.  At this time, did require 

noninvasive BiPAP, eventually discharged on home O2.  Returns with a chief 

complaint of shortness of breath progressing over the last 2 to 3 days.  Also, 

reports sudden onset substernal chest pain that developed the night prior and 

has been persistent.  On arrival to the ED, found to be in some respiratory 

distress, and placed on BiPAP.  Workup in the emergency department including a 

chest x-ray showing multifocal infiltrates concerning for pneumonia or pulmonary

edema.  D-dimer was elevated in setting CT angio protocol which was positive for

segmental and subsegmental right middle lobe and right lower lobe pulmonary 

emboli.  No saddle pulmonary embolus. Pulmonary artery mildly enlarged.  

Preserved RV to LV ratio.  There were diffuse coarse reticular infiltrates, as 

well as, groundglass lung attenuation, cystic changes, with concerns for 

interstitial lung disease. Patient does take Eliquis for his history of atrial 

fibrillation.  Does state he has missed some doses lately.  CBC: WBC count 22.6,

hemoglobin 11, platelets 322.  CMP: Sodium 133, potassium 4.2, chloride 101, 

serum bicarb 24, BUN 30, creatinine 1.27, glucose 135.  Lactic 1.5.  VBG with a 

pH of 7.5 and pCO2 of 32.  EKG: Sinus rhythm, rate 65 bpm, left bundle branch 

block, not acute.  Elevated serial troponins 0.049 and 0.055 respectively.  NT 

proBNP elevated 8807.  Patient is currently being evaluated in the emergency 

department.  He is alert and some moderate respiratory distress.  On BiPAP with 

settings 12/5 and FiO2 60%.  Tachypneic, breathing around 40 breaths/min. 

Endorses progressive worsening difficulty in breathing over last couple days.  

He has tried to increase his supplemental oxygen at home without any relief. 

Denies previous history of DVT or PE.  Associated nonproductive cough.  Denies 

sputum production or hemoptysis.  Substernal nonradiating chest pain persist.  

Denies any fevers or chills.  Denies heart palpitations or syncopal events.  

Denies swelling in the lower extremities.  Heart rhythm is normal sinus on 

bedside monitor.  Blood pressure has been normotensive, did receive 2 L of 

crystalloid fluid bolus earlier.  Not requiring any vasopressors.  Normal saline

is infusing at 150 mL/h.  IV heparin infusing per protocol








3/13/2025, the patient is being seen for a follow-up.  The patient is 

alternating between BiPAP at a pressure of 12/5 with an FiO2 of 60% and 15 L of 

oxygen by nasal cannula.  He is getting slightly anxious and the patient is 

being provided Xanax accordingly.  Fluid balance is -1.1 L over the past 24 

hours.  Limited echocardiogram was also done and it showed preserved LV function

with an EF of around 55 to 60%.  Valvular functions could not be accurately 

assessed as the patient's study was technically difficult study.  There was 

however RV dilatation.  Unable to estimate RV pressures.  The electrolytes from 

today showed a creatinine of 1.5 with a BUN of 35.  Bicarb is at 21.  Sodium is 

at 132.  The patient remains on DuoNeb updrafts.  The patient remains on a 

combination of cefepime and vancomycin.  The patient was taken off the IV 

heparin and the patient was started on anticoagulation with Eliquis.  Remains on

IV Solu-Medrol 60 mg every 6 hours.  Remains on Aldactone.  Remains on IV Lasix 

40 mg every 12 hours.





On today's evaluation of 3/14/2025, the patient is overall condition is 

essentially unchanged.  Continues to be hypoxic, continues to be on BiPAP and 

the patient remains at a pressure of 12/5 with an FiO2 of 60%.  Continues to 

have crackles in lung base bilaterally.  White cell count remains elevated at 20

with a hemoglobin 9.2.  Very much BiPAP dependent as the patient desaturates 

once taken off the BiPAP.  BUN is 44 with a platelet of 1.5.  Sodium levels at 

134 and a potassium level is at 3.4.  Remains on DuoNeb updrafts.  Remains on IV

Lasix 40 mg every 12 hours.  Remains on Aldactone.  Remains on bronchodilators. 

Remains on steroids.  Empiric antibiotic coverage with IV cefepime.  Reviewed 

the CAT scan again and there is some chronic interstitial changes and the 

patient has diffuse infiltrates with areas of groundglass.  Consider acute 

interstitial pneumonias.  Consider fungal pneumonias.





On today's evaluation of 3/15/2025, the patient is feeling slightly better 

compared to yesterday.  The patient is off the BiPAP and the patient is 

currently on 15 L of oxygen by nasal cannula.  Remains on bronchodilators.  

Remains on IV Solu-Medrol.  Remains on IV Lasix.  Antibiotic coverage including 

combination of cefepime, Levaquin orally and itraconazole orally.  Fungal 

antibodies are still pending.  Meanwhile, rheumatologic profile showed a 

negative rheumatoid factor and negative CARLA.  Legionella urine antigen is still 

pending for now.  Clinically however, the patient is feeling slightly improved 

compared to yesterday.  His chest x-ray continues to show multifocal airspace 

disease more so in the right lung.  Fluid balance has been negative.  The 

patient's creatinine is currently at 1.6 with a BUN of 54 and a sodium levels at

133.  I am going to taper the steroids.  The white cell count is 18.2 with a 

hemoglobin of 9.2.





On 3/16/2025, the patient is transferred to the intensive care unit for closer 

monitoring.  As mentioned, the patient developed acute hypoxic respiratory fa

ilure with diffuse bilateral pulmonary filtrates discussed earlier.  The patient

was able to tolerate Airvo, however, overnight, the patient developed epistaxis.

 Therefore was discontinued the patient was placed back on BiPAP and his current

BiPAP is running at a pressure of 12 over 5 cm of water with an FiO2 of 80%.  

The patient seems to be quite dependent on the BiPAP.  Unable to drop the FiO2 

any further.  Repeat chest x-ray was done and shows stable diffuse bilateral 

pulm infiltrates which remains essentially unchanged compared to yesterday.  

Clinically, the patient is awake and alert.  He is reported to have some 

congested cough.  No significant sputum production.  No chest pain.  No h

emoptysis.  Noted, over the past 48 hours, the patient was diuresed with IV 

Lasix.  Nevertheless, we have not seen any improvement in the patient's 

oxygenation.  The patient was negative fluid balance.  His BNP today is at 68 

with a creatinine of 1.7.  Based on that, I stopped diuretics.  Rest of the 

electrolytes are all within normal limits.  White cell count of 21 with a 

hemoglobin 9.1 and a platelet count of 245.  The patient remains on broad-

spectrum antibiotics.  The patient remains on IV cefepime, itraconazole and 

Levaquin.  Fungal antibodies are still pending for now.  CARLA and rheumatoid 

factor were both negative.  Legionella urine antigen was also negative.  The 

patient remains on anticoagulation and I am going to drop the Eliquis dose to 5 

mg p.o. twice a day.  I am not absolutely convinced that the patient has a 

pulmonary embolism.  I reviewed the CAT scan of the chest and there could be 

potentially some subsegmental filling defects in the right, however, the overall

contrast administration was essentially poor.  The patient will be monitored 

very closely in the intensive care unit.  On a separate note, the patient was 

started on insulin drip for blood sugar control.





Patient was seen today on 3/17/2025, remains in the ICU, remains on fluid 

restrictions for low sodium of 128, he is on BiPAP 12/5/60%, patient is 

presenting this time very much similar to his last presentation few weeks ago.  

I am familiar with this patient, and on his last admission patient responded to 

treatment with mostly diuretics, and steroids.  This time came back with a 

similar presentation, he is receiving diuretics, but has been on hold for the 

last 24 hours, and I recommended we go back on Lasix 40 mg IV push every 12 

hours, patient is receiving Solu-Medrol at 60 mg IV push every 8 hours, he is 

also on Levaquin's, cefepime, and he is marginal at best.  In addition to this 

the patient had non-ST elevation myocardial infarction and flu/influenza A back 

on 3/11.  Blood cultures have been negative, patient has leukocytosis with WBC 

count of 21.7 hemoglobin is 7.9.  Creatinine has been slightly rising 1.51 on 

admission which is about his baseline, today his creatinine is 1.97 with a BUN 

of 95.  Legionella antigen has been negative CARLA screen has been negative 

rheumatoid factor is negative patient continues to complain of shortness of 

breath, intermittent cough, and wheezing.





Patient was seen today on 3/18/2025, remains in the ICU, remains on BiPAP, 

patient is down on FiO2 to 50%, so he is on BiPAP 12/5/50%.  Clinically the 

patient is feeling better breathing easier, remains on empiric antibiotics 

including cefepime and Levaquin remains on Lasix 40 mg every 12 hours remains on

steroids, and today I have noticed probably minimal improvement in his chest x-

ray findings.  Hence we will continue the same and will make Lasix 40 mg twice 

daily instead of 40 mg daily.  WBC count is 22.8 hemoglobin 7.4 electrolytes are

normal BUN is 117 creatinine 2.11





Patient was seen today on 3/19/2025, patient is feeling better today, remains on

BiPAP, however he is down to 40%, chest x-ray is showing definite improvement in

comparison to previous x-rays of the chest.  Patient remains on diuretics 

remains on antibiotics and remains on steroids, hence I have no plans to cut 

down on his diuretics at this point specially with his improvement clinically 

and improvement noted on the chest x-ray.  As a matter fact I was able to 

discontinue BiPAP, and I placed the patient on a high flow nasal cannula 10 L 

and he is saturating anywhere between 97 to 99%.  Creatinine is noted to be a 

bit higher today 2.28, his BUN is 125, his WBC count remains elevated at 18.1, 

hemoglobin is 7.  Patient remains quite edematous, and he remains on diuretics 

in the form of Lasix and Aldactone.  





Patient was seen today on 3/20/2025, basically about the same, hemoglobin is low

and the patient will receive 1 unit of packed RBCs today.  Last night he had to 

go back on BiPAP, although he was tried on high flow nasal cannula throughout 

the day yesterday, Desaturating and kept getting anxious and agitated, then he 

was placed on BiPAP overnight, and he remains on BiPAP 12/5/50%.  Hemoglobin 

today is 6.2, had some recommending at least a unit of packed RBCs.  Chest x-ray

continues to show bilateral interstitial edema/infiltrates.  Patient remains on 

cefepime, IV fluids at KVO, remains on diuretics.  Renal functioning is holding 

and not showing any significant change.





Seen today on 3/21/2025, patient was on BiPAP at night, however earlier this 

morning he was placed down to 8 L high flow nasal cannula.  Saturating at 93 to 

95%.  He was on BiPAP 12/5/50% last time.  Remains on Lasix 40 mg IV push twice 

daily remains on Solu-Medrol 60 every 6 remains on cefepime.  Patient has been 

on Sporanox.  Some improvement today compared to the last few days, no chest x-

ray was done, but the patient seems to be more comfortable on high flow nasal 

cannula compared to BiPAP.  Continues to have leukocytosis but improving with 

WBC of 16.5 electrolytes are normal bicarb is normal BUN is 123 creatinine 2.33 

blood sugar is 305.





Patient was seen today on 3/22/2025, remains in the ICU, presently on 15 L high 

flow nasal cannula, he was on BiPAP 12/5/50% last night.  Patient is also on 

insulin drip 7 units/h for elevated blood sugar, cefepime Lasix 40 mg twice 

daily and today I cut down his Solu-Medrol to 40 mg IV push every 12 hours.  

Chest x-ray continues to show edema/infiltrates at the base of his lungs, 

however overall his chest x-ray is much better now compared to his initial chest

x-ray on admission.  Clinical improvement but not quite ready to be transferred 

out of the ICU today, patient remains marginal in spite of the improvement 

noted.  And he has very complex multiple medical issues being addressed.  WBC 

count today is a bit higher at 19.9 hemoglobin is holding at 7.2 electrolytes 

are normal BUN is 118 creatinine 2.37 slightly higher.  Patient remains in 

negative fluid balance.





Patient was seen today on 3/23/2025, remains in the ICU, on 15 L high flow nasal

cannula on insulin at 5 units/h remains on cefepime Lasix Sporanox and Solu-

Medrol patient is feeling better today compared to baseline.  He seems to be 

less edematous, less shortness of breath, chest x-ray continues show bilateral 

interstitial infiltrates/edema.  His renal functioning is holding about the 

same, hence I have made no changes in his diuresis regimen today.  And I have 

kept him on the same medications, patient is improving but very slowly.  WBC 

count today is 20.6 hemoglobin 7.3 electrolytes are normal BUN is 118 creatinine

2.22





3/24/25:  Patient seen and examined at bedside today. Patient denies any chest 

pain, shortness of breath, abdominal pain. He does express his wish to be out of

bed and in the chair more frequently but mentions that he is unable to do so on 

his own. He reports feeling thirsty and wanting to drink more water but was 

counseled that he is on fluid restriction and is currently getting Lasix. He 

continues to be on 15L of oxygen via high flow nasal cannula.  Chest x-ray done 

today shows mild cardiomegaly with right greater than left bilateral lower lung 

acute infiltrates, some improved aeration in the left lower lung noted.  Vitals 

today show temperature 98.2, pulse 61, 32, /54.  Labs today show 

creatinine 1.89, , WBC 21.0, hemoglobin 7.5, sodium 134, bicarb 18, 

albumin 3.2.  Patient continues to be on Solu-Medrol 40 mg IV every 12 hours and

Lasix.  He is also on cefepime and itraconazole.  Patient wishes No code.  

Patient is on 0.9 normal saline at 10 mL/h and insulin at 2.6.





3/25/25: Patient evaluated at bedside.Patient continues to be in the ICU. He 

denies any acute complains. He is now on 9L of oxygen via high flow nasal 

cannula. Patient used BIPAP at 12/5/50% intermittently overnight. Chest X ray 

done today shows overall similar examination with cardiomegaly and bilateral 

lower lung infiltrates and/or edema. A midline IV access was obtained yesterday.

Histo serologies were negative so Itraconazole was discontinued. Iron studies 

show Iron 34, TIBC 239, % saturation 14.23, Transferrrin 171, Ferritin 1164. 

Labs show CRP <0.5, WBC 22.4, hemoglobin 7.3, sodium 133, , creatinine 

2.4, glucose 169, procalcitonin 0.43. Patient continues to be on 0.9 normal 

saline at 10 mL/h and insulin drip.





Objective





- Vital Signs


Vital signs: 


                                   Vital Signs











Temp  96.4 F L  03/25/25 08:00


 


Pulse  73   03/25/25 09:00


 


Resp  26 H  03/25/25 09:00


 


BP  147/83   03/25/25 09:00


 


Pulse Ox  93 L  03/25/25 09:00


 


FiO2  50   03/25/25 05:00








                                 Intake & Output











 03/24/25 03/25/25 03/25/25





 18:59 06:59 18:59


 


Intake Total 941.162 703.603 25.729


 


Output Total 1600 1250 0


 


Balance -658.838 -546.397 25.729


 


Weight 100.5 kg 100.9 kg 


 


Intake:   


 


   110 10


 


    .9 140 110 10


 


    Cefepime 2 gm In Sodium 100  





    Chloride 0.9% 100 ml @ 25   





    mls/hr IVPB Q12H [facility] Rx#   





    :[id number]   


 


  Intake, IV Titration 101.162 53.603 15.729





  Amount   


 


    Insulin Regular 100 unit 101.162 53.603 15.729





    In Sodium Chloride 0.9%   





    100 ml @ Titrate IV .Q0M   





    [facility] Rx#:[id number]   


 


  Oral 600 540 


 


Output:   


 


  Urine 1600 1250 0


 


Other:   


 


  Voiding Method External Catheter External Catheter 














- Exam





GENERAL EXAM: 64-year-old white male obese , 9 L high flow nasal cannula


HEAD: Normocephalic and atraumatic


EYES: Normal reaction of pupils, equal size.


NOSE: Normal nasal mucosa no further epistaxis


THROAT: No erythema or exudates.


NECK: No masses, no JVD.


CHEST: No chest wall deformity.  Lungs: Bibasilar crackles crackles at the bases

persist.


CVS: S1 and S2 normal with soft systolic murmur, regular rhythm. No other extra 

heart sounds


ABDOMEN: No hepatosplenomegaly, active bowel sounds, no guarding or rigidity. 


SKIN: No rashes


CENTRAL NERVOUS SYSTEM: Alert oriented x 3 no gross focal deficit


EXTREMITIES: No clubbing, no edema, no cyanosis





- Labs


CBC & Chem 7: 


                                 03/25/25 05:26





                                 03/25/25 05:26


Labs: 


                  Abnormal Lab Results - Last 24 Hours (Table)











  03/23/25 03/24/25 03/24/25 Range/Units





  05:48 05:53 11:05 


 


WBC     (3.8-10.6)  k/uL


 


RBC     (4.30-5.90)  m/uL


 


Hgb     (13.0-17.5)  gm/dL


 


Hct     (39.0-53.0)  %


 


Sodium     (137-145)  mmol/L


 


BUN     (9-20)  mg/dL


 


Creatinine     (0.66-1.25)  mg/dL


 


Glucose     (74-99)  mg/dL


 


POC Glucose (mg/dL)    293 H  ()  mg/dL


 


Iron  34 L    ()  UG/DL


 


% Saturation  14.23 L    (15.00-50.00)   


 


Transferrin  171.0 L    (204.0-354.0)  mg/dL


 


Ferritin  1164.0 H    (22.0-322.0)  ng/mL


 


Albumin   3.2 L   (3.5-5.0)  g/dL














  03/24/25 03/24/25 03/24/25 Range/Units





  11:59 13:23 14:35 


 


WBC     (3.8-10.6)  k/uL


 


RBC     (4.30-5.90)  m/uL


 


Hgb     (13.0-17.5)  gm/dL


 


Hct     (39.0-53.0)  %


 


Sodium     (137-145)  mmol/L


 


BUN     (9-20)  mg/dL


 


Creatinine     (0.66-1.25)  mg/dL


 


Glucose     (74-99)  mg/dL


 


POC Glucose (mg/dL)  328 H  301 H  298 H  ()  mg/dL


 


Iron     ()  UG/DL


 


% Saturation     (15.00-50.00)   


 


Transferrin     (204.0-354.0)  mg/dL


 


Ferritin     (22.0-322.0)  ng/mL


 


Albumin     (3.5-5.0)  g/dL














  03/24/25 03/24/25 03/24/25 Range/Units





  16:22 17:34 18:29 


 


WBC     (3.8-10.6)  k/uL


 


RBC     (4.30-5.90)  m/uL


 


Hgb     (13.0-17.5)  gm/dL


 


Hct     (39.0-53.0)  %


 


Sodium     (137-145)  mmol/L


 


BUN     (9-20)  mg/dL


 


Creatinine     (0.66-1.25)  mg/dL


 


Glucose     (74-99)  mg/dL


 


POC Glucose (mg/dL)  217 H  157 H  139 H  ()  mg/dL


 


Iron     ()  UG/DL


 


% Saturation     (15.00-50.00)   


 


Transferrin     (204.0-354.0)  mg/dL


 


Ferritin     (22.0-322.0)  ng/mL


 


Albumin     (3.5-5.0)  g/dL














  03/24/25 03/24/25 03/24/25 Range/Units





  20:32 21:03 22:04 


 


WBC     (3.8-10.6)  k/uL


 


RBC     (4.30-5.90)  m/uL


 


Hgb     (13.0-17.5)  gm/dL


 


Hct     (39.0-53.0)  %


 


Sodium     (137-145)  mmol/L


 


BUN     (9-20)  mg/dL


 


Creatinine     (0.66-1.25)  mg/dL


 


Glucose     (74-99)  mg/dL


 


POC Glucose (mg/dL)  220 H  376 H  243 H  ()  mg/dL


 


Iron     ()  UG/DL


 


% Saturation     (15.00-50.00)   


 


Transferrin     (204.0-354.0)  mg/dL


 


Ferritin     (22.0-322.0)  ng/mL


 


Albumin     (3.5-5.0)  g/dL














  03/24/25 03/25/25 03/25/25 Range/Units





  23:10 00:29 01:09 


 


WBC     (3.8-10.6)  k/uL


 


RBC     (4.30-5.90)  m/uL


 


Hgb     (13.0-17.5)  gm/dL


 


Hct     (39.0-53.0)  %


 


Sodium     (137-145)  mmol/L


 


BUN     (9-20)  mg/dL


 


Creatinine     (0.66-1.25)  mg/dL


 


Glucose     (74-99)  mg/dL


 


POC Glucose (mg/dL)  213 H  156 H  143 H  ()  mg/dL


 


Iron     ()  UG/DL


 


% Saturation     (15.00-50.00)   


 


Transferrin     (204.0-354.0)  mg/dL


 


Ferritin     (22.0-322.0)  ng/mL


 


Albumin     (3.5-5.0)  g/dL














  03/25/25 03/25/25 03/25/25 Range/Units





  04:11 05:05 05:26 


 


WBC    22.4 H  (3.8-10.6)  k/uL


 


RBC    2.38 L  (4.30-5.90)  m/uL


 


Hgb    7.3 L  (13.0-17.5)  gm/dL


 


Hct    21.8 L  (39.0-53.0)  %


 


Sodium     (137-145)  mmol/L


 


BUN     (9-20)  mg/dL


 


Creatinine     (0.66-1.25)  mg/dL


 


Glucose     (74-99)  mg/dL


 


POC Glucose (mg/dL)  177 H  236 H   ()  mg/dL


 


Iron     ()  UG/DL


 


% Saturation     (15.00-50.00)   


 


Transferrin     (204.0-354.0)  mg/dL


 


Ferritin     (22.0-322.0)  ng/mL


 


Albumin     (3.5-5.0)  g/dL














  03/25/25 03/25/25 03/25/25 Range/Units





  05:26 06:11 07:54 


 


WBC     (3.8-10.6)  k/uL


 


RBC     (4.30-5.90)  m/uL


 


Hgb     (13.0-17.5)  gm/dL


 


Hct     (39.0-53.0)  %


 


Sodium  133 L    (137-145)  mmol/L


 


BUN  116 H*    (9-20)  mg/dL


 


Creatinine  2.40 H    (0.66-1.25)  mg/dL


 


Glucose  169 H    (74-99)  mg/dL


 


POC Glucose (mg/dL)   209 H  175 H  ()  mg/dL


 


Iron     ()  UG/DL


 


% Saturation     (15.00-50.00)   


 


Transferrin     (204.0-354.0)  mg/dL


 


Ferritin     (22.0-322.0)  ng/mL


 


Albumin     (3.5-5.0)  g/dL














  03/25/25 Range/Units





  08:57 


 


WBC   (3.8-10.6)  k/uL


 


RBC   (4.30-5.90)  m/uL


 


Hgb   (13.0-17.5)  gm/dL


 


Hct   (39.0-53.0)  %


 


Sodium   (137-145)  mmol/L


 


BUN   (9-20)  mg/dL


 


Creatinine   (0.66-1.25)  mg/dL


 


Glucose   (74-99)  mg/dL


 


POC Glucose (mg/dL)  214 H  ()  mg/dL


 


Iron   ()  UG/DL


 


% Saturation   (15.00-50.00)   


 


Transferrin   (204.0-354.0)  mg/dL


 


Ferritin   (22.0-322.0)  ng/mL


 


Albumin   (3.5-5.0)  g/dL














Assessment and Plan


Assessment: 





Acute on chronic hypoxic respiratory failure, multifactorial, I suspect this is 

mostly a picture of pulmonary edema, underlying pneumonia is not entirely ruled 

out.  Hence the patient will remain on antibiotics and diuretics.


Acute exacerbation of chronic systolic congestive heart failure, recent 

echocardiogram from 02/24/2025 estimating a left ventricular ejection fraction 

of 45 to 50%, moderate pulmonary hypertension, and moderate tricuspid 

regurgitation.  Repeat echocardiogram showed improvement in LV function with 

ejection fraction 55%


Acute leukocytosis


Acute on chronic kidney disease


Recent influenza A infection


History of atrial fibrillation with previous cardioversion 


History of underlying COPD and chronic tobacco dependence


Benign essential hypertension


Type 2 diabetes


Acute on chronic anemia patient will require a unit of packed RBCs to be 

transfused today.  And will continue to monitor for any blood loss specially GI 

blood losses.


Plan: 





Continue to monitor in the ICU


Continue high flow nasal cannula, titrate FiO2 accordingly


Continue Lasix 40 mg IV Q12HR


Continue sodium bicarbonate tablet 650 mg PO BID


Continue bronchodilators


Continue Solu-Medrol at 40 mg IV push every 12 hours


Continue empiric antibiotics, patient is on cefepime


Patient is accepted at [facility] and his Insulin drip is 

discontinued with addition of Lantus and sliding scale in preparation for 

discharge


Remains marginal at best


Remains quite ill, prognosis remains guarded.





Time with Patient: Greater than 30

## 2025-03-25 NOTE — P.PN
Subjective





Patient is a 64-year-old male with a PMH of atrial fibrillation on Eliquis, COPD

on 2 L home oxygen, non-insulin-dependent diabetes mellitus, hyperlipidemia, 

hypertension presenting with shortness of breath.  Patient was previously 

admitted here for acute hypoxic respiratory failure secondary to influenza 

pneumonia and was discharged on 2 L of home oxygen.  Patient states he has been 

feeling short of breath while at rest.  He reports that since being discharged 

the home oxygen has not been sufficient. He states that he had to keep 

increasing his oxygen.    Patient says shortness of breath is slightly relieved 

when he is laying down.  Denies any orthopnea or PND.  Patient endorses a 

nonproductive cough that is chronic in nature, but says it has been getting 

worse.  Patient admits to having fever, chills.  Patient also admits to having 

non-radiating, pressure-like chest pain over the last few days.  Chest pain is 

not related to exertion and he had no alleviating or aggravating factors.  

Patient denies any headache, vision changes, nausea, vomiting, diarrhea, 

abdominal pain, urinary symptoms.





EKG independently interpreted displaying sinus rhythm with left bundle branch 

block that has been seen on prior EKG, rate 65 bpm, QTc 451 ms


CXR independently interpreted displaying bilateral multifocal airspace opacities


Chest CTA displaying acute segmental and subsegmental pulmonary emboli within 

the right and middle lower lobes, no saddle embolus, no RV strain, chronic 

interstitial loading disease


Venous Doppler B/L LE displaying no evidence of acute DVT


Troponin 0.049, 0.055, proBNP 8810, D-dimer 4.87, WBC 22.6, Hgb 11.0, HCT 35.2, 

MCV 93.7, platelet 322, PT 11.8, INR 1.1, APTT 28, sodium 133, potassium 4.2, 

CO2 24, BUN 30, creatinine 1.27, glucose 135


VBG pH 7.52, pCO2 32


T98.2 F, CO 80, RR 26, /77, O2 sat 90% on BiPAP with FiO2 of 100%





March 12: Overflow the ER.  Up in the bed.  Short of breath.  Patient was on BiP

AP overnight.  This morning on 15 L high flow nasal cannula.  Decreased 

appetite.  Has got a cough.  Some wheezing.  Some sputum production.  Decreased 

appetite.  Patient is on IV heparin.  Also on IV cefepime.  Pulmonary following


March 13: Patient did confirm that because Eliquis is very expensive patient was

not taking it properly did and take it sparingly at home.  Explains patient's 

PE.  Started on Eliquis today by cardiology.  IV heparin discontinued.  Getting 

easily short of breath.  Requiring BiPAP..  Some cough.  Oral intake fair.  On 

IV cefepime and vancomycin.  ID following.  Also on IV Lasix.  Due to worsening 

renal function will DC vancomycin


March 14: Saw this afternoon.  On BiPAP.  60%.  Ensure ordered.  All blood work 

ordered by pulmonary including fungal.  Patient currently on IV cefepime and 

Levaquin.  IV Solu-Medrol.  Remain short of breath.  Tired.  Being followed by 

pulmonary and ID.  Chest x-ray film personally reviewed by me-showing pulm edema

bilateral infiltrates especially at the bases.  Renal ultrasound nonspecific.  

UA showing protein 1+.


March 15: Remain short of breath.  Sitting up in bed.  Not able to come off the 

BiPAP.  Remains on IV cefepime.  Accu-Cheks running high.  Put on insulin drip. 

On IV Solu-Medrol.  Oral intake fair.


March 16: Patient remains short of breath.  Unable to get off BiPAP.  FiO2 

increased to 70%.  12/5.  Per Dr. Joshua: Patient be moved to the ICU.  Poor 

oral intake.  Remains on insulin drip.  IV Solu-Medrol.  Eliquis.  IV cefepime 

and Levaquin.  Tired.  Sitting up in bed leaning forward short of breath.  Chest

x-ray film personally reviewed by me: Scattered bilateral infiltrates


March 17: ICU.  Remains on BiPAP.  Decreased oral intake.  Sitting up.  Short of

breath.  On IV cefepime.  Insulin drip.  IV Solu-Medrol.  DuoNeb Q4.  Rather 

tired.  Also yesterday evening at epistaxis.  Nasal packing.


March 18: ICU.  Mostly been on BiPAP.  High flow nasal cannula.  Decreased oral 

intake.  Sitting up leaning forward.  Remains on IV cefepime or Levaquin insulin

drip.  A bit tired.  Patient has very poor oral intake.  Per intensivist patient

will IV Lasix.  Note worsening renal function-cut back to Lasix once a day.  

Given blood pressure running lower side.  DC Aldactone and DC amlodipine.


March 19: ICU.  Patient was taken off BiPAP this morning.  Decreased to high 

flow nasal cannula 10 L.  Wife at the bedside.  Encourage oral intake.  Chopped 

diet.  Per intensivist nephrology patient to continue on IV Lasix.  Blood 

pressure better after stopping amlodipine and Aldactone yesterday.  Encourage 

oral intake.  Incentive spirometry.  IV cefepime, Levaquin, I terconazole per 

ID.


March 20: ICU.  Yesterday patient was nasal cannula.  Overnight repeat put back 

on BiPAP.  Patient did drop his hemoglobin further.  Down to 6.2.  Felt to be 

from epistaxis.  Given a unit of blood.  Further increase in BUN/creatinine.  

Remains on IV Lasix.  Ordered Kerlix to both the lower extremities.  Had a good 

portion of his lunch today.


March 21: ICU.  Patient seen this morning.  15 L nasal cannula.  Reclining in 

bed.  Short of breath.  Ace wrap lower extremity.  Patient remains on IV 

cefepime, IV Lasix 40 mg every 12, insulin drip, IV Solu-Medrol DuoNeb Q4.  Did 

not eat breakfast but had about 100% of his lunch.





3/22/2025, resume the care of the patient today.  Patient seen and examined in 

the ICU.  His not tolerating his BiPAP mask more than 1 hour, currently his 

mildly/moderately tachypneic on 15 L oxygen via nonrebreather.  He has apparent 

swelling of both arms and legs, sitting up in chair feels tired denies chest 

pain.


Patient also on insulin drip at 7 units/h.





3/23


Patient looks similar to yesterday although reports some improvement in his 

breathing.  He used the BiPAP last night which might contributed to his feeling 

of improvement.


He still on IV Lasix 40 mg lowered the dose to twice daily and IV Solu-Medrol 40

mg lowered the dose to 40 mg twice daily.  He has more than 1.5 L out since 

yesterday.  He is still on fluid restriction and feels thirsty.


His WBC is 20,000 compared to 19.9 yesterday while he is on steroids.  

Hemoglobin stable at 7.3, creatinine stable at 2.2.


Also remains on IV cefepime and itraconazole for possible pneumonia





2/24


Patient improving slowly and gradually


Still sitting in chair with mild tachypnea and fluid overload


He remains on IV Lasix 40 mg twice daily and IV Solu-Medrol 40 mg twice daily


Also he is on IV cefepime and itraconazole


WBC is 21,000 today, hemoglobin up to 7.5 and creatinine trended down to 1.8.








2/25


He is slightly on the gradually improving


Oxygen requirements improved from 15 L/min down to 9 L/min


Hist still mildly tachypneic/dyspneic, and bilateral leg swelling


He is not drinking water, patient counseled about fluid restriction


Patient currently on IV Lasix 40 mg twice daily and.  Sodium bicarb was added


WBC 22,000, creatinine slightly up at 2.4





Objective





- Vital Signs


Vital signs: 


                                   Vital Signs











Temp  97.7 F   03/25/25 05:00


 


Pulse  60   03/25/25 07:00


 


Resp  12   03/25/25 07:00


 


BP  141/56   03/25/25 07:00


 


Pulse Ox  100   03/25/25 07:00


 


FiO2  50   03/25/25 05:00








                                 Intake & Output











 03/24/25 03/25/25 03/25/25





 18:59 06:59 18:59


 


Intake Total 941.162 703.603 10


 


Output Total 1600 1250 0


 


Balance -658.838 -546.397 10


 


Weight 100.5 kg 100.9 kg 


 


Intake:   


 


   110 10


 


    .9 140 110 10


 


    Cefepime 2 gm In Sodium 100  





    Chloride 0.9% 100 ml @ 25   





    mls/hr IVPB Q12H [facility] Rx#   





    :[id number]   


 


  Intake, IV Titration 101.162 53.603 





  Amount   


 


    Insulin Regular 100 unit 101.162 53.603 





    In Sodium Chloride 0.9%   





    100 ml @ Titrate IV .Q0M   





    [facility] Rx#:[id number]   


 


  Oral 600 540 


 


Output:   


 


  Urine 1600 1250 0


 


Other:   


 


  Voiding Method External Catheter External Catheter 














- Exam





GENERAL: The patient is alert and oriented x3, not in any acute distress. Well 

developed, well nourished. 


HEENT: Pupils are round and equally reacting to light. EOMI. No scleral icterus.

No conjunctival pallor. Normocephalic, atraumatic. No pharyngeal erythema. No 

thyromegaly. 


CARDIOVASCULAR: S1 and S2 present. No murmurs, rubs, or gallops. 


-PULMONARY: Chest is clear to auscultation, no wheezing , n bilateral basal 

crackles. 


ABDOMEN: Soft, nontender, nondistended, normoactive bowel sounds. No palpable 

organomegaly. 


MUSCULOSKELETAL: No joint swelling or deformity. 


-EXTREMITIES: No cyanosis, clubbing, bilateral arm and leg pitting leg edema


NEUROLOGICAL: Gross neurological examination did not reveal any focal deficits. 


SKIN: No rashes. no petechiae.








- Labs


CBC & Chem 7: 


                                 03/25/25 05:26





                                 03/25/25 05:26


Labs: 


                  Abnormal Lab Results - Last 24 Hours (Table)











  03/23/25 03/24/25 03/24/25 Range/Units





  05:48 05:53 05:53 


 


WBC     (3.8-10.6)  k/uL


 


RBC     (4.30-5.90)  m/uL


 


Hgb     (13.0-17.5)  gm/dL


 


Hct     (39.0-53.0)  %


 


Sodium     (137-145)  mmol/L


 


BUN   132 H*   (9-20)  mg/dL


 


Creatinine     (0.66-1.25)  mg/dL


 


Glucose     (74-99)  mg/dL


 


POC Glucose (mg/dL)     ()  mg/dL


 


Iron  34 L    ()  UG/DL


 


% Saturation  14.23 L    (15.00-50.00)   


 


Transferrin  171.0 L    (204.0-354.0)  mg/dL


 


Ferritin  1164.0 H    (22.0-322.0)  ng/mL


 


Albumin    3.2 L  (3.5-5.0)  g/dL














  03/24/25 03/24/25 03/24/25 Range/Units





  08:38 09:42 11:05 


 


WBC     (3.8-10.6)  k/uL


 


RBC     (4.30-5.90)  m/uL


 


Hgb     (13.0-17.5)  gm/dL


 


Hct     (39.0-53.0)  %


 


Sodium     (137-145)  mmol/L


 


BUN     (9-20)  mg/dL


 


Creatinine     (0.66-1.25)  mg/dL


 


Glucose     (74-99)  mg/dL


 


POC Glucose (mg/dL)  172 H  257 H  293 H  ()  mg/dL


 


Iron     ()  UG/DL


 


% Saturation     (15.00-50.00)   


 


Transferrin     (204.0-354.0)  mg/dL


 


Ferritin     (22.0-322.0)  ng/mL


 


Albumin     (3.5-5.0)  g/dL














  03/24/25 03/24/25 03/24/25 Range/Units





  11:59 13:23 14:35 


 


WBC     (3.8-10.6)  k/uL


 


RBC     (4.30-5.90)  m/uL


 


Hgb     (13.0-17.5)  gm/dL


 


Hct     (39.0-53.0)  %


 


Sodium     (137-145)  mmol/L


 


BUN     (9-20)  mg/dL


 


Creatinine     (0.66-1.25)  mg/dL


 


Glucose     (74-99)  mg/dL


 


POC Glucose (mg/dL)  328 H  301 H  298 H  ()  mg/dL


 


Iron     ()  UG/DL


 


% Saturation     (15.00-50.00)   


 


Transferrin     (204.0-354.0)  mg/dL


 


Ferritin     (22.0-322.0)  ng/mL


 


Albumin     (3.5-5.0)  g/dL














  03/24/25 03/24/25 03/24/25 Range/Units





  16:22 17:34 18:29 


 


WBC     (3.8-10.6)  k/uL


 


RBC     (4.30-5.90)  m/uL


 


Hgb     (13.0-17.5)  gm/dL


 


Hct     (39.0-53.0)  %


 


Sodium     (137-145)  mmol/L


 


BUN     (9-20)  mg/dL


 


Creatinine     (0.66-1.25)  mg/dL


 


Glucose     (74-99)  mg/dL


 


POC Glucose (mg/dL)  217 H  157 H  139 H  ()  mg/dL


 


Iron     ()  UG/DL


 


% Saturation     (15.00-50.00)   


 


Transferrin     (204.0-354.0)  mg/dL


 


Ferritin     (22.0-322.0)  ng/mL


 


Albumin     (3.5-5.0)  g/dL














  03/24/25 03/24/25 03/24/25 Range/Units





  20:32 21:03 22:04 


 


WBC     (3.8-10.6)  k/uL


 


RBC     (4.30-5.90)  m/uL


 


Hgb     (13.0-17.5)  gm/dL


 


Hct     (39.0-53.0)  %


 


Sodium     (137-145)  mmol/L


 


BUN     (9-20)  mg/dL


 


Creatinine     (0.66-1.25)  mg/dL


 


Glucose     (74-99)  mg/dL


 


POC Glucose (mg/dL)  220 H  376 H  243 H  ()  mg/dL


 


Iron     ()  UG/DL


 


% Saturation     (15.00-50.00)   


 


Transferrin     (204.0-354.0)  mg/dL


 


Ferritin     (22.0-322.0)  ng/mL


 


Albumin     (3.5-5.0)  g/dL














  03/24/25 03/25/25 03/25/25 Range/Units





  23:10 04:11 05:05 


 


WBC     (3.8-10.6)  k/uL


 


RBC     (4.30-5.90)  m/uL


 


Hgb     (13.0-17.5)  gm/dL


 


Hct     (39.0-53.0)  %


 


Sodium     (137-145)  mmol/L


 


BUN     (9-20)  mg/dL


 


Creatinine     (0.66-1.25)  mg/dL


 


Glucose     (74-99)  mg/dL


 


POC Glucose (mg/dL)  213 H  177 H  236 H  ()  mg/dL


 


Iron     ()  UG/DL


 


% Saturation     (15.00-50.00)   


 


Transferrin     (204.0-354.0)  mg/dL


 


Ferritin     (22.0-322.0)  ng/mL


 


Albumin     (3.5-5.0)  g/dL














  03/25/25 03/25/25 03/25/25 Range/Units





  05:26 05:26 06:11 


 


WBC  22.4 H    (3.8-10.6)  k/uL


 


RBC  2.38 L    (4.30-5.90)  m/uL


 


Hgb  7.3 L    (13.0-17.5)  gm/dL


 


Hct  21.8 L    (39.0-53.0)  %


 


Sodium   133 L   (137-145)  mmol/L


 


BUN   116 H*   (9-20)  mg/dL


 


Creatinine   2.40 H   (0.66-1.25)  mg/dL


 


Glucose   169 H   (74-99)  mg/dL


 


POC Glucose (mg/dL)    209 H  ()  mg/dL


 


Iron     ()  UG/DL


 


% Saturation     (15.00-50.00)   


 


Transferrin     (204.0-354.0)  mg/dL


 


Ferritin     (22.0-322.0)  ng/mL


 


Albumin     (3.5-5.0)  g/dL














Assessment and Plan


Assessment: 





#.  Acute pulmonary embolism, non-massive [segmental and subsegmental within the

 right middle and lower lobes.  No RV strain


Initially IV heparin, currently on o Eliquis 








#.  Sepsis likely secondary to -viral pneumonia.  Secondary bacterial component 


IV cefepime.  Levaquin discontinued on March 19..-also has been on itraconazole 

started on the March 14


ID and pulmonary following








#.  Acute hypoxic respiratory failure, secondary to above: Slow to respond


Intermittently BiPAP, currently 15 L nasal cannula








#.  Acute COPD exacerbation, in a prior smoker: Some improvement


DuoNeb Q4.  IV Solu-Medrol 60 mg Q6.  Nebulized Pulmicort








#.  Acute on chronic heart failure with reduced ejection fraction (EF 45 to 

50%): Some improvement


IV Lasix 40 mg every 12.  Aldactone-discontinued.





#.  Chronic hypoxic respiratory failure from underlying COPD, 2 L home O2





-Acute kidney injury, likely ATN multifactorial, worsening


Admission creatinine 1.27.











#.  Non-insulin-dependent type 2 diabetes, uncontrolled with hyperglycemia seco

ndary steroids: Not improving


Insulin subcu sliding scale.  Stop Levemir.  


Continues on insulin drip


Accu-Cheks ACHS





- chronic kidney disease stage III from nephrosclerosis and diabetic nephropathy

 [creatinine 1.63 in June 2023]


Renal ultrasound.:  Suboptimal study.  No corticomedullary comment.


UA protein 1+





-Recent influenza type A








#.  Essential hypertension-


Toprol-XL.  Other antihypertensives have been held





#.  Hyperlipidemia


Lipitor





-Full code





On IV Lasix.,  IV cefepime, Itroconazole,  IV Solu-Medrol 60 mg.  Had a good 

lunch.  IV insulin

## 2025-03-25 NOTE — P.PN
Subjective





Patient is seen in follow-up for acute kidney injury on chronic kidney disease. 

Renal function worse today.  Nonoliguric.  On 9 L high flow nasal cannula.





Vital signs are stable.


General: No acute distress.


HEENT: Head exam is unremarkable.  On high flow nasal cannula.


LUNGS: Scattered rhonchi.


HEART: Rate and Rhythm are regular. 


ABDOMEN: Nontender.


EXTREMITITES: 1+ edema.





Objective





- Vital Signs


Vital signs: 


                                   Vital Signs











Temp  96.4 F L  03/25/25 08:00


 


Pulse  73   03/25/25 09:00


 


Resp  26 H  03/25/25 09:00


 


BP  147/83   03/25/25 09:00


 


Pulse Ox  93 L  03/25/25 09:00


 


FiO2  50   03/25/25 05:00








                                 Intake & Output











 03/24/25 03/25/25 03/25/25





 18:59 06:59 18:59


 


Intake Total 941.162 703.603 25.729


 


Output Total 1600 1250 0


 


Balance -658.838 -546.397 25.729


 


Weight 100.5 kg 100.9 kg 


 


Intake:   


 


   110 10


 


    .9 140 110 10


 


    Cefepime 2 gm In Sodium 100  





    Chloride 0.9% 100 ml @ 25   





    mls/hr IVPB Q12H [facility] Rx#   





    :[id number]   


 


  Intake, IV Titration 101.162 53.603 15.729





  Amount   


 


    Insulin Regular 100 unit 101.162 53.603 15.729





    In Sodium Chloride 0.9%   





    100 ml @ Titrate IV .Q0M   





    [facility] Rx#:[id number]   


 


  Oral 600 540 


 


Output:   


 


  Urine 1600 1250 0


 


Other:   


 


  Voiding Method External Catheter External Catheter 














- Labs


CBC & Chem 7: 


                                 03/25/25 05:26





                                 03/25/25 05:26


Labs: 


                  Abnormal Lab Results - Last 24 Hours (Table)











  03/23/25 03/24/25 03/24/25 Range/Units





  05:48 05:53 11:05 


 


WBC     (3.8-10.6)  k/uL


 


RBC     (4.30-5.90)  m/uL


 


Hgb     (13.0-17.5)  gm/dL


 


Hct     (39.0-53.0)  %


 


Sodium     (137-145)  mmol/L


 


BUN     (9-20)  mg/dL


 


Creatinine     (0.66-1.25)  mg/dL


 


Glucose     (74-99)  mg/dL


 


POC Glucose (mg/dL)    293 H  ()  mg/dL


 


Iron  34 L    ()  UG/DL


 


% Saturation  14.23 L    (15.00-50.00)   


 


Transferrin  171.0 L    (204.0-354.0)  mg/dL


 


Ferritin  1164.0 H    (22.0-322.0)  ng/mL


 


Albumin   3.2 L   (3.5-5.0)  g/dL














  03/24/25 03/24/25 03/24/25 Range/Units





  11:59 13:23 14:35 


 


WBC     (3.8-10.6)  k/uL


 


RBC     (4.30-5.90)  m/uL


 


Hgb     (13.0-17.5)  gm/dL


 


Hct     (39.0-53.0)  %


 


Sodium     (137-145)  mmol/L


 


BUN     (9-20)  mg/dL


 


Creatinine     (0.66-1.25)  mg/dL


 


Glucose     (74-99)  mg/dL


 


POC Glucose (mg/dL)  328 H  301 H  298 H  ()  mg/dL


 


Iron     ()  UG/DL


 


% Saturation     (15.00-50.00)   


 


Transferrin     (204.0-354.0)  mg/dL


 


Ferritin     (22.0-322.0)  ng/mL


 


Albumin     (3.5-5.0)  g/dL














  03/24/25 03/24/25 03/24/25 Range/Units





  16:22 17:34 18:29 


 


WBC     (3.8-10.6)  k/uL


 


RBC     (4.30-5.90)  m/uL


 


Hgb     (13.0-17.5)  gm/dL


 


Hct     (39.0-53.0)  %


 


Sodium     (137-145)  mmol/L


 


BUN     (9-20)  mg/dL


 


Creatinine     (0.66-1.25)  mg/dL


 


Glucose     (74-99)  mg/dL


 


POC Glucose (mg/dL)  217 H  157 H  139 H  ()  mg/dL


 


Iron     ()  UG/DL


 


% Saturation     (15.00-50.00)   


 


Transferrin     (204.0-354.0)  mg/dL


 


Ferritin     (22.0-322.0)  ng/mL


 


Albumin     (3.5-5.0)  g/dL














  03/24/25 03/24/25 03/24/25 Range/Units





  20:32 21:03 22:04 


 


WBC     (3.8-10.6)  k/uL


 


RBC     (4.30-5.90)  m/uL


 


Hgb     (13.0-17.5)  gm/dL


 


Hct     (39.0-53.0)  %


 


Sodium     (137-145)  mmol/L


 


BUN     (9-20)  mg/dL


 


Creatinine     (0.66-1.25)  mg/dL


 


Glucose     (74-99)  mg/dL


 


POC Glucose (mg/dL)  220 H  376 H  243 H  ()  mg/dL


 


Iron     ()  UG/DL


 


% Saturation     (15.00-50.00)   


 


Transferrin     (204.0-354.0)  mg/dL


 


Ferritin     (22.0-322.0)  ng/mL


 


Albumin     (3.5-5.0)  g/dL














  03/24/25 03/25/25 03/25/25 Range/Units





  23:10 00:29 01:09 


 


WBC     (3.8-10.6)  k/uL


 


RBC     (4.30-5.90)  m/uL


 


Hgb     (13.0-17.5)  gm/dL


 


Hct     (39.0-53.0)  %


 


Sodium     (137-145)  mmol/L


 


BUN     (9-20)  mg/dL


 


Creatinine     (0.66-1.25)  mg/dL


 


Glucose     (74-99)  mg/dL


 


POC Glucose (mg/dL)  213 H  156 H  143 H  ()  mg/dL


 


Iron     ()  UG/DL


 


% Saturation     (15.00-50.00)   


 


Transferrin     (204.0-354.0)  mg/dL


 


Ferritin     (22.0-322.0)  ng/mL


 


Albumin     (3.5-5.0)  g/dL














  03/25/25 03/25/25 03/25/25 Range/Units





  04:11 05:05 05:26 


 


WBC    22.4 H  (3.8-10.6)  k/uL


 


RBC    2.38 L  (4.30-5.90)  m/uL


 


Hgb    7.3 L  (13.0-17.5)  gm/dL


 


Hct    21.8 L  (39.0-53.0)  %


 


Sodium     (137-145)  mmol/L


 


BUN     (9-20)  mg/dL


 


Creatinine     (0.66-1.25)  mg/dL


 


Glucose     (74-99)  mg/dL


 


POC Glucose (mg/dL)  177 H  236 H   ()  mg/dL


 


Iron     ()  UG/DL


 


% Saturation     (15.00-50.00)   


 


Transferrin     (204.0-354.0)  mg/dL


 


Ferritin     (22.0-322.0)  ng/mL


 


Albumin     (3.5-5.0)  g/dL














  03/25/25 03/25/25 03/25/25 Range/Units





  05:26 06:11 07:54 


 


WBC     (3.8-10.6)  k/uL


 


RBC     (4.30-5.90)  m/uL


 


Hgb     (13.0-17.5)  gm/dL


 


Hct     (39.0-53.0)  %


 


Sodium  133 L    (137-145)  mmol/L


 


BUN  116 H*    (9-20)  mg/dL


 


Creatinine  2.40 H    (0.66-1.25)  mg/dL


 


Glucose  169 H    (74-99)  mg/dL


 


POC Glucose (mg/dL)   209 H  175 H  ()  mg/dL


 


Iron     ()  UG/DL


 


% Saturation     (15.00-50.00)   


 


Transferrin     (204.0-354.0)  mg/dL


 


Ferritin     (22.0-322.0)  ng/mL


 


Albumin     (3.5-5.0)  g/dL














  03/25/25 03/25/25 Range/Units





  08:57 10:00 


 


WBC    (3.8-10.6)  k/uL


 


RBC    (4.30-5.90)  m/uL


 


Hgb    (13.0-17.5)  gm/dL


 


Hct    (39.0-53.0)  %


 


Sodium    (137-145)  mmol/L


 


BUN    (9-20)  mg/dL


 


Creatinine    (0.66-1.25)  mg/dL


 


Glucose    (74-99)  mg/dL


 


POC Glucose (mg/dL)  214 H  279 H  ()  mg/dL


 


Iron    ()  UG/DL


 


% Saturation    (15.00-50.00)   


 


Transferrin    (204.0-354.0)  mg/dL


 


Ferritin    (22.0-322.0)  ng/mL


 


Albumin    (3.5-5.0)  g/dL














Assessment and Plan


Plan: 





Assessment:


1.  Acute kidney injury secondary to ATN secondary to severe sepsis.  Renal 

function worse with creatinine 2.4 today.  No hydronephrosis noted on imaging.  

UA benign.  BUN disproportionately elevated partially due to steroids.  

Hemoglobin 7.3.  Improved with diuresis.  Questionable GI bleed.


2.  Chronic kidney disease stage IIIa with baseline creatinine 1.2-1.4 secondary

to nephrosclerosis.


3.  Acute on chronic diastolic CHF.


4.  Volume overload.


5.  Metabolic acidosis secondary to acute kidney injury.  Improved.  On oral 

bicarb.


6.  Recent Influenza A pneumonia status post Tamiflu.


7.  Hypervolemic hyponatremia.  Stable.


8.  Acute blood loss anemia with concern for GI bleed status post blood 

transfusions this admission.





Plan:


Transition to oral Lasix 40 mg twice daily.


IV iron today.


Avoid nephrotoxins.


Wean FiO2.


Potential discharge to select specialty today.

## 2025-03-26 VITALS — HEART RATE: 69 BPM | DIASTOLIC BLOOD PRESSURE: 58 MMHG | SYSTOLIC BLOOD PRESSURE: 127 MMHG | RESPIRATION RATE: 15 BRPM

## 2025-03-26 VITALS — TEMPERATURE: 96.7 F

## 2025-03-26 LAB
ANION GAP SERPL CALC-SCNC: 5 MMOL/L
BASOPHILS # BLD AUTO: 0 K/UL (ref 0–0.2)
BASOPHILS NFR BLD AUTO: 0 %
BUN SERPL-SCNC: 118 MG/DL (ref 9–20)
CALCIUM SPEC-MCNC: 8.4 MG/DL (ref 8.4–10.2)
CHLORIDE SERPL-SCNC: 99 MMOL/L (ref 98–107)
CO2 SERPL-SCNC: 28 MMOL/L (ref 22–30)
EOSINOPHIL # BLD AUTO: 0 K/UL (ref 0–0.7)
EOSINOPHIL NFR BLD AUTO: 0 %
ERYTHROCYTE [DISTWIDTH] IN BLOOD BY AUTOMATED COUNT: 2.29 M/UL (ref 4.3–5.9)
ERYTHROCYTE [DISTWIDTH] IN BLOOD: 13.6 % (ref 11.5–15.5)
GLUCOSE BLD-MCNC: 234 MG/DL (ref 70–110)
GLUCOSE BLD-MCNC: 241 MG/DL (ref 70–110)
GLUCOSE BLD-MCNC: 279 MG/DL (ref 70–110)
GLUCOSE BLD-MCNC: 307 MG/DL (ref 70–110)
GLUCOSE SERPL-MCNC: 206 MG/DL (ref 74–99)
HCT VFR BLD AUTO: 21.6 % (ref 39–53)
HGB BLD-MCNC: 7 GM/DL (ref 13–17.5)
LYMPHOCYTES # SPEC AUTO: 0.3 K/UL (ref 1–4.8)
LYMPHOCYTES NFR SPEC AUTO: 1 %
MAGNESIUM SPEC-SCNC: 2.8 MG/DL (ref 1.6–2.3)
MCH RBC QN AUTO: 30.3 PG (ref 25–35)
MCHC RBC AUTO-ENTMCNC: 32.3 G/DL (ref 31–37)
MCV RBC AUTO: 93.9 FL (ref 80–100)
MONOCYTES # BLD AUTO: 0.7 K/UL (ref 0–1)
MONOCYTES NFR BLD AUTO: 3 %
NEUTROPHILS # BLD AUTO: 25.3 K/UL (ref 1.3–7.7)
NEUTROPHILS NFR BLD AUTO: 95 %
PLATELET # BLD AUTO: 216 K/UL (ref 150–450)
POTASSIUM SERPL-SCNC: 4.2 MMOL/L (ref 3.5–5.1)
SODIUM SERPL-SCNC: 132 MMOL/L (ref 137–145)
WBC # BLD AUTO: 26.5 K/UL (ref 3.8–10.6)

## 2025-03-26 RX ADMIN — FLUCONAZOLE SCH MG: 100 TABLET ORAL at 09:03

## 2025-03-26 RX ADMIN — DARBEPOETIN ALFA SCH MCG: 40 INJECTION, SOLUTION INTRAVENOUS; SUBCUTANEOUS at 16:02

## 2025-03-26 RX ADMIN — SODIUM FERRIC GLUCONATE COMPLEX ONE MLS/HR: 12.5 INJECTION INTRAVENOUS at 11:52

## 2025-03-26 NOTE — P.PN
Subjective





Patient is seen in follow-up for acute kidney injury on chronic kidney disease. 

Renal function stable.  Nonoliguric.  On 9 L high flow nasal cannula.  Sitting 

up in chair.





Vital signs are stable.


General: No acute distress.


HEENT: Head exam is unremarkable.  On high flow nasal cannula.


LUNGS: Scattered rhonchi.


HEART: Rate and Rhythm are regular. 


ABDOMEN: Nontender.


EXTREMITITES: 1+ edema.





Objective





- Vital Signs


Vital signs: 


                                   Vital Signs











Temp  96.5 F L  03/26/25 08:00


 


Pulse  68   03/26/25 09:00


 


Resp  22   03/26/25 09:00


 


BP  103/49   03/26/25 09:00


 


Pulse Ox  97   03/26/25 09:00


 


FiO2  9   03/26/25 08:00








                                 Intake & Output











 03/25/25 03/26/25 03/26/25





 18:59 06:59 18:59


 


Intake Total 997.498 660 10


 


Output Total 1200 650 200


 


Balance -202.502 10 -190


 


Intake:   


 


   120 10


 


    .9 120 120 10


 


  Intake, IV Titration 127.498  





  Amount   


 


    Insulin Regular 100 unit 27.498  





    In Sodium Chloride 0.9%   





    100 ml @ Titrate IV .Q0M   





    [facility] Rx#:[id number]   


 


    Sodium Ferric Gluconat- 100  





    Sucrose 125 mg In Sodium   





    Chloride 0.9% 100 ml @   





    100 mls/hr IVPB ONCE ONE   





    Rx#:[id number]   


 


  Oral 750 540 


 


Output:   


 


  Urine 1200 650 200


 


Other:   


 


  Voiding Method External Catheter External Catheter 














- Labs


CBC & Chem 7: 


                                 03/26/25 05:28





                                 03/26/25 05:28


Labs: 


                  Abnormal Lab Results - Last 24 Hours (Table)











  03/25/25 03/25/25 03/25/25 Range/Units





  10:00 11:53 16:05 


 


WBC     (3.8-10.6)  k/uL


 


RBC     (4.30-5.90)  m/uL


 


Hgb     (13.0-17.5)  gm/dL


 


Hct     (39.0-53.0)  %


 


Neutrophils #     (1.3-7.7)  k/uL


 


Lymphocytes #     (1.0-4.8)  k/uL


 


Sodium     (137-145)  mmol/L


 


BUN     (9-20)  mg/dL


 


Creatinine     (0.66-1.25)  mg/dL


 


Glucose     (74-99)  mg/dL


 


POC Glucose (mg/dL)  279 H  391 H  405 H  ()  mg/dL


 


Hemoglobin A1c     (<=6.0)  %


 


Magnesium     (1.6-2.3)  mg/dL














  03/25/25 03/26/25 03/26/25 Range/Units





  20:18 02:57 05:28 


 


WBC     (3.8-10.6)  k/uL


 


RBC     (4.30-5.90)  m/uL


 


Hgb     (13.0-17.5)  gm/dL


 


Hct     (39.0-53.0)  %


 


Neutrophils #     (1.3-7.7)  k/uL


 


Lymphocytes #     (1.0-4.8)  k/uL


 


Sodium     (137-145)  mmol/L


 


BUN     (9-20)  mg/dL


 


Creatinine     (0.66-1.25)  mg/dL


 


Glucose     (74-99)  mg/dL


 


POC Glucose (mg/dL)  270 H  307 H   ()  mg/dL


 


Hemoglobin A1c    7.2 H  (<=6.0)  %


 


Magnesium     (1.6-2.3)  mg/dL














  03/26/25 03/26/25 03/26/25 Range/Units





  05:28 05:28 06:38 


 


WBC  26.5 H    (3.8-10.6)  k/uL


 


RBC  2.29 L    (4.30-5.90)  m/uL


 


Hgb  7.0 L    (13.0-17.5)  gm/dL


 


Hct  21.6 L    (39.0-53.0)  %


 


Neutrophils #  25.3 H    (1.3-7.7)  k/uL


 


Lymphocytes #  0.3 L    (1.0-4.8)  k/uL


 


Sodium   132 L   (137-145)  mmol/L


 


BUN   118 H*   (9-20)  mg/dL


 


Creatinine   2.35 H   (0.66-1.25)  mg/dL


 


Glucose   206 H   (74-99)  mg/dL


 


POC Glucose (mg/dL)    241 H  ()  mg/dL


 


Hemoglobin A1c     (<=6.0)  %


 


Magnesium   2.8 H   (1.6-2.3)  mg/dL














Assessment and Plan


Plan: 





Assessment:


1.  Acute kidney injury secondary to ATN secondary to severe sepsis.  Renal 

function stable with creatinine 2.35 today.  No hydronephrosis noted on imaging.

 UA benign.  BUN disproportionately elevated partially due to steroids.  

Hemoglobin 7.0.  Improved with diuresis.  Questionable GI bleed.


2.  Chronic kidney disease stage IIIa with baseline creatinine 1.2-1.4 secondary

to nephrosclerosis.


3.  Acute on chronic diastolic CHF.


4.  Volume overload.


5.  Metabolic acidosis secondary to acute kidney injury.  Improved.  On oral 

bicarb.


6.  Recent Influenza A pneumonia status post Tamiflu.


7.  Hypervolemic hyponatremia.  Stable.


8.  Acute blood loss anemia with concern for GI bleed status post blood 

transfusions this admission.





Plan:


Maintain IV Lasix.  Can be transitioned to oral diuretics in the next 2 to 3 

days.


Repeat IV iron today.


Add Aranesp.


Avoid nephrotoxins.


Wean FiO2.


Potential discharge to select specialty today.

## 2025-03-26 NOTE — P.PN
Subjective


Progress Note Date: 03/26/25


Principal diagnosis: 





Reason for follow-up is pneumonia





Patient is a 64-year-old  male with a past medical history significant 

for diabetes mellitus hypertension hyperlipidemia pneumonia atrial fibrillation 

currently everyday smoker presenting to the hospital for evaluation of 

increasing shortness of breath patient has been diagnosed with multifocal 

pneumonia prompting this consultation.


On today's evaluation that is 03/26/2025,the patient denies any fever or any chi

lls, patient is breathing slightly comfortably and is down to 9 L high flow 

nasal cannula oxygen the patient denies  chest pain or any significant cough no 

abdominal pain or diarrhea.


Patient white count is 26.5 creatinine is 2.35 blood culture remains to be 

negative chest x-ray bibasilar infiltrate or edema small bilateral effusion





Objective





- Vital Signs


Vital signs: 


                                   Vital Signs











Temp  96.5 F L  03/26/25 08:00


 


Pulse  70   03/26/25 11:00


 


Resp  16   03/26/25 11:00


 


BP  130/55   03/26/25 11:00


 


Pulse Ox  95   03/26/25 11:00


 


FiO2  50   03/26/25 10:19








                                 Intake & Output











 03/25/25 03/26/25 03/26/25





 18:59 06:59 18:59


 


Intake Total 997.498 660 550


 


Output Total 1200 650 400


 


Balance -202.502 10 150


 


Weight   100.9 kg


 


Intake:   


 


   120 50


 


    .9 120 120 50


 


  Intake, IV Titration 127.498  





  Amount   


 


    Insulin Regular 100 unit 27.498  





    In Sodium Chloride 0.9%   





    100 ml @ Titrate IV .Q0M   





    [facility][id number]   


 


    Sodium Ferric Gluconat- 100  





    Sucrose 125 mg In Sodium   





    Chloride 0.9% 100 ml @   





    100 mls/hr IVPB ONCE ONE   





    Rx#:[id number]   


 


  Oral 750 540 500


 


Output:   


 


  Urine 1200 650 400


 


Other:   


 


  Voiding Method External Catheter External Catheter External Catheter














- Exam





GENERAL DESCRIPTION: An elderly male lying in bed in no distress





RESPIRATORY SYSTEM: Unlabored breathing , coarse breath sounds bilaterally





HEART: S1 S2 regular rate and rhythm ,





ABDOMEN: Soft , no tenderness





EXTREMITIES: 2+ edema feet





- Labs


CBC & Chem 7: 


                                 03/26/25 05:28





                                 03/26/25 05:28


Labs: 


                  Abnormal Lab Results - Last 24 Hours (Table)











  03/25/25 03/25/25 03/26/25 Range/Units





  16:05 20:18 02:57 


 


WBC     (3.8-10.6)  k/uL


 


RBC     (4.30-5.90)  m/uL


 


Hgb     (13.0-17.5)  gm/dL


 


Hct     (39.0-53.0)  %


 


Neutrophils #     (1.3-7.7)  k/uL


 


Lymphocytes #     (1.0-4.8)  k/uL


 


Sodium     (137-145)  mmol/L


 


BUN     (9-20)  mg/dL


 


Creatinine     (0.66-1.25)  mg/dL


 


Glucose     (74-99)  mg/dL


 


POC Glucose (mg/dL)  405 H  270 H  307 H  ()  mg/dL


 


Hemoglobin A1c     (<=6.0)  %


 


Magnesium     (1.6-2.3)  mg/dL














  03/26/25 03/26/25 03/26/25 Range/Units





  05:28 05:28 05:28 


 


WBC   26.5 H   (3.8-10.6)  k/uL


 


RBC   2.29 L   (4.30-5.90)  m/uL


 


Hgb   7.0 L   (13.0-17.5)  gm/dL


 


Hct   21.6 L   (39.0-53.0)  %


 


Neutrophils #   25.3 H   (1.3-7.7)  k/uL


 


Lymphocytes #   0.3 L   (1.0-4.8)  k/uL


 


Sodium    132 L  (137-145)  mmol/L


 


BUN    118 H*  (9-20)  mg/dL


 


Creatinine    2.35 H  (0.66-1.25)  mg/dL


 


Glucose    206 H  (74-99)  mg/dL


 


POC Glucose (mg/dL)     ()  mg/dL


 


Hemoglobin A1c  7.2 H    (<=6.0)  %


 


Magnesium    2.8 H  (1.6-2.3)  mg/dL














  03/26/25 03/26/25 03/26/25 Range/Units





  06:38 11:42 11:44 


 


WBC     (3.8-10.6)  k/uL


 


RBC     (4.30-5.90)  m/uL


 


Hgb     (13.0-17.5)  gm/dL


 


Hct     (39.0-53.0)  %


 


Neutrophils #     (1.3-7.7)  k/uL


 


Lymphocytes #     (1.0-4.8)  k/uL


 


Sodium     (137-145)  mmol/L


 


BUN     (9-20)  mg/dL


 


Creatinine     (0.66-1.25)  mg/dL


 


Glucose     (74-99)  mg/dL


 


POC Glucose (mg/dL)  241 H  261 H  279 H  ()  mg/dL


 


Hemoglobin A1c     (<=6.0)  %


 


Magnesium     (1.6-2.3)  mg/dL














Assessment and Plan


(1) Leukocytosis


Current Visit: Yes   Status: Acute   Code(s): D72.829 - ELEVATED WHITE BLOOD 

CELL COUNT, UNSPECIFIED   SNOMED Code(s): 308279285


   





(2) Bilateral pneumonia


Current Visit: Yes   Status: Acute   Code(s): J18.9 - PNEUMONIA, UNSPECIFIED 

ORGANISM   SNOMED Code(s): 535825435


   


Plan: 





1patient presented hospital with increasing shortness of breath and chest pain 

which is likely multifactorial in this patient who did have evidence of PE on 

the CT there is also evidence of multifocal pneumonia with elevated white count 

and recently acute influenza A will need to cover for resistant gram-positive as

well as gram-negative pathogen for post influenza pneumonia


2-blood culture has been negative no sputum has been collected, histo serology 

came back negative


3-patient has received adequate antibiotic therapy for pneumonia


4leukocytosis more likely steroid related plus minus oral pharyngeal 

candidiasis, Diflucan was added yesterday white count still elevated and will be

monitored closely


Dictation was produced using dragon dictation software. please excuse any gra

mmatical, word or spelling errors. 








Time with Patient: Less than 30

## 2025-03-26 NOTE — XR
EXAMINATION TYPE: XR chest 1V portable

 

DATE OF EXAM: 3/26/2025 5:13 AM

 

COMPARISON: Multiple radiographs, with the most recent on 3/25/2025

 

TECHNIQUE: XR chest 1V portable Portable AP radiograph of the chest.

 

CLINICAL INDICATION:Male, 64 years old with history of Respiratory Failure; 

 

FINDINGS: 

Lungs/Pleura: Blunting of the bilateral costophrenic angles. Similar bibasilar airspace opacities. No
 pneumothorax. 

Heart/mediastinum: Cardiomediastinal silhouette is enlarged and stable.  Atherosclerotic calcificatio
ns are seen in the aorta.

Musculoskeletal: No acute osseous pathology.

 

IMPRESSION: 

Overall similar examination with cardiomegaly and bibasilar infiltrates and/or edema. Small bilateral
 pleural effusions.

 

[facility], Workstation: [id number], 3/26/2025 7:11 AM

## 2025-03-26 NOTE — P.PN
Subjective





Patient is a 64-year-old male with a PMH of atrial fibrillation on Eliquis, COPD

on 2 L home oxygen, non-insulin-dependent diabetes mellitus, hyperlipidemia, 

hypertension presenting with shortness of breath.  Patient was previously 

admitted here for acute hypoxic respiratory failure secondary to influenza 

pneumonia and was discharged on 2 L of home oxygen.  Patient states he has been 

feeling short of breath while at rest.  He reports that since being discharged 

the home oxygen has not been sufficient. He states that he had to keep 

increasing his oxygen.    Patient says shortness of breath is slightly relieved 

when he is laying down.  Denies any orthopnea or PND.  Patient endorses a 

nonproductive cough that is chronic in nature, but says it has been getting 

worse.  Patient admits to having fever, chills.  Patient also admits to having 

non-radiating, pressure-like chest pain over the last few days.  Chest pain is 

not related to exertion and he had no alleviating or aggravating factors.  

Patient denies any headache, vision changes, nausea, vomiting, diarrhea, 

abdominal pain, urinary symptoms.





EKG independently interpreted displaying sinus rhythm with left bundle branch 

block that has been seen on prior EKG, rate 65 bpm, QTc 451 ms


CXR independently interpreted displaying bilateral multifocal airspace opacities


Chest CTA displaying acute segmental and subsegmental pulmonary emboli within 

the right and middle lower lobes, no saddle embolus, no RV strain, chronic 

interstitial loading disease


Venous Doppler B/L LE displaying no evidence of acute DVT


Troponin 0.049, 0.055, proBNP 8810, D-dimer 4.87, WBC 22.6, Hgb 11.0, HCT 35.2, 

MCV 93.7, platelet 322, PT 11.8, INR 1.1, APTT 28, sodium 133, potassium 4.2, 

CO2 24, BUN 30, creatinine 1.27, glucose 135


VBG pH 7.52, pCO2 32


T98.2 F, W[place] 80, RR 26, /77, O2 sat 90% on BiPAP with FiO2 of 100%





March 12: Overflow the ER.  Up in the bed.  Short of breath.  Patient was on BiP

AP overnight.  This morning on 15 L high flow nasal cannula.  Decreased 

appetite.  Has got a cough.  Some wheezing.  Some sputum production.  Decreased 

appetite.  Patient is on IV heparin.  Also on IV cefepime.  Pulmonary following


March 13: Patient did confirm that because Eliquis is very expensive patient was

not taking it properly did and take it sparingly at home.  Explains patient's 

PE.  Started on Eliquis today by cardiology.  IV heparin discontinued.  Getting 

easily short of breath.  Requiring BiPAP..  Some cough.  Oral intake fair.  On 

IV cefepime and vancomycin.  ID following.  Also on IV Lasix.  Due to worsening 

renal function will DC vancomycin


March 14: Saw this afternoon.  On BiPAP.  60%.  Ensure ordered.  All blood work 

ordered by pulmonary including fungal.  Patient currently on IV cefepime and 

Levaquin.  IV Solu-Medrol.  Remain short of breath.  Tired.  Being followed by 

pulmonary and ID.  Chest x-ray film personally reviewed by me-showing pulm edema

bilateral infiltrates especially at the bases.  Renal ultrasound nonspecific.  

UA showing protein 1+.


March 15: Remain short of breath.  Sitting up in bed.  Not able to come off the 

BiPAP.  Remains on IV cefepime.  Accu-Cheks running high.  Put on insulin drip. 

On IV Solu-Medrol.  Oral intake fair.


March 16: Patient remains short of breath.  Unable to get off BiPAP.  FiO2 

increased to 70%.  12/5.  Per Dr. Joshua: Patient be moved to the ICU.  Poor 

oral intake.  Remains on insulin drip.  IV Solu-Medrol.  Eliquis.  IV cefepime 

and Levaquin.  Tired.  Sitting up in bed leaning forward short of breath.  Chest

x-ray film personally reviewed by me: Scattered bilateral infiltrates


March 17: ICU.  Remains on BiPAP.  Decreased oral intake.  Sitting up.  Short of

breath.  On IV cefepime.  Insulin drip.  IV Solu-Medrol.  DuoNeb Q4.  Rather 

tired.  Also yesterday evening at epistaxis.  Nasal packing.


March 18: ICU.  Mostly been on BiPAP.  High flow nasal cannula.  Decreased oral 

intake.  Sitting up leaning forward.  Remains on IV cefepime or Levaquin insulin

drip.  A bit tired.  Patient has very poor oral intake.  Per intensivist patient

will IV Lasix.  Note worsening renal function-cut back to Lasix once a day.  

Given blood pressure running lower side.  DC Aldactone and DC amlodipine.


March 19: ICU.  Patient was taken off BiPAP this morning.  Decreased to high 

flow nasal cannula 10 L.  Wife at the bedside.  Encourage oral intake.  Chopped 

diet.  Per intensivist nephrology patient to continue on IV Lasix.  Blood 

pressure better after stopping amlodipine and Aldactone yesterday.  Encourage 

oral intake.  Incentive spirometry.  IV cefepime, Levaquin, I terconazole per 

ID.


March 20: ICU.  Yesterday patient was nasal cannula.  Overnight repeat put back 

on BiPAP.  Patient did drop his hemoglobin further.  Down to 6.2.  Felt to be 

from epistaxis.  Given a unit of blood.  Further increase in BUN/creatinine.  

Remains on IV Lasix.  Ordered Kerlix to both the lower extremities.  Had a good 

portion of his lunch today.


March 21: ICU.  Patient seen this morning.  15 L nasal cannula.  Reclining in 

bed.  Short of breath.  Ace wrap lower extremity.  Patient remains on IV 

cefepime, IV Lasix 40 mg every 12, insulin drip, IV Solu-Medrol DuoNeb Q4.  Did 

not eat breakfast but had about 100% of his lunch.





3/22/2025, resume the care of the patient today.  Patient seen and examined in 

the ICU.  His not tolerating his BiPAP mask more than 1 hour, currently his 

mildly/moderately tachypneic on 15 L oxygen via nonrebreather.  He has apparent 

swelling of both arms and legs, sitting up in chair feels tired denies chest 

pain.


Patient also on insulin drip at 7 units/h.





3/23


Patient looks similar to yesterday although reports some improvement in his 

breathing.  He used the BiPAP last night which might contributed to his feeling 

of improvement.


He still on IV Lasix 40 mg lowered the dose to twice daily and IV Solu-Medrol 40

mg lowered the dose to 40 mg twice daily.  He has more than 1.5 L out since 

yesterday.  He is still on fluid restriction and feels thirsty.


His WBC is 20,000 compared to 19.9 yesterday while he is on steroids.  

Hemoglobin stable at 7.3, creatinine stable at 2.2.


Also remains on IV cefepime and itraconazole for possible pneumonia





2/24


Patient improving slowly and gradually


Still sitting in chair with mild tachypnea and fluid overload


He remains on IV Lasix 40 mg twice daily and IV Solu-Medrol 40 mg twice daily


Also he is on IV cefepime and itraconazole


WBC is 21,000 today, hemoglobin up to 7.5 and creatinine trended down to 1.8.








2/25


He is slightly on the gradually improving


Oxygen requirements improved from 15 L/min down to 9 L/min


Hist still mildly tachypneic/dyspneic, and bilateral leg swelling


He is not drinking water, patient counseled about fluid restriction


Patient currently on IV Lasix 40 mg twice daily and.  Sodium bicarb was added


WBC 22,000, creatinine slightly up at 2.4





2/26


Patient improving slowly and gradually


He is on 9 L oxygen via nasal cannula since yesterday and looks stable and 

proven.


He is kept on IV Lasix


He is on IV cefepime and fluconazole and Solu-Medrol


He has leukocytosis partly due to his infection and steroid effect


Patient is to be downgraded to select unit in 3 S.





Objective





- Vital Signs


Vital signs: 


                                   Vital Signs











Temp  98.2 F   03/26/25 00:00


 


Pulse  61   03/26/25 07:00


 


Resp  13   03/26/25 07:00


 


BP  128/59   03/26/25 07:00


 


Pulse Ox  96   03/26/25 07:00


 


FiO2  50   03/25/25 05:00








                                 Intake & Output











 03/25/25 03/26/25 03/26/25





 18:59 06:59 18:59


 


Intake Total 997.498 660 10


 


Output Total 1200 650 200


 


Balance -202.502 10 -190


 


Intake:   


 


   120 10


 


    .9 120 120 10


 


  Intake, IV Titration 127.498  





  Amount   


 


    Insulin Regular 100 unit 27.498  





    In Sodium Chloride 0.9%   





    100 ml @ Titrate IV .Q0M   





    [facility] Rx#:[id number]   


 


    Sodium Ferric Gluconat- 100  





    Sucrose 125 mg In Sodium   





    Chloride 0.9% 100 ml @   





    100 mls/hr IVPB ONCE ONE   





    Rx#:[id number]   


 


  Oral 750 540 


 


Output:   


 


  Urine 1200 650 200


 


Other:   


 


  Voiding Method External Catheter External Catheter 














- Exam





GENERAL: The patient is alert and oriented x3, not in any acute distress. Well 

developed, well nourished. 


HEENT: Pupils are round and equally reacting to light. EOMI. No scleral icterus.

No conjunctival pallor. Normocephalic, atraumatic. No pharyngeal erythema. No t

hyromegaly. 


CARDIOVASCULAR: S1 and S2 present. No murmurs, rubs, or gallops. 


-PULMONARY: Chest is clear to auscultation, no wheezing , n bilateral basal 

crackles. 


ABDOMEN: Soft, nontender, nondistended, normoactive bowel sounds. No palpable 

organomegaly. 


MUSCULOSKELETAL: No joint swelling or deformity. 


-EXTREMITIES: No cyanosis, clubbing, bilateral arm and leg pitting leg edema


NEUROLOGICAL: Gross neurological examination did not reveal any focal deficits. 


SKIN: No rashes. no petechiae.








- Labs


CBC & Chem 7: 


                                 03/26/25 05:28





                                 03/26/25 05:28


Labs: 


                  Abnormal Lab Results - Last 24 Hours (Table)











  03/25/25 03/25/25 03/25/25 Range/Units





  10:00 11:53 16:05 


 


WBC     (3.8-10.6)  k/uL


 


RBC     (4.30-5.90)  m/uL


 


Hgb     (13.0-17.5)  gm/dL


 


Hct     (39.0-53.0)  %


 


Neutrophils #     (1.3-7.7)  k/uL


 


Lymphocytes #     (1.0-4.8)  k/uL


 


Sodium     (137-145)  mmol/L


 


BUN     (9-20)  mg/dL


 


Creatinine     (0.66-1.25)  mg/dL


 


Glucose     (74-99)  mg/dL


 


POC Glucose (mg/dL)  279 H  391 H  405 H  ()  mg/dL


 


Hemoglobin A1c     (<=6.0)  %


 


Magnesium     (1.6-2.3)  mg/dL














  03/25/25 03/26/25 03/26/25 Range/Units





  20:18 02:57 05:28 


 


WBC     (3.8-10.6)  k/uL


 


RBC     (4.30-5.90)  m/uL


 


Hgb     (13.0-17.5)  gm/dL


 


Hct     (39.0-53.0)  %


 


Neutrophils #     (1.3-7.7)  k/uL


 


Lymphocytes #     (1.0-4.8)  k/uL


 


Sodium     (137-145)  mmol/L


 


BUN     (9-20)  mg/dL


 


Creatinine     (0.66-1.25)  mg/dL


 


Glucose     (74-99)  mg/dL


 


POC Glucose (mg/dL)  270 H  307 H   ()  mg/dL


 


Hemoglobin A1c    7.2 H  (<=6.0)  %


 


Magnesium     (1.6-2.3)  mg/dL














  03/26/25 03/26/25 03/26/25 Range/Units





  05:28 05:28 06:38 


 


WBC  26.5 H    (3.8-10.6)  k/uL


 


RBC  2.29 L    (4.30-5.90)  m/uL


 


Hgb  7.0 L    (13.0-17.5)  gm/dL


 


Hct  21.6 L    (39.0-53.0)  %


 


Neutrophils #  25.3 H    (1.3-7.7)  k/uL


 


Lymphocytes #  0.3 L    (1.0-4.8)  k/uL


 


Sodium   132 L   (137-145)  mmol/L


 


BUN   118 H*   (9-20)  mg/dL


 


Creatinine   2.35 H   (0.66-1.25)  mg/dL


 


Glucose   206 H   (74-99)  mg/dL


 


POC Glucose (mg/dL)    241 H  ()  mg/dL


 


Hemoglobin A1c     (<=6.0)  %


 


Magnesium   2.8 H   (1.6-2.3)  mg/dL














Assessment and Plan


Assessment: 





#.  Acute pulmonary embolism, non-massive [segmental and subsegmental within the

 right middle and lower lobes.  No RV strain


Initially IV heparin, currently on o Eliquis 








#.  Sepsis likely secondary to -viral pneumonia.  Secondary bacterial component 


IV cefepime.  Levaquin discontinued on March 19..-also has been on itraconazole 

started on the March 14


ID and pulmonary following








#.  Acute hypoxic respiratory failure, secondary to above: Slow to respond


Intermittently BiPAP, currently 15 L nasal cannula








#.  Acute COPD exacerbation, in a prior smoker: Some improvement


DuoNeb Q4.  IV Solu-Medrol 60 mg Q6.  Nebulized Pulmicort








#.  Acute on chronic heart failure with reduced ejection fraction (EF 45 to 

50%): Some improvement


IV Lasix 40 mg every 12.  Aldactone-discontinued.





#.  Chronic hypoxic respiratory failure from underlying COPD, 2 L home O2





-Acute kidney injury, likely ATN multifactorial, worsening


Admission creatinine 1.27.











#.  Non-insulin-dependent type 2 diabetes, uncontrolled with hyperglycemia 

secondary steroids: Not improving


Insulin subcu sliding scale.  Stop Levemir.  


Continues on insulin drip


Accu-Cheks ACHS





- chronic kidney disease stage III from nephrosclerosis and diabetic nephropathy

 [creatinine 1.63 in June 2023]


Renal ultrasound.:  Suboptimal study.  No corticomedullary comment.


UA protein 1+





-Recent influenza type A








#.  Essential hypertension-


Toprol-XL.  Other antihypertensives have been held





#.  Hyperlipidemia


Lipitor





-Full code





On IV Lasix.,  IV cefepime, Itroconazole,  IV Solu-Medrol 60 mg.  Had a good 

lunch.  IV insulin

## 2025-03-26 NOTE — P.DS
Providers


Date of admission: 


03/11/25 23:15





Attending physician: 


Hermann Bahena





Consults: 





                                        





03/11/25 23:14


Consult Physician Routine 


   Consulting Provider: Syd Wilson


   Consult Reason/Comments: PE


   Do you want consulting provider notified?: Yes





03/11/25 23:15


Consult Physician Routine 


   Consulting Provider: Paola Joshua


   Consult Reason/Comments: hypoxia


   Do you want consulting provider notified?: Yes


Consult Physician Urgent 


   Consulting Provider: Lina Martinez


   Consult Reason/Comments: multifocalPna


   Do you want consulting provider notified?: Yes





03/17/25 09:54


Consult Physician Routine 


   Consulting Provider: Maria L Rangel


   Consult Reason/Comments: HERNAN, hyponatremia


   Do you want consulting provider notified?: Yes











Primary care physician: 


[facility] Course: 





Diagnoses:


#.  Acute pulmonary embolism, non-massive [segmental and subsegmental within the

 right middle and lower lobes.  No RV strain








#.  Sepsis likely secondary to -viral pneumonia.  Secondary bacterial component 





#.  Acute hypoxic respiratory failure, secondary to above: Slow to respond





#.  Acute COPD exacerbation, in a prior smoker: Some improvement





#.  Acute on chronic heart failure with reduced ejection fraction (EF 45 to 

50%): Some improvement





#.  Chronic hypoxic respiratory failure from underlying COPD, 2 L home O2





#.  Acute kidney injury, likely ATN multifactorial, worsening





#.  Non-insulin-dependent type 2 diabetes, uncontrolled with hyperglycemia 

secondary steroids: Not improving





#.  Essential hypertension-





#.  Hyperlipidemia





-Full code











Hospital course:


Patient is a 64-year-old male with a PMH of atrial fibrillation on Eliquis, COPD

 on 2 L home oxygen, non-insulin-dependent diabetes mellitus, hyperlipidemia, 

hypertension presenting with shortness of breath.  Patient was previously admitt

ed here for acute hypoxic respiratory failure secondary to influenza pneumonia 

and was discharged on 2 L of home oxygen.  Patient states he has been feeling 

short of breath while at rest.  He reports that since being discharged the home 

oxygen has not been sufficient. He states that he had to keep increasing his 

oxygen.    Patient says shortness of breath is slightly relieved when he is 

laying down.  Denies any orthopnea or PND.  Patient endorses a nonproductive 

cough that is chronic in nature, but says it has been getting worse.  Patient 

admits to having fever, chills.  Patient also admits to having non-radiating, 

pressure-like chest pain over the last few days.  Chest pain is not related to 

exertion and he had no alleviating or aggravating factors.  Patient denies any 

headache, vision changes, nausea, vomiting, diarrhea, abdominal pain, urinary 

symptoms.


Patient was found to have acute hypoxic respiratory failure secondary to 

pulmonary edema with systolic dysfunction and ejection fraction was 45 to 50%.  

However pneumonia could not be excluded and patient was treated with IV 

antibiotic


Nephrology team are following the patient closely for acute kidney injury on CKD

 stage III.


Also patient had recent influenza infection.


He is kept on blood thinner Eliquis for his history of atrial fibrillation.


He has some elements of acute COPD exacerbation and was treated with Solu-Medrol

 40 mg twice daily and this can be tapered to oral pills upon discharge with 

prednisone


Patient oxygen requirements was 15 L/min told yesterday came down to 9 L/min 

which is the same as of today.


Patient denies chest pain.  No coughing.  No other specific GI/ symptom.  No 

headache dizziness.  He feels generally weak.  He feels anxious and is asking 

about getting his Xanax.  Risk benefits explained for him.


Other than that he looks stable and guarded prognosis and he is agreeable for 

the plan.


He was cleared for discharge by all consultants including pulmonary/critical 

care team, nephrology team, and infectious disease


Patient will be discharged antibiotics as per ID team


Patient also taper steroids


Continued on IV Lasix 40 mg twice daily, however this can be switched to oral 

dose 40 mg twice daily per accepting physician


Problems and management plan were discussed with the patient and he verbalized 

understanding and acceptance


Patient was found stable and can be discharged home in guarded prognosis however

 he needs follow-up as an outpatient. Patient was instructed to follow up with 

PCP within one week and patient agrees








Physical exam


-Gen: patient is a AAOx3, no distress.  Generally weak


CVS: S1-S2, RRR, no murmur


-Lungs: B/L CTA, no wheezing.  Bilateral basal crepitation.  Mildly to 

moderately tachypneic


Abdomen: soft, no distention, no tenderness, positive bowel sounds


Extremity: Bilateral pitting leg edema or induration





Time spent more than 35 minutes





Patient Condition at Discharge: Serious





Plan - Discharge Summary


Discharge Rx Participant: No


New Discharge Prescriptions: 


New


   Ipratropium-Albuterol Nebulize [Duoneb 0.5 mg-3 mg/3 ml Soln] 3 ml INHALATION

RT-QID PRN  each


     PRN Reason: Shortness Of Breath Or Wheezing


   HYDROcodone/APAP 5-325MG [Norco 5-325] 1 tab PO Q6HR PRN 3 Days #10 tab


     PRN Reason: Severe Pain (Scale 7 To 10)


   Budesonide [Pulmicort] 1 mg INHALATION RT-BID  ml


   Apixaban [Eliquis Starter Pack (for VTE)] 5 - 10 mg PO AS DIRECTED 30 Days #1

each


   Artificial Tears-Hypromellose [Artificial Tear Drops] 1 drops BOTH EYES QID 

PRN  ml


     PRN Reason: Dry Eye(S)


   Lactulose [Cephulac] 20 gm PO TID PRN  ml


     PRN Reason: Constipation


   Ipratropium-Albuterol Nebulize [Duoneb 0.5 mg-3 mg/3 ml Soln] 3 ml INHALATION

RT-Q4H  each


   INSULIN LISPRO (HumaLOG) [HumaLOG] 0 unit SQ [id number]  each


   Insulin Glargine (Lantus) [Lantus Vial] 20 unit SQ DAILY@0700  each


   Insulin Glargine (Lantus) [Lantus Vial] 20 unit SQ HS  each


   predniSONE 10 mg PO AS DIRECTED #50 tab


   Pantoprazole [Protonix] 40 mg PO AC-BRKFST  tab


   Sodium Bicarbonate Tab 650 mg PO BID  tab


   ALPRAZolam [Xanax] 0.5 mg PO BID 3 Days #6 tab





Continue


   Metoprolol Succinate (ER) [Toprol XL] 25 mg PO DAILY


   Aspirin 81 mg PO DAILY #90 tab


   Budesonide-Formot 160-4.5 Mcg [Symbicort 160-4.5 Mcg Inhaler] 2 puff 

INHALATION RT-BID 30 Days #1 each





Changed


   Furosemide [Lasix] 40 mg IV BID #0





Discontinued


   Amiodarone [Cordarone] 200 mg PO DAILY


   predniSONE See Taper PO DAILY #32 tab


   lisinopriL [Zestril] 20 mg PO BID


   Spironolactone [Aldactone] 25 mg PO DAILY #90 tablet


   metFORMIN HCL [Glucophage] 500 mg PO BID #60 tab


   Atorvastatin [Lipitor] 40 mg PO HS #90 tab


   amLODIPine [Norvasc] 5 mg PO DAILY #90 tab





No Action


   Apixaban [Eliquis] 5 mg PO BID


   Fluticasone Nasal Spray [Flonase Nasal Spray] 2 spray EA NOSTRIL DAILY PRN  

ml


     PRN Reason: Allergy Symptoms


Discharge Medication List





Apixaban [Eliquis] 5 mg PO BID 11/17/22 [History]


Metoprolol Succinate (ER) [Toprol XL] 25 mg PO DAILY 02/24/25 [History]


Aspirin 81 mg PO DAILY #90 tab 03/07/25 [Rx]


Budesonide-Formot 160-4.5 Mcg [Symbicort 160-4.5 Mcg Inhaler] 2 puff INHALATION 

RT-BID 30 Days #1 each 03/07/25 [Rx]


Fluticasone Nasal Spray [Flonase Nasal Spray] 2 spray EA NOSTRIL DAILY PRN  ml 

03/07/25 [Rx]


Apixaban [Eliquis Starter Pack (for VTE)] 5 - 10 mg PO AS DIRECTED 30 Days #1 

each 03/13/25 [Rx]


ALPRAZolam [Xanax] 0.5 mg PO BID 3 Days #6 tab 03/26/25 [Rx]


Artificial Tears-Hypromellose [Artificial Tear Drops] 1 drops BOTH EYES QID PRN 

ml 03/26/25 [Rx]


Budesonide [Pulmicort] 1 mg INHALATION RT-BID  ml 03/26/25 [Rx]


Furosemide [Lasix] 40 mg IV BID #0 03/26/25 [Rx]


HYDROcodone/APAP 5-325MG [Norco 5-325] 1 tab PO Q6HR PRN 3 Days #10 tab 03/26/25

[Rx]


INSULIN LISPRO (HumaLOG) [HumaLOG] 0 unit SQ [id number]  each 03/26/25 [Rx]


Insulin Glargine (Lantus) [Lantus Vial] 20 unit SQ DAILY@0700  each 03/26/25 

[Rx]


Insulin Glargine (Lantus) [Lantus Vial] 20 unit SQ HS  each 03/26/25 [Rx]


Ipratropium-Albuterol Nebulize [Duoneb 0.5 mg-3 mg/3 ml Soln] 3 ml INHALATION 

RT-Q4H  each 03/26/25 [Rx]


Ipratropium-Albuterol Nebulize [Duoneb 0.5 mg-3 mg/3 ml Soln] 3 ml INHALATION 

RT-QID PRN  each 03/26/25 [Rx]


Lactulose [Cephulac] 20 gm PO TID PRN  ml 03/26/25 [Rx]


Pantoprazole [Protonix] 40 mg PO AC-BRKFST  tab 03/26/25 [Rx]


Sodium Bicarbonate Tab 650 mg PO BID  tab 03/26/25 [Rx]


predniSONE 10 mg PO AS DIRECTED #50 tab 03/26/25 [Rx]








Follow up Appointment(s)/Referral(s): 


Maria L Rangel MD [STAFF PHYSICIAN] - 1 Week


Geovani Ray DO [STAFF PHYSICIAN] - 1 Week


None,Stated [REFERRING] - 1-2 days


Paola Joshua MD [STAFF PHYSICIAN] - 2 Weeks


Activity/Diet/Wound Care/Special Instructions: 


[facility] ([facility])


Discharge Disposition: [facility]

## 2025-03-26 NOTE — P.PN
Subjective


Progress Note Date: 03/26/25


Principal diagnosis: 





Hospital course:


Acute on chronic hypoxic respiratory failure, multifactorial





Patient is a 64-year-old male with past medical history significant for atrial 

fibrillation, diabetes mellitus, CKD stage III, hypertension, hyperlipidemia, 

tobacco smoker.  Of note, recently had a prolonged hospitalization 02/23/2025 

through 03/07/2025.  Treated for combination of influenza A, pneumonia, CHF.  

Completed course of antibiotics and Tamiflu.  At this time, did require 

noninvasive BiPAP, eventually discharged on home O2.  Returns with a chief 

complaint of shortness of breath progressing over the last 2 to 3 days.  Also, 

reports sudden onset substernal chest pain that developed the night prior and 

has been persistent.  On arrival to the ED, found to be in some respiratory 

distress, and placed on BiPAP.  Workup in the emergency department including a 

chest x-ray showing multifocal infiltrates concerning for pneumonia or pulmonary

edema.  D-dimer was elevated in setting CT angio protocol which was positive for

segmental and subsegmental right middle lobe and right lower lobe pulmonary 

emboli.  No saddle pulmonary embolus. Pulmonary artery mildly enlarged.  

Preserved RV to LV ratio.  There were diffuse coarse reticular infiltrates, as 

well as, groundglass lung attenuation, cystic changes, with concerns for 

interstitial lung disease. Patient does take Eliquis for his history of atrial 

fibrillation.  Does state he has missed some doses lately.  CBC: WBC count 22.6,

hemoglobin 11, platelets 322.  CMP: Sodium 133, potassium 4.2, chloride 101, 

serum bicarb 24, BUN 30, creatinine 1.27, glucose 135.  Lactic 1.5.  VBG with a 

pH of 7.5 and pCO2 of 32.  EKG: Sinus rhythm, rate 65 bpm, left bundle branch 

block, not acute.  Elevated serial troponins 0.049 and 0.055 respectively.  NT 

proBNP elevated 8807.  Patient is currently being evaluated in the emergency 

department.  He is alert and some moderate respiratory distress.  On BiPAP with 

settings 12/5 and FiO2 60%.  Tachypneic, breathing around 40 breaths/min. 

Endorses progressive worsening difficulty in breathing over last couple days.  

He has tried to increase his supplemental oxygen at home without any relief. 

Denies previous history of DVT or PE.  Associated nonproductive cough.  Denies 

sputum production or hemoptysis.  Substernal nonradiating chest pain persist.  

Denies any fevers or chills.  Denies heart palpitations or syncopal events.  

Denies swelling in the lower extremities.  Heart rhythm is normal sinus on 

bedside monitor.  Blood pressure has been normotensive, did receive 2 L of 

crystalloid fluid bolus earlier.  Not requiring any vasopressors.  Normal saline

is infusing at 150 mL/h.  IV heparin infusing per protocol








3/13/2025, the patient is being seen for a follow-up.  The patient is 

alternating between BiPAP at a pressure of 12/5 with an FiO2 of 60% and 15 L of 

oxygen by nasal cannula.  He is getting slightly anxious and the patient is 

being provided Xanax accordingly.  Fluid balance is -1.1 L over the past 24 

hours.  Limited echocardiogram was also done and it showed preserved LV function

with an EF of around 55 to 60%.  Valvular functions could not be accurately 

assessed as the patient's study was technically difficult study.  There was 

however RV dilatation.  Unable to estimate RV pressures.  The electrolytes from 

today showed a creatinine of 1.5 with a BUN of 35.  Bicarb is at 21.  Sodium is 

at 132.  The patient remains on DuoNeb updrafts.  The patient remains on a 

combination of cefepime and vancomycin.  The patient was taken off the IV 

heparin and the patient was started on anticoagulation with Eliquis.  Remains on

IV Solu-Medrol 60 mg every 6 hours.  Remains on Aldactone.  Remains on IV Lasix 

40 mg every 12 hours.





On today's evaluation of 3/14/2025, the patient is overall condition is 

essentially unchanged.  Continues to be hypoxic, continues to be on BiPAP and 

the patient remains at a pressure of 12/5 with an FiO2 of 60%.  Continues to 

have crackles in lung base bilaterally.  White cell count remains elevated at 20

with a hemoglobin 9.2.  Very much BiPAP dependent as the patient desaturates 

once taken off the BiPAP.  BUN is 44 with a platelet of 1.5.  Sodium levels at 

134 and a potassium level is at 3.4.  Remains on DuoNeb updrafts.  Remains on IV

Lasix 40 mg every 12 hours.  Remains on Aldactone.  Remains on bronchodilators. 

Remains on steroids.  Empiric antibiotic coverage with IV cefepime.  Reviewed 

the CAT scan again and there is some chronic interstitial changes and the 

patient has diffuse infiltrates with areas of groundglass.  Consider acute 

interstitial pneumonias.  Consider fungal pneumonias.





On today's evaluation of 3/15/2025, the patient is feeling slightly better 

compared to yesterday.  The patient is off the BiPAP and the patient is 

currently on 15 L of oxygen by nasal cannula.  Remains on bronchodilators.  

Remains on IV Solu-Medrol.  Remains on IV Lasix.  Antibiotic coverage including 

combination of cefepime, Levaquin orally and itraconazole orally.  Fungal 

antibodies are still pending.  Meanwhile, rheumatologic profile showed a 

negative rheumatoid factor and negative CARLA.  Legionella urine antigen is still 

pending for now.  Clinically however, the patient is feeling slightly improved 

compared to yesterday.  His chest x-ray continues to show multifocal airspace 

disease more so in the right lung.  Fluid balance has been negative.  The 

patient's creatinine is currently at 1.6 with a BUN of 54 and a sodium levels at

133.  I am going to taper the steroids.  The white cell count is 18.2 with a 

hemoglobin of 9.2.





On 3/16/2025, the patient is transferred to the intensive care unit for closer 

monitoring.  As mentioned, the patient developed acute hypoxic respiratory fa

ilure with diffuse bilateral pulmonary filtrates discussed earlier.  The patient

was able to tolerate Airvo, however, overnight, the patient developed epistaxis.

 Therefore was discontinued the patient was placed back on BiPAP and his current

BiPAP is running at a pressure of 12 over 5 cm of water with an FiO2 of 80%.  

The patient seems to be quite dependent on the BiPAP.  Unable to drop the FiO2 

any further.  Repeat chest x-ray was done and shows stable diffuse bilateral 

pulm infiltrates which remains essentially unchanged compared to yesterday.  

Clinically, the patient is awake and alert.  He is reported to have some 

congested cough.  No significant sputum production.  No chest pain.  No h

emoptysis.  Noted, over the past 48 hours, the patient was diuresed with IV 

Lasix.  Nevertheless, we have not seen any improvement in the patient's 

oxygenation.  The patient was negative fluid balance.  His BNP today is at 68 

with a creatinine of 1.7.  Based on that, I stopped diuretics.  Rest of the 

electrolytes are all within normal limits.  White cell count of 21 with a 

hemoglobin 9.1 and a platelet count of 245.  The patient remains on broad-

spectrum antibiotics.  The patient remains on IV cefepime, itraconazole and 

Levaquin.  Fungal antibodies are still pending for now.  CARLA and rheumatoid 

factor were both negative.  Legionella urine antigen was also negative.  The 

patient remains on anticoagulation and I am going to drop the Eliquis dose to 5 

mg p.o. twice a day.  I am not absolutely convinced that the patient has a 

pulmonary embolism.  I reviewed the CAT scan of the chest and there could be 

potentially some subsegmental filling defects in the right, however, the overall

contrast administration was essentially poor.  The patient will be monitored 

very closely in the intensive care unit.  On a separate note, the patient was 

started on insulin drip for blood sugar control.





Patient was seen today on 3/17/2025, remains in the ICU, remains on fluid 

restrictions for low sodium of 128, he is on BiPAP 12/5/60%, patient is 

presenting this time very much similar to his last presentation few weeks ago.  

I am familiar with this patient, and on his last admission patient responded to 

treatment with mostly diuretics, and steroids.  This time came back with a 

similar presentation, he is receiving diuretics, but has been on hold for the 

last 24 hours, and I recommended we go back on Lasix 40 mg IV push every 12 

hours, patient is receiving Solu-Medrol at 60 mg IV push every 8 hours, he is 

also on Levaquin's, cefepime, and he is marginal at best.  In addition to this 

the patient had non-ST elevation myocardial infarction and flu/influenza A back 

on 3/11.  Blood cultures have been negative, patient has leukocytosis with WBC 

count of 21.7 hemoglobin is 7.9.  Creatinine has been slightly rising 1.51 on 

admission which is about his baseline, today his creatinine is 1.97 with a BUN 

of 95.  Legionella antigen has been negative CARLA screen has been negative 

rheumatoid factor is negative patient continues to complain of shortness of 

breath, intermittent cough, and wheezing.





Patient was seen today on 3/18/2025, remains in the ICU, remains on BiPAP, 

patient is down on FiO2 to 50%, so he is on BiPAP 12/5/50%.  Clinically the 

patient is feeling better breathing easier, remains on empiric antibiotics 

including cefepime and Levaquin remains on Lasix 40 mg every 12 hours remains on

steroids, and today I have noticed probably minimal improvement in his chest x-

ray findings.  Hence we will continue the same and will make Lasix 40 mg twice 

daily instead of 40 mg daily.  WBC count is 22.8 hemoglobin 7.4 electrolytes are

normal BUN is 117 creatinine 2.11





Patient was seen today on 3/19/2025, patient is feeling better today, remains on

BiPAP, however he is down to 40%, chest x-ray is showing definite improvement in

comparison to previous x-rays of the chest.  Patient remains on diuretics 

remains on antibiotics and remains on steroids, hence I have no plans to cut 

down on his diuretics at this point specially with his improvement clinically 

and improvement noted on the chest x-ray.  As a matter fact I was able to 

discontinue BiPAP, and I placed the patient on a high flow nasal cannula 10 L 

and he is saturating anywhere between 97 to 99%.  Creatinine is noted to be a 

bit higher today 2.28, his BUN is 125, his WBC count remains elevated at 18.1, 

hemoglobin is 7.  Patient remains quite edematous, and he remains on diuretics 

in the form of Lasix and Aldactone.  





Patient was seen today on 3/20/2025, basically about the same, hemoglobin is low

and the patient will receive 1 unit of packed RBCs today.  Last night he had to 

go back on BiPAP, although he was tried on high flow nasal cannula throughout 

the day yesterday, Desaturating and kept getting anxious and agitated, then he 

was placed on BiPAP overnight, and he remains on BiPAP 12/5/50%.  Hemoglobin 

today is 6.2, had some recommending at least a unit of packed RBCs.  Chest x-ray

continues to show bilateral interstitial edema/infiltrates.  Patient remains on 

cefepime, IV fluids at KVO, remains on diuretics.  Renal functioning is holding 

and not showing any significant change.





Seen today on 3/21/2025, patient was on BiPAP at night, however earlier this 

morning he was placed down to 8 L high flow nasal cannula.  Saturating at 93 to 

95%.  He was on BiPAP 12/5/50% last time.  Remains on Lasix 40 mg IV push twice 

daily remains on Solu-Medrol 60 every 6 remains on cefepime.  Patient has been 

on Sporanox.  Some improvement today compared to the last few days, no chest x-

ray was done, but the patient seems to be more comfortable on high flow nasal 

cannula compared to BiPAP.  Continues to have leukocytosis but improving with 

WBC of 16.5 electrolytes are normal bicarb is normal BUN is 123 creatinine 2.33 

blood sugar is 305.





Patient was seen today on 3/22/2025, remains in the ICU, presently on 15 L high 

flow nasal cannula, he was on BiPAP 12/5/50% last night.  Patient is also on 

insulin drip 7 units/h for elevated blood sugar, cefepime Lasix 40 mg twice 

daily and today I cut down his Solu-Medrol to 40 mg IV push every 12 hours.  

Chest x-ray continues to show edema/infiltrates at the base of his lungs, 

however overall his chest x-ray is much better now compared to his initial chest

x-ray on admission.  Clinical improvement but not quite ready to be transferred 

out of the ICU today, patient remains marginal in spite of the improvement 

noted.  And he has very complex multiple medical issues being addressed.  WBC 

count today is a bit higher at 19.9 hemoglobin is holding at 7.2 electrolytes 

are normal BUN is 118 creatinine 2.37 slightly higher.  Patient remains in 

negative fluid balance.





Patient was seen today on 3/23/2025, remains in the ICU, on 15 L high flow nasal

cannula on insulin at 5 units/h remains on cefepime Lasix Sporanox and Solu-

Medrol patient is feeling better today compared to baseline.  He seems to be 

less edematous, less shortness of breath, chest x-ray continues show bilateral 

interstitial infiltrates/edema.  His renal functioning is holding about the 

same, hence I have made no changes in his diuresis regimen today.  And I have 

kept him on the same medications, patient is improving but very slowly.  WBC 

count today is 20.6 hemoglobin 7.3 electrolytes are normal BUN is 118 creatinine

2.22





3/24/25:  Patient seen and examined at bedside today. Patient denies any chest 

pain, shortness of breath, abdominal pain. He does express his wish to be out of

bed and in the chair more frequently but mentions that he is unable to do so on 

his own. He reports feeling thirsty and wanting to drink more water but was 

counseled that he is on fluid restriction and is currently getting Lasix. He 

continues to be on 15L of oxygen via high flow nasal cannula.  Chest x-ray done 

today shows mild cardiomegaly with right greater than left bilateral lower lung 

acute infiltrates, some improved aeration in the left lower lung noted.  Vitals 

today show temperature 98.2, pulse 61, 32, /54.  Labs today show 

creatinine 1.89, , WBC 21.0, hemoglobin 7.5, sodium 134, bicarb 18, 

albumin 3.2.  Patient continues to be on Solu-Medrol 40 mg IV every 12 hours and

Lasix.  He is also on cefepime and itraconazole.  Patient wishes No code.  

Patient is on 0.9 normal saline at 10 mL/h and insulin at 2.6.





3/25/25: Patient evaluated at bedside.Patient continues to be in the ICU. He 

denies any acute complains. He is now on 9L of oxygen via high flow nasal 

cannula. Patient used BIPAP at 12/5/50% intermittently overnight. Chest X ray 

done today shows overall similar examination with cardiomegaly and bilateral 

lower lung infiltrates and/or edema. A midline IV access was obtained yesterday.

Histo serologies were negative so Itraconazole was discontinued. Iron studies 

show Iron 34, TIBC 239, % saturation 14.23, Transferrrin 171, Ferritin 1164. 

Labs show CRP <0.5, WBC 22.4, hemoglobin 7.3, sodium 133, , creatinine 

2.4, glucose 169, procalcitonin 0.43. Patient continues to be on 0.9 normal 

saline at 10 mL/h and insulin drip.








3/26/25: Patient seen today in the ICU. He denies any acute complains. Patient 

is out of bed and in the chair beside his bed. Reports feeling better with La

ctulose. He continues to be on 9L of oxygen via high flow nasal cannula. He did 

not use the BIPAP overnight. Chest Xray done today shows overall similar 

examination with cardiomegaly and bibasilar infiltrates and/or edema, small 

bilateral pleural effusions.  Labs today show WBC 26.5, hemoglobin 7, sodium 

132, , creatinine 2.35 magnesium 2.8, A1c 7.2. He is on 0.9 normal saline 

at 10 mL/h. Patient is awaiting bed at [facility]. 





Objective





- Vital Signs


Vital signs: 


                                   Vital Signs











Temp  96.5 F L  03/26/25 08:00


 


Pulse  68   03/26/25 09:00


 


Resp  22   03/26/25 09:00


 


BP  103/49   03/26/25 09:00


 


Pulse Ox  97   03/26/25 09:00


 


FiO2  9   03/26/25 08:00








                                 Intake & Output











 03/25/25 03/26/25 03/26/25





 18:59 06:59 18:59


 


Intake Total 997.498 660 10


 


Output Total 1200 650 200


 


Balance -202.502 10 -190


 


Intake:   


 


   120 10


 


    .9 120 120 10


 


  Intake, IV Titration 127.498  





  Amount   


 


    Insulin Regular 100 unit 27.498  





    In Sodium Chloride 0.9%   





    100 ml @ Titrate IV .Q0M   





    [facility] Rx#:[id number]   


 


    Sodium Ferric Gluconat- 100  





    Sucrose 125 mg In Sodium   





    Chloride 0.9% 100 ml @   





    100 mls/hr IVPB ONCE ONE   





    Rx#:[id number]   


 


  Oral 750 540 


 


Output:   


 


  Urine 1200 650 200


 


Other:   


 


  Voiding Method External Catheter External Catheter External Catheter














- Exam





GENERAL EXAM: 64-year-old white male obese , 9 L high flow nasal cannula


HEAD: Normocephalic and atraumatic


EYES: Normal reaction of pupils, equal size.


NOSE: Normal nasal mucosa no further epistaxis


THROAT: No erythema or exudates.


NECK: No masses, no JVD.


CHEST: No chest wall deformity.  Lungs: Bibasilar crackles crackles at the bases

persist.


CVS: S1 and S2 normal with soft systolic murmur, regular rhythm. No other extra 

heart sounds


ABDOMEN: No hepatosplenomegaly, active bowel sounds, no guarding or rigidity. 


SKIN: No rashes


CENTRAL NERVOUS SYSTEM: Alert oriented x 3 no gross focal deficit


EXTREMITIES: No clubbing, no edema, no cyanosis








- Labs


CBC & Chem 7: 


                                 03/26/25 05:28





                                 03/26/25 05:28


Labs: 


                  Abnormal Lab Results - Last 24 Hours (Table)











  03/25/25 03/25/25 03/25/25 Range/Units





  11:53 16:05 20:18 


 


WBC     (3.8-10.6)  k/uL


 


RBC     (4.30-5.90)  m/uL


 


Hgb     (13.0-17.5)  gm/dL


 


Hct     (39.0-53.0)  %


 


Neutrophils #     (1.3-7.7)  k/uL


 


Lymphocytes #     (1.0-4.8)  k/uL


 


Sodium     (137-145)  mmol/L


 


BUN     (9-20)  mg/dL


 


Creatinine     (0.66-1.25)  mg/dL


 


Glucose     (74-99)  mg/dL


 


POC Glucose (mg/dL)  391 H  405 H  270 H  ()  mg/dL


 


Hemoglobin A1c     (<=6.0)  %


 


Magnesium     (1.6-2.3)  mg/dL














  03/26/25 03/26/25 03/26/25 Range/Units





  02:57 05:28 05:28 


 


WBC    26.5 H  (3.8-10.6)  k/uL


 


RBC    2.29 L  (4.30-5.90)  m/uL


 


Hgb    7.0 L  (13.0-17.5)  gm/dL


 


Hct    21.6 L  (39.0-53.0)  %


 


Neutrophils #    25.3 H  (1.3-7.7)  k/uL


 


Lymphocytes #    0.3 L  (1.0-4.8)  k/uL


 


Sodium     (137-145)  mmol/L


 


BUN     (9-20)  mg/dL


 


Creatinine     (0.66-1.25)  mg/dL


 


Glucose     (74-99)  mg/dL


 


POC Glucose (mg/dL)  307 H    ()  mg/dL


 


Hemoglobin A1c   7.2 H   (<=6.0)  %


 


Magnesium     (1.6-2.3)  mg/dL














  03/26/25 03/26/25 Range/Units





  05:28 06:38 


 


WBC    (3.8-10.6)  k/uL


 


RBC    (4.30-5.90)  m/uL


 


Hgb    (13.0-17.5)  gm/dL


 


Hct    (39.0-53.0)  %


 


Neutrophils #    (1.3-7.7)  k/uL


 


Lymphocytes #    (1.0-4.8)  k/uL


 


Sodium  132 L   (137-145)  mmol/L


 


BUN  118 H*   (9-20)  mg/dL


 


Creatinine  2.35 H   (0.66-1.25)  mg/dL


 


Glucose  206 H   (74-99)  mg/dL


 


POC Glucose (mg/dL)   241 H  ()  mg/dL


 


Hemoglobin A1c    (<=6.0)  %


 


Magnesium  2.8 H   (1.6-2.3)  mg/dL














Assessment and Plan


Assessment: 





Acute on chronic hypoxic respiratory failure, multifactorial, I suspect this is 

mostly a picture of pulmonary edema, underlying pneumonia is not entirely ruled 

out.  Hence the patient will remain on antibiotics and diuretics.


Acute exacerbation of chronic systolic congestive heart failure, recent 

echocardiogram from 02/24/2025 estimating a left ventricular ejection fraction 

of 45 to 50%, moderate pulmonary hypertension, and moderate tricuspid 

regurgitation.  Repeat echocardiogram showed improvement in LV function with 

ejection fraction 55%


Acute leukocytosis


Acute on chronic kidney disease


Recent influenza A infection


History of atrial fibrillation with previous cardioversion 


History of underlying COPD and chronic tobacco dependence


Benign essential hypertension


Type 2 diabetes


Acute on chronic anemia patient will require a unit of packed RBCs to be 

transfused today.  And will continue to monitor for any blood loss specially GI 

blood losses.


Plan: 





Continue to monitor in the ICU


Continue high flow nasal cannula, titrate FiO2 accordingly


Continue Lasix 40 mg IV Q12HR


Continue sodium bicarbonate tablet 650 mg PO BID


Continue bronchodilators


Continue Solu-Medrol at 40 mg IV push every 12 hours


Patient is accepted at [facility] and his Insulin drip is 

discontinued with addition of Lantus and sliding scale in preparation for 

discharge. Patient awaiting bed. 


Remains marginal at best


Remains quite ill, prognosis remains guarded.





Time with Patient: Greater than 30